# Patient Record
Sex: FEMALE | Race: WHITE | Employment: OTHER | URBAN - METROPOLITAN AREA
[De-identification: names, ages, dates, MRNs, and addresses within clinical notes are randomized per-mention and may not be internally consistent; named-entity substitution may affect disease eponyms.]

---

## 2017-05-09 ENCOUNTER — HOSPITAL ENCOUNTER (INPATIENT)
Facility: HOSPITAL | Age: 67
LOS: 9 days | Discharge: HOME | DRG: 268 | End: 2017-05-18
Attending: EMERGENCY MEDICINE | Admitting: SURGERY
Payer: MEDICARE

## 2017-05-09 ENCOUNTER — OFFICE VISIT (OUTPATIENT)
Dept: FAMILY MEDICINE CLINIC | Facility: CLINIC | Age: 67
End: 2017-05-09

## 2017-05-09 ENCOUNTER — SURGERY (OUTPATIENT)
Age: 67
End: 2017-05-09

## 2017-05-09 ENCOUNTER — ANESTHESIA (OUTPATIENT)
Dept: CARDIAC SURGERY | Facility: HOSPITAL | Age: 67
End: 2017-05-09

## 2017-05-09 ENCOUNTER — APPOINTMENT (OUTPATIENT)
Dept: CT IMAGING | Age: 67
DRG: 268 | End: 2017-05-09
Attending: EMERGENCY MEDICINE
Payer: MEDICARE

## 2017-05-09 ENCOUNTER — ANESTHESIA EVENT (OUTPATIENT)
Dept: CARDIAC SURGERY | Facility: HOSPITAL | Age: 67
End: 2017-05-09

## 2017-05-09 VITALS
OXYGEN SATURATION: 99 % | RESPIRATION RATE: 18 BRPM | HEART RATE: 84 BPM | BODY MASS INDEX: 25.77 KG/M2 | DIASTOLIC BLOOD PRESSURE: 70 MMHG | HEIGHT: 70 IN | SYSTOLIC BLOOD PRESSURE: 120 MMHG | WEIGHT: 180 LBS | TEMPERATURE: 98 F

## 2017-05-09 DIAGNOSIS — R10.31 RIGHT LOWER QUADRANT ABDOMINAL PAIN: Primary | ICD-10-CM

## 2017-05-09 DIAGNOSIS — I71.3 RUPTURED ABDOMINAL AORTIC ANEURYSM (AAA) (HCC): Primary | ICD-10-CM

## 2017-05-09 PROBLEM — I71.30 RUPTURED ABDOMINAL AORTIC ANEURYSM (AAA): Status: ACTIVE | Noted: 2017-05-09

## 2017-05-09 PROBLEM — R77.8 ELEVATED TROPONIN: Status: ACTIVE | Noted: 2017-05-09

## 2017-05-09 PROBLEM — E87.1 HYPONATREMIA: Status: ACTIVE | Noted: 2017-05-09

## 2017-05-09 PROBLEM — I71.30 RUPTURED ABDOMINAL AORTIC ANEURYSM (AAA) (HCC): Status: ACTIVE | Noted: 2017-05-09

## 2017-05-09 PROBLEM — R79.89 ELEVATED TROPONIN: Status: ACTIVE | Noted: 2017-05-09

## 2017-05-09 PROBLEM — D64.9 ANEMIA: Status: ACTIVE | Noted: 2017-05-09

## 2017-05-09 PROCEDURE — 76376 3D RENDER W/INTRP POSTPROCES: CPT | Performed by: EMERGENCY MEDICINE

## 2017-05-09 PROCEDURE — 99291 CRITICAL CARE FIRST HOUR: CPT | Performed by: INTERNAL MEDICINE

## 2017-05-09 PROCEDURE — 05HM33Z INSERTION OF INFUSION DEVICE INTO RIGHT INTERNAL JUGULAR VEIN, PERCUTANEOUS APPROACH: ICD-10-PCS | Performed by: HOSPITALIST

## 2017-05-09 PROCEDURE — 99222 1ST HOSP IP/OBS MODERATE 55: CPT | Performed by: INTERNAL MEDICINE

## 2017-05-09 PROCEDURE — 74174 CTA ABD&PLVS W/CONTRAST: CPT | Performed by: EMERGENCY MEDICINE

## 2017-05-09 PROCEDURE — 04100J8 BYPASS ABDOMINAL AORTA TO BILATERAL COMMON ILIAC ARTERIES WITH SYNTHETIC SUBSTITUTE, OPEN APPROACH: ICD-10-PCS | Performed by: SURGERY

## 2017-05-09 PROCEDURE — 04R00JZ REPLACEMENT OF ABDOMINAL AORTA WITH SYNTHETIC SUBSTITUTE, OPEN APPROACH: ICD-10-PCS | Performed by: SURGERY

## 2017-05-09 DEVICE — IMPLANTABLE DEVICE: Type: IMPLANTABLE DEVICE | Site: AORTA | Status: FUNCTIONAL

## 2017-05-09 RX ORDER — SODIUM CHLORIDE, SODIUM LACTATE, POTASSIUM CHLORIDE, CALCIUM CHLORIDE 600; 310; 30; 20 MG/100ML; MG/100ML; MG/100ML; MG/100ML
INJECTION, SOLUTION INTRAVENOUS CONTINUOUS
Status: DISCONTINUED | OUTPATIENT
Start: 2017-05-09 | End: 2017-05-12

## 2017-05-09 RX ORDER — MORPHINE SULFATE 4 MG/ML
8 INJECTION, SOLUTION INTRAMUSCULAR; INTRAVENOUS
Status: DISCONTINUED | OUTPATIENT
Start: 2017-05-09 | End: 2017-05-17

## 2017-05-09 RX ORDER — NITROGLYCERIN 20 MG/100ML
INJECTION INTRAVENOUS
Status: DISCONTINUED | OUTPATIENT
Start: 2017-05-09 | End: 2017-05-15

## 2017-05-09 RX ORDER — CEFAZOLIN SODIUM 1 G/3ML
INJECTION, POWDER, FOR SOLUTION INTRAMUSCULAR; INTRAVENOUS
Status: DISCONTINUED | OUTPATIENT
Start: 2017-05-09 | End: 2017-05-09 | Stop reason: HOSPADM

## 2017-05-09 RX ORDER — ONDANSETRON 2 MG/ML
4 INJECTION INTRAMUSCULAR; INTRAVENOUS ONCE
Status: COMPLETED | OUTPATIENT
Start: 2017-05-09 | End: 2017-05-09

## 2017-05-09 RX ORDER — MORPHINE SULFATE 4 MG/ML
4 INJECTION, SOLUTION INTRAMUSCULAR; INTRAVENOUS
Status: DISCONTINUED | OUTPATIENT
Start: 2017-05-09 | End: 2017-05-17

## 2017-05-09 RX ORDER — MORPHINE SULFATE 2 MG/ML
2 INJECTION, SOLUTION INTRAMUSCULAR; INTRAVENOUS
Status: DISCONTINUED | OUTPATIENT
Start: 2017-05-09 | End: 2017-05-17

## 2017-05-09 RX ORDER — ONDANSETRON 2 MG/ML
4 INJECTION INTRAMUSCULAR; INTRAVENOUS EVERY 6 HOURS PRN
Status: DISCONTINUED | OUTPATIENT
Start: 2017-05-09 | End: 2017-05-18

## 2017-05-09 RX ORDER — ASPIRIN 325 MG
325 TABLET, DELAYED RELEASE (ENTERIC COATED) ORAL NIGHTLY
Status: DISCONTINUED | OUTPATIENT
Start: 2017-05-09 | End: 2017-05-18

## 2017-05-09 RX ORDER — NICOTINE 21 MG/24HR
1 PATCH, TRANSDERMAL 24 HOURS TRANSDERMAL DAILY
Status: DISCONTINUED | OUTPATIENT
Start: 2017-05-09 | End: 2017-05-18

## 2017-05-09 RX ORDER — SODIUM CHLORIDE 9 MG/ML
INJECTION, SOLUTION INTRAVENOUS CONTINUOUS
Status: ACTIVE | OUTPATIENT
Start: 2017-05-09 | End: 2017-05-09

## 2017-05-09 RX ORDER — SODIUM CHLORIDE 9 MG/ML
INJECTION, SOLUTION INTRAVENOUS CONTINUOUS
Status: DISCONTINUED | OUTPATIENT
Start: 2017-05-09 | End: 2017-05-12

## 2017-05-09 RX ORDER — NALBUPHINE HCL 10 MG/ML
2.5 AMPUL (ML) INJECTION EVERY 4 HOURS PRN
Status: DISCONTINUED | OUTPATIENT
Start: 2017-05-09 | End: 2017-05-17

## 2017-05-09 RX ORDER — NALOXONE HYDROCHLORIDE 0.4 MG/ML
0.08 INJECTION, SOLUTION INTRAMUSCULAR; INTRAVENOUS; SUBCUTANEOUS
Status: DISCONTINUED | OUTPATIENT
Start: 2017-05-09 | End: 2017-05-18

## 2017-05-09 RX ORDER — DIPHENHYDRAMINE HYDROCHLORIDE 50 MG/ML
12.5 INJECTION INTRAMUSCULAR; INTRAVENOUS EVERY 4 HOURS PRN
Status: DISCONTINUED | OUTPATIENT
Start: 2017-05-09 | End: 2017-05-18

## 2017-05-09 RX ORDER — ONDANSETRON 2 MG/ML
4 INJECTION INTRAMUSCULAR; INTRAVENOUS EVERY 6 HOURS PRN
Status: DISCONTINUED | OUTPATIENT
Start: 2017-05-09 | End: 2017-05-09

## 2017-05-09 RX ORDER — HYDROMORPHONE HYDROCHLORIDE 1 MG/ML
0.4 INJECTION, SOLUTION INTRAMUSCULAR; INTRAVENOUS; SUBCUTANEOUS EVERY 30 MIN PRN
Status: DISCONTINUED | OUTPATIENT
Start: 2017-05-09 | End: 2017-05-16

## 2017-05-09 NOTE — PROGRESS NOTES
05/09/17 1157   Clinical Encounter Type   Visited With Family   Jainism Encounters   Jainism Needs (Jewish/prayer)   Family Spiritual Encounters   Family Coping ( provided emotional care of family.)

## 2017-05-09 NOTE — PLAN OF CARE
Received from OR accompanied , on RA. NG placed to LIS. Following commands, complaining of incisional pain, morphine initially given, then dilaudid pca started. 2 bolus doses given in addition with good pain control, currently sleeping.  Small amoun

## 2017-05-09 NOTE — ANESTHESIA POSTPROCEDURE EVALUATION
[place] Patient Status:  Inpatient   Age/Gender 78year old female MRN [id number]   [facility] 6NE-A Attending Brett Plummer MD   Hosp Day # 0 PCP Tamera Mendoza MD       Anesthesia Post-op Note    Procedure(s)

## 2017-05-09 NOTE — CONSULTS
[facility]    PATIENT'S NAME: Ludy Morales   ATTENDING PHYSICIAN: Ya Garg M.D.   CONSULTING PHYSICIAN: Ariadna Rolle M.D.    PATIENT ACCOUNT#:   [de-identified]    LOCATION:  [address] [place] [address]  MEDICAL RECORD #:   [id number]       DATE OF BIRTH: No neck vein distention, carotid bruits, thyroid enlargement. LUNGS:  Clear. HEART:  No murmurs, gallops, or rubs. ABDOMEN:  Mildly distended. There are normal bowel sounds present. EXTREMITIES:  Without clubbing, cyanosis, or edema.   Careful palpatio for adrenal tumor evaluation. 5.   If she has no coronary ischemic symptoms during the admission, we can consider her for an outpatient Lexiscan stress test soon after discharge, assuming echo shows no wall motion abnormalities.     6.   She would benefit

## 2017-05-09 NOTE — CONSULTS
[facility]  Report of Consultation    Ann Mariecamelia Fraserh Patient Status:  Inpatient    1950 MRN [id number]   [facility] 6NE-A Attending Lindsey Liu MD   Hosp Day # 0 PCP Suze Del Castillo MD     Reason for Consultation:  Medical removal     Family History   Problem Relation Age of Onset   • Heart Disorder Neg    • Diabetes Neg    • Hypertension Neg    • Cancer Neg      Social History:   reports that she has been smoking Cigarettes. She has a 23.5 pack-year smoking history.  She ha pulse 78, temperature 96.8 °F (36 °C), temperature source Temporal, resp. rate 17, height 5' 10\" (1.778 m), weight 185 lb (83.915 kg), SpO2 92 %. General: No acute distress. Alert and oriented x 3. HEENT: Moist mucous membranes. EOM-I.  PERRL  Neck: No l surgeon  2. Leukocytosis-possibly stress-induced. Patient given IV Ancef and vancomycin perioperatively by surgeon. Follow CBC. Patient afebrile. UA slightly cloudy no WBC, nitrites and leukoesterase negative. Follow urine culture and chest x-ray.   3.

## 2017-05-09 NOTE — ED NOTES
CT complete. EAS to CT to transport pt to [facility]. No change in status.   Pt continues to c/o back pain

## 2017-05-09 NOTE — CONSULTS
[facility]  Report of Consultation    Max Main Patient Status:  Inpatient    1950 MRN [id number]   [facility] 6NE-A Attending Steve Montgomery MD   Hosp Day # 0 PCP Nicole Blizzard, MD     Reason for Consultation:  Julia López **OR** morphINE sulfate (PF) 4 MG/ML injection 8 mg, 8 mg, Intravenous, Q1H PRN  •  Vancomycin HCl (VANCOCIN) 1,250 mg in sodium chloride 0.9 % 500 mL IVPB, 15 mg/kg, Intravenous, Once  •  nitroGLYCERIN infusion 50mg in D5W 250ml, 5-400 mcg/min, Intravenou 0.6 05/09/2017   TP 8.5 05/09/2017   AST 16 05/09/2017   ALT 17 05/09/2017   LIP 82 05/09/2017   PGLU 142 05/09/2017         BUN (mg/dL)   Date Value   05/09/2017 11   04/23/2014 14   ----------  CREATININE (mg/dL)   Date Value   05/09/2017 0.79   04/23/20

## 2017-05-09 NOTE — ANESTHESIA PREPROCEDURE EVALUATION
PRE-OP EVALUATION    Patient Name: Julieta Hobbs    Pre-op Diagnosis: abdominal aortic aneurysm    Procedure(s):  endovascular repair of abdominal aortic aneurysm    Surgeon(s) and Role:     Purvi Fountain MD - Primary    Pre-op vitals reviewed.   Temp 05/09/2017   CREATSERUM 0.79 05/09/2017   * 05/09/2017   CA 10.1 05/09/2017            Airway      Mallampati: II  Mouth opening: >3 FB  TM distance: > 6 cm  Neck ROM: full Cardiovascular    Cardiovascular exam normal.         Dental    No notable d

## 2017-05-09 NOTE — CONSULTS
alexa amg cardiology consult: full note dictated    77 y/o wf presents with abdominal pain. Sent to [facility] where a huge AAA was found. By cta appeared to have some dye in wall of aneurysm. She went directly to OR. She is postop awake and extubated.  Sh

## 2017-05-09 NOTE — ED PROVIDER NOTES
Patient Seen in: [facility] Cardiovascular Surgery    History   Patient presents with:  Abdomen/Flank Pain (GI/)  Nausea/Vomiting/Diarrhea (gastrointestinal)    Stated Complaint: abd pain, urinary frequency sent by [facility]    HPI    This is a 66-year- Temp(Src) 98 °F (36.7 °C) (Oral)  Resp 16  Ht 177.8 cm (5' 10\")  Wt 83.915 kg  BMI 26.54 kg/m2  SpO2 100%        Physical Exam    The patient is alert and oriented ×3 appears somewhat uncomfortable HEENT exam within normal limits neck there is no lymphade CBC W/ DIFFERENTIAL[[id number]]          Abnormal            Final result                 Please view results for these tests on the individual orders. TYPE AND SCREEN    Narrative: The following orders were created for panel order TYPE AND SCREEN. Other etiologies include pheochromocytoma or metastases. 3. Nonobstructing right renal calculus measuring 11 mm.  4. Bilateral renal cysts. 5. Cholelithiasis. 6. There are numerous other additional incidental findings.  Please see  Results as above shows

## 2017-05-09 NOTE — PROGRESS NOTES
Pt presents to [facility] for lower right sided abd pain x 3 days. Pt states she has had n/v today. Pt states it \"feels like a kidney infection\" which she has had in the past. Pt states the pain is a 7 or 8/10 and is constant.  Pt states she can't stop pacing d/

## 2017-05-09 NOTE — CONSULTS
[facility] Pharmacy Note:  Pain Consult    Elias Torres is a 78year old female started on Dilaudid PCA by Dr. Sj Javier. Pharmacy was consulted to review medication profile and to discontinue previously ordered narcotics and sedatives.     Medication profile was

## 2017-05-10 ENCOUNTER — APPOINTMENT (OUTPATIENT)
Dept: CV DIAGNOSTICS | Facility: HOSPITAL | Age: 67
DRG: 268 | End: 2017-05-10
Attending: INTERNAL MEDICINE
Payer: MEDICARE

## 2017-05-10 ENCOUNTER — APPOINTMENT (OUTPATIENT)
Dept: GENERAL RADIOLOGY | Facility: HOSPITAL | Age: 67
DRG: 268 | End: 2017-05-10
Attending: INTERNAL MEDICINE
Payer: MEDICARE

## 2017-05-10 PROCEDURE — 71010 XR CHEST AP PORTABLE  (CPT=71010): CPT | Performed by: INTERNAL MEDICINE

## 2017-05-10 PROCEDURE — 99233 SBSQ HOSP IP/OBS HIGH 50: CPT | Performed by: INTERNAL MEDICINE

## 2017-05-10 PROCEDURE — 93306 TTE W/DOPPLER COMPLETE: CPT | Performed by: INTERNAL MEDICINE

## 2017-05-10 PROCEDURE — 99232 SBSQ HOSP IP/OBS MODERATE 35: CPT | Performed by: INTERNAL MEDICINE

## 2017-05-10 RX ORDER — DILTIAZEM HYDROCHLORIDE 5 MG/ML
INJECTION INTRAVENOUS
Status: COMPLETED
Start: 2017-05-10 | End: 2017-05-10

## 2017-05-10 RX ORDER — METOPROLOL TARTRATE 5 MG/5ML
2.5 INJECTION INTRAVENOUS EVERY 6 HOURS
Status: DISCONTINUED | OUTPATIENT
Start: 2017-05-10 | End: 2017-05-13

## 2017-05-10 RX ORDER — DEXTROSE, SODIUM CHLORIDE, AND POTASSIUM CHLORIDE 5; .45; .15 G/100ML; G/100ML; G/100ML
INJECTION INTRAVENOUS CONTINUOUS
Status: DISCONTINUED | OUTPATIENT
Start: 2017-05-10 | End: 2017-05-12

## 2017-05-10 RX ORDER — POTASSIUM CHLORIDE 14.9 MG/ML
20 INJECTION INTRAVENOUS ONCE
Status: COMPLETED | OUTPATIENT
Start: 2017-05-10 | End: 2017-05-10

## 2017-05-10 RX ORDER — DILTIAZEM HYDROCHLORIDE 5 MG/ML
10 INJECTION INTRAVENOUS ONCE
Status: COMPLETED | OUTPATIENT
Start: 2017-05-10 | End: 2017-05-10

## 2017-05-10 NOTE — CONSULTS
[facility]    PATIENT'S NAME: Katy Saunders   ATTENDING PHYSICIAN: AYSE Oropeza PHYSICIAN: Keiko Castañeda M.D.    PATIENT ACCOUNT#:   [de-identified]    LOCATION:  [address] [place] [address]  MEDICAL RECORD #:   [id number]       DATE OF BIRTH: [birth year].  Heart rate 72 to 84, respirations 20. HEENT:  Pupils equal, round. LUNGS:  Breath sounds bilaterally. HEART:  Appeared normal without a murmur. ABDOMEN:  Soft. There was a large pulsatile mass, tender.   EXTREMITIES:  The femoral pulses are palpab AYSE [place]wood

## 2017-05-10 NOTE — PROGRESS NOTES
[facility]  Nephrology Progress Note    Ela Escalona Patient Status:  Inpatient    1950 MRN [id number]   [facility] 6NE-A Attending Richar Kerns MD   Hosp Day # 1 PCP Iline Felty, MD       SUBJECTIVE:  Awake and alert PGLU  142*       Meds:     Current Facility-Administered Medications:  dextrose 5 % and 0.45 % NaCl with KCl 20 mEq infusion  Intravenous Continuous   metoprolol Tartrate (LOPRESSOR) 5 MG/5ML injection 2.5 mg 2.5 mg Intravenous Q6H   0.9%  NaCl infusion

## 2017-05-10 NOTE — PROGRESS NOTES
[facility]  Progress Note     Nericorina Rosario Patient Status:  Inpatient    1950 MRN [id number]   [facility] 6NE-A Attending Melania Singleton MD   Hosp Day # 1 PCP Stacy Spencer MD     Chief Complaint: medical managemnt aft Conclusions:    1. Left ventricle: The cavity size was mildly increased. Wall thickness was    normal. Systolic function was moderately reduced. The estimated ejection fraction was 30-35%.  The study is not technically sufficient to allow evaluation of LV d demand ischemia–cardiology  Following,  troponin lower but echo shows reduced LVEF, macy will need cardiac ischemic w/u ? LHC when renal function stable   Meds per cards    7. Nicotine addiction–will give nicotine patch, smoking cessation advised  8.  Inc

## 2017-05-10 NOTE — PROGRESS NOTES
[facility]  Progress Note    Chapis Hollismilagros     Subjective:  No chest pain or shortness of breath. No significant abd pain with current pain meds. i again checked for history of outpt chest pain or hoskins: she denies.        Intake/Output:    Intake/Outp Facility-Administered Medications:  dextrose 5 % and 0.45 % NaCl with KCl 20 mEq infusion  Intravenous Continuous   metoprolol Tartrate (LOPRESSOR) 5 MG/5ML injection 2.5 mg 2.5 mg Intravenous Q6H   0.9%  NaCl infusion  Intravenous Continuous   aspirin EC adrenal mass  7. Ct chest when able to look more closely at lungs.      Gilmer Isaac  5/10/2017  11:45 AM

## 2017-05-10 NOTE — PLAN OF CARE
Sitting up to side of bed when went into rapid afib with RVR, 150-160's, denies any dizziness. EKG done.  called and orders received. Cardizem given with no change, amio bolus and infusion initiated.  Began to complain of upper rt arm discomfort

## 2017-05-10 NOTE — CONSULTS
INFECTIOUS DISEASE CONSULT NOTE    Miah Fernandez Patient Status:  Inpatient    1950 MRN [id number]   [facility] 6NE-A Attending Lauri Torres MD   Hosp Day # 1 PCP Jen Gomez 2.5 mg, 2.5 mg, Intravenous, Q6H  •  0.9%  NaCl infusion, , Intravenous, Continuous  •  aspirin EC EC tab 325 mg, 325 mg, Oral, Nightly  •  lactated ringers infusion, , Intravenous, Continuous  •  morphINE sulfate (PF) 2 MG/ML injection 2 mg, 2 mg, Atrur Ruano Exam:    General: No acute distress. Alert and oriented x 3. Vital signs: Blood pressure 106/64, pulse 96, temperature 100.3 °F (37.9 °C), temperature source Temporal, resp. rate 20, height 5' 10\" (1.778 m), weight 185 lb (83.915 kg), SpO2 92 %.   HEENT: RBCUR  6-10*   BACUR  1+*   EPIUR  Moderate*       Microbiology    Reviewed in EMR,   Ucx neg to date    Radiology: Cta Abd/pel (uvx=[id number])    5/9/2017  PROCEDURE:  CTA ABD/PEL (IGU=[id number]) COMPARISON:  None.   INDICATIONS:  abd pain, urinary frequency sen patent. LIVER:  There are a couple of small subcentimeter hypoattenuating lesions within the liver measuring 7 mm and 4 mm within the medial segment and lateral segment of the left lobes. These lesions are too small to definitively characterize.  There is m Mild dependent atelectasis. A few small blebs are seen in both lungs suggesting possibility of emphysematous disease. OTHER:  Negative. 5/9/2017  CONCLUSION:  1. Massive fusiform infrarenal abdominal aortic aneurysm with evidence of leakage.  The crit recommended. [place], CTA ABD/PEL (OVW=[id number]), 5/09/2017, 9:57. INDICATIONS:  postop AAA resection. adrenal tumor- r/o lung mass  PATIENT STATED HISTORY: (As transcribed by Technologist)     FINDINGS: Lung volumes are low.  The right [place]-Christine catheter s

## 2017-05-10 NOTE — PHYSICAL THERAPY NOTE
PHYSICAL THERAPY EVALUATION - INPATIENT     Room Number: 0324/8940-R  Evaluation Date: 5/10/2017  Type of Evaluation: Initial  Physician Order: PT Eval and Treat    Presenting Problem: AAA repair  Reason for Therapy: Mobility Dysfunction and Discharge extremity ROM and strength are within functional limits     Lower extremity ROM is within functional limits except for the following:  R hip flexion to 70 due to incisional pain    Lower extremity strength is within functional limits Right Hip flexion  3-/ min a for balance limited by fatigue. Patient requesting to return to supine. Sit to supine with min a for trunk and BLEs. Patient educated to perform ankle pumps. Discussed current recommendation for use of rw and plan for discharge home.   Patient als independent     Goal #4 Ascend/descend flight of stairs with rail modified Indepedent   Goal #5    Goal #6    Goal Comments: Goals established on 5/10/2017

## 2017-05-10 NOTE — PROGRESS NOTES
Awake/alert. Denies chest pain/shortness of breath. Has gone inti S-zci-qteslgepd being treated-awaiting cardizem drip. Neurologically intact. Urine output good. Continue supportive care- cardiology following cardiac status. CSA to follow for tomorrow.

## 2017-05-10 NOTE — PROGRESS NOTES
05/10/17 0831   Clinical Encounter Type   Visited With Patient and family together  ( )   Routine Visit Introduction   Continue Visiting No  (Will call as needed)   Patient's Supportive Strategies/Resources  assisted patient/ to id

## 2017-05-10 NOTE — PLAN OF CARE
Received this am, awake, sitting up in chair, dilaudid pca for pain control, rates pain 5/10, comfortable per pt. PT/OT at bedside, ambulated to door. ECHO done. NG to LIS, taking small amounts of ice chips.  consulted.  Plan of care updated, Conrado Sorto

## 2017-05-11 ENCOUNTER — APPOINTMENT (OUTPATIENT)
Dept: ULTRASOUND IMAGING | Facility: HOSPITAL | Age: 67
DRG: 268 | End: 2017-05-11
Attending: SURGERY
Payer: MEDICARE

## 2017-05-11 ENCOUNTER — PRIOR ORIGINAL RECORDS (OUTPATIENT)
Dept: OTHER | Age: 67
End: 2017-05-11

## 2017-05-11 PROCEDURE — 93970 EXTREMITY STUDY: CPT | Performed by: SURGERY

## 2017-05-11 PROCEDURE — 99233 SBSQ HOSP IP/OBS HIGH 50: CPT | Performed by: INTERNAL MEDICINE

## 2017-05-11 PROCEDURE — 05H533Z INSERTION OF INFUSION DEVICE INTO RIGHT SUBCLAVIAN VEIN, PERCUTANEOUS APPROACH: ICD-10-PCS | Performed by: HOSPITALIST

## 2017-05-11 RX ORDER — SODIUM CHLORIDE 0.9 % (FLUSH) 0.9 %
10 SYRINGE (ML) INJECTION EVERY 12 HOURS
Status: DISCONTINUED | OUTPATIENT
Start: 2017-05-11 | End: 2017-05-18

## 2017-05-11 RX ORDER — ECHINACEA PURPUREA EXTRACT 125 MG
1 TABLET ORAL
Status: DISCONTINUED | OUTPATIENT
Start: 2017-05-11 | End: 2017-05-18

## 2017-05-11 NOTE — CM/SW NOTE
Opened per case find. Pt admitted 5/9 with ruptured AAA. Met with pt and . They are both retired. Pt is independent. Owns a rolling walker and cane. No hx of rehab or HH. Pt may consider HH. Pt/ot working with pt and recommend home.    Pt still

## 2017-05-11 NOTE — PROGRESS NOTES
[address] [place] [address]  TEL: [phone]  FAX: [phone]    Cecil Romulo Patient Status:  Inpatient    1950 MRN [id number]   [facility] 6NE-A Attending Marla Reyes MD   Hosp Day # 2 PCP Peter Robles 8. 5   ALB  3.5   --    --   2.0*   NA  135*  141  144  139   K  4.2  4.2  3.7  4.2  4.2   CL  102  109  110  108   CO2  24.0  22.0  27.0  27.0   ALKPHO  96   --    --   72   AST  16   --    --   137*   ALT  17   --    --   28   BILT  0.6   --    --   0.5 Follow up recommended.    Dictated by: Jose Correia MD on 5/10/2017 at 7:59     Approved by: Jose Correia MD      Dictated by: Jose Correia MD on 5/10/2017 at 8:01     Approved by: Jose Correia MD             5/10/2017  PROCEDURE:  XR [facility] AND [facility]

## 2017-05-11 NOTE — PROGRESS NOTES
[facility]  Progress Note     Simba Hart Patient Status:  Inpatient    1950 MRN [id number]   [facility] 6NE-A Attending Michaelle Smalls MD   Hosp Day # 2 PCP Deb Davidson MD     Chief Complaint: medical management af PTP  14.6*   INR  1.13*       Recent Labs   Lab  05/09/17   1549  05/09/17   2208  05/10/17   0525   TROP  0.099*  1.500*  0.873*   CK  50   --    --           Imaging: Imaging data reviewed in Epic.     MICRO:   Blood P   Medications:   • vancomycin  15 addiction– nicotine patch, smoking cessation advised  9. Incidental gallstones-no pericholecystic inflammation seen on CT.  Intrahepatic bile ducts appear mildly dilated- follow LFT's,  10.  Incidental left kidney cyst and 11 mm non obstructing calculus in

## 2017-05-11 NOTE — PROGRESS NOTES
[facility]  Progress Note    Pascual Martell     Subjective:  Denies chest pain or shortness of breath      Intake/Output:    Intake/Output Summary (Last 24 hours) at 05/11/17 1251  Last data filed at 05/11/17 0700   Gross per 24 hour   Intake 3002. 92 oral liquid spray 1-5 spray 1-5 spray Oral Q2H PRN   dextrose 5 % and 0.45 % NaCl with KCl 20 mEq infusion  Intravenous Continuous   metoprolol Tartrate (LOPRESSOR) 5 MG/5ML injection 2.5 mg 2.5 mg Intravenous Q6H   Amiodarone HCl (CORDARONE) 450 mg in dex in past  5. Pvcs: will need iv lopressor when BP allows  6. Adrenal mass: needs evaluation when recovering.      Plan:   - agree with IV antibiotics and IV fluids   - consider decreasing rate to 50 cc/hr and add an ionotrope such as milrinone/dobutamine

## 2017-05-11 NOTE — PROGRESS NOTES
[facility] dosing service    Initial Pharmacokinetic Consult for Vancomycin Dosing     Letha Vallejo is a 78year old female admitted on 5/9/17 who is being treated for leukocytosis post AAA. Pharmacy has been asked to dose Vancomycin by Dr. Ang Malhotra. Vancomycin trough level prior to the 4th dose. Goal trough level 15-20 ug/mL. 3.  Pharmacy will need BUN/Scr daily while on Vancomycin to assess renal function. 4.  Pharmacy will follow and monitor renal function changes, toxicity and efficacy.     Ph

## 2017-05-12 ENCOUNTER — APPOINTMENT (OUTPATIENT)
Dept: GENERAL RADIOLOGY | Facility: HOSPITAL | Age: 67
DRG: 268 | End: 2017-05-12
Attending: NURSE PRACTITIONER
Payer: MEDICARE

## 2017-05-12 ENCOUNTER — APPOINTMENT (OUTPATIENT)
Dept: ULTRASOUND IMAGING | Facility: HOSPITAL | Age: 67
DRG: 268 | End: 2017-05-12
Attending: NURSE PRACTITIONER
Payer: MEDICARE

## 2017-05-12 PROCEDURE — 99233 SBSQ HOSP IP/OBS HIGH 50: CPT | Performed by: INTERNAL MEDICINE

## 2017-05-12 PROCEDURE — 93970 EXTREMITY STUDY: CPT | Performed by: NURSE PRACTITIONER

## 2017-05-12 PROCEDURE — 71010 XR CHEST AP PORTABLE  (CPT=71010): CPT | Performed by: NURSE PRACTITIONER

## 2017-05-12 RX ORDER — POTASSIUM CHLORIDE 29.8 MG/ML
40 INJECTION INTRAVENOUS ONCE
Status: COMPLETED | OUTPATIENT
Start: 2017-05-12 | End: 2017-05-13

## 2017-05-12 RX ORDER — DIGOXIN 0.25 MG/ML
250 INJECTION INTRAMUSCULAR; INTRAVENOUS ONCE
Status: COMPLETED | OUTPATIENT
Start: 2017-05-12 | End: 2017-05-12

## 2017-05-12 RX ORDER — DILTIAZEM HYDROCHLORIDE 5 MG/ML
10 INJECTION INTRAVENOUS ONCE
Status: COMPLETED | OUTPATIENT
Start: 2017-05-12 | End: 2017-05-12

## 2017-05-12 RX ORDER — IPRATROPIUM BROMIDE AND ALBUTEROL SULFATE 2.5; .5 MG/3ML; MG/3ML
3 SOLUTION RESPIRATORY (INHALATION) EVERY 6 HOURS PRN
Status: DISCONTINUED | OUTPATIENT
Start: 2017-05-12 | End: 2017-05-18

## 2017-05-12 RX ORDER — POTASSIUM CHLORIDE 14.9 MG/ML
20 INJECTION INTRAVENOUS ONCE
Status: COMPLETED | OUTPATIENT
Start: 2017-05-12 | End: 2017-05-12

## 2017-05-12 RX ORDER — FUROSEMIDE 10 MG/ML
40 INJECTION INTRAMUSCULAR; INTRAVENOUS
Status: DISCONTINUED | OUTPATIENT
Start: 2017-05-12 | End: 2017-05-15

## 2017-05-12 RX ORDER — DILTIAZEM HYDROCHLORIDE 5 MG/ML
INJECTION INTRAVENOUS
Status: DISPENSED
Start: 2017-05-12 | End: 2017-05-13

## 2017-05-12 RX ORDER — FUROSEMIDE 10 MG/ML
20 INJECTION INTRAMUSCULAR; INTRAVENOUS ONCE
Status: COMPLETED | OUTPATIENT
Start: 2017-05-12 | End: 2017-05-12

## 2017-05-12 RX ORDER — FUROSEMIDE 10 MG/ML
40 INJECTION INTRAMUSCULAR; INTRAVENOUS ONCE
Status: COMPLETED | OUTPATIENT
Start: 2017-05-12 | End: 2017-05-12

## 2017-05-12 NOTE — PROGRESS NOTES
[facility]  Progress Note     Max Jose David Patient Status:  Inpatient    1950 MRN [id number]   [facility] 6NE-A Attending Steve Montgomery MD   Hosp Day # 3 PCP Nicole Blizzard, MD     Chief Complaint: medical management af ALKPHO  96   --    --   72  96   AST  16   --    --   137*  72*   ALT  17   --    --   28  30   BILT  0.6   --    --   0.5  0.8   TP  8.5*   --    --   6.0*  6.1    < > = values in this interval not displayed.        Recent Labs   Lab  05/09/17   1525   P dimer  3. Add nebs  4. Needs gently dieursis- lasix 20 mg IV now   5. Cont IS- only getting 750 today    6. Expected anemia of acute blood loss– Hgb stable 10.1     Follow   7. Hyponatremia secondary dehydration–resolved  8.  Elevated troponin possibly due

## 2017-05-12 NOTE — PHYSICAL THERAPY NOTE
PHYSICAL THERAPY TREATMENT NOTE - INPATIENT    Room Number: 5259/1071-U     Session: 1   Number of Visits to Meet Established Goals: 5    Presenting Problem: AAA repair    Problem List  Principal Problem:    Ruptured abdominal aortic aneurysm (AAA) ([facility] [address] Score:  Raw Score: 18   PT Approx Degree of Impairment Score: 46.58%   Standardized Score (AM-PAC Scale): 43.63   CMS Modifier (G-Code): CK    FUNCTIONAL ABILITY STATUS  Gait Assessment   Gait Assistance: Maximum assistance  Distance (ft): 50  Assistive De of function. The patient scored 13/20 on the Elderly Mobility Scale this session, indicating patient is borderline in terms of safe mobility, however, confident that this patient will progress during hospital admission.  Recommend Pt D/C home with HHPT in o

## 2017-05-12 NOTE — PLAN OF CARE
Received this am awake and alert, dilaudid pca with good pain control. Denies nausea,  at bedside, ng clamped, will assess for possible removal later today. Crackles heard, lasix given, ivf's dc'd as ordered.  Weaning high flow oxygen as able to [employer]

## 2017-05-12 NOTE — PLAN OF CARE
At 1600 ng hooked to suction, only 50cc out. Denied any nausea or abd discomfort while clamped.  called and updated, ok to remove NG. Once NG removed, sao2 increased to >94% on room air, pt stating \"I feel so much better\".

## 2017-05-12 NOTE — OCCUPATIONAL THERAPY NOTE
OCCUPATIONAL THERAPY EVALUATION - INPATIENT     Room Number: 9813/3832-K  Evaluation Date: 5/12/2017  Type of Evaluation: Initial  Presenting Problem: ruptured abdominal aortic aneurysm    Physician Order: IP Consult to Occupational Therapy  Reason for The Pt able to appropriately participate in conversation during session. Behavioral/Emotional/Social: Pt was cooperative and motivated during session. RANGE OF MOTION AND STRENGTH ASSESSMENT  Upper extremity ROM is impaired R shdr actively.   Pt able t light within reach;RN aware of session/findings; All patient questions and concerns addressed;SCDs in place    ASSESSMENT     Patient is a 78year old female admitted 5/9/2017 for ruptured abdominal aortic aneurysm.   In this OT evaluation patient presents w

## 2017-05-12 NOTE — PROGRESS NOTES
[facility]  Progress Note    Harvey Ly Patient Status:  Inpatient    1950 MRN [id number]   [facility] 6NE-A Attending Colleen Solomon MD   Hosp Day # 3 PCP Deon Hoffman MD       Subjective:   Worsening resp status overn ALKPHO 96 05/12/2017   BILT 0.8 05/12/2017   TP 6.1 05/12/2017   AST 72 05/12/2017   ALT 30 05/12/2017   MG 2.2 05/12/2017       Medications:    • vancomycin  15 mg/kg Intravenous Q12H   • Normal Saline Flush  10 mL Intravenous Q12H   • metoprolol Cristóbal Moscoso understands about her heart being moderately weakened. The atrial fib and pvcs/couplets and a couple days ago 2 triplets, all manifestations of her silent cardiac disease.      I think the best option would be to diurese her slowly, the plan on a radial art

## 2017-05-12 NOTE — PROGRESS NOTES
[facility]  TEL: [phone]  FAX: [phone]    [facility] Patient Status:  Inpatient    1950 MRN [id number]   [facility] 6NE-A Attending Lindsey Liu MD   Hosp Day # 3 PCP Robbie Avelar 27.0  27.0  25.0   ALKPHO   --   72  96   AST   --   137*  72*   ALT   --   28  30   BILT   --   0.5  0.8   TP   --   6.0*  6.1       Microbiology    Reviewed in EMR,   Bcx neg    Radiology:   PROCEDURE: XR CHEST AP PORTABLE (CPT=71010)    TECHNIQUE: AP ch

## 2017-05-13 PROCEDURE — 99233 SBSQ HOSP IP/OBS HIGH 50: CPT | Performed by: INTERNAL MEDICINE

## 2017-05-13 RX ORDER — POTASSIUM CHLORIDE 14.9 MG/ML
20 INJECTION INTRAVENOUS ONCE
Status: COMPLETED | OUTPATIENT
Start: 2017-05-13 | End: 2017-05-13

## 2017-05-13 RX ORDER — METOPROLOL TARTRATE 5 MG/5ML
5 INJECTION INTRAVENOUS EVERY 6 HOURS
Status: COMPLETED | OUTPATIENT
Start: 2017-05-13 | End: 2017-05-14

## 2017-05-13 RX ORDER — POTASSIUM CHLORIDE 29.8 MG/ML
40 INJECTION INTRAVENOUS ONCE
Status: COMPLETED | OUTPATIENT
Start: 2017-05-13 | End: 2017-05-13

## 2017-05-13 NOTE — PROGRESS NOTES
[facility]  TEL: [phone]  FAX: [phone]    Daniela Figueroa Patient Status:  Inpatient    1950 MRN [id number]   [facility] 6NE-A Attending Cassi Del Cid MD   Hosp Day # 4 PCP Rafy Hernandez ALKPHO  72  96   --    --    --    AST  137*  72*   --    --    --    ALT  28  30   --    --    --    BILT  0.5  0.8   --    --    --    TP  6.0*  6.1   --    --    --        Microbiology    Reviewed in EMR,   Bcx neg    Radiology:   reviewed    ASSESSME

## 2017-05-13 NOTE — PLAN OF CARE
Ambulated in hallway this evening, tolerated well, at end of walk and sitting on side of bed, noted on monitor, v-tach, pt stating \"I feel like my legs are weak\".  Laid down, converted to rapid afib with heart rate 150-160's, pt remaining awake throughout

## 2017-05-13 NOTE — PROGRESS NOTES
[facility]   [facility] Progress Note    Jamir Segovia Patient Status:  Inpatient    1950 MRN [id number]   [facility] 6NE-A Attending Thelma Last MD   Hosp Day # 4 PCP Milvia Schwarz MD     Subjective:  Feeling better now paramjit 5-400 mcg/min Intravenous Titrated   ondansetron HCl (ZOFRAN) injection 4 mg 4 mg Intravenous Q6H PRN   Naloxone HCl (NARCAN) 0.4 MG/ML injection 0.08 mg 0.08 mg Intravenous Q5 Min PRN   DiphenhydrAMINE HCl (BENADRYL) injection 12.5 mg 12.5 mg Intravenous

## 2017-05-13 NOTE — PROGRESS NOTES
[facility] dosing service    Follow-up Pharmacokinetic Consult for Vancomycin Dosing     Lakshmi Gu is a 78year old female admitted on  who is being treated for fevers and elevated WBCs. Patient is on day 2 of Vancomycin 1.25 gm IV Q 12 hours.   G function. 4.  Pharmacy will follow and monitor renal function changes, toxicity and efficacy.     Jose Alfredo Evangelista, PharmD  5/12/2017  7:44 PM  [address] [place] [address] Extension: [phone]

## 2017-05-13 NOTE — PROGRESS NOTES
[facility]  Progress Note    Julieta Hobbs Patient Status:  Inpatient    1950 MRN [id number]   [facility] 6NE-A Attending Freedom Roblero MD   Hosp Day # 4 PCP Jaci Mccrary MD       Subjective:  Denies chest pain or dyspne 29.9*  30.8*  28.4*   MCV  91.4  88.8  87.7   MCH  30.0  29.1  29.3   MCHC  32.8  32.8  33.5   RDW  14.7  14.5  14.4   NEPRELIM  18.41*  21.69*  12.42*   WBC  24.3*  26.5*  16.9*   PLT  206.0  255.0  294.0       Medications:    • potassium chloride  20 mEq

## 2017-05-13 NOTE — PROGRESS NOTES
[facility]  Progress Note     Bryn Page Patient Status:  Inpatient    1950 MRN [id number]   [facility] 6NE-A Attending David Remy MD   Hosp Day # 4 PCP Allen De Leon MD     Chief Complaint: medical management af Imaging: Imaging data reviewed in Epic.     MICRO:   Blood / urine NGTD    Medications:   • furosemide  40 mg Intravenous BID (Diuretic)   • vancomycin  15 mg/kg Intravenous Q12H   • Normal Saline Flush  10 mL Intravenous Q12H   • metoprolol Tartrate  2 Intrahepatic bile ducts appear mildly dilated- follow LFT's,  11. Incidental left kidney cyst and 11 mm non obstructing calculus in lower pole of the right kidney-advised plenty of oral fluids,     12. Incidental left adrenal gland mass-  13.  Abnormal TFT-

## 2017-05-14 PROCEDURE — 99233 SBSQ HOSP IP/OBS HIGH 50: CPT | Performed by: INTERNAL MEDICINE

## 2017-05-14 RX ORDER — POTASSIUM CHLORIDE 14.9 MG/ML
20 INJECTION INTRAVENOUS ONCE
Status: COMPLETED | OUTPATIENT
Start: 2017-05-14 | End: 2017-05-14

## 2017-05-14 RX ORDER — AMIODARONE HYDROCHLORIDE 200 MG/1
400 TABLET ORAL 3 TIMES DAILY
Status: DISCONTINUED | OUTPATIENT
Start: 2017-05-14 | End: 2017-05-17

## 2017-05-14 RX ORDER — HYDROCODONE BITARTRATE AND ACETAMINOPHEN 5; 325 MG/1; MG/1
2 TABLET ORAL EVERY 4 HOURS PRN
Status: DISCONTINUED | OUTPATIENT
Start: 2017-05-14 | End: 2017-05-18

## 2017-05-14 RX ORDER — HYDROCODONE BITARTRATE AND ACETAMINOPHEN 5; 325 MG/1; MG/1
1 TABLET ORAL EVERY 4 HOURS PRN
Status: DISCONTINUED | OUTPATIENT
Start: 2017-05-14 | End: 2017-05-18

## 2017-05-14 RX ORDER — METOPROLOL TARTRATE 50 MG/1
50 TABLET, FILM COATED ORAL
Status: DISCONTINUED | OUTPATIENT
Start: 2017-05-14 | End: 2017-05-18

## 2017-05-14 NOTE — PROGRESS NOTES
JIMMY HOSPITALIST  Progress Note     Chela Cancer Patient Status:  Inpatient    1950 MRN [id number]   [facility] 6NE-A Attending Baudilio Clarke MD   Hosp Day # 5 PCP Toshia Florian MD     Chief Complaint: medical management af Medications:   • potassium chloride 40mEq IVPB (peripheral line)  40 mEq Intravenous Once    Followed by   • potassium chloride  20 mEq Intravenous Once   • amiodarone HCl  400 mg Oral TID   • metoprolol tartrate  50 mg Oral 2x Daily(Beta Blocker)   • me ischemic w/u ? LHC when renal function stable   Meds per cards    9. Nicotine addiction– nicotine patch, smoking cessation advised  10.  Incidental gallstones-no pericholecystic inflammation seen on CT.  Intrahepatic bile ducts appear mildly dilated- follow

## 2017-05-14 NOTE — PLAN OF CARE
Assumed care of patient at [address] [place] [address]. Patient alert, oriented, and neurologically intact. VSS and patient complaining of pain which is relieved with medication. IV in place and stable. Amio transitioned to PO per cardiology. Will attempt to wean dilaudid PCA.  P

## 2017-05-14 NOTE — PROGRESS NOTES
No complaints-passing gas. Afebrile/ blood pressure stable. Wound clean/dry. Possible cardiac cath tomorrow. Doppler flow right/left extremities.  Neurologically intact, Start clear liquids

## 2017-05-14 NOTE — PROGRESS NOTES
[facility]   [facility] Progress Note    Letha Vallejo Patient Status:  Inpatient    1950 MRN [id number]   [facility] 6NE-A Attending Brien Roth MD   Hosp Day # 5 PCP Dinesh Mccray MD     Subjective:  Sitting up in chair, f injection 4 mg 4 mg Intravenous Q6H PRN   Naloxone HCl (NARCAN) 0.4 MG/ML injection 0.08 mg 0.08 mg Intravenous Q5 Min PRN   DiphenhydrAMINE HCl (BENADRYL) injection 12.5 mg 12.5 mg Intravenous Q4H PRN   Nalbuphine HCl (NUBAIN) injection 2.5 mg 2.5 mg Intr

## 2017-05-14 NOTE — H&P
[address]    PATIENT'S NAME: Kimberly David   ATTENDING PHYSICIAN: Brandan Her M.D.    PATIENT ACCOUNT#:   [de-identified]    LOCATION:  [address] [place] [address]  MEDICAL RECORD #:   [id number]       YOB: 1950  ADMISSION DATE:       05/09/2017 normal.  ABDOMEN:  Distended, tender, particularly in the right lower quadrant. No bowel sounds. EXTREMITIES:  The femoral pulses appear to be palpable. The popliteal and pedal pulses are diminished bilaterally. She was moving all 4 extremities.     CT

## 2017-05-15 ENCOUNTER — APPOINTMENT (OUTPATIENT)
Dept: INTERVENTIONAL RADIOLOGY/VASCULAR | Facility: HOSPITAL | Age: 67
DRG: 268 | End: 2017-05-15
Attending: INTERNAL MEDICINE
Payer: MEDICARE

## 2017-05-15 PROCEDURE — B2151ZZ FLUOROSCOPY OF LEFT HEART USING LOW OSMOLAR CONTRAST: ICD-10-PCS | Performed by: INTERNAL MEDICINE

## 2017-05-15 PROCEDURE — 99233 SBSQ HOSP IP/OBS HIGH 50: CPT | Performed by: INTERNAL MEDICINE

## 2017-05-15 PROCEDURE — 4A023N7 MEASUREMENT OF CARDIAC SAMPLING AND PRESSURE, LEFT HEART, PERCUTANEOUS APPROACH: ICD-10-PCS | Performed by: INTERNAL MEDICINE

## 2017-05-15 PROCEDURE — B2111ZZ FLUOROSCOPY OF MULTIPLE CORONARY ARTERIES USING LOW OSMOLAR CONTRAST: ICD-10-PCS | Performed by: INTERNAL MEDICINE

## 2017-05-15 RX ORDER — MIDAZOLAM HYDROCHLORIDE 1 MG/ML
INJECTION INTRAMUSCULAR; INTRAVENOUS
Status: COMPLETED
Start: 2017-05-15 | End: 2017-05-15

## 2017-05-15 RX ORDER — HEPARIN SODIUM 5000 [USP'U]/ML
INJECTION, SOLUTION INTRAVENOUS; SUBCUTANEOUS
Status: COMPLETED
Start: 2017-05-15 | End: 2017-05-15

## 2017-05-15 RX ORDER — POTASSIUM CHLORIDE 29.8 MG/ML
40 INJECTION INTRAVENOUS ONCE
Status: DISCONTINUED | OUTPATIENT
Start: 2017-05-15 | End: 2017-05-15

## 2017-05-15 RX ORDER — HEPARIN SODIUM 5000 [USP'U]/ML
5000 INJECTION, SOLUTION INTRAVENOUS; SUBCUTANEOUS EVERY 8 HOURS SCHEDULED
Status: DISCONTINUED | OUTPATIENT
Start: 2017-05-15 | End: 2017-05-18

## 2017-05-15 RX ORDER — POTASSIUM CHLORIDE 14.9 MG/ML
20 INJECTION INTRAVENOUS ONCE
Status: DISCONTINUED | OUTPATIENT
Start: 2017-05-15 | End: 2017-05-15

## 2017-05-15 RX ORDER — LIDOCAINE HYDROCHLORIDE 10 MG/ML
INJECTION, SOLUTION INFILTRATION; PERINEURAL
Status: COMPLETED
Start: 2017-05-15 | End: 2017-05-15

## 2017-05-15 RX ORDER — ATORVASTATIN CALCIUM 40 MG/1
40 TABLET, FILM COATED ORAL NIGHTLY
Status: DISCONTINUED | OUTPATIENT
Start: 2017-05-15 | End: 2017-05-18

## 2017-05-15 RX ORDER — VERAPAMIL HYDROCHLORIDE 2.5 MG/ML
INJECTION, SOLUTION INTRAVENOUS
Status: COMPLETED
Start: 2017-05-15 | End: 2017-05-15

## 2017-05-15 RX ORDER — POTASSIUM CHLORIDE 14.9 MG/ML
20 INJECTION INTRAVENOUS ONCE
Status: COMPLETED | OUTPATIENT
Start: 2017-05-15 | End: 2017-05-15

## 2017-05-15 RX ORDER — FUROSEMIDE 10 MG/ML
40 INJECTION INTRAMUSCULAR; INTRAVENOUS DAILY
Status: DISCONTINUED | OUTPATIENT
Start: 2017-05-16 | End: 2017-05-17

## 2017-05-15 NOTE — PROGRESS NOTES
No complaints/denies chest pain/shortness of breath/passing gas with bowel movement. Afebrile/wounds clean/dry. Neurologically intact. Pulses present/ For cardiac cath today.

## 2017-05-15 NOTE — PROCEDURES
[facility]  Angio via Radial Artery Procedure Note    Sarajane Plane Location: Cath Lab    CSN [id number] MRN [id number]   Admission Date 5/9/2017 Procedure Date 5/15/2017   Attending Physician Nancy Osler, MD Procedure Physician Joe Cruz MD distal left main stenosis  3. The circumflex artery is 100% occluded after a ramus intermedius or high diagonal  4. There is a 95% proximal LAD stenosis    Impression:  1. Moderately reduced left ventricular function as outlined above  2.   Severe three-

## 2017-05-15 NOTE — PROGRESS NOTES
JIMMY HOSPITALIST  Progress Note     Myrna Adamsch Patient Status:  Inpatient    1950 MRN [id number]   [facility] 6NE-A Attending Benja Dunbar MD   Hosp Day # 6 PCP James Collier MD     Chief Complaint: medical management af Epic.    MICRO:   Blood / urine NGTD    Medications:   • potassium chloride 40mEq IVPB (peripheral line)  40 mEq Intravenous Once    Followed by   • potassium chloride  20 mEq Intravenous Once   • amiodarone HCl  400 mg Oral TID   • metoprolol tartrate  50 cessation advised  10. Incidental gallstones-no pericholecystic inflammation seen on CT.  Intrahepatic bile ducts appear mildly dilated- follow LFT's,  11.  Incidental left kidney cyst and 11 mm non obstructing calculus in lower pole of the right kidney-adv

## 2017-05-15 NOTE — OCCUPATIONAL THERAPY NOTE
OCCUPATIONAL THERAPY TREATMENT NOTE - INPATIENT     Room Number: 1256/7237-P  Session: 1   Number of Visits to Meet Established Goals: 5    Presenting Problem: ruptured abdominal aortic aneurysm    History related to current admission:admitted to er with a TRANSFER ASSESSMENT  Supine to Sit : Supervision  Sit to Stand: Minimum assistance    Skilled Therapy Provided: Pt received supine for session. Pt able to sit EOB to complete LE dressing donning pants and adjusting socks.   Educated pt on methods to perfor eval/education  Rehab Potential : Good  Frequency (Obs): Daily      OT Goals:   ADL Goals  Patient will perform all ADLs: with modified independent    Functional Transfer Goals  Patient will perform all functional transfers:  with modified independent    U

## 2017-05-15 NOTE — PHYSICAL THERAPY NOTE
PHYSICAL THERAPY TREATMENT NOTE - INPATIENT    Room Number: 2148/6195-T     Session: 2   Number of Visits to Meet Established Goals: 5  History related to current admission: admitted to er with abd/back pain x 2-3 days, ct revealed ruptured AAA, s/p emerg 3-5 steps with a railing?: A Little       AM-PAC Score:  Raw Score: 18   PT Approx Degree of Impairment Score: 46.58%   Standardized Score (AM-PAC Scale): 43.63   CMS Modifier (G-Code): CK    FUNCTIONAL ABILITY STATUS  Gait Assessment   Gait Assistance: Mi Grover Calvin  Frequency (Obs): Daily    CURRENT GOALS   Goal #1   Patient is able to demonstrate supine - sit EOB @ level: modified independent        Goal #2   Patient is able to demonstrate transfers Sit to/from Stand at assistance level: modified independent

## 2017-05-15 NOTE — PROGRESS NOTES
[facility]  Progress Note    Kimberlee Tatum Patient Status:  Inpatient    1950 MRN [id number]   [facility] 6NE-A Attending Anaid Perkins MD   [facility] Day # 6 PCP Katelyn Arenas MD       Subjective:  No complaints.  Overall doin (Diuretic)   • vancomycin  15 mg/kg Intravenous Q12H   • Normal Saline Flush  10 mL Intravenous Q12H   • aspirin  325 mg Oral Nightly   • nicotine  1 patch Transdermal Daily     • Nitroglycerin in D5W     • HYDROmorphone         Assessment:    · Ruptured A

## 2017-05-15 NOTE — PROGRESS NOTES
[address] [place] [address]  TEL: [phone]  FAX: [phone]    Lino Espinosa Patient Status:  Inpatient    1950 MRN [id number]   [facility] 6NE-A Attending Ricky Sanchez MD   Hosp Day # 6 CYN Naranjo Nearing --   29.0   --    --   29.0   ALKPHO   --    --    --    --    --   199*   AST   --    --    --    --    --   30   ALT   --    --    --    --    --   28   BILT   --    --    --    --    --   2.0   TP   --    --    --    --    --   6.1    < > = values in th

## 2017-05-16 ENCOUNTER — APPOINTMENT (OUTPATIENT)
Dept: CT IMAGING | Facility: HOSPITAL | Age: 67
DRG: 268 | End: 2017-05-16
Attending: INTERNAL MEDICINE
Payer: MEDICARE

## 2017-05-16 PROCEDURE — 99233 SBSQ HOSP IP/OBS HIGH 50: CPT | Performed by: INTERNAL MEDICINE

## 2017-05-16 PROCEDURE — 71250 CT THORAX DX C-: CPT | Performed by: INTERNAL MEDICINE

## 2017-05-16 RX ORDER — LISINOPRIL 2.5 MG/1
2.5 TABLET ORAL DAILY
Status: DISCONTINUED | OUTPATIENT
Start: 2017-05-16 | End: 2017-05-16

## 2017-05-16 RX ORDER — POTASSIUM CHLORIDE 20 MEQ/1
40 TABLET, EXTENDED RELEASE ORAL ONCE
Status: COMPLETED | OUTPATIENT
Start: 2017-05-16 | End: 2017-05-16

## 2017-05-16 RX ORDER — DOCUSATE SODIUM 100 MG/1
100 CAPSULE, LIQUID FILLED ORAL 2 TIMES DAILY
Status: DISCONTINUED | OUTPATIENT
Start: 2017-05-16 | End: 2017-05-18

## 2017-05-16 RX ORDER — POLYETHYLENE GLYCOL 3350 17 G/17G
17 POWDER, FOR SOLUTION ORAL DAILY PRN
Status: DISCONTINUED | OUTPATIENT
Start: 2017-05-16 | End: 2017-05-18

## 2017-05-16 NOTE — CONSULTS
[facility] dosing service    Follow-up Pharmacokinetic Consult for Vancomycin Dosing     Paul Rosario is a 78year old female who is being treated for cellulitis. Patient had been on vanco  1.25g iv q12h from 5/11 to 5/15 with trough of 10.5 ug/ml. re-check Vancomycin trough levels prior to 3rd dose. Goal trough level 10-15 ug/mL. 3.  Pharmacy will need BUN/Scr daily while on Vancomycin to assess renal function.     4.  Pharmacy will follow and monitor renal function changes, toxicity and efficacy

## 2017-05-16 NOTE — PROGRESS NOTES
Discussed with Dr. Marilyn Potter. No complaints. Wounds clean/dry. Neurologically intact/tolerating diet.  Transfer to telemetry/start diet/ambulate

## 2017-05-16 NOTE — PROGRESS NOTES
[facility] Pharmacy Note:  Renal Adjustment for Ancef (cefazolin)    Ela Escalona is a 78year old female who has been prescribed Ancef (cefazolin) 2 grams IV every 8 hours.  Based on the patient's weight and renal function, the dose has been adjusted to Ancef

## 2017-05-16 NOTE — PROGRESS NOTES
[facility]  Progress Note    Myrna Tellez Patient Status:  Inpatient    1950 MRN [id number]   [facility] 6NE-A Attending Benja Dunbar MD   [facility] Day # 7 PCP James Collier MD       Subjective:  No complaints.  Denies cp or MG 2.2 05/12/2017       Medications:    • furosemide  40 mg Intravenous Daily   • Heparin Sodium (Porcine)  5,000 Units Subcutaneous [facility]   • atorvastatin  40 mg Oral Nightly   • amiodarone HCl  400 mg Oral TID   • metoprolol tartrate  50 mg Oral 2x D be forced to discharge her without revascularization of her coronaries, to see what their opinion is about timing of icd in this pt. She has a fair amount of scar on echo.  Finally, since no endocrinologist is on the case, i will order the metanephrines and

## 2017-05-16 NOTE — PHYSICAL THERAPY NOTE
Attempted to see pt for PT this afternoon. Per RN, Tl Cruz pt is on limited activity order till surgery due to low EF rate. Checked with pt, she reported that she has been up a lot due to diuretics and exercising her le's in the chair.  Pt deferred mobility ti

## 2017-05-16 NOTE — PROGRESS NOTES
JIMMY HOSPITALIST  Progress Note     [facility] Patient Status:  Inpatient    1950 MRN [id number]   [facility] 6NE-A Attending Anaid Perkins MD   Hosp Day # 7 PCP Katelyn Arenas MD     Chief Complaint: medical management af • aspirin  325 mg Oral Nightly   • nicotine  1 patch Transdermal Daily       ASSESSMENT / PLAN:     1.  Massive infrarenal fusiform abdominal aortic aneurysm- ? leaking  measures 8.2 x 9.0 cm with stranding in the retroperitoneal periaortic fat.   1. S/p removed, midline   5/11 placed      Estimated date of discharge:  TBD  Discharge is dependent on: clinical course  At this point Ms. Martell is expected to be discharge to: home   Plan of care discussed with RN, pt   YULIET Desai   5

## 2017-05-16 NOTE — PLAN OF CARE
[facility]est paperwork filled out and faxed to [facility]. Notified Paula Garcia of new referral.     To follow up on status tomorrow.       Melinda Rubinstein, NP   5/16/2017  5:09 PM

## 2017-05-16 NOTE — PLAN OF CARE
Assumed care of patient at 0730. Patient alert, oriented x4. No complaints of pain at this time. Tolerating PO intake. Able to ambulate in room with walker to the bathroom. Incision with gauze dressing in place, well approximated, no oozing.  Patient now si

## 2017-05-16 NOTE — PROGRESS NOTES
[facility]  TEL: [phone]  FAX: [phone]    Lino Espinosa Patient Status:  Inpatient    1950 MRN [id number]   [facility] 6NE-A Attending Ricky Sanchez MD   Hosp Day # 7 [facility]N [facility]   --   29.0   --   26.0   ALKPHO   --   199*   --   159*   AST   --   30   --   26   ALT   --   28   --   24   BILT   --   2.0   --   1.6   TP   --   6.1   --   5.9*       Microbiology    Reviewed in EMR,   Bcx neg at 4 days    Radiology:   reviewed

## 2017-05-17 PROCEDURE — 99232 SBSQ HOSP IP/OBS MODERATE 35: CPT | Performed by: HOSPITALIST

## 2017-05-17 RX ORDER — TEMAZEPAM 7.5 MG/1
7.5 CAPSULE ORAL NIGHTLY PRN
Status: DISCONTINUED | OUTPATIENT
Start: 2017-05-17 | End: 2017-05-18

## 2017-05-17 RX ORDER — AMIODARONE HYDROCHLORIDE 200 MG/1
400 TABLET ORAL 2 TIMES DAILY WITH MEALS
Status: DISCONTINUED | OUTPATIENT
Start: 2017-05-17 | End: 2017-05-18

## 2017-05-17 NOTE — PROGRESS NOTES
JIMMY HOSPITALIST  Progress Note     Lonza Rubinstein Patient Status:  Inpatient    1950 MRN [id number]   [facility] 6NE-A Attending Chong Meredith MD   Hosp Day # 8 PCP Mi Clifton MD     Chief Complaint: medical management af limitations of the examination. 2. There is a small filling defect within the right upper lobe bronchus which likely represents mucous plugging, however, a three-month follow chest CT is recommended to exclude an endobronchial lesion.   3. Minimal pulmonar packs    3. Repeat blood cultures P  4. A Fib-/ WCT-  1. Cards following  2.  amio  , BB  oral  3. K/ Mg levels replace   As needed   4. TFT  -  TSH 0.303, free t4 - 2.3    F/u as outpt     5.  Acute / Chronic systolic heart failure- LVEF 35%-   25 % on LH stated in subjective.     Objective:  Constitutional: no signs of distress; vitals stable  Cardiovascular: regular rate and rhythm, no murmurs, rubs, or gallops  Respiratory: clear to ausculation bilaterally; no labored breathing pattern  Abdomen: surgical

## 2017-05-17 NOTE — PLAN OF CARE
Assumed care of patient at [address], ambulate in room with walker, denies pain, VSS, NSR on tele, right upper arm midline in place, KENNEY C/D/I, report called to RN pt to transfer to [address] [facility]    • Maintains optimal cardiac output and he

## 2017-05-17 NOTE — PROGRESS NOTES
[facility]  Progress Note    Melvina Martell     Subjective:  No chest pain or shortness of breath. Walking in dailey without symps.        Intake/Output:    Intake/Output Summary (Last 24 hours) at 05/17/17 1130  Last data filed at 05/17/17 5182   Gross Heparin Sodium (Porcine) 5000 UNIT/ML injection 5,000 Units 5,000 Units Subcutaneous Q8H [address]   atorvastatin (LIPITOR) tab 40 mg 40 mg Oral Nightly   amiodarone HCl (PACERONE) tab 400 mg 400 mg Oral TID   Metoprolol Tartrate (LOPRESSOR) tab 50 mg 50 mg Or suspect discharge tomorrow if we can get lifevest. i have ordered a venous doppler legs for tomorrow as surveillance for dvt prior to discharge. i dont feel she needs a diuretic. She will need to see me next week in office- perhaps Monday, or Thursday.

## 2017-05-17 NOTE — PLAN OF CARE
Assumed care @ 1030  A&Ox4, VSS on RA, NSR on tele  Abdominal incision NAINA  Denies pain  Ambulated with PT    Plan for possible d/c tomorrow after venous doppler    CARDIOVASCULAR - ADULT    • Maintains optimal cardiac output and hemodynamic stability Prog

## 2017-05-17 NOTE — PLAN OF CARE
Pt transferred  at [phone]. Safety precautions initiated. Bed in low position. Call light within reach.

## 2017-05-17 NOTE — CONSULTS
I had the opportunity to see Mrs. Luis Alfredo Gan who entered the hospital a week ago with a leaking abdominal aortic aneurysm which required open repair which was successful. This was accomplished via aortobi-iliac grafting by Doctor Iris Ivy.   Her recovery h Physical examination shows an alert female sitting upright in her room in the ICU. Her  is present. Pupils are equal and round. Carotid pulses are equal bilaterally. I don't hear bruits.   Her lungs are fairly clear, perhaps the bases are diminis

## 2017-05-17 NOTE — PLAN OF CARE
Pt A/O X4. Lethargic. Benadryl given for sleep. RA. NSR on tele. VSS. EKG done in the morning. BS active, abdomen soft, mild tenderness. Midline abdominal incision with staples, dressing changed, CDI. Norco given for pain. Will continue to monitor.      CAR

## 2017-05-17 NOTE — PLAN OF CARE
Assumed care of patient at 10 am  AOx4 in NAD  Calm and cooperative  RA lungs clear; diminished to bases  SR; occasional PVC; no edema; SCD's ; heparin sub Q for DVT prophylaxis  Abdomen soft + BS; no BM yet; miralax and colace started as per order  Voids

## 2017-05-17 NOTE — PHYSICAL THERAPY NOTE
Attempted to visit patient for PT treatment. Pt currently refuses due to fatigue and reporting too much activity earlier today (up to bathroom  6 times and ambulating hallway). Pt agreeable to PT tomorrow.

## 2017-05-17 NOTE — CM/SW NOTE
Call received from [facility] representative sky--pt approved for lifevest--updated jacquelyn wesley--requested fitting for today--await call back with name and time of fitting--updated  carter

## 2017-05-18 ENCOUNTER — APPOINTMENT (OUTPATIENT)
Dept: ULTRASOUND IMAGING | Facility: HOSPITAL | Age: 67
DRG: 268 | End: 2017-05-18
Attending: INTERNAL MEDICINE
Payer: MEDICARE

## 2017-05-18 VITALS
SYSTOLIC BLOOD PRESSURE: 114 MMHG | DIASTOLIC BLOOD PRESSURE: 57 MMHG | HEART RATE: 77 BPM | RESPIRATION RATE: 17 BRPM | TEMPERATURE: 98 F | OXYGEN SATURATION: 98 % | BODY MASS INDEX: 27.84 KG/M2 | HEIGHT: 70 IN | WEIGHT: 194.44 LBS

## 2017-05-18 PROCEDURE — 99231 SBSQ HOSP IP/OBS SF/LOW 25: CPT | Performed by: HOSPITALIST

## 2017-05-18 PROCEDURE — 93970 EXTREMITY STUDY: CPT | Performed by: INTERNAL MEDICINE

## 2017-05-18 RX ORDER — HYDROCODONE BITARTRATE AND ACETAMINOPHEN 5; 325 MG/1; MG/1
2 TABLET ORAL EVERY 6 HOURS PRN
Qty: 30 TABLET | Refills: 0 | Status: ON HOLD | OUTPATIENT
Start: 2017-05-18 | End: 2017-05-31

## 2017-05-18 RX ORDER — NICOTINE 21 MG/24HR
1 PATCH, TRANSDERMAL 24 HOURS TRANSDERMAL DAILY
Qty: 21 PATCH | Refills: 0 | Status: SHIPPED | OUTPATIENT
Start: 2017-05-18 | End: 2017-06-09 | Stop reason: ALTCHOICE

## 2017-05-18 RX ORDER — NICOTINE 21 MG/24HR
1 PATCH, TRANSDERMAL 24 HOURS TRANSDERMAL DAILY
Qty: 21 PATCH | Refills: 0 | Status: SHIPPED | OUTPATIENT
Start: 2017-05-18 | End: 2017-05-18

## 2017-05-18 RX ORDER — TEMAZEPAM 7.5 MG/1
7.5 CAPSULE ORAL NIGHTLY PRN
Qty: 15 CAPSULE | Refills: 0 | Status: ON HOLD | OUTPATIENT
Start: 2017-05-18 | End: 2017-10-31

## 2017-05-18 RX ORDER — METOPROLOL TARTRATE 50 MG/1
50 TABLET, FILM COATED ORAL
Qty: 60 TABLET | Refills: 11 | Status: ON HOLD | OUTPATIENT
Start: 2017-05-18 | End: 2017-05-31

## 2017-05-18 RX ORDER — ATORVASTATIN CALCIUM 40 MG/1
40 TABLET, FILM COATED ORAL NIGHTLY
Qty: 30 TABLET | Refills: 11 | Status: SHIPPED | OUTPATIENT
Start: 2017-05-18 | End: 2019-04-25

## 2017-05-18 RX ORDER — DOXYCYCLINE HYCLATE 100 MG/1
100 CAPSULE ORAL 2 TIMES DAILY
Qty: 14 CAPSULE | Refills: 0 | Status: ON HOLD | OUTPATIENT
Start: 2017-05-18 | End: 2017-05-31

## 2017-05-18 RX ORDER — POTASSIUM CHLORIDE 20 MEQ/1
40 TABLET, EXTENDED RELEASE ORAL EVERY 4 HOURS
Status: COMPLETED | OUTPATIENT
Start: 2017-05-18 | End: 2017-05-18

## 2017-05-18 RX ORDER — AMIODARONE HYDROCHLORIDE 200 MG/1
200 TABLET ORAL DAILY
Qty: 30 TABLET | Refills: 11 | Status: SHIPPED | OUTPATIENT
Start: 2017-05-18 | End: 2019-04-25

## 2017-05-18 NOTE — PLAN OF CARE
Assumed care @ 0700  A&Ox4, VSS on RA, NSR on tele  Midline incision with staples  Pt states she had 5 formed BMs yesterday. 3 loose, watery BMs today. Sample sent for c.diff.  Marietta HORVATH and Dr. Mariah Gamboa updated  Venous doppler completed - neg for DVT    Plan

## 2017-05-18 NOTE — PROGRESS NOTES
[address] [facility]  TEL: [phone]  FAX: [phone]    Kimberlee [facility] Patient Status:  Inpatient    1950 MRN [id number]   [facility] 6NE-A Attending Anaid Perkins MD   Hosp Day # 9 PCP Alexa Katz date  Cdiff pend    Radiology:   reviewed    ASSESSMENT:    1. Leukocytosis - trending down   -wbc not back to normal yet but she is afebrile and seems to be improving overall    2. Phlebitis L wrist- sounds like due to infiltrated IV, better today.  Bcx ne

## 2017-05-18 NOTE — PROGRESS NOTES
JIMMY HOSPITALIST  Progress Note     Winston Chen Patient Status:  Inpatient    1950 MRN [id number]   [facility] 6NE-A Attending Duane Zavala MD   [facility] Day # 9 PCP Álvaro Duff MD     Chief Complaint: medical management af Sodium (Porcine)  5,000 Units Subcutaneous [facility]   • atorvastatin  40 mg Oral Nightly   • metoprolol tartrate  50 mg Oral 2x Daily(Beta Blocker)   • Normal Saline Flush  10 mL Intravenous Q12H   • aspirin  325 mg Oral Nightly   • nicotine  1 patch Transd pain w/ eating     12. Incidental left kidney cyst and 11 mm non obstructing calculus in lower pole of the right kidney-advised plenty of oral fluids,     13. Incidental left adrenal gland mass-  14.  Abnormal TFT- will need to be followed ,   PLAN of care

## 2017-05-18 NOTE — PROGRESS NOTES
[facility]  Progress Note    Meli Lee Patient Status:  Inpatient    1950 MRN [id number]   [facility] 6NE-A Attending Nathalie Hi MD   [facility] Day # 9 PCP Melony Wagner MD       Subjective:  No complaints.  Denies cp vancomycin  1,250 mg Intravenous Q12H   • Heparin Sodium (Porcine)  5,000 Units Subcutaneous Q8H [facility]   • atorvastatin  40 mg Oral Nightly   • metoprolol tartrate  50 mg Oral 2x Daily(Beta Blocker)   • Normal Saline Flush  10 mL Intravenous Q12H   • aspirin

## 2017-05-18 NOTE — PHYSICAL THERAPY NOTE
PHYSICAL THERAPY TREATMENT NOTE - INPATIENT    Room Number: 1802/0465-S     Session: 3   Number of Visits to Meet Established Goals: 5  History related to current admission: admitted to er with abd/back pain x 2-3 days, ct revealed ruptured AAA, s/p emerg None   -   Need to walk in hospital room?: A Little   -   Climbing 3-5 steps with a railing?: A Little       AM-PAC Score:  Raw Score: 22   PT Approx Degree of Impairment Score: 20.91%   Standardized Score (AM-PAC Scale): 53.28   CMS Modifier (G-Code): CJ causing increased HR during activity (HR remained WNL during activity). Pt was educated on techniques for relaxation and energy conservation.  Pt continues to demonstrate the following impairments: decreased endurance/activity tolerance, decreased knowledge

## 2017-05-18 NOTE — PROGRESS NOTES
[facility] dosing service    Follow-up Pharmacokinetic Consult for Vancomycin Dosing     Lucila Mallory is a 78year old female admitted on 5/9/17 who is being treated for cellulitis. Patient is on day 2 of restart of Vancomycin 1.25 gm IV Q 12 hours. to assess renal function. 4.  Pharmacy will follow and monitor renal function changes, toxicity and efficacy.     Hira Huang PharmD  5/17/2017  8:40 PM  [address] [place] [address] Extension: [phone]

## 2017-05-18 NOTE — PLAN OF CARE
CARDIOVASCULAR - ADULT    • Maintains optimal cardiac output and hemodynamic stability Adequate for Discharge    • Absence of cardiac arrhythmias or at baseline Adequate for Discharge        Impaired Activities of Daily Living    • Achieve highest/safest l

## 2017-05-18 NOTE — PROGRESS NOTES
[address] [place] [address]  TEL: [phone]  FAX: [phone]    Julieta Hobbs Patient Status:  Inpatient    1950 MRN [id number]   [facility] 6NE-A Attending Freedom Roblero MD   Hosp Day # 8 PCP Jose Jones reviewed    ASSESSMENT:    1. Leukocytosis - stable   -wbc not back to normal yet but she is afebrile and seems to be improving overall    2.  Phlebitis L wrist- sounds like due to infiltrated IV but has obvious erythema on exam, could have a superimposed

## 2017-05-18 NOTE — OCCUPATIONAL THERAPY NOTE
OCCUPATIONAL THERAPY TREATMENT NOTE - INPATIENT     Room Number: 1397/8434-L  Session: 2   Number of Visits to Meet Established Goals: 5    Presenting Problem: ruptured abdominal aortic aneurysm    History related to current admission:admitted to er with a ASSESSMENT  Supine to Sit : Not tested  Sit to Stand: Modified independent    Skilled Therapy Provided: Pt seen seated directly after PT session. Reports fatigue. Demonstrates RUE AROM pain free in all ranges. UE goal D/C'd. Sit to stand Mod (I) via RW.  Jimenez gravity eliminated HEP to improve shdr function with 2 cues for proper technique.  -D/C goal due to no pain in RUE at this time    Additional Goals:  Pt will stand at the sink x 4 minutes during morning routine with no rest breaks modified independent.  -on

## 2017-05-18 NOTE — PLAN OF CARE
Pt A/O X4. Temazepam given for sleep. RA. NSR on tele. BS active, abdomen soft, nontender. Midline abdominal incision with staples, CDI. LT forearm phlebitis- IV Vanco. Denies pain. Pt is sleeping comfortably. Will continue to monitor.      CARDIOVASCULAR -

## 2017-05-19 ENCOUNTER — PATIENT OUTREACH (OUTPATIENT)
Dept: CASE MANAGEMENT | Age: 67
End: 2017-05-19

## 2017-05-19 DIAGNOSIS — I25.10 CAD, MULTIPLE VESSEL: Primary | ICD-10-CM

## 2017-05-19 DIAGNOSIS — Z72.0 TOBACCO ABUSE: ICD-10-CM

## 2017-05-19 DIAGNOSIS — I71.3 RUPTURED ABDOMINAL AORTIC ANEURYSM (AAA) (HCC): ICD-10-CM

## 2017-05-19 DIAGNOSIS — R77.8 ELEVATED TROPONIN: ICD-10-CM

## 2017-05-19 NOTE — DISCHARGE SUMMARY
[facility] HOSPITALIST  DISCHARGE SUMMARY     Daniela Figueroa Patient Status:  Inpatient    1950 MRN [id number]   [facility] 7NE-A Attending No att. providers found   Hosp Day # 9 PCP Aba Marie MD     Date of Admission: 2017 phone for a ruptured abdominal aortic aneurysm.  The aneurysm measured at least 8-9 cm.  CT angiogram showed a 10 cm aortic aneurysm.  It appeared to be ruptured along the right posterior portion of the aneurysm.  The patient had significant back pain donovan echocardiogram done due to the elevated troponin and unknown cardiac history. Was found to have a reduced left ventricular EF of 35%. An echocardiogram, she was also positive for wall motion abnormality. Patient denies complaints of chest pain.     With was continued on IV vancomycin per ID. Will complete antibiotic therapy with doxycycline for 7 days at discharge.     Patient has been followed by cardiology with her reduced EF seen on echo and elevation in troponins felt to been initially caused by maximino have continued to improve. Her edema is resolving. She is awake alert orientated pain is well controlled. She will be going home to the care of her family. They understand postop care. And will be following up with cardiology, her PCP and CV surgery. immediately. ,  ORIF received shock from [facility]est.    Lab/Test results pending at Discharge:   · None will need follow-up of TFTs and adrenal mass at a later date.     Discharge Medication List:     Discharge Medications      START taking these medications total) by mouth nightly as needed for Sleep. Quantity:  15 capsule   Refills:  0         CONTINUE taking these medications       Instructions Prescription details    MULTI-VITAMIN DAILY OR        Take  by mouth.     Refills:  0       PROBIOTIC OR neurological deficits. Musculoskeletal: Moves all extremities. Extremities: no   Edema.   Left wrist less tender and decreased erythema today   -----------------------------------------------------------------------------------------------  PATIENT [facility]

## 2017-05-19 NOTE — CM/SW NOTE
05/19/17 0800   Discharge disposition   Discharged to: Home or Self   Name of [address] [place] [address] services after discharge DME   HME provider ([facility])   Discharge transportation Pr

## 2017-05-19 NOTE — PROGRESS NOTES
Initial Post Discharge Follow Up   Discharge Date: 5/18/17  Contact Date: 5/19/2017    Consent Verification:  Assessment Completed With: Patient  Spouse: Maria Ggio Sandra received per patient?  written  HIPAA Verified?   Yes    Discharge Dx:   Ruptured a • Before leaving the hospital was your diagnoses explained to you? Yes  • Do you have any questions about your diagnoses?  No  • Are you able to perform normal daily activities of living as you have prior to your hospital stay? no  o If No, What are some leaving the hospital were any medication changes discussed with you? yes  • If you were prescribed a new medication:   o Was the new medication’s purpose explained? yes  o Was the new medication’s side effects discussed?  yes  o Do you have any questions ab incentive spirometer and do her breathing exercise. Patient verbalized understanding. [x]  Discharge Summary, medications reviewed/discussed/and reconciled with patient, and orders reviewed and discussed.

## 2017-05-21 ENCOUNTER — APPOINTMENT (OUTPATIENT)
Dept: GENERAL RADIOLOGY | Facility: HOSPITAL | Age: 67
DRG: 229 | End: 2017-05-21
Attending: EMERGENCY MEDICINE
Payer: MEDICARE

## 2017-05-21 ENCOUNTER — HOSPITAL ENCOUNTER (INPATIENT)
Facility: HOSPITAL | Age: 67
LOS: 10 days | Discharge: HOME HEALTH | DRG: 229 | End: 2017-05-31
Attending: EMERGENCY MEDICINE | Admitting: HOSPITALIST
Payer: MEDICARE

## 2017-05-21 DIAGNOSIS — Z86.79 H/O VENTRICULAR TACHYCARDIA: Primary | ICD-10-CM

## 2017-05-21 DIAGNOSIS — I48.91 ATRIAL FIBRILLATION, NEW ONSET (HCC): ICD-10-CM

## 2017-05-21 DIAGNOSIS — R77.8 ELEVATED TROPONIN: ICD-10-CM

## 2017-05-21 PROCEDURE — 71010 XR CHEST AP PORTABLE  (CPT=71010): CPT | Performed by: EMERGENCY MEDICINE

## 2017-05-21 PROCEDURE — 99291 CRITICAL CARE FIRST HOUR: CPT | Performed by: HOSPITALIST

## 2017-05-21 RX ORDER — METOPROLOL TARTRATE 5 MG/5ML
5 INJECTION INTRAVENOUS ONCE
Status: COMPLETED | OUTPATIENT
Start: 2017-05-21 | End: 2017-05-21

## 2017-05-21 RX ORDER — HEPARIN SODIUM 5000 [USP'U]/ML
5000 INJECTION, SOLUTION INTRAVENOUS; SUBCUTANEOUS EVERY 8 HOURS SCHEDULED
Status: DISCONTINUED | OUTPATIENT
Start: 2017-05-21 | End: 2017-05-22

## 2017-05-21 RX ORDER — SODIUM CHLORIDE 9 MG/ML
INJECTION, SOLUTION INTRAVENOUS CONTINUOUS
Status: ACTIVE | OUTPATIENT
Start: 2017-05-21 | End: 2017-05-21

## 2017-05-21 RX ORDER — METOPROLOL TARTRATE 50 MG/1
50 TABLET, FILM COATED ORAL
Status: DISPENSED | OUTPATIENT
Start: 2017-05-21 | End: 2017-05-31

## 2017-05-21 RX ORDER — ASPIRIN 81 MG/1
324 TABLET, CHEWABLE ORAL ONCE
Status: COMPLETED | OUTPATIENT
Start: 2017-05-21 | End: 2017-05-21

## 2017-05-21 RX ORDER — ASPIRIN 325 MG
325 TABLET, DELAYED RELEASE (ENTERIC COATED) ORAL DAILY
Status: DISCONTINUED | OUTPATIENT
Start: 2017-05-21 | End: 2017-05-31

## 2017-05-21 RX ORDER — DOXYCYCLINE HYCLATE 100 MG/1
100 CAPSULE ORAL 2 TIMES DAILY
Status: DISPENSED | OUTPATIENT
Start: 2017-05-21 | End: 2017-05-27

## 2017-05-21 RX ORDER — TEMAZEPAM 7.5 MG/1
7.5 CAPSULE ORAL NIGHTLY PRN
Status: DISCONTINUED | OUTPATIENT
Start: 2017-05-21 | End: 2017-05-25

## 2017-05-21 RX ORDER — ONDANSETRON 2 MG/ML
4 INJECTION INTRAMUSCULAR; INTRAVENOUS EVERY 4 HOURS PRN
Status: DISCONTINUED | OUTPATIENT
Start: 2017-05-21 | End: 2017-05-21 | Stop reason: ALTCHOICE

## 2017-05-21 RX ORDER — METOPROLOL TARTRATE 5 MG/5ML
INJECTION INTRAVENOUS
Status: COMPLETED
Start: 2017-05-21 | End: 2017-05-21

## 2017-05-21 RX ORDER — HYDROCODONE BITARTRATE AND ACETAMINOPHEN 5; 325 MG/1; MG/1
2 TABLET ORAL EVERY 6 HOURS PRN
Status: DISCONTINUED | OUTPATIENT
Start: 2017-05-21 | End: 2017-05-25 | Stop reason: SDUPTHER

## 2017-05-21 RX ORDER — NICOTINE 21 MG/24HR
1 PATCH, TRANSDERMAL 24 HOURS TRANSDERMAL NIGHTLY
Status: DISCONTINUED | OUTPATIENT
Start: 2017-05-21 | End: 2017-05-25

## 2017-05-21 RX ORDER — ONDANSETRON 2 MG/ML
4 INJECTION INTRAMUSCULAR; INTRAVENOUS EVERY 6 HOURS PRN
Status: DISCONTINUED | OUTPATIENT
Start: 2017-05-21 | End: 2017-05-25

## 2017-05-21 RX ORDER — ATORVASTATIN CALCIUM 40 MG/1
40 TABLET, FILM COATED ORAL NIGHTLY
Status: DISCONTINUED | OUTPATIENT
Start: 2017-05-21 | End: 2017-05-31

## 2017-05-21 RX ORDER — AMIODARONE HYDROCHLORIDE 50 MG/ML
150 INJECTION, SOLUTION INTRAVENOUS ONCE
Status: DISCONTINUED | OUTPATIENT
Start: 2017-05-21 | End: 2017-05-22

## 2017-05-21 NOTE — ED INITIAL ASSESSMENT (HPI)
Pt in v-tach. pts life vest going off. Pt is alert and speaking with staff. Respirations equal and nonlabored. A&ox3.

## 2017-05-22 ENCOUNTER — TELEPHONE (OUTPATIENT)
Dept: FAMILY MEDICINE CLINIC | Facility: CLINIC | Age: 67
End: 2017-05-22

## 2017-05-22 ENCOUNTER — APPOINTMENT (OUTPATIENT)
Dept: ULTRASOUND IMAGING | Facility: HOSPITAL | Age: 67
DRG: 229 | End: 2017-05-22
Attending: PHYSICIAN ASSISTANT
Payer: MEDICARE

## 2017-05-22 PROCEDURE — 99233 SBSQ HOSP IP/OBS HIGH 50: CPT | Performed by: HOSPITALIST

## 2017-05-22 PROCEDURE — 02HV33Z INSERTION OF INFUSION DEVICE INTO SUPERIOR VENA CAVA, PERCUTANEOUS APPROACH: ICD-10-PCS | Performed by: INTERNAL MEDICINE

## 2017-05-22 PROCEDURE — B548ZZA ULTRASONOGRAPHY OF SUPERIOR VENA CAVA, GUIDANCE: ICD-10-PCS | Performed by: INTERNAL MEDICINE

## 2017-05-22 PROCEDURE — 93880 EXTRACRANIAL BILAT STUDY: CPT | Performed by: PHYSICIAN ASSISTANT

## 2017-05-22 RX ORDER — HEPARIN SODIUM AND DEXTROSE 10000; 5 [USP'U]/100ML; G/100ML
INJECTION INTRAVENOUS CONTINUOUS
Status: DISCONTINUED | OUTPATIENT
Start: 2017-05-22 | End: 2017-05-25

## 2017-05-22 RX ORDER — AMIODARONE HYDROCHLORIDE 200 MG/1
400 TABLET ORAL 3 TIMES DAILY
Status: DISCONTINUED | OUTPATIENT
Start: 2017-05-22 | End: 2017-05-26

## 2017-05-22 RX ORDER — POTASSIUM CHLORIDE 20 MEQ/1
40 TABLET, EXTENDED RELEASE ORAL EVERY 4 HOURS
Status: COMPLETED | OUTPATIENT
Start: 2017-05-22 | End: 2017-05-22

## 2017-05-22 RX ORDER — HEPARIN SODIUM AND DEXTROSE 10000; 5 [USP'U]/100ML; G/100ML
12 INJECTION INTRAVENOUS ONCE
Status: COMPLETED | OUTPATIENT
Start: 2017-05-22 | End: 2017-05-22

## 2017-05-22 RX ORDER — SODIUM CHLORIDE 0.9 % (FLUSH) 0.9 %
10 SYRINGE (ML) INJECTION AS NEEDED
Status: DISCONTINUED | OUTPATIENT
Start: 2017-05-22 | End: 2017-05-25

## 2017-05-22 RX ORDER — HEPARIN SODIUM 5000 [USP'U]/ML
5000 INJECTION INTRAVENOUS; SUBCUTANEOUS ONCE
Status: COMPLETED | OUTPATIENT
Start: 2017-05-22 | End: 2017-05-22

## 2017-05-22 NOTE — PROGRESS NOTES
[facility]  Progress Note    Ela Escalona Patient Status:  Inpatient    1950 MRN [id number]   [facility] 6NE-A Attending Roswell Sicard, MD   [address] Day # 1 PCP Neftali Pablo MD       Subjective:  No chest pain or shortness 150 mg Intravenous Once   • aspirin EC  325 mg Oral Daily   • atorvastatin  40 mg Oral Nightly   • Metoprolol Tartrate  50 mg Oral 2x Daily(Beta Blocker)   • Doxycycline Hyclate  100 mg Oral BID   • nicotine  1 patch Transdermal Nightly   • Heparin Sodium

## 2017-05-22 NOTE — PROGRESS NOTES
05/22/17 0854   Clinical Encounter Type   Visited With Patient and family together   Routine Visit Introduction   Continue Visiting (The patient/family was empowered to call  anytime for continued support)   Patient's Supportive Strategies/Resou

## 2017-05-22 NOTE — PROGRESS NOTES
[facility]  Progress Note     [facility] Patient Status:  Inpatient    1950 MRN [id number]   [facility] 6NE-A Attending Ronny Lopez MD   [facility] Day # 1 PCP Pierre Ayala MD     Chief Complaint: VT/A. Kirk Enamorado Cord Lidocaine HCl (Cardiac)  150 mg Intravenous Once   • aspirin EC  325 mg Oral Daily   • atorvastatin  40 mg Oral Nightly   • Metoprolol Tartrate  50 mg Oral 2x Daily(Beta Blocker)   • Doxycycline Hyclate  100 mg Oral BID   • nicotine  1 patch Transdermal Ni HENRY.

## 2017-05-22 NOTE — CONSULTS
I had the opportunity to see Mrs. Kayleen Ashraf who entered the hospital recently for emergent repair of ruptured abdominal aortic aneurysm which was successful.  This was accomplished via aortobi-iliac grafting by Doctor Rj Connors Her recovery has been pret Physical examination shows an alert female sitting upright in her room in the ICU.  Her  is present.  Pupils are equal and round.  Carotid pulses are equal bilaterally.  I don't hear bruits.  Her lungs are fairly clear, perhaps the bases are diminis

## 2017-05-22 NOTE — H&P
[facility]  History and Physical     Riccardo Rutledge Patient Status:  Emergency    1950 MRN [id number]   Location [facility] [facility] Attending Haleigh Marquez MD   Hosp Day # 0 PCP Teodoro Caputo MD     Chief Complaint: History:  reports that she has been smoking Cigarettes. She has a 23.5 pack-year smoking history. She has never used smokeless tobacco. She reports that she drinks alcohol. She reports that she does not use illicit drugs.   Family History:   Family History distress. Alert and oriented x 3. HEENT: Normocephalic atraumatic. Moist mucous membranes. EOM-I. PERRLA. Anicteric. Neck: No lymphadenopathy. No JVD. No carotid bruits. Respiratory: Clear to auscultation bilaterally. No wheezes. No rhonchi.   Cardiovasc mass  1. Recommendation was for repeat imaging in 3 months  7. Nicotine dependence  1. Cont. Nicotine patch  8. Leukocytosis  1. Monitor  2. Likely non-infectious  9. Atelectasis    Total Critical care time:  Greater than 40 minutes.       Quality:  · DVT P

## 2017-05-22 NOTE — PROGRESS NOTES
05/22/17 1618   Clinical Encounter Type   Visited With Patient   Orthodoxy Encounters   Orthodoxy Needs Bible;Prayer  (Harrison Prater visited and provided prayer, Scripture, support and SOS)   Spiritual Requests During Visit / Hospitalization Sacrament of the

## 2017-05-22 NOTE — CM/SW NOTE
Pt was dc on 5/18 with lifevest and coming back a few weeks for CABG. Pt was home for 2 days and Sunday lifevest was going off. Pt will be having CABG this admission d/t high risk arrhythmia. Opened per case find. Met with pt.  She felt she was doing wel Patient Status Prior to Admission   Independent with ADLs and Mobility No   Pt. requires assistance with Ambulating   Services in place prior to admission DME/Supplies at home   Type of DME/Supplies Critical access hospital   Discharge Needs   Anticipated D/C need

## 2017-05-22 NOTE — ED PROVIDER NOTES
Patient Seen in: [facility] Emergency Department    History   Patient presents with:  Chest Pain Angina (cardiovascular)    Stated Complaint: life vest going off    HPI  Patient is a 14-year-old female who has recent AAA repair.   Patient was release Relation Age of Onset   • Heart Disorder Neg    • Diabetes Neg    • Hypertension Neg    • Cancer Neg          Smoking Status: Current Every Day Smoker        Packs/Day: 0.50  Years: 52        Types: Cigarettes    Smokeless Status: Never Used limits   ABG PANEL W ELECT AND LACTATE - Abnormal; Notable for the following:     ABG pH 7.50 (*)     ABG pCO2 33 (*)     All other components within normal limits   CBC W/ DIFFERENTIAL - Abnormal; Notable for the following:     WBC 18.8 (*)     HGB 11.9 ( Patient's rhythm was alternating from atrial fibrillation with rapid ventricular response to V. tach wide-complex tachycardia and occasional beats of sinus. Patient was stable throughout.   Patient was given amiodarone and started on amiodarone drip on arr

## 2017-05-22 NOTE — CONSULTS
[facility]  Report of Consultation    Rashid Nayak Patient Status:  Emergency    1950 MRN [id number]   Location [address] [facility] Attending Berkley Marino MD   Hosp Day # 0 PCP Ingris Pandya MD     Reason for Consult BY** [START ON 5/22/2017] Amiodarone HCl (CORDARONE) 450 mg in dextrose 5 % 250 mL infusion, 0.5 mg/min, Intravenous, Continuous    Review of Systems:  All systems were reviewed and are negative except as described above in HPI.     Physical Exam:  Blood pr

## 2017-05-22 NOTE — PROGRESS NOTES
CARDIOLOGY PROGRESS NOTE - [facility]      NAME: [address] [facility] Town: 3378/3175-H - MRN: [id number] - Age: 78year old - :  1950    Date of Admission: 2017  6:37 PM  Admission Diagnosis: Elevated troponin [R74.8]  106   CO2 31.0 28.0   BUN 7* 7*   CA 9.6 8.5   MG  --  1.9         Recent Labs   05/21/17  1850   ALT 29   AST 22   ALB 2.5*       No results for input(s): BNP in the last 72 hours.     Invalid input(s): TROPT      INR   Date/Time Value Ref Range S

## 2017-05-22 NOTE — PLAN OF CARE
From ER to CNICU 2030, family at bedside. Released from the hospital two days ago after AAA repair. Scheduled to have bypass surgery in the next few weeks. Felt light headed and her lifevest kept \"ging off\". Patient was never shocked. Denies CP and SOB.

## 2017-05-22 NOTE — ED NOTES
Pt report from rn, pt resting on the cart, cardio and hospitalist talking wit pt and family. Pt given dose of lopressor, pt denies pain or discomfort, family at bed side.

## 2017-05-22 NOTE — DISCHARGE SUMMARY
[address] [place]    PATIENT'S NAME: José Yu   ATTENDING PHYSICIAN: Rocio Buckley M.D.    PATIENT ACCOUNT#:   [de-identified]    LOCATION:  [address] [place] [address]  MEDICAL RECORD #:   [id number]       YOB: 1950  ADMISSION DATE:       05/09/2017

## 2017-05-23 PROBLEM — I25.5 ISCHEMIC CARDIOMYOPATHY: Status: ACTIVE | Noted: 2017-05-23

## 2017-05-23 PROCEDURE — 99232 SBSQ HOSP IP/OBS MODERATE 35: CPT | Performed by: HOSPITALIST

## 2017-05-23 RX ORDER — POLYETHYLENE GLYCOL 3350 17 G/17G
17 POWDER, FOR SOLUTION ORAL DAILY PRN
Status: DISCONTINUED | OUTPATIENT
Start: 2017-05-23 | End: 2017-05-25

## 2017-05-23 RX ORDER — POLYETHYLENE GLYCOL 3350 17 G/17G
17 POWDER, FOR SOLUTION ORAL ONCE
Status: COMPLETED | OUTPATIENT
Start: 2017-05-23 | End: 2017-05-23

## 2017-05-23 RX ORDER — DOCUSATE SODIUM 100 MG/1
100 CAPSULE, LIQUID FILLED ORAL 2 TIMES DAILY
Status: DISCONTINUED | OUTPATIENT
Start: 2017-05-23 | End: 2017-05-25

## 2017-05-23 NOTE — PROGRESS NOTES
[facility]  Progress Note     Kingsley Mcnamara Patient Status:  Inpatient    1950 MRN [id number]   [facility] 6NE-A Attending Rianna Rivas MD   [facility] Day # 2 PCP David Blanc MD     Chief Complaint: VT/A. Cyndi Borrego • amiodarone HCl  400 mg Oral TID   • Lidocaine HCl (Cardiac)  150 mg Intravenous Once   • aspirin EC  325 mg Oral Daily   • atorvastatin  40 mg Oral Nightly   • Metoprolol Tartrate  50 mg Oral 2x Daily(Beta Blocker)   • Doxycycline Hyclate  100 mg Oral

## 2017-05-23 NOTE — PROGRESS NOTES
Seen and examined by Dr. Donnie Villegas, plan for cabg Thursday.   Katt Ahuja RN  Clinical Coordinator  CV Surgery

## 2017-05-23 NOTE — PROGRESS NOTES
[facility]  Progress Note    Clarise Kayser Bohlin     Subjective:  No chest pain or shortness of breath. No angina or sob prior to admission. No syncope. No discharge from lifevest though she continually reset it.        Intake/Output:    Intake/Output Summ Medications:  PEG 3350 (MIRALAX) powder packet 17 g 17 g Oral Once   heparin (PORCINE) drip 42284xgwzo/250mL infusion CONTINUOUS 200-3,000 Units/hr Intravenous Continuous   Normal Saline Flush 0.9 % injection 10 mL 10 mL Intravenous PRN   amiodarone HCl (P

## 2017-05-23 NOTE — PLAN OF CARE
Problem: CARDIOVASCULAR - ADULT  Goal: Maintains optimal cardiac output and hemodynamic stability  INTERVENTIONS:  - Monitor vital signs, rhythm, and trends  - Monitor for bleeding, hypotension and signs of decreased cardiac output  - Evaluate effectivenes independently with walker, steady gait. Updated regarding plan of care. Made comfortable. Will continue to monitor closely.

## 2017-05-24 ENCOUNTER — ANESTHESIA EVENT (OUTPATIENT)
Dept: CARDIAC SURGERY | Facility: HOSPITAL | Age: 67
DRG: 229 | End: 2017-05-24
Payer: MEDICARE

## 2017-05-24 PROCEDURE — 99232 SBSQ HOSP IP/OBS MODERATE 35: CPT | Performed by: INTERNAL MEDICINE

## 2017-05-24 RX ORDER — SODIUM CHLORIDE 9 MG/ML
INJECTION, SOLUTION INTRAVENOUS CONTINUOUS
Status: DISCONTINUED | OUTPATIENT
Start: 2017-05-25 | End: 2017-05-25

## 2017-05-24 NOTE — PROGRESS NOTES
[facility]  Progress Note     Ana Guy Patient Status:  Inpatient    1950 MRN [id number]   [facility] 6NE-A Attending Reg Perez MD   [address] Day # 3 PCP Jus Lew MD     Chief Complaint: VT/A. Sabrina Colindres Imaging: Imaging data reviewed in Epic.     Medications:   • mupirocin  1 Application Nasal Now then every 0500   • docusate sodium  100 mg Oral BID   • amiodarone HCl  400 mg Oral TID   • Lidocaine HCl (Cardiac)  150 mg Intravenous Once   • aspirin EC

## 2017-05-24 NOTE — PLAN OF CARE
CARDIOVASCULAR - ADULT    • Maintains optimal cardiac output and hemodynamic stability Progressing    • Absence of cardiac arrhythmias or at baseline Progressing          Assumed patient care this am, patient on heparin drip, ptt is therapeutic per protoco

## 2017-05-24 NOTE — PROGRESS NOTES
[facility]  Progress Note    Cecil Sebastina Patient Status:  Inpatient    1950 MRN [id number]   [facility] 6NE-A Attending Tatyana Santo MD   Psychiatric Day # 3 PCP Kushal Harrison MD       Subjective:  Doing well.  No pain, so sob Qt has been stable   · Paroxysmal afib with rvr-maintaining sr.  IV heparin infusing  · Ruptured AAA s/p emergent aortobi-iliac bypass, dacron graft placement 5/9  · Newly dx severe MV cad including 80-90% LM dz- plan to return for cabg tomorrow  · ICM- EF

## 2017-05-24 NOTE — PROGRESS NOTES
Met with patient and  to discuss pre op teaching, binder given, all questions answered.   Robyn Powell RN  Clinical Coordinator  CV Surgery

## 2017-05-24 NOTE — PLAN OF CARE
Assumed care of patient at [address], heparin gtt infusing per protocol, VSS, NSR on tele, ambulating in zamora with walker and standby assist, JENIFFER with S/S C/D/I denies pain, family visiting, will continue to monitor closely.       CARDIOVASCULAR - ADULT    • Jeniffer

## 2017-05-24 NOTE — ANESTHESIA PREPROCEDURE EVALUATION
PRE-OP EVALUATION    Patient Name: Ana Guy    Pre-op Diagnosis: CORONARY ARTERY DISEASE    Procedure(s):  coronary artery bypass graft    Surgeon(s) and Role:     * Myranda Hall MD - Primary    Pre-op vitals reviewed.   Temp: 98.3 °F (36.8 °C)  Pul mg/min Intravenous Continuous   [DISCONTINUED] Amiodarone HCl (CORDARONE) 450 mg in dextrose 5 % 250 mL infusion 0.5 mg/min Intravenous Continuous   [COMPLETED] metoprolol Tartrate (LOPRESSOR) 5 MG/5ML injection 5 mg 5 mg Intravenous Once   [] 0.9% 11   Metoprolol Tartrate 50 MG Oral Tab Take 1 tablet (50 mg total) by mouth 2x Daily(Beta Blocker). Disp: 60 tablet Rfl: 11   Doxycycline Hyclate 100 MG Oral Cap Take 1 capsule (100 mg total) by mouth 2 (two) times daily.  Disp: 14 capsule Rfl: 0   nicotin Neuro/Psych    Negative neuro/psych ROS.                                     Past Surgical History    OTHER SURGICAL HISTORY  2011    Comment facial skin cancer removal    SYMP AAA URGENT REPAIR          Smoking status: Former Smoker  0.50

## 2017-05-25 ENCOUNTER — APPOINTMENT (OUTPATIENT)
Dept: GENERAL RADIOLOGY | Facility: HOSPITAL | Age: 67
DRG: 229 | End: 2017-05-25
Attending: PHYSICIAN ASSISTANT
Payer: MEDICARE

## 2017-05-25 ENCOUNTER — ANESTHESIA (OUTPATIENT)
Dept: CARDIAC SURGERY | Facility: HOSPITAL | Age: 67
DRG: 229 | End: 2017-05-25
Payer: MEDICARE

## 2017-05-25 ENCOUNTER — SURGERY (OUTPATIENT)
Age: 67
End: 2017-05-25

## 2017-05-25 PROCEDURE — 30233R1 TRANSFUSION OF NONAUTOLOGOUS PLATELETS INTO PERIPHERAL VEIN, PERCUTANEOUS APPROACH: ICD-10-PCS | Performed by: THORACIC SURGERY (CARDIOTHORACIC VASCULAR SURGERY)

## 2017-05-25 PROCEDURE — B24BZZ4 ULTRASONOGRAPHY OF HEART WITH AORTA, TRANSESOPHAGEAL: ICD-10-PCS | Performed by: ANESTHESIOLOGY

## 2017-05-25 PROCEDURE — 021109W BYPASS CORONARY ARTERY, TWO ARTERIES FROM AORTA WITH AUTOLOGOUS VENOUS TISSUE, OPEN APPROACH: ICD-10-PCS | Performed by: THORACIC SURGERY (CARDIOTHORACIC VASCULAR SURGERY)

## 2017-05-25 PROCEDURE — 30233N1 TRANSFUSION OF NONAUTOLOGOUS RED BLOOD CELLS INTO PERIPHERAL VEIN, PERCUTANEOUS APPROACH: ICD-10-PCS | Performed by: THORACIC SURGERY (CARDIOTHORACIC VASCULAR SURGERY)

## 2017-05-25 PROCEDURE — 02580ZZ DESTRUCTION OF CONDUCTION MECHANISM, OPEN APPROACH: ICD-10-PCS | Performed by: THORACIC SURGERY (CARDIOTHORACIC VASCULAR SURGERY)

## 2017-05-25 PROCEDURE — 3E033XZ INTRODUCTION OF VASOPRESSOR INTO PERIPHERAL VEIN, PERCUTANEOUS APPROACH: ICD-10-PCS | Performed by: INTERNAL MEDICINE

## 2017-05-25 PROCEDURE — 30233K1 TRANSFUSION OF NONAUTOLOGOUS FROZEN PLASMA INTO PERIPHERAL VEIN, PERCUTANEOUS APPROACH: ICD-10-PCS | Performed by: THORACIC SURGERY (CARDIOTHORACIC VASCULAR SURGERY)

## 2017-05-25 PROCEDURE — 02100Z9 BYPASS CORONARY ARTERY, ONE ARTERY FROM LEFT INTERNAL MAMMARY, OPEN APPROACH: ICD-10-PCS | Performed by: THORACIC SURGERY (CARDIOTHORACIC VASCULAR SURGERY)

## 2017-05-25 PROCEDURE — 02B70ZK EXCISION OF LEFT ATRIAL APPENDAGE, OPEN APPROACH: ICD-10-PCS | Performed by: THORACIC SURGERY (CARDIOTHORACIC VASCULAR SURGERY)

## 2017-05-25 PROCEDURE — 5A1221Z PERFORMANCE OF CARDIAC OUTPUT, CONTINUOUS: ICD-10-PCS | Performed by: THORACIC SURGERY (CARDIOTHORACIC VASCULAR SURGERY)

## 2017-05-25 PROCEDURE — 5A1223Z PERFORMANCE OF CARDIAC PACING, CONTINUOUS: ICD-10-PCS | Performed by: THORACIC SURGERY (CARDIOTHORACIC VASCULAR SURGERY)

## 2017-05-25 PROCEDURE — 71010 XR CHEST AP PORTABLE  (CPT=71010): CPT | Performed by: PHYSICIAN ASSISTANT

## 2017-05-25 PROCEDURE — 06BQ4ZZ EXCISION OF LEFT SAPHENOUS VEIN, PERCUTANEOUS ENDOSCOPIC APPROACH: ICD-10-PCS | Performed by: THORACIC SURGERY (CARDIOTHORACIC VASCULAR SURGERY)

## 2017-05-25 PROCEDURE — 30233N0 TRANSFUSION OF AUTOLOGOUS RED BLOOD CELLS INTO PERIPHERAL VEIN, PERCUTANEOUS APPROACH: ICD-10-PCS | Performed by: THORACIC SURGERY (CARDIOTHORACIC VASCULAR SURGERY)

## 2017-05-25 PROCEDURE — 99232 SBSQ HOSP IP/OBS MODERATE 35: CPT | Performed by: INTERNAL MEDICINE

## 2017-05-25 DEVICE — BIOGLUE OPEN HEART: Type: IMPLANTABLE DEVICE | Status: FUNCTIONAL

## 2017-05-25 RX ORDER — DEXTROSE AND SODIUM CHLORIDE 5; .45 G/100ML; G/100ML
INJECTION, SOLUTION INTRAVENOUS CONTINUOUS
Status: ACTIVE | OUTPATIENT
Start: 2017-05-25 | End: 2017-05-25

## 2017-05-25 RX ORDER — POTASSIUM CHLORIDE 29.8 MG/ML
40 INJECTION INTRAVENOUS AS NEEDED
Status: DISCONTINUED | OUTPATIENT
Start: 2017-05-25 | End: 2017-05-30

## 2017-05-25 RX ORDER — NITROGLYCERIN 20 MG/100ML
INJECTION INTRAVENOUS CONTINUOUS PRN
Status: DISCONTINUED | OUTPATIENT
Start: 2017-05-25 | End: 2017-05-30

## 2017-05-25 RX ORDER — HYDROCODONE BITARTRATE AND ACETAMINOPHEN 10; 325 MG/1; MG/1
2 TABLET ORAL EVERY 4 HOURS PRN
Status: DISCONTINUED | OUTPATIENT
Start: 2017-05-25 | End: 2017-05-31

## 2017-05-25 RX ORDER — MORPHINE SULFATE 4 MG/ML
8 INJECTION, SOLUTION INTRAMUSCULAR; INTRAVENOUS
Status: DISCONTINUED | OUTPATIENT
Start: 2017-05-25 | End: 2017-05-30

## 2017-05-25 RX ORDER — TEMAZEPAM 15 MG/1
15 CAPSULE ORAL NIGHTLY PRN
Status: DISCONTINUED | OUTPATIENT
Start: 2017-05-25 | End: 2017-05-29

## 2017-05-25 RX ORDER — POTASSIUM CHLORIDE 14.9 MG/ML
20 INJECTION INTRAVENOUS AS NEEDED
Status: DISCONTINUED | OUTPATIENT
Start: 2017-05-25 | End: 2017-05-29

## 2017-05-25 RX ORDER — ONDANSETRON 2 MG/ML
4 INJECTION INTRAMUSCULAR; INTRAVENOUS EVERY 6 HOURS PRN
Status: DISCONTINUED | OUTPATIENT
Start: 2017-05-25 | End: 2017-05-31

## 2017-05-25 RX ORDER — MORPHINE SULFATE 2 MG/ML
2 INJECTION, SOLUTION INTRAMUSCULAR; INTRAVENOUS
Status: DISCONTINUED | OUTPATIENT
Start: 2017-05-25 | End: 2017-05-31

## 2017-05-25 RX ORDER — ASPIRIN 325 MG
325 TABLET ORAL ONCE
Status: COMPLETED | OUTPATIENT
Start: 2017-05-25 | End: 2017-05-25

## 2017-05-25 RX ORDER — POLYETHYLENE GLYCOL 3350 17 G/17G
1 POWDER, FOR SOLUTION ORAL DAILY PRN
Status: DISCONTINUED | OUTPATIENT
Start: 2017-05-25 | End: 2017-05-31

## 2017-05-25 RX ORDER — IPRATROPIUM BROMIDE AND ALBUTEROL SULFATE 2.5; .5 MG/3ML; MG/3ML
3 SOLUTION RESPIRATORY (INHALATION) EVERY 4 HOURS PRN
Status: DISCONTINUED | OUTPATIENT
Start: 2017-05-25 | End: 2017-05-31

## 2017-05-25 RX ORDER — FAMOTIDINE 10 MG/ML
20 INJECTION, SOLUTION INTRAVENOUS 2 TIMES DAILY
Status: DISCONTINUED | OUTPATIENT
Start: 2017-05-25 | End: 2017-05-29

## 2017-05-25 RX ORDER — DEXTROSE MONOHYDRATE 25 G/50ML
50 INJECTION, SOLUTION INTRAVENOUS
Status: DISCONTINUED | OUTPATIENT
Start: 2017-05-25 | End: 2017-05-27

## 2017-05-25 RX ORDER — DOCUSATE SODIUM 100 MG/1
100 CAPSULE, LIQUID FILLED ORAL 2 TIMES DAILY
Status: DISCONTINUED | OUTPATIENT
Start: 2017-05-25 | End: 2017-05-31

## 2017-05-25 RX ORDER — DEXTROSE AND SODIUM CHLORIDE 5; .45 G/100ML; G/100ML
INJECTION, SOLUTION INTRAVENOUS CONTINUOUS
Status: ACTIVE | OUTPATIENT
Start: 2017-05-25 | End: 2017-05-26

## 2017-05-25 RX ORDER — ALBUMIN, HUMAN INJ 5% 5 %
250 SOLUTION INTRAVENOUS ONCE AS NEEDED
Status: DISCONTINUED | OUTPATIENT
Start: 2017-05-25 | End: 2017-05-29

## 2017-05-25 RX ORDER — SODIUM CHLORIDE 9 MG/ML
INJECTION, SOLUTION INTRAVENOUS CONTINUOUS
Status: DISCONTINUED | OUTPATIENT
Start: 2017-05-25 | End: 2017-05-29

## 2017-05-25 RX ORDER — FAMOTIDINE 20 MG/1
20 TABLET ORAL 2 TIMES DAILY
Status: DISCONTINUED | OUTPATIENT
Start: 2017-05-25 | End: 2017-05-31

## 2017-05-25 RX ORDER — MIDAZOLAM HYDROCHLORIDE 1 MG/ML
1 INJECTION INTRAMUSCULAR; INTRAVENOUS EVERY 30 MIN PRN
Status: DISCONTINUED | OUTPATIENT
Start: 2017-05-25 | End: 2017-05-29

## 2017-05-25 RX ORDER — ASPIRIN 300 MG
300 SUPPOSITORY, RECTAL RECTAL ONCE
Status: COMPLETED | OUTPATIENT
Start: 2017-05-25 | End: 2017-05-25

## 2017-05-25 RX ORDER — MORPHINE SULFATE 4 MG/ML
4 INJECTION, SOLUTION INTRAMUSCULAR; INTRAVENOUS
Status: DISCONTINUED | OUTPATIENT
Start: 2017-05-25 | End: 2017-05-30

## 2017-05-25 RX ORDER — CHLORHEXIDINE GLUCONATE 0.12 MG/ML
15 RINSE ORAL
Status: DISCONTINUED | OUTPATIENT
Start: 2017-05-25 | End: 2017-05-27

## 2017-05-25 RX ORDER — HYDROCODONE BITARTRATE AND ACETAMINOPHEN 10; 325 MG/1; MG/1
1 TABLET ORAL EVERY 4 HOURS PRN
Status: DISCONTINUED | OUTPATIENT
Start: 2017-05-25 | End: 2017-05-31

## 2017-05-25 RX ORDER — DEXMEDETOMIDINE HYDROCHLORIDE 4 UG/ML
INJECTION, SOLUTION INTRAVENOUS CONTINUOUS
Status: DISCONTINUED | OUTPATIENT
Start: 2017-05-25 | End: 2017-05-29

## 2017-05-25 RX ORDER — DOBUTAMINE HYDROCHLORIDE 100 MG/100ML
INJECTION INTRAVENOUS CONTINUOUS PRN
Status: DISCONTINUED | OUTPATIENT
Start: 2017-05-25 | End: 2017-05-29

## 2017-05-25 RX ORDER — LEVOFLOXACIN 5 MG/ML
INJECTION, SOLUTION INTRAVENOUS
Status: DISCONTINUED | OUTPATIENT
Start: 2017-05-25 | End: 2017-05-26 | Stop reason: ALTCHOICE

## 2017-05-25 RX ORDER — BISACODYL 10 MG
10 SUPPOSITORY, RECTAL RECTAL
Status: DISCONTINUED | OUTPATIENT
Start: 2017-05-25 | End: 2017-05-31

## 2017-05-25 NOTE — PROGRESS NOTES
[facility]  Progress Note    Elias Torres Patient Status:  Inpatient    1950 MRN [id number]   [facility] 6NE-A Attending Uday Flowers MD   [facility] Day # 4 PCP Negro Blancas MD       Subjective:   Intubated, sedated     Obje • mupirocin  1 Application Nasal BID   • amiodarone HCl  400 mg Oral TID   • aspirin EC  325 mg Oral Daily   • atorvastatin  40 mg Oral Nightly   • Metoprolol Tartrate  50 mg Oral 2x Daily(Beta Blocker)   • Doxycycline Hyclate  100 mg Oral BID     • EPIN tolerated. Post-op EKG with sinus and RBBB with no major ST changes. Hypotension after protamine, will monitor and extubation per anesthesiology. D/w CCU RN at bedside.     Mely Gomez MD

## 2017-05-25 NOTE — PROGRESS NOTES
Assumed care of pt this am. She is going to [facility] today for CABG. She has been NPO. Hibiclens done per report. VSS. denies pain. 46- OR staff here to take pt to [facility] 141 escorted on monitoring. 1545-returned from [facility] s/p CABG with MAZE and JOHNATHAN.  Intubate

## 2017-05-25 NOTE — PLAN OF CARE
Assumed care of pt approx 1900. Plan for CABG tomorrow. Hibiclens bath done and leads changed, will repeat in am.  Plan of care reviewed with pt and family and all questions answered and encouraged.

## 2017-05-25 NOTE — DIETARY NOTE
Nutrition Short Note    Dietitian consult received per cardiac rehab/CHF protocol. Pt to be educated by cardiac rehab staff and encouraged to attend outpatient classes taught by RD. RD available PRN.     Samuel Victor MS, RD, LDN

## 2017-05-25 NOTE — ANESTHESIA POSTPROCEDURE EVALUATION
[place] Patient Status:  Inpatient   Age/Gender 78year old female MRN [id number]   [facility] 6NE-A Attending Ana Barrios, [address] [facility] [address] Day # 4 PCP Dodie River MD       Anesthesia Post-op Note    Procedure

## 2017-05-25 NOTE — PROGRESS NOTES
[facility]  Progress Note     Simba Hart Patient Status:  Inpatient    1950 MRN [id number]   [facility] 6NE-A Attending Mercedes Medrano MD   [address] Day # 4 PCP Deb Davidson MD     Chief Complaint: VT/A. Larua Wagoner • [MAR Hold] docusate sodium  100 mg Oral BID   • [MAR Hold] amiodarone HCl  400 mg Oral TID   • [MAR Hold] Lidocaine HCl (Cardiac)  150 mg Intravenous Once   • aspirin EC  325 mg Oral Daily   • [MAR Hold] atorvastatin  40 mg Oral Nightly   • [MAR Hold]

## 2017-05-26 ENCOUNTER — APPOINTMENT (OUTPATIENT)
Dept: GENERAL RADIOLOGY | Facility: HOSPITAL | Age: 67
DRG: 229 | End: 2017-05-26
Attending: THORACIC SURGERY (CARDIOTHORACIC VASCULAR SURGERY)
Payer: MEDICARE

## 2017-05-26 ENCOUNTER — APPOINTMENT (OUTPATIENT)
Dept: GENERAL RADIOLOGY | Facility: HOSPITAL | Age: 67
DRG: 229 | End: 2017-05-26
Attending: PHYSICIAN ASSISTANT
Payer: MEDICARE

## 2017-05-26 ENCOUNTER — APPOINTMENT (OUTPATIENT)
Dept: CV DIAGNOSTICS | Facility: HOSPITAL | Age: 67
DRG: 229 | End: 2017-05-26
Attending: INTERNAL MEDICINE
Payer: MEDICARE

## 2017-05-26 PROBLEM — Z95.1 S/P CABG X 3: Status: ACTIVE | Noted: 2017-05-26

## 2017-05-26 PROBLEM — Z98.890 S/P ABLATION OF ATRIAL FIBRILLATION: Status: ACTIVE | Noted: 2017-05-26

## 2017-05-26 PROBLEM — Z86.79 S/P ABLATION OF ATRIAL FIBRILLATION: Status: ACTIVE | Noted: 2017-05-26

## 2017-05-26 PROCEDURE — 71010 XR CHEST AP PORTABLE  (CPT=71010): CPT | Performed by: PHYSICIAN ASSISTANT

## 2017-05-26 PROCEDURE — 99232 SBSQ HOSP IP/OBS MODERATE 35: CPT | Performed by: INTERNAL MEDICINE

## 2017-05-26 PROCEDURE — 93307 TTE W/O DOPPLER COMPLETE: CPT | Performed by: INTERNAL MEDICINE

## 2017-05-26 PROCEDURE — 71010 XR CHEST AP PORTABLE  (CPT=71010): CPT | Performed by: THORACIC SURGERY (CARDIOTHORACIC VASCULAR SURGERY)

## 2017-05-26 RX ORDER — ALBUMIN, HUMAN INJ 5% 5 %
500 SOLUTION INTRAVENOUS ONCE
Status: COMPLETED | OUTPATIENT
Start: 2017-05-26 | End: 2017-05-26

## 2017-05-26 RX ORDER — AMIODARONE HYDROCHLORIDE 200 MG/1
400 TABLET ORAL 2 TIMES DAILY WITH MEALS
Status: DISCONTINUED | OUTPATIENT
Start: 2017-05-26 | End: 2017-05-31

## 2017-05-26 RX ORDER — SODIUM CHLORIDE 9 MG/ML
INJECTION, SOLUTION INTRAVENOUS ONCE
Status: COMPLETED | OUTPATIENT
Start: 2017-05-26 | End: 2017-05-26

## 2017-05-26 NOTE — PHYSICAL THERAPY NOTE
PHYSICAL THERAPY EVALUATION - INPATIENT     Room Number: 5056/3621-D  Evaluation Date: 5/26/2017  Type of Evaluation: Initial  Physician Order: PT Eval and Treat    Presenting Problem: V-tach, s/p CABG 5/25/17  Reason for Therapy: Mobility Dysfunction functional limits     Lower extremity strength is within functional limits     BALANCE  Static Sitting: Fair  Dynamic Sitting: Fair -  Static Standing: Not tested  Dynamic Standing: Not tested    ADDITIONAL TESTS                                    Veto Getting exercise: Ankle pumps  Transfer training    Patient End of Session: In bed;Needs met;Call light within reach;RN aware of session/findings; All patient questions and concerns addressed;SCDs in place; Discussed recommendations with / Comments: Goals established on 5/26/2017

## 2017-05-26 NOTE — OPERATIVE REPORT
[address] [place]    PATIENT'S NAME: José Yu   ATTENDING PHYSICIAN: Romina Harman MD   OPERATING PHYSICIAN: Geovanny Escalante MD   PATIENT ACCOUNT#:   [id number]    LOCATION:  [address] [place] [address]  MEDICAL RECORD #:   [id number]       DATE OF BIRTH:  [birth year]. She was admitted in the hospital, and surgery date was moved up per request of Cardiology. PROCEDURE:  Appropriate lines and catheters were placed, general anesthesia was induced, Lam catheter was placed in the bladder for drainage. cardioplegia was given, the aorta was unclamped, the heart was defibrillated once. Following complete and thorough rewarming, the patient was weaned from cardiopulmonary bypass without difficulty.   Pump time 93 minutes, crossclamp 77 minutes, cardioplegia

## 2017-05-26 NOTE — HOME CARE LIAISON
Referral received for [facility]. Met with pt to discuss home health services and coverage criteria. Pt agreeable to [facility]. Agency brochure provided. Referral placed via [facility].  [facility] has accepted pt and will provide skilled nurse and physical thera

## 2017-05-26 NOTE — PROGRESS NOTES
[facility]  Progress Note     Riccardo Rutledge Patient Status:  Inpatient    1950 MRN [id number]   [facility] 6NE-A Attending Jakob Anderson MD   [facility] Day # 5 PCP Teodoro Caputo MD     Chief Complaint: VT/A. Jeanine Rios hours.         Imaging: Imaging data reviewed in Epic.     Medications:   • amiodarone HCl  400 mg Oral BID with meals   • docusate sodium  100 mg Oral BID    Or   • docusate sodium  100 mg Oral BID   • famoTIDine  20 mg Oral BID    Or   • famoTIDine  20 mg

## 2017-05-26 NOTE — PLAN OF CARE
Assumed care of pt approx 1915, s/p CABG. Vented overnight and sedated on precedex gtt, anxious, versed IVP given x2 for restlessness and anxiety. Medicated with morphine for pain. Levo weaned off, vitals stable. Extubated at 3445 to venti-mask.  Will c

## 2017-05-26 NOTE — OCCUPATIONAL THERAPY NOTE
OT order received. Attempted to see pt for OT session, but spoke with RN who stated pt is being extubated now and to try later. Will f/u again later as appropriate and as time permits. RN in agreement with this plan.

## 2017-05-26 NOTE — PROGRESS NOTES
POD #!  S/p CABG seen and examined by Dr. Donnie Villegas  -looks great sitting up in bed.  -remains on levo despite albumin and ivf bolous, wean as able, echo per cards, recheck cbc per cards  -up in chair, mobilize once bp stable  -chest tubes with minimal output, ha

## 2017-05-26 NOTE — PROGRESS NOTES
[facility]  Progress Note    Shruti Martell     Subjective:  No chest pain or shortness of breath, except at ct site.        Intake/Output:    Intake/Output Summary (Last 24 hours) at 05/26/17 1203  Last data filed at 05/26/17 0700   Gross per 24 hour infusion 1-10 mcg/min Intravenous Continuous   Insulin Regular Human (NOVOLIN R) 100 Units in sodium chloride 0.9 % 100 mL infusion 1-57 Units/hr Intravenous Continuous   glucose (DEX4) oral liquid 15 g 15 g Oral Q15 Min PRN   Or      Glucose-Vitamin C (DE famoTIDine (PEPCID) tab 20 mg 20 mg Oral BID   Or      famoTIDine (PEPCID) injection 20 mg 20 mg Intravenous BID   potassium chloride IVPB premix 20 mEq 20 mEq Intravenous PRN   Or      potassium chloride IVPB premix 40 mEq 40 mEq Intravenous PRN   calci

## 2017-05-26 NOTE — CONSULTS
[facility]  Report of Consultation    Yakov Lindo Patient Status:  Inpatient    1950 MRN [id number]   [facility] 6NE-A Attending Nayeli Curran MD   [facility] Day # 5 PCP Quinton Paez MD     Reason for Consultation: Continuous  •  glucose (DEX4) oral liquid 15 g, 15 g, Oral, Q15 Min PRN **OR** Glucose-Vitamin C (DEX-4) 4-0.006 g chewable tab 4 tablet, 4 tablet, Oral, Q15 Min PRN **OR** dextrose 50% injection 50 mL, 50 mL, Intravenous, Q15 Min PRN **OR** glucose (DEX4) ondansetron HCl (ZOFRAN) injection 4 mg, 4 mg, Intravenous, Q6H PRN  •  famoTIDine (PEPCID) tab 20 mg, 20 mg, Oral, BID **OR** famoTIDine (PEPCID) injection 20 mg, 20 mg, Intravenous, BID  •  potassium chloride IVPB premix 20 mEq, 20 mEq, Intravenous, PRN acute distress, well groomed   Eyes: anicteric sclera, perrla, eomi  ENT: op clear, no lesions noted, MMM  Neck: supple, no thyromegaly or nodules palpable  Lymph: no neck or supraclavicular lymphadenopathy  Cardiovascular:  RRR; no murmurs 2+ pedal pulses MD  Endocrinology, Diabetes, and Metabolism  [facility]  5/26/2017  7:10 AM

## 2017-05-26 NOTE — OCCUPATIONAL THERAPY NOTE
OCCUPATIONAL THERAPY EVALUATION - INPATIENT     Room Number: 9478/5554-L  Evaluation Date: 5/26/2017  Type of Evaluation: Initial  Presenting Problem: h/o ventricular tachycardia    Physician Order: IP Consult to Occupational Therapy  Reason for Therapy: A techniques;Relaxation;Repositioning    COGNITION  Safety Judgement:  decreased awareness of need for safety  Awareness of Deficits:  decreased awareness of deficits and not aware of deficits    VISION  Current Vision: wears glasses only for reading    [facility] mobility. Pt sat EOB for short time, but was significantly limited d/t incisional pain. Following mobility pt returned to supine. No additional questions or concerns voiced. Discussed recommendation.  Educated pt on hand pumps to assist with reducing ed with modified independent    Functional Transfer Goals  Patient will perform all functional transfers:  with modified independent    UE Exercise Program Goal  Patient will be independent with bilateral AROM HEP (home exercise program).     Additional Goals:

## 2017-05-26 NOTE — CM/SW NOTE
SW met with pt to discuss eventual dc plans. PT/OT advise HHC. Pt in agreement and choice offered. Pt requests referral to [facility]. [facility] paged re: order for hhc.   Ashlee Cespedes, 05/26/2017, 3:33 PM

## 2017-05-27 PROCEDURE — 99232 SBSQ HOSP IP/OBS MODERATE 35: CPT | Performed by: INTERNAL MEDICINE

## 2017-05-27 RX ORDER — DEXTROSE MONOHYDRATE 25 G/50ML
50 INJECTION, SOLUTION INTRAVENOUS
Status: DISCONTINUED | OUTPATIENT
Start: 2017-05-27 | End: 2017-05-31

## 2017-05-27 RX ORDER — CLOTRIMAZOLE 1 %
CREAM (GRAM) TOPICAL 2 TIMES DAILY
Status: DISCONTINUED | OUTPATIENT
Start: 2017-05-27 | End: 2017-05-31

## 2017-05-27 RX ORDER — CODEINE PHOSPHATE AND GUAIFENESIN 10; 100 MG/5ML; MG/5ML
5 SOLUTION ORAL EVERY 4 HOURS PRN
Status: DISCONTINUED | OUTPATIENT
Start: 2017-05-27 | End: 2017-05-31

## 2017-05-27 NOTE — PROGRESS NOTES
[facility]  Progress Note     Paul Rosario Patient Status:  Inpatient    1950 MRN [id number]   [facility] 6NE-A Attending Shade Sawyer MD   [address] Day # 6 PCP Stacy pSencer MD     Chief Complaint: VT/A. Osmin Bella Sol Epic.    Medications:   • clotrimazole   Topical BID   • amiodarone HCl  400 mg Oral BID with meals   • docusate sodium  100 mg Oral BID    Or   • docusate sodium  100 mg Oral BID   • famoTIDine  20 mg Oral BID    Or   • famoTIDine  20 mg Intravenous BID

## 2017-05-27 NOTE — PROGRESS NOTES
No complaints-doing well. Neurologically intact. Status post- CABG/Maze/left atrial appendage ligation.  Pulses present lower legs

## 2017-05-27 NOTE — PROGRESS NOTES
[facility]  Progress Note    Daniela Figueroa Patient Status:  Inpatient    1950 MRN [id number]   [facility] 6NE-A Attending Power Xie MD   [facility] Day # 6 PCP Aba Marie MD     Assessment:  1.  Status post CABG- ische 0.174* 05/21/2017   TROP 0.873* 05/10/2017        Medications:    • amiodarone HCl  400 mg Oral BID with meals   • docusate sodium  100 mg Oral BID    Or   • docusate sodium  100 mg Oral BID   • famoTIDine  20 mg Oral BID    Or   • famoTIDine  20 mg Yasmin Espinosa

## 2017-05-27 NOTE — PROGRESS NOTES
[facility]   [facility] Progress Note    Lino Espinosa Patient Status:  Inpatient    1950 MRN [id number]   [facility] 6NE-A Attending Maria Luisa Linda MD   [address] Day # 6 PCP Sreekanth Baker MD     Subjective:    Doing well.  Incis HYDROcodone-acetaminophen (NORCO)  MG per tab 1 tablet 1 tablet Oral Q4H PRN   Or      HYDROcodone-acetaminophen (NORCO)  MG per tab 2 tablet 2 tablet Oral Q4H PRN   morphINE sulfate (PF) 2 MG/ML injection 2 mg 2 mg Intravenous Q1H PRN   Or PRN   propofol (DIPRIVAN) infusion 5-100 mcg/kg/min Intravenous Continuous   Dexmedetomidine HCl in NaCl (PRECEDEX) 400 MCG/100ML premix infusion 0.2-1.5 mcg/kg/hr Intravenous Continuous   ipratropium-albuterol (DUONEB) nebulizer solution 3 mL 3 mL Nebuliz

## 2017-05-27 NOTE — PLAN OF CARE
Assumed care of pt this am. LIZETTE Lopez atrial art line, R. Trip.picc line. Dobutamine, insulin, IVF infusing. Order for Dr Jamir Hernandez to wean dobs as long as cardiac index >2.0 and pull swjamila. [facility] PA order to deline if CT output <100 when pt gets up.  SBP drop to 70's

## 2017-05-27 NOTE — PROGRESS NOTES
[facility]  Progress Note    Kimberlee Tatum Patient Status:  Inpatient    1950 MRN [id number]   [facility] 6NE-A Attending Jass Arzate MD   [facility] Day # 6 PCP Katelyn Arenas MD       SUBJECTIVE:  No acute events over K 4.0 05/27/2017    05/27/2017   CO2 27.0 05/27/2017    05/27/2017   CA 9.5 05/27/2017   PTT 31.0 05/27/2017   MG 2.3 05/27/2017   PGLU 141 05/26/2017         Lab Results  Component Value Date   A1C 5.3 05/24/2017         Recent Labs   05/25 (RESTORIL) cap 15 mg 15 mg Oral Nightly PRN   Albumin Human (ALBUMINAR) 5 % solution 250 mL 250 mL Intravenous Once PRN   DOBUTamine in D5W (DOBUTREX) 250 mg/250 ml infusion 2.5-10 mcg/kg/min Intravenous Continuous PRN   nitroGLYCERIN infusion 50mg in D5W 2x Daily(Beta Blocker)   Doxycycline Hyclate (VIBRAMYCIN) cap 100 mg 100 mg Oral BID       Assessment and Plan:   78year old female here for 3v CABG. POD2. 1) Hyperglycemia - likely stressed induced. A1c preop was 5.3%. No prior hx of DM.  Currently blo

## 2017-05-28 ENCOUNTER — PRIOR ORIGINAL RECORDS (OUTPATIENT)
Dept: OTHER | Age: 67
End: 2017-05-28

## 2017-05-28 PROCEDURE — 99232 SBSQ HOSP IP/OBS MODERATE 35: CPT | Performed by: INTERNAL MEDICINE

## 2017-05-28 RX ORDER — LACTULOSE 10 G/15ML
10 SOLUTION ORAL ONCE
Status: DISCONTINUED | OUTPATIENT
Start: 2017-05-28 | End: 2017-05-31

## 2017-05-28 RX ORDER — POTASSIUM CHLORIDE 20 MEQ/1
40 TABLET, EXTENDED RELEASE ORAL ONCE
Status: COMPLETED | OUTPATIENT
Start: 2017-05-28 | End: 2017-05-28

## 2017-05-28 NOTE — PROGRESS NOTES
[facility]  Progress Note    Phi López Patient Status:  Inpatient    1950 MRN [id number]   [facility] 6NE-A Attending Dae Avelar MD   [facility] Day # 7 PCP Suze Del Castillo MD     Assessment:  1.  Status post CABG- ische 0.174* 05/21/2017   TROP 0.873* 05/10/2017        Medications:    • lactulose  10 g Oral Once   • clotrimazole   Topical BID   • amiodarone HCl  400 mg Oral BID with meals   • docusate sodium  100 mg Oral BID    Or   • docusate sodium  100 mg Oral BID   •

## 2017-05-28 NOTE — PROGRESS NOTES
[facility]  Progress Note     Meli Lee Patient Status:  Inpatient    1950 MRN [id number]   [facility] 6NE-A Attending Carmen Pillai MD   [address] Day # 7 PCP Melony Wagner MD     Chief Complaint: VT/A. David Gill clotrimazole   Topical BID   • amiodarone HCl  400 mg Oral BID with meals   • docusate sodium  100 mg Oral BID    Or   • docusate sodium  100 mg Oral BID   • famoTIDine  20 mg Oral BID    Or   • famoTIDine  20 mg Intravenous BID   • mupirocin  1 Applicatio

## 2017-05-28 NOTE — PROGRESS NOTES
[facility]   CVS Progress Note    Princess Ta Patient Status:  Inpatient    1950 MRN [id number]   [facility] 6NE-A Attending Beau Briggs MD   [facility] Day # 7 PCP Fer Chavze MD     Subjective:    Doing well walkin Intravenous Continuous   HYDROcodone-acetaminophen (NORCO)  MG per tab 1 tablet 1 tablet Oral Q4H PRN   Or      HYDROcodone-acetaminophen (NORCO)  MG per tab 2 tablet 2 tablet Oral Q4H PRN   morphINE sulfate (PF) 2 MG/ML injection 2 mg 2 mg In mg Intravenous Q30 Min PRN   propofol (DIPRIVAN) infusion 5-100 mcg/kg/min Intravenous Continuous   Dexmedetomidine HCl in NaCl (PRECEDEX) 400 MCG/100ML premix infusion 0.2-1.5 mcg/kg/hr Intravenous Continuous   ipratropium-albuterol (DUONEB) nebulizer sol

## 2017-05-29 PROCEDURE — 99232 SBSQ HOSP IP/OBS MODERATE 35: CPT | Performed by: INTERNAL MEDICINE

## 2017-05-29 RX ORDER — POTASSIUM CHLORIDE 20 MEQ/1
40 TABLET, EXTENDED RELEASE ORAL ONCE
Status: COMPLETED | OUTPATIENT
Start: 2017-05-29 | End: 2017-05-29

## 2017-05-29 RX ORDER — ZOLPIDEM TARTRATE 5 MG/1
5 TABLET ORAL NIGHTLY PRN
Status: DISCONTINUED | OUTPATIENT
Start: 2017-05-29 | End: 2017-05-31

## 2017-05-29 RX ORDER — LACTULOSE 10 G/15ML
10 SOLUTION ORAL ONCE
Status: COMPLETED | OUTPATIENT
Start: 2017-05-29 | End: 2017-05-29

## 2017-05-29 NOTE — PROGRESS NOTES
[facility]   CVS Progress Note    Letha Vallejo Patient Status:  Inpatient    1950 MRN [id number]   [facility] 6NE-A Attending Loren Lamar MD   [facility] Day # 8 PCP Dinesh Mccray MD     Subjective:    Still feels const Topical BID   amiodarone HCl (PACERONE) tab 400 mg 400 mg Oral BID with meals   EPINEPHrine HCl (ADRENALIN) 4 mg in sodium chloride 0.9 % 250 mL infusion 1-10 mcg/min Intravenous Continuous   0.9%  NaCl infusion  Intravenous Continuous   HYDROcodone-acetam 1 g Intravenous PRN   magnesium sulfate IVPB premix 2 g 2 g Intravenous PRN   mupirocin (BACTROBAN) 2% nasal ointment OINT 1 Application 1 Application Nasal BID   Midazolam HCl (VERSED) 2 MG/2ML injection 1 mg 1 mg Intravenous Q30 Min PRN   propofol (DIPRI

## 2017-05-29 NOTE — PROGRESS NOTES
[facility]  Progress Note     Maxbob Main Patient Status:  Inpatient    1950 MRN [id number]   [facility] 6NE-A Attending Martinez Jacobs MD   [facility] Day # 8 PCP Nicole Blizzard, MD     Chief Complaint: VT/A. Thereasa KalaklArti Trejo mg Oral BID    Or   • docusate sodium  100 mg Oral BID   • famoTIDine  20 mg Oral BID    Or   • famoTIDine  20 mg Intravenous BID   • mupirocin  1 Application Nasal BID   • aspirin EC  325 mg Oral Daily   • atorvastatin  40 mg Oral Nightly   • Metoprolol T

## 2017-05-29 NOTE — PROGRESS NOTES
[facility] Progress Note    Patient seen and examined.  Chart reviewed.     Soar at site of prior chest tube insertion.   No shortness of breath    /66 mmHg  Pulse 66  Temp(Src) 98.6 °F (37 °C) (Temporal)  Resp 18  Wt 189 lb 9.5 oz (86 kg)  SpO2 9

## 2017-05-29 NOTE — PHYSICAL THERAPY NOTE
PHYSICAL THERAPY TREATMENT NOTE - INPATIENT    Room Number: 1321/7454-L     Session: 1   Number of Visits to Meet Established Goals: 5    Presenting Problem: V-tach, s/p CABG 5/25/17       History related to current admission: Pt is a 78 y.o.  Female admit etc.): A Little   -   Moving from lying on back to sitting on the side of the bed?: A Little   How much help from another person does the patient currently need. ..   -   Moving to and from a bed to a chair (including a wheelchair)?: A Little   -   Need to mobility; Endurance; Energy conservation;Patient education;Gait training;Strengthening;Stair training;Transfer training;Balance training  Rehab Potential : Good  Frequency (Obs): 5x/week    CURRENT GOALS   Goal #1  Patient is able to demonstrate supine - sit

## 2017-05-29 NOTE — PLAN OF CARE
Patient alert and oriented, up in chair, standby assist to bathroom with walker. On room air, surgical incisions dry and intact, pacer wires secured under gauze with Tegaderm.  Patient passing large amount of flatus, abdomen soft and non-distended, declined

## 2017-05-29 NOTE — PLAN OF CARE
Assumed care of pt @ 0730. Pt up to chair with meals, walking in halls w/ walker. Norco given for pain. Incision sites clean dry and intact. Pacer wires still in place and intact. Pt family at bedside, updated on plan of care and questions answered.  Damian

## 2017-05-30 ENCOUNTER — PRIOR ORIGINAL RECORDS (OUTPATIENT)
Dept: OTHER | Age: 67
End: 2017-05-30

## 2017-05-30 PROCEDURE — 99232 SBSQ HOSP IP/OBS MODERATE 35: CPT | Performed by: INTERNAL MEDICINE

## 2017-05-30 RX ORDER — ACETAMINOPHEN 325 MG/1
650 TABLET ORAL EVERY 6 HOURS PRN
Status: DISCONTINUED | OUTPATIENT
Start: 2017-05-30 | End: 2017-05-31

## 2017-05-30 RX ORDER — SPIRONOLACTONE 25 MG/1
12.5 TABLET ORAL DAILY
Status: DISCONTINUED | OUTPATIENT
Start: 2017-05-30 | End: 2017-05-31

## 2017-05-30 RX ORDER — METOPROLOL SUCCINATE 100 MG/1
100 TABLET, EXTENDED RELEASE ORAL
Status: DISCONTINUED | OUTPATIENT
Start: 2017-05-31 | End: 2017-05-31

## 2017-05-30 NOTE — PROGRESS NOTES
[facility]  Progress Note    Myrna Tellez Patient Status:  Inpatient    1950 MRN [id number]   [facility] 6NE-A Attending Lionel Simpson MD   [facility] Day # 9 PCP James Collier MD     Subjective:  Pt feeling well.  She is de soft  Extremities: no edema    Assessment/Plan: S/P CABG/MAZE/Amputation LA Appendage POD #5   - HD stable   - Vol OK   - PAF/VT - maintaining SR on PO Amio, EP following.  Life Vest on D/C   - Pain control/IS/ambulation   - OK for CTU      [facility]

## 2017-05-30 NOTE — CM/SW NOTE
Spoke w/ pt and she can tx and hoping home tomorrow. Pt has life vest at home and  will bring it in . Pt is set up for [facility].      Shi Hodges RN/CM  NICU  [id number]/[phone]

## 2017-05-30 NOTE — PLAN OF CARE
CARDIOVASCULAR - ADULT    • Maintains optimal cardiac output and hemodynamic stability Progressing    • Absence of cardiac arrhythmias or at baseline Progressing        Received patient awake and alert. Up walking hallway.   Complaints of mild pain medicate

## 2017-05-30 NOTE — PLAN OF CARE
Patient received A+Ox4. Up in chair. Denies sob or cp. Encouraged patient to use incentive spirometer every hour while awake to prevent atelectasis, return demonstration provided. NSR on tele.  Incisions are c/d/i with no drainage noted, incisional care pro

## 2017-05-30 NOTE — PROGRESS NOTES
[facility]  Progress Note     Jamir Segovia Patient Status:  Inpatient    1950 MRN [id number]   [facility] 6NE-A Attending Reno Plascencia MD   [facility] Day # 9 PCP Milvia Schwarz MD     Chief Complaint: VT/A. Radha Gorman Oral BID   • famoTIDine  20 mg Oral BID   • aspirin EC  325 mg Oral Daily   • atorvastatin  40 mg Oral Nightly   • Metoprolol Tartrate  50 mg Oral 2x Daily(Beta Blocker)       ASSESSMENT / PLAN:     1. Coronary artery disease   1. S/p 3V CABG 5/25  2.  Post

## 2017-05-30 NOTE — PROGRESS NOTES
[facility]  Progress Note    Miah Fernandez Patient Status:  Inpatient    1950 MRN [id number]   [facility] 6NE-A Attending Shant Deleon MD   [facility] Day # 9 PCP Renetta Miranda MD       Subjective:  Resting comfortably.  Kristin Nightly   • Metoprolol Tartrate  50 mg Oral 2x Daily(Beta Blocker)          Assessment:    · S/p cabg x 3 (Lima to lad, svg om, svg pda), Maze, reported JOHNATHAN amputation 5/25/17    · VT- has been stable without recurrence.    · Paroxysmal afib with rvr- yareli

## 2017-05-30 NOTE — PLAN OF CARE
Up in chair for lunch. Assisted back to bed. Napped briefly. Per Pan pacer wires in for now. Report called to DENICE [facility]IRA DEPARTMENT [facility]. Transferred on tele to CTU via w/c.

## 2017-05-30 NOTE — OCCUPATIONAL THERAPY NOTE
OCCUPATIONAL THERAPY TREATMENT NOTE - INPATIENT     Room Number: 5762/5959-V  Session: 1   Number of Visits to Meet Established Goals: 7    Presenting Problem: h/o ventricular tachycardia    History related to current admission: Pt is admit for CABG with r ASSESSMENT  Supine to Sit : Supervision  Sit to Stand: Supervision    Skilled Therapy Provided: Pt was received supine in bed for session, therapist educated pt on sternal precautions, pt was able to amb x1 in room, hallway, stairs, bathroom with supervisi

## 2017-05-30 NOTE — PHYSICAL THERAPY NOTE
PHYSICAL THERAPY TREATMENT NOTE - INPATIENT    Room Number: 9927/4688-K     Session: 2    Number of Visits to Meet Established Goals: 5    Presenting Problem: V-tach, s/p CABG 5/25/17     History related to current admission: Pt is a 78 y.o.  Female admitt the bed?: None   How much help from another person does the patient currently need. ..   -   Moving to and from a bed to a chair (including a wheelchair)?: None   -   Need to walk in hospital room?: None   -   Climbing 3-5 steps with a railing?: A Little progressing well with functional mobility and endurance. Pt able to recall 3/3 sternal precautions. Pt performed CV binder HEP, as well as stair negotiation with supervision.  Pt has met all goals at this time, and therefore does not require further skilled

## 2017-05-31 VITALS
HEIGHT: 70 IN | DIASTOLIC BLOOD PRESSURE: 68 MMHG | TEMPERATURE: 98 F | HEART RATE: 67 BPM | SYSTOLIC BLOOD PRESSURE: 109 MMHG | OXYGEN SATURATION: 98 % | WEIGHT: 183.63 LBS | RESPIRATION RATE: 18 BRPM | BODY MASS INDEX: 26.29 KG/M2

## 2017-05-31 PROCEDURE — 99239 HOSP IP/OBS DSCHRG MGMT >30: CPT | Performed by: INTERNAL MEDICINE

## 2017-05-31 RX ORDER — METOPROLOL SUCCINATE 100 MG/1
100 TABLET, EXTENDED RELEASE ORAL
Qty: 30 TABLET | Refills: 6 | Status: ON HOLD | OUTPATIENT
Start: 2017-05-31 | End: 2017-08-13

## 2017-05-31 RX ORDER — SPIRONOLACTONE 25 MG/1
12.5 TABLET ORAL DAILY
Qty: 15 TABLET | Refills: 6 | Status: ON HOLD | OUTPATIENT
Start: 2017-05-31 | End: 2017-08-13

## 2017-05-31 NOTE — PROGRESS NOTES
Met with patient to discuss discharge instructions, activity restrictions and recommendations, follow up visits, all questions answered, encouraged to call with any questions or concerns. Patient ready for d/c.   Robyn Powell RN  Clinical Coordinator  [facility]

## 2017-05-31 NOTE — PROGRESS NOTES
Patient seen and examined by Dr. Leia Waller. OK to D/C home from our perspective.     CHERI AntonyC

## 2017-05-31 NOTE — PROGRESS NOTES
[facility] Progress Note    Subjective:  Feels great, happy to be going home today.  Pacer wires removed this am.     Objective:  /68 mmHg  Pulse 67  Temp(Src) 98.1 °F (36.7 °C) (Oral)  Resp 18  Ht 177.8 cm (5' 10\")  Wt 183 lb 10.3 oz (83.3 k

## 2017-05-31 NOTE — PROGRESS NOTES
Explained discharge instructions including medications and follow ups to the patient, verbalize understanding, PICC line removed, tele monitor discontinued, will be transported via wheelchair.

## 2017-06-01 ENCOUNTER — PATIENT OUTREACH (OUTPATIENT)
Dept: CASE MANAGEMENT | Age: 67
End: 2017-06-01

## 2017-06-01 DIAGNOSIS — Z95.1 S/P CABG X 3: ICD-10-CM

## 2017-06-01 DIAGNOSIS — Z98.890 S/P ABLATION OF ATRIAL FIBRILLATION: ICD-10-CM

## 2017-06-01 DIAGNOSIS — Z86.79 S/P AAA REPAIR: ICD-10-CM

## 2017-06-01 DIAGNOSIS — I25.5 ISCHEMIC CARDIOMYOPATHY: ICD-10-CM

## 2017-06-01 DIAGNOSIS — Z86.79 S/P ABLATION OF ATRIAL FIBRILLATION: ICD-10-CM

## 2017-06-01 DIAGNOSIS — Z98.890 S/P AAA REPAIR: ICD-10-CM

## 2017-06-01 DIAGNOSIS — Z86.79 H/O VENTRICULAR TACHYCARDIA: ICD-10-CM

## 2017-06-01 DIAGNOSIS — I25.10 CAD, MULTIPLE VESSEL: ICD-10-CM

## 2017-06-01 DIAGNOSIS — I48.91 ATRIAL FIBRILLATION, NEW ONSET (HCC): Primary | ICD-10-CM

## 2017-06-01 DIAGNOSIS — R77.8 ELEVATED TROPONIN: ICD-10-CM

## 2017-06-01 NOTE — CM/SW NOTE
Lifevest     06/01/17 0700   Discharge disposition   Discharged to: Home-Health   Name of Facillity/Home Care/Hospice Residential   Discharge transportation Private car

## 2017-06-01 NOTE — DISCHARGE SUMMARY
[facility] HOSPITALIST  DISCHARGE SUMMARY     Bryn Page Patient Status:  Inpatient    1950 MRN [id number]   [facility] 8NE-A Attending No att. providers found   Hosp Day # 10 PCP Allen De Leon MD     Date of Admission: 2017 later date.  During her stay the patient also had leukocytosis and was on IV antibiotics.  She was eventually found to have superficial wrist phlebitis/cellulitis and was eventually discharged on doxycycline for 7 days.  Given her depressed ejection fracti medications       Instructions Prescription details    Metoprolol Succinate  MG Tb24   Last time this was given:  100 mg on 5/31/2017  5:04 AM   Commonly known as: Toprol XL        Take 1 tablet (100 mg total) by mouth Daily Beta Blocker.     Sam Plasencia Tartrate 50 MG Tabs   Commonly known as:  LOPRESSOR                Where to Get Your Medications      Please  your prescriptions at the location directed by your doctor or nurse     Bring a paper prescription for each of these medications    - Russel palpation.      -----------------------------------------------------------------------------------------------  PATIENT DISCHARGE INSTRUCTIONS: See electronic chart    Shanon Crane MD 6/1/2017    Time spent:  > 30 minutes

## 2017-06-02 ENCOUNTER — TELEPHONE (OUTPATIENT)
Dept: FAMILY MEDICINE CLINIC | Facility: CLINIC | Age: 67
End: 2017-06-02

## 2017-06-02 ENCOUNTER — TELEPHONE (OUTPATIENT)
Dept: CARDIOLOGY UNIT | Facility: HOSPITAL | Age: 67
End: 2017-06-02

## 2017-06-02 NOTE — TELEPHONE ENCOUNTER
Patient was discharged from [facility] on 5/31/17 and is at High risk for readmission it is recommended that she have a TCM HFU appointment by 6/7/17. Patient currently has a TCM HFU appointment scheduled for 6/9/17.     Triage: Please ask Dr. Maicol Jean

## 2017-06-02 NOTE — PROGRESS NOTES
Follow Up Phone Call    1. How are you doing now that you are home? \"pretty good, can't complain\"    2. Have there been any changes in your wound/incision since going home? Denies any redness, swelling, discharge, or warmth to the touch. 3. Is your pain manageable at home? \"No pain, except Life Vest battery weighs 2 pounds and causes neck muscles to hurt\"    4. Are you following the walking routine given to you in the hospital? \"i don't necessarily set a timer, but I'm walking all over the house\"    5. Are you continuing to use your incentive spirometer? \"[id number]\"    6. Do you have your appointments for      Chest Xray? 6/8/17 (scheduled per pt, but  knows time and he was in bathroom during phone call)                                                              Primary MD? Dr. Keren Jara (will scheduled per pt)                                                              Cardiologist? 6/8/17 @ [phone] w/Dr. Xiomara Blood? 6/8/17 after visit w/Dr. Xiomara Harden to get staples out; 6/28/17 @ 1030 w/YULIET Walters                                                              Cardiac Rehab?  6/27/17 @1000     7. Do you have any other questions or concerns today?  \"no\"        Kayla Monae  6/2/2017  9:43 AM

## 2017-06-02 NOTE — PROGRESS NOTES
Initial Post Discharge Follow Up   Discharge Date: 5/31/17  Contact Date: 6/1/2017    Consent Verification:  Assessment Completed With: Spouse: Abel Permission received per patient?  written  HIPAA Verified?   Yes    Discharge Dx:   JAYSON of Jill; Murray Take 1 tablet (200 mg total) by mouth daily. Disp: 30 tablet Rfl: 11   atorvastatin 40 MG Oral Tab Take 1 tablet (40 mg total) by mouth nightly. Disp: 30 tablet Rfl: 11   nicotine 14 MG/24HR Transdermal Patch 24 Hr Place 1 patch onto the skin daily.  Disp: while she was still in the hospital.  Your appointment with Cardiopulmonary Rehabilitation is on 6/27/2017 at 10am.    Prior to this appointment: See cardiologist and if stress test is ordered, please complete.  Check with your insurance plan for coverage f Associates, [facility])        [facility], [facility]  [address] [facility] Cardiopulmonary Rehabilitation  [facility]

## 2017-06-08 ENCOUNTER — HOSPITAL ENCOUNTER (OUTPATIENT)
Dept: GENERAL RADIOLOGY | Facility: HOSPITAL | Age: 67
Discharge: HOME | End: 2017-06-08
Attending: THORACIC SURGERY (CARDIOTHORACIC VASCULAR SURGERY)
Payer: MEDICARE

## 2017-06-08 DIAGNOSIS — Z98.890 HISTORY OF OPEN HEART SURGERY: ICD-10-CM

## 2017-06-08 PROCEDURE — 71020 XR CHEST PA + LAT CHEST (CPT=71020): CPT | Performed by: THORACIC SURGERY (CARDIOTHORACIC VASCULAR SURGERY)

## 2017-06-08 PROCEDURE — 71035 XR CHEST DECUBITUS (CPT=71035): CPT | Performed by: THORACIC SURGERY (CARDIOTHORACIC VASCULAR SURGERY)

## 2017-06-09 ENCOUNTER — OFFICE VISIT (OUTPATIENT)
Dept: FAMILY MEDICINE CLINIC | Facility: CLINIC | Age: 67
End: 2017-06-09

## 2017-06-09 VITALS
SYSTOLIC BLOOD PRESSURE: 90 MMHG | WEIGHT: 172 LBS | HEART RATE: 72 BPM | BODY MASS INDEX: 25 KG/M2 | RESPIRATION RATE: 12 BRPM | DIASTOLIC BLOOD PRESSURE: 50 MMHG | TEMPERATURE: 98 F

## 2017-06-09 DIAGNOSIS — Z95.1 S/P CABG X 3: ICD-10-CM

## 2017-06-09 DIAGNOSIS — I48.0 PAF (PAROXYSMAL ATRIAL FIBRILLATION) (HCC): ICD-10-CM

## 2017-06-09 DIAGNOSIS — I47.2 VENTRICULAR TACHYCARDIA (HCC): ICD-10-CM

## 2017-06-09 DIAGNOSIS — I71.3 RUPTURED ABDOMINAL AORTIC ANEURYSM (AAA) (HCC): Primary | ICD-10-CM

## 2017-06-09 DIAGNOSIS — I25.10 CAD, MULTIPLE VESSEL: ICD-10-CM

## 2017-06-09 DIAGNOSIS — I50.20 NYHA CLASS 2 HEART FAILURE WITH REDUCED EJECTION FRACTION (HCC): ICD-10-CM

## 2017-06-09 PROCEDURE — 99495 TRANSJ CARE MGMT MOD F2F 14D: CPT | Performed by: FAMILY MEDICINE

## 2017-06-09 RX ORDER — ACETAMINOPHEN 500 MG
500 TABLET ORAL EVERY 6 HOURS PRN
Status: ON HOLD | COMMUNITY
End: 2019-05-10

## 2017-06-09 NOTE — PROGRESS NOTES
HPI:    Maggy Nunez is a 78year old female here today for hospital follow up.    She was discharged from Inpatient hospital, [facility] to Home   Admission Date: 5/21/17   Discharge Date: 5/31/17  Hospital Discharge Diagnosis: Massive infrarenal the emergency room she had a wide-complex tachycardia consistent with ventricular tachycardia.  She has been in and out of ventricular tachycardia with atrial fibrillation.  She has been started on an amiodarone drip and given IV beta-blocker with improvem repair. She family history is negative for Heart Disorder, Diabetes, Hypertension, and Cancer. She  reports that she quit smoking about 4 weeks ago. Her smoking use included Cigarettes. She has a 23.5 pack-year smoking history.  She has never used smok PLAN:   Diagnoses and all orders for this visit:    Ruptured abdominal aortic aneurysm (AAA) ([facility] [address] [facility]  -     CBC W Differential W Platelet; Future  - Follow-up with vascular surgery as requested    CAD, multiple vessel  -     Comp Metabolic Panel (14);  Future

## 2017-06-15 LAB
BUN: 7 MG/DL
CALCIUM: 8.8 MG/DL
CHLORIDE: 105 MEQ/L
CREATININE, SERUM: 0.47 MG/DL
GLUCOSE: 98 MG/DL
HEMATOCRIT: 30.7 %
HEMOGLOBIN: 9.8 G/DL
PLATELETS: 176 K/UL
POTASSIUM, SERUM: 3.9 MEQ/L
RED BLOOD COUNT: 3.36 X 10-6/U
SODIUM: 139 MEQ/L
WHITE BLOOD COUNT: 13.3 X 10-3/U

## 2017-06-19 ENCOUNTER — PRIOR ORIGINAL RECORDS (OUTPATIENT)
Dept: OTHER | Age: 67
End: 2017-06-19

## 2017-06-20 ENCOUNTER — PRIOR ORIGINAL RECORDS (OUTPATIENT)
Dept: OTHER | Age: 67
End: 2017-06-20

## 2017-06-27 ENCOUNTER — APPOINTMENT (OUTPATIENT)
Dept: CARDIAC REHAB | Facility: HOSPITAL | Age: 67
End: 2017-06-27
Attending: INTERNAL MEDICINE
Payer: MEDICARE

## 2017-07-13 ENCOUNTER — PATIENT OUTREACH (OUTPATIENT)
Dept: CASE MANAGEMENT | Age: 67
End: 2017-07-13

## 2017-07-19 ENCOUNTER — APPOINTMENT (OUTPATIENT)
Dept: CARDIAC REHAB | Facility: HOSPITAL | Age: 67
End: 2017-07-19
Attending: INTERNAL MEDICINE
Payer: MEDICARE

## 2017-07-24 ENCOUNTER — HOSPITAL ENCOUNTER (OUTPATIENT)
Dept: CV DIAGNOSTICS | Facility: HOSPITAL | Age: 67
Discharge: HOME | End: 2017-07-24
Attending: INTERNAL MEDICINE

## 2017-07-24 ENCOUNTER — MYAURORA ACCOUNT LINK (OUTPATIENT)
Dept: OTHER | Age: 67
End: 2017-07-24

## 2017-07-24 ENCOUNTER — PRIOR ORIGINAL RECORDS (OUTPATIENT)
Dept: OTHER | Age: 67
End: 2017-07-24

## 2017-07-24 DIAGNOSIS — I25.5 ISCHEMIC CARDIOMYOPATHY: ICD-10-CM

## 2017-07-28 ENCOUNTER — APPOINTMENT (OUTPATIENT)
Dept: CARDIAC REHAB | Facility: HOSPITAL | Age: 67
End: 2017-07-28
Attending: INTERNAL MEDICINE
Payer: MEDICARE

## 2017-08-01 ENCOUNTER — APPOINTMENT (OUTPATIENT)
Dept: CT IMAGING | Facility: HOSPITAL | Age: 67
DRG: 330 | End: 2017-08-01
Attending: EMERGENCY MEDICINE
Payer: MEDICARE

## 2017-08-01 ENCOUNTER — TELEPHONE (OUTPATIENT)
Dept: FAMILY MEDICINE CLINIC | Facility: CLINIC | Age: 67
End: 2017-08-01

## 2017-08-01 ENCOUNTER — APPOINTMENT (OUTPATIENT)
Dept: GENERAL RADIOLOGY | Facility: HOSPITAL | Age: 67
DRG: 330 | End: 2017-08-01
Attending: SURGERY
Payer: MEDICARE

## 2017-08-01 ENCOUNTER — HOSPITAL ENCOUNTER (INPATIENT)
Facility: HOSPITAL | Age: 67
LOS: 12 days | Discharge: HOME HEALTH | DRG: 330 | End: 2017-08-13
Attending: EMERGENCY MEDICINE | Admitting: HOSPITALIST
Payer: MEDICARE

## 2017-08-01 DIAGNOSIS — I72.3 ILIAC ARTERY ANEURYSM, BILATERAL (HCC): ICD-10-CM

## 2017-08-01 DIAGNOSIS — Z86.79 H/O VENTRICULAR TACHYCARDIA: ICD-10-CM

## 2017-08-01 DIAGNOSIS — I70.209 FEMORAL ARTERY OCCLUSION (HCC): ICD-10-CM

## 2017-08-01 DIAGNOSIS — Z86.79 S/P AAA REPAIR: ICD-10-CM

## 2017-08-01 DIAGNOSIS — K56.609 SMALL BOWEL OBSTRUCTION (HCC): Primary | ICD-10-CM

## 2017-08-01 DIAGNOSIS — Z98.890 S/P AAA REPAIR: ICD-10-CM

## 2017-08-01 PROBLEM — D64.9 NORMOCYTIC ANEMIA: Status: ACTIVE | Noted: 2017-05-09

## 2017-08-01 LAB
ALBUMIN SERPL-MCNC: 3.7 G/DL (ref 3.5–4.8)
ALP LIVER SERPL-CCNC: 101 U/L (ref 55–142)
ALT SERPL-CCNC: 23 U/L (ref 14–54)
AST SERPL-CCNC: 17 U/L (ref 15–41)
ATRIAL RATE: 59 BPM
BASOPHILS # BLD AUTO: 0.04 X10(3) UL (ref 0–0.1)
BASOPHILS NFR BLD AUTO: 0.4 %
BILIRUB SERPL-MCNC: 0.3 MG/DL (ref 0.1–2)
BUN BLD-MCNC: 13 MG/DL (ref 8–20)
CALCIUM BLD-MCNC: 10.1 MG/DL (ref 8.3–10.3)
CHLORIDE: 102 MMOL/L (ref 101–111)
CO2: 28 MMOL/L (ref 22–32)
CREAT BLD-MCNC: 0.89 MG/DL (ref 0.55–1.02)
EOSINOPHIL # BLD AUTO: 0.03 X10(3) UL (ref 0–0.3)
EOSINOPHIL NFR BLD AUTO: 0.3 %
ERYTHROCYTE [DISTWIDTH] IN BLOOD BY AUTOMATED COUNT: 15.4 % (ref 11.5–16)
GLUCOSE BLD-MCNC: 128 MG/DL (ref 70–99)
HCT VFR BLD AUTO: 36.9 % (ref 34–50)
HGB BLD-MCNC: 11.8 G/DL (ref 12–16)
IMMATURE GRANULOCYTE COUNT: 0.06 X10(3) UL (ref 0–1)
IMMATURE GRANULOCYTE RATIO %: 0.5 %
LIPASE: 137 U/L (ref 73–393)
LYMPHOCYTES # BLD AUTO: 1.53 X10(3) UL (ref 0.9–4)
LYMPHOCYTES NFR BLD AUTO: 14 %
M PROTEIN MFR SERPL ELPH: 8.5 G/DL (ref 6.1–8.3)
MCH RBC QN AUTO: 28.4 PG (ref 27–33.2)
MCHC RBC AUTO-ENTMCNC: 32 G/DL (ref 31–37)
MCV RBC AUTO: 88.9 FL (ref 81–100)
MONOCYTES # BLD AUTO: 0.43 X10(3) UL (ref 0.1–0.6)
MONOCYTES NFR BLD AUTO: 3.9 %
NEUTROPHIL ABS PRELIM: 8.86 X10 (3) UL (ref 1.3–6.7)
NEUTROPHILS # BLD AUTO: 8.86 X10(3) UL (ref 1.3–6.7)
NEUTROPHILS NFR BLD AUTO: 80.9 %
P AXIS: 56 DEGREES
P-R INTERVAL: 146 MS
PLATELET # BLD AUTO: 266 10(3)UL (ref 150–450)
POTASSIUM SERPL-SCNC: 4.2 MMOL/L (ref 3.6–5.1)
Q-T INTERVAL: 496 MS
QRS DURATION: 126 MS
QTC CALCULATION (BEZET): 491 MS
R AXIS: 69 DEGREES
RBC # BLD AUTO: 4.15 X10(6)UL (ref 3.8–5.1)
RED CELL DISTRIBUTION WIDTH-SD: 50.1 FL (ref 35.1–46.3)
SODIUM SERPL-SCNC: 138 MMOL/L (ref 136–144)
T AXIS: 80 DEGREES
VENTRICULAR RATE: 59 BPM
WBC # BLD AUTO: 11 X10(3) UL (ref 4–13)

## 2017-08-01 PROCEDURE — 74174 CTA ABD&PLVS W/CONTRAST: CPT | Performed by: EMERGENCY MEDICINE

## 2017-08-01 PROCEDURE — 71010 XR CHEST AP PORTABLE  (CPT=71010): CPT | Performed by: SURGERY

## 2017-08-01 PROCEDURE — 99223 1ST HOSP IP/OBS HIGH 75: CPT | Performed by: HOSPITALIST

## 2017-08-01 PROCEDURE — 74176 CT ABD & PELVIS W/O CONTRAST: CPT | Performed by: EMERGENCY MEDICINE

## 2017-08-01 RX ORDER — MORPHINE SULFATE 4 MG/ML
1 INJECTION, SOLUTION INTRAMUSCULAR; INTRAVENOUS EVERY 2 HOUR PRN
Status: DISCONTINUED | OUTPATIENT
Start: 2017-08-01 | End: 2017-08-11

## 2017-08-01 RX ORDER — LOSARTAN POTASSIUM 50 MG/1
50 TABLET ORAL DAILY
COMMUNITY
End: 2019-10-13 | Stop reason: CLARIF

## 2017-08-01 RX ORDER — ONDANSETRON 2 MG/ML
4 INJECTION INTRAMUSCULAR; INTRAVENOUS ONCE
Status: COMPLETED | OUTPATIENT
Start: 2017-08-01 | End: 2017-08-01

## 2017-08-01 RX ORDER — ENOXAPARIN SODIUM 100 MG/ML
40 INJECTION SUBCUTANEOUS DAILY
Status: DISCONTINUED | OUTPATIENT
Start: 2017-08-01 | End: 2017-08-06

## 2017-08-01 RX ORDER — ONDANSETRON 2 MG/ML
4 INJECTION INTRAMUSCULAR; INTRAVENOUS EVERY 6 HOURS PRN
Status: DISCONTINUED | OUTPATIENT
Start: 2017-08-01 | End: 2017-08-04

## 2017-08-01 RX ORDER — ACETAMINOPHEN/DIPHENHYDRAMINE 500MG-25MG
1 TABLET ORAL DAILY PRN
COMMUNITY

## 2017-08-01 RX ORDER — HYDROMORPHONE HYDROCHLORIDE 1 MG/ML
0.5 INJECTION, SOLUTION INTRAMUSCULAR; INTRAVENOUS; SUBCUTANEOUS EVERY 30 MIN PRN
Status: DISCONTINUED | OUTPATIENT
Start: 2017-08-01 | End: 2017-08-01

## 2017-08-01 RX ORDER — SODIUM CHLORIDE 9 MG/ML
INJECTION, SOLUTION INTRAVENOUS CONTINUOUS
Status: DISCONTINUED | OUTPATIENT
Start: 2017-08-01 | End: 2017-08-04

## 2017-08-01 RX ORDER — METOPROLOL TARTRATE 5 MG/5ML
5 INJECTION INTRAVENOUS EVERY 6 HOURS
Status: DISCONTINUED | OUTPATIENT
Start: 2017-08-01 | End: 2017-08-04

## 2017-08-01 RX ORDER — ONDANSETRON 2 MG/ML
4 INJECTION INTRAMUSCULAR; INTRAVENOUS EVERY 4 HOURS PRN
Status: DISCONTINUED | OUTPATIENT
Start: 2017-08-01 | End: 2017-08-01

## 2017-08-01 RX ORDER — HYDROMORPHONE HYDROCHLORIDE 1 MG/ML
0.5 INJECTION, SOLUTION INTRAMUSCULAR; INTRAVENOUS; SUBCUTANEOUS ONCE
Status: COMPLETED | OUTPATIENT
Start: 2017-08-01 | End: 2017-08-01

## 2017-08-01 RX ORDER — MORPHINE SULFATE 4 MG/ML
4 INJECTION, SOLUTION INTRAMUSCULAR; INTRAVENOUS EVERY 2 HOUR PRN
Status: DISCONTINUED | OUTPATIENT
Start: 2017-08-01 | End: 2017-08-11

## 2017-08-01 RX ORDER — MORPHINE SULFATE 4 MG/ML
2 INJECTION, SOLUTION INTRAMUSCULAR; INTRAVENOUS EVERY 2 HOUR PRN
Status: DISCONTINUED | OUTPATIENT
Start: 2017-08-01 | End: 2017-08-11

## 2017-08-01 NOTE — HISTORICAL OFFICE NOTE
Kyle Oreilly  : 1950  ACCOUNT:  [id number]  [phone]  PCP: Dr. Nicole Blizzard     TODAY'S DATE: 2017  DICTATED BY:  Florence Garnett M.D.]    CHIEF COMPLAINT: [Followup of Atrial fibrillation, currently SR, Followup of CAD, of native vess Postop echo on May 26, 2017, a very limited study that I read showed EF 20%. I really did not feel we could compare the LVs due to the limited views done on the second study.   She also had a carotid Doppler study in the hospital; her right was less than 5 daily. ALCOHOL: drinks rarely. EXERCISE: walks, active. DIET: balanced. MARITAL STATUS: . OCCUPATION: .      ALLERGIES: ProAmatine - Oral, Hypotension    MEDICATIONS: Selected prescriptions see below    VITAL SIGNS: [B/P - 96/50, Pul the chest in a couple of months. ASSESSMENT:  1. Amiodarone Management  2. Carotid artery stenosis, right carotid artery  3. Anemia  4. Aneurysm, abdominal, AAA status post emergent aortobiiliac bypass and Dacron graft placement 5/9/2017  5.  Atrial fi

## 2017-08-01 NOTE — CONSULTS
[facility] cardi[facility] consult: full note dictated    76 y/o wf with cad sp cabg may 21, 2017 after surviving a contained ruptured AAA in early may, 2017, who presents today with sudden onset of abd pain with distension and vomiting (once in er).  Ct suggests s

## 2017-08-01 NOTE — TELEPHONE ENCOUNTER
Incoming call from patient , states his wife has had  abdominal pain since 9 am this morning. I asked Onofre to describe the pain, states is a pain that is under rib cage and wraps around to the middle of the back area.   Feels like the same pain sh

## 2017-08-01 NOTE — ED INITIAL ASSESSMENT (HPI)
Patient with AAA repair in May and bypass graft in June. Patient with LifeVest in place. Patient with stomach cramps radiating from the front of her abdomen around to the back. She had similar symptoms when she had her aortic aneurysm.

## 2017-08-01 NOTE — ED PROVIDER NOTES
Patient Seen in: [facility] Emergency Department    History   Patient presents with:  Abdomen/Flank Pain (GI/)    Stated Complaint: abd pain, s/p triple A repair    HPI    16-year-old female presents to the emergency department with complaints of daily.   temazepam 7.5 MG Oral Cap,  Take 1 capsule (7.5 mg total) by mouth nightly as needed for Sleep. aspirin  MG Oral Tab EC,  Take 1 tablet (325 mg total) by mouth daily.    amiodarone HCl 200 MG Oral Tab,  Take 1 tablet (200 mg total) by mouth bilaterally with difficulty palpating but no signs of acute ischemic changes   Pulmonary/Chest: Effort normal and breath sounds normal. No stridor. She has no wheezes. Abdominal: Soft. Bowel sounds are normal. There is tenderness. There is no rebound. segment changes         CT scan abdomen pelvis with contrast shows no acute extravasation from her AAA repair. She has multiple aneurysms of other vessels as well as what appears to be a small bowel obstruction.   CT scan report is comprehensive please rev Discharge Medication List        Present on Admission  Date Reviewed: 5/9/2017          ICD-10-CM Noted POA    [facility] K56.69 8/1/2017 Unknown

## 2017-08-01 NOTE — H&P
[facility]  History and Physical     Letha Vallejo Patient Status:  Emergency    1950 MRN [id number]   Location [address] [facility] Attending Portillo Tam MD   Hosp Day # 0 PCP Dinesh Mccray MD     Chief Complain Metoprolol Succinate  MG Oral Tablet 24 Hr Take 1 tablet (100 mg total) by mouth Daily Beta Blocker. Disp: 30 tablet Rfl: 6   spironolactone 25 MG Oral Tab Take 0.5 tablets (12.5 mg total) by mouth daily.  Disp: 15 tablet Rfl: 6   temazepam 7.5 MG O 13   CREATSERUM  0.89   CA  10.1   ALB  3.7   NA  138   K  4.2   CL  102   CO2  28.0   ALKPHO  101   AST  17   ALT  23   BILT  0.3   TP  8.5*       Estimated Creatinine Clearance: 65.4 mL/min (based on SCr of 0.89 mg/dL).     No results for input(s): PTP, I

## 2017-08-01 NOTE — CONSULTS
[facility]  Report of Consultation    Kingsley Mcnamara Patient Status:  Emergency    1950 MRN [id number]   Location [address] Attending No att. providers found   Hosp Day # 0 PCP David Blanc MD     Reason for Co normal limits bilaterally. LIVER:  Normal.  No enlargement, atrophy, abnormal density, or significant focal lesion. There is mild intrahepatic biliary tree dilatation. BILIARY:  A gallbladder is present. Small granular stones are seen within the fundus. short axis diameter. Left SFA occlusion. Mild dilatation of the intrahepatic and extrahepatic biliary tree. No pancreatic mass. Gravel-like stones are noted within the gallbladder fundus. No evidence of acute cholecystitis.      Focally dilated smal Data:    Lab Results  Component Value Date   WBC 11.0 08/01/2017   HGB 11.8 08/01/2017   HCT 36.9 08/01/2017   .0 08/01/2017   CREATSERUM 0.89 08/01/2017   BUN 13 08/01/2017    08/01/2017   K 4.2 08/01/2017    08/01/2017   CO2 28.0 08/01 answered.  present. I spent 40 minutes face to face with the patient. More than 50% of that time was spent counseling the patient and/or on coordination of care.  The diagnosis, prognosis, and general treatment was explained to the patient and th

## 2017-08-02 PROCEDURE — 0D9670Z DRAINAGE OF STOMACH WITH DRAINAGE DEVICE, VIA NATURAL OR ARTIFICIAL OPENING: ICD-10-PCS | Performed by: SURGERY

## 2017-08-02 PROCEDURE — 99232 SBSQ HOSP IP/OBS MODERATE 35: CPT | Performed by: HOSPITALIST

## 2017-08-02 NOTE — CONSULTS
[facility]    PATIENT'S NAME: Brianne Narayan   ATTENDING PHYSICIAN: AYSE Arias Dies: Nina Diaz M.D.    PATIENT ACCOUNT#:   [de-identified]    LOCATION:  [facility] 4 EDW 10  MEDICAL RECORD #:   [id number]       DATE OF BIRTH Dyslipidemia. 5.   Carotid disease. 6.   Adrenal mass found on last admission as well, though she has had no hypertensive issues. 7.   Tonsillectomy.     MEDICATIONS:  Prior to admission, metoprolol succinate 100 mg a day, spironolactone 12.5 mg a day, superficial femoral artery:  Asymptomatic. My review of the images from her May 2017 CT abdomen suggests it was occluded then. 4.   Coronary artery disease, status post coronary artery bypass graft:  No angina.   5.   Ischemic cardiomyopathy, with LifeVes

## 2017-08-02 NOTE — PROGRESS NOTES
[facility]  Progress Note    Selena Martell     Subjective:  No chest pain or shortness of breath. No abdominal pain. No nausea.  ngt in      Intake/Output:    Intake/Output Summary (Last 24 hours) at 08/02/17 1625  Last data filed at 08/02/17 1629 ondansetron HCl (ZOFRAN) injection 4 mg 4 mg Intravenous Q6H PRN   metoprolol Tartrate (LOPRESSOR) 5 MG/5ML injection 5 mg 5 mg Intravenous Q6H       Assessment and Plan:  Principal Problem:    Small bowel obstruction (HCC)  Active Problems:    H/O ventr

## 2017-08-02 NOTE — PROGRESS NOTES
08/02/17 1140   Clinical Encounter Type   Visited With Patient   Routine Visit Introduction   Continue Visiting No   Patient's Supportive Strategies/Resources  offered emotional support and explained  role.   acknowledged respecte

## 2017-08-02 NOTE — PROGRESS NOTES
[facility]  Progress Note    Jamir Segovia Patient Status:  Inpatient    1950 MRN [id number]   [facility] 3NE-A Attending Martir Reed MD   Hosp Day # 1 PCP Milvia Schwarz MD     Subjective:  Feels better.   Minimal/no abd stent graft for abdominal aortic aneurysm (AAA)     Atrial fibrillation, new onset ([address]     Ischemic cardiomyopathy     S/P CABG x 3     S/P ablation of atrial fibrillation     SBO (small bowel obstruction) (HCC)     Dyslipidemia     Bilateral iliac arter

## 2017-08-02 NOTE — PLAN OF CARE
Pt. Is Ax4, anxious, upset about having another health issue after all she want through in May. Full code, NPO, electrolyte protocol, RA, Tele:NSR, , BM earlier today, up ad jayla. PIV:.9/100. Monitoring B/P for Metoprolol that has parameters.   Pt. Amy Beckwith

## 2017-08-02 NOTE — CM/SW NOTE
SW found pt thru casefinding for readmission risk and met with pt for assessment and d/c planning. Pt is a 75 y.o female admitted on 08/01 with dx of SBO.  Pt's most recent admission was 05/21-05/31, pt had CABG during admission with d/c home with lifevest

## 2017-08-02 NOTE — PROGRESS NOTES
2137   Paged Tai Gould in regards to NG-he stated to notify [facility]. 2138    Paged Dr. Marcus Figures in regards to no NG tube output-xray ordered for placement. Xray revealed needing advancement. Advanced and second Xray completed showing in fundus.     NG now d

## 2017-08-02 NOTE — PROGRESS NOTES
[facility]  Progress Note     Maggy Bushrasmiley Patient Status:  Inpatient    1950 MRN [id number]   [facility] 3NE-A Attending Emily Navarro MD   Hosp Day # 1 PCP George Cervantes MD     Chief Complaint:  abd pain     S: Vanice Gaucher imporved since CABG- has life vest at home   4. H/o VT  1. Continue life vest  2. Oral amio on hold, IV per cardio  3. EP to eval  4. LVEF improved   5. PAF s/p MAZE and JOHNATHAN resection  1. Currently in NSR  6. Chronic systolic heart failure  1.  Compensated

## 2017-08-03 ENCOUNTER — TELEPHONE (OUTPATIENT)
Dept: FAMILY MEDICINE CLINIC | Facility: CLINIC | Age: 67
End: 2017-08-03

## 2017-08-03 ENCOUNTER — APPOINTMENT (OUTPATIENT)
Dept: ULTRASOUND IMAGING | Facility: HOSPITAL | Age: 67
DRG: 330 | End: 2017-08-03
Attending: SURGERY
Payer: MEDICARE

## 2017-08-03 LAB
BUN BLD-MCNC: 14 MG/DL (ref 8–20)
CALCIUM BLD-MCNC: 9.7 MG/DL (ref 8.3–10.3)
CHLORIDE: 105 MMOL/L (ref 101–111)
CO2: 22 MMOL/L (ref 22–32)
CREAT BLD-MCNC: 0.69 MG/DL (ref 0.55–1.02)
GLUCOSE BLD-MCNC: 136 MG/DL (ref 70–99)
POTASSIUM SERPL-SCNC: 4 MMOL/L (ref 3.6–5.1)
SODIUM SERPL-SCNC: 138 MMOL/L (ref 136–144)

## 2017-08-03 PROCEDURE — 93925 LOWER EXTREMITY STUDY: CPT | Performed by: SURGERY

## 2017-08-03 PROCEDURE — 99232 SBSQ HOSP IP/OBS MODERATE 35: CPT | Performed by: HOSPITALIST

## 2017-08-03 NOTE — PROGRESS NOTES
[facility]  Progress Note     Elias Torres Patient Status:  Inpatient    1950 MRN [id number]   [facility] 3NE-A Attending Maynor Caldera MD   Hosp Day # 2 PCP Negro Blancas MD     Chief Complaint:  abd pain     S: Lorri Sandhoff 5/25/17  1. IV BB  2. Cardiology following/ EP to see for consideration ICD/ need for life vest at d/c   4. H/o VT  1. Continue life vest per EP rec: Will see Fri per cards notes  2. EP to eval  3. LVEF improved  But still reduced   5.  PAF s/p MAZE and [place]

## 2017-08-03 NOTE — CONSULTS
[address]    PATIENT'S NAME: Jose Francisco Byrne   ATTENDING PHYSICIAN: [facility] [place]: Tori Pruitt M.D.    PATIENT ACCOUNT#:   [de-identified]    LOCATION:  [address] [place] [address]  MEDICAL RECORD #:   [id number]       DATE OF BIRTH: EXAMINATION:    EXTREMITIES:  There is no gangrene or tissue loss in the lower legs. No evidence of any gross infection.   The patient had palpable femoral pulses; the popliteal and pedals were diminished as they were preoperatively when I did her ruptured

## 2017-08-03 NOTE — PROGRESS NOTES
[facility]  Cardiology Progress Note    Subjective:  No chest pain or shortness of breath. At a little soup and drank a bit, now feels nauseated. No emesis since NGT out.       Objective:  /96 (BP Location: Left arm)   Pulse 99   Temp 98.3 °F ( of 30-35%. No syncope or discharges from [address] [place] [address]:  1. Need to wait 3 months post CABG before can offer an ICD. Would also prefer she recover from SBO before implanting device. 2.  Recheck Echo in 1 month.   If EF < 35% and recovered from SBO ca

## 2017-08-03 NOTE — PLAN OF CARE
Resumed care of pt. At 1900. Pt. Is Ax4, full code, and a bit anxious. Pt. States she would like her NG removed because she states she has so much mucus in the back of her throat. NG is properly draining. Pt. Was NPO, in AM, NG was removed and pt.  Plac

## 2017-08-03 NOTE — PROGRESS NOTES
[facility]  Progress Note    Kimberlee Tatum Patient Status:  Inpatient    1950 MRN [id number]   [facility] 3NE-A Attending Sohail Paredes MD   [facility] Day # 2 PCP Katelyn Arenas MD     Subjective:  Feels good other than gagging MD  8/3/2017  5:56 AM

## 2017-08-04 ENCOUNTER — APPOINTMENT (OUTPATIENT)
Dept: GENERAL RADIOLOGY | Facility: HOSPITAL | Age: 67
DRG: 330 | End: 2017-08-04
Attending: SURGERY
Payer: MEDICARE

## 2017-08-04 PROCEDURE — 74020 XR ABDOMEN, OBSTRUCTIVE SERIES (CPT=74020): CPT | Performed by: SURGERY

## 2017-08-04 PROCEDURE — 99232 SBSQ HOSP IP/OBS MODERATE 35: CPT | Performed by: HOSPITALIST

## 2017-08-04 RX ORDER — FLUTICASONE PROPIONATE 50 MCG
2 SPRAY, SUSPENSION (ML) NASAL 2 TIMES DAILY
Status: DISCONTINUED | OUTPATIENT
Start: 2017-08-04 | End: 2017-08-06

## 2017-08-04 RX ORDER — DEXTROSE AND SODIUM CHLORIDE 5; .45 G/100ML; G/100ML
INJECTION, SOLUTION INTRAVENOUS CONTINUOUS
Status: DISCONTINUED | OUTPATIENT
Start: 2017-08-04 | End: 2017-08-06

## 2017-08-04 RX ORDER — METOCLOPRAMIDE HYDROCHLORIDE 5 MG/ML
10 INJECTION INTRAMUSCULAR; INTRAVENOUS EVERY 6 HOURS PRN
Status: DISCONTINUED | OUTPATIENT
Start: 2017-08-04 | End: 2017-08-04

## 2017-08-04 RX ORDER — METOCLOPRAMIDE HYDROCHLORIDE 5 MG/ML
10 INJECTION INTRAMUSCULAR; INTRAVENOUS EVERY 6 HOURS
Status: DISCONTINUED | OUTPATIENT
Start: 2017-08-04 | End: 2017-08-06

## 2017-08-04 RX ORDER — METOPROLOL SUCCINATE 50 MG/1
50 TABLET, EXTENDED RELEASE ORAL
Status: DISCONTINUED | OUTPATIENT
Start: 2017-08-04 | End: 2017-08-05

## 2017-08-04 RX ORDER — CALCIUM CARBONATE 200(500)MG
1000 TABLET,CHEWABLE ORAL EVERY 4 HOURS PRN
Status: DISCONTINUED | OUTPATIENT
Start: 2017-08-04 | End: 2017-08-06

## 2017-08-04 RX ORDER — LOSARTAN POTASSIUM 50 MG/1
25 TABLET ORAL DAILY
Status: DISCONTINUED | OUTPATIENT
Start: 2017-08-05 | End: 2017-08-05

## 2017-08-04 RX ORDER — METOCLOPRAMIDE 10 MG/1
10 TABLET ORAL EVERY 6 HOURS PRN
Status: DISCONTINUED | OUTPATIENT
Start: 2017-08-04 | End: 2017-08-04

## 2017-08-04 RX ORDER — AMIODARONE HYDROCHLORIDE 200 MG/1
200 TABLET ORAL DAILY
Status: DISCONTINUED | OUTPATIENT
Start: 2017-08-04 | End: 2017-08-05

## 2017-08-04 NOTE — PROGRESS NOTES
· [facility] Progress Note     Subjective: Tolerating liquids without nausea/vomitting.   No CP or dyspnea    Objective:  141/74  Afebrile  SR  I/O incomplete    No new labs    Tele: short run of atrial tach this am.     Neuro:awake/alert; nml

## 2017-08-04 NOTE — PROGRESS NOTES
[facility]  Progress Note    Jordyn Burgess Patient Status:  Inpatient    1950 MRN [id number]   [facility] 3NE-A Attending Wanda Diego MD   [address] Day # 3 PCP Ursula Mohamud MD     Subjective:  Feels ok.   Nasal congestion i repair        Plan:  1. Pt with a slowly resolving small bowel obstruction. No abdominal pain. No flatus today. 2. NGT out. Continue liquid diet. Will advance slowly. 3. Obstructive series shows reviewed with the patient.   \"May represent mild ileus\

## 2017-08-04 NOTE — PLAN OF CARE
ANXIETY    • Will report anxiety at manageable levels Progressing        DISCHARGE PLANNING    • Discharge to home or other facility with appropriate resources Progressing        GASTROINTESTINAL - ADULT    • Minimal or absence of nausea and vomiting Progr

## 2017-08-04 NOTE — PLAN OF CARE
Pt. Is Ax4, full code. Clear liquid diet. Pt. Takes off . Tele: NS, RA, continent of bowel and bladder. Up ad jayla. PIV: .9/100. One episode of vomiting early morning, Zofran given per mar. WIll continue to monitor.     ANXIETY    • Will report anx

## 2017-08-04 NOTE — PROGRESS NOTES
Passing gas yesterday- no abdominal pain/ complaint of rhinnorhea with nausea. Plan obstructive series. Call to Dr. Dahl Flotyrone- aware of malrotation of mesenteric base. Obstructive series ordered. . Afebrile- pulse 90

## 2017-08-04 NOTE — PROGRESS NOTES
[facility]  Progress Note     Myrna Tellez Patient Status:  Inpatient    1950 MRN [id number]   [facility] 3NE-A Attending Yves Anglin MD   Hosp Day # 3 PCP James Collier MD     Chief Complaint:  abd pain     S: Erin Hides aneurysm, left SFA occlusion- chronic   1. Vascular on consult  2. Cards following   3. Arterial US- no stenosis, biphasic waveforms ,   3. CAD s/p 3-vessel CABG 5/25/17  1. IV BB  2.  Cardiology following  3. EP rec:  Cont life vest x1 month, repeat echo 1

## 2017-08-05 ENCOUNTER — APPOINTMENT (OUTPATIENT)
Dept: GENERAL RADIOLOGY | Facility: HOSPITAL | Age: 67
DRG: 330 | End: 2017-08-05
Attending: SURGERY
Payer: MEDICARE

## 2017-08-05 ENCOUNTER — ANESTHESIA EVENT (OUTPATIENT)
Dept: SURGERY | Facility: HOSPITAL | Age: 67
End: 2017-08-05

## 2017-08-05 LAB
BASOPHILS # BLD AUTO: 0.03 X10(3) UL (ref 0–0.1)
BASOPHILS NFR BLD AUTO: 0.2 %
BUN BLD-MCNC: 9 MG/DL (ref 8–20)
CALCIUM BLD-MCNC: 9.5 MG/DL (ref 8.3–10.3)
CHLORIDE: 98 MMOL/L (ref 101–111)
CO2: 30 MMOL/L (ref 22–32)
CREAT BLD-MCNC: 0.55 MG/DL (ref 0.55–1.02)
EOSINOPHIL # BLD AUTO: 0.01 X10(3) UL (ref 0–0.3)
EOSINOPHIL NFR BLD AUTO: 0.1 %
ERYTHROCYTE [DISTWIDTH] IN BLOOD BY AUTOMATED COUNT: 15 % (ref 11.5–16)
GLUCOSE BLD-MCNC: 120 MG/DL (ref 70–99)
HCT VFR BLD AUTO: 36 % (ref 34–50)
HGB BLD-MCNC: 11.8 G/DL (ref 12–16)
IMMATURE GRANULOCYTE COUNT: 0.12 X10(3) UL (ref 0–1)
IMMATURE GRANULOCYTE RATIO %: 0.9 %
LYMPHOCYTES # BLD AUTO: 1.89 X10(3) UL (ref 0.9–4)
LYMPHOCYTES NFR BLD AUTO: 13.9 %
MCH RBC QN AUTO: 28.2 PG (ref 27–33.2)
MCHC RBC AUTO-ENTMCNC: 32.8 G/DL (ref 31–37)
MCV RBC AUTO: 86.1 FL (ref 81–100)
MONOCYTES # BLD AUTO: 1.96 X10(3) UL (ref 0.1–0.6)
MONOCYTES NFR BLD AUTO: 14.5 %
NEUTROPHIL ABS PRELIM: 9.55 X10 (3) UL (ref 1.3–6.7)
NEUTROPHILS # BLD AUTO: 9.55 X10(3) UL (ref 1.3–6.7)
NEUTROPHILS NFR BLD AUTO: 70.4 %
PLATELET # BLD AUTO: 240 10(3)UL (ref 150–450)
POTASSIUM SERPL-SCNC: 3 MMOL/L (ref 3.6–5.1)
POTASSIUM SERPL-SCNC: 3.9 MMOL/L (ref 3.6–5.1)
RBC # BLD AUTO: 4.18 X10(6)UL (ref 3.8–5.1)
RED CELL DISTRIBUTION WIDTH-SD: 46.7 FL (ref 35.1–46.3)
SODIUM SERPL-SCNC: 136 MMOL/L (ref 136–144)
WBC # BLD AUTO: 13.6 X10(3) UL (ref 4–13)

## 2017-08-05 PROCEDURE — 99232 SBSQ HOSP IP/OBS MODERATE 35: CPT | Performed by: HOSPITALIST

## 2017-08-05 PROCEDURE — 71010 XR CHEST AP PORTABLE  (CPT=71010): CPT | Performed by: SURGERY

## 2017-08-05 RX ORDER — POTASSIUM CHLORIDE 14.9 MG/ML
20 INJECTION INTRAVENOUS ONCE
Status: COMPLETED | OUTPATIENT
Start: 2017-08-05 | End: 2017-08-05

## 2017-08-05 RX ORDER — METOPROLOL TARTRATE 5 MG/5ML
5 INJECTION INTRAVENOUS EVERY 6 HOURS
Status: DISCONTINUED | OUTPATIENT
Start: 2017-08-05 | End: 2017-08-06

## 2017-08-05 NOTE — PROGRESS NOTES
[facility]  Progress Note     Phi Rene Patient Status:  Inpatient    1950 MRN [id number]   [facility] 3NE-A Attending Susy Reynoso MD   [facility] Day # 4 PCP Suze Del Castillo MD     Chief Complaint:  abd pain     S: [address] consult  2. Cards following   3. Arterial US- no stenosis, biphasic waveforms ,   3. CAD s/p 3-vessel CABG 5/25/17  1. IV BB  2.  Cardiology following  3. EP rec:  Cont life vest x1 month, repeat echo 1 month, if LVEF < 35%, then would offer ICD at that asya

## 2017-08-05 NOTE — ANESTHESIA PREPROCEDURE EVALUATION
PRE-OP EVALUATION    Patient Name: Paul Rosario    Pre-op Diagnosis: Small bowel obstruction ([facility] [K56.69]    Procedure(s):  Exploratory laparotomy, lysis of adhesions, possible bowel resection    Surgeon(s) and Role:     Alexa Caldera MD - Primary Outpatient Medications:    Docusate Sodium (STOOL SOFTENER OR) Take 1 capsule by mouth nightly. Disp:  Rfl:    Diphenhydramine-APAP, sleep, (TYLENOL PM EXTRA STRENGTH)  MG Oral Tab Take 1 tablet by mouth daily.  Disp:  Rfl:    Losartan Potassium SURGICAL HISTORY      Comment: facial skin cancer removal  No date: OTHER SURGICAL HISTORY      Comment: left atrial appendage amputation  No date: OTHER SURGICAL HISTORY      Comment: maze procedure  No date: SYMP AAA URGENT REPAIR     Smoking status: For patient    Consented to blood products.           Present on Admission:  **None**

## 2017-08-05 NOTE — PROGRESS NOTES
[facility]  Progress Note    Kimberlee Tatum Patient Status:  Inpatient    1950 MRN [id number]   [facility] 3NE-A Attending Sohail Paredes MD   [facility] Day # 4 PCP Katelyn Arenas MD     Subjective:  Feels ok but still with dry h abdominal aortic aneurysm (AAA)     Atrial fibrillation, new onset (HCC)     Ischemic cardiomyopathy     S/P CABG x 3     S/P ablation of atrial fibrillation     SBO (small bowel obstruction) (HCC)     Dyslipidemia     Bilateral iliac artery aneurysm ([address]

## 2017-08-05 NOTE — PROGRESS NOTES
[facility]  Progress Note    Meli Lee Patient Status:  Inpatient    1950 MRN [id number]   [facility] 3NE-A Attending Mirta Alva MD   Hosp Day # 4 PCP Melony Wagner MD     Subjective:  Patient seen and examined, 240.0 08/05/2017       Lab Results  Component Value Date   INR 1.47 (H) 05/25/2017   INR 1.61 (H) 05/25/2017   INR 1.02 05/21/2017       Medications:    Current Facility-Administered Medications:  potassium chloride 40 mEq in sodium chloride 0.9 % 250 mL I repair      Should see examined, chart reviewed, tentatively scheduled for lysis of adhesions in a.m. Given this we should stop her oral medications including amiodarone, losartan, metoprolol until her small bowel obstruction is completely resolved.   Do Lares

## 2017-08-05 NOTE — PLAN OF CARE
Pt states feeling better after NG tube was reinserted this morning. It has been draining brownish liquid in moderate amount. VS stable. No complaints of nausea. Plan for surgery in AM discussed with pt who is in agreement with it.  NPO status maintained, IV

## 2017-08-06 ENCOUNTER — SURGERY (OUTPATIENT)
Age: 67
End: 2017-08-06

## 2017-08-06 ENCOUNTER — ANESTHESIA (OUTPATIENT)
Dept: SURGERY | Facility: HOSPITAL | Age: 67
End: 2017-08-06

## 2017-08-06 LAB
ANTIBODY SCREEN: NEGATIVE
ERYTHROCYTE [DISTWIDTH] IN BLOOD BY AUTOMATED COUNT: 15.2 % (ref 11.5–16)
GLUCOSE BLD-MCNC: 120 MG/DL (ref 65–99)
GLUCOSE BLD-MCNC: 131 MG/DL (ref 65–99)
HCT VFR BLD AUTO: 40 % (ref 34–50)
HGB BLD-MCNC: 12.8 G/DL (ref 12–16)
MCH RBC QN AUTO: 28.3 PG (ref 27–33.2)
MCHC RBC AUTO-ENTMCNC: 32 G/DL (ref 31–37)
MCV RBC AUTO: 88.5 FL (ref 81–100)
PLATELET # BLD AUTO: 217 10(3)UL (ref 150–450)
RBC # BLD AUTO: 4.52 X10(6)UL (ref 3.8–5.1)
RED CELL DISTRIBUTION WIDTH-SD: 48.5 FL (ref 35.1–46.3)
RH BLOOD TYPE: NEGATIVE
WBC # BLD AUTO: 10.4 X10(3) UL (ref 4–13)

## 2017-08-06 PROCEDURE — 0DB80ZZ EXCISION OF SMALL INTESTINE, OPEN APPROACH: ICD-10-PCS | Performed by: SURGERY

## 2017-08-06 PROCEDURE — 0DNA0ZZ RELEASE JEJUNUM, OPEN APPROACH: ICD-10-PCS | Performed by: SURGERY

## 2017-08-06 PROCEDURE — 06QC0ZZ REPAIR RIGHT COMMON ILIAC VEIN, OPEN APPROACH: ICD-10-PCS | Performed by: SURGERY

## 2017-08-06 PROCEDURE — 99233 SBSQ HOSP IP/OBS HIGH 50: CPT | Performed by: STUDENT IN AN ORGANIZED HEALTH CARE EDUCATION/TRAINING PROGRAM

## 2017-08-06 PROCEDURE — 3E0M05Z INTRODUCTION OF ADHESION BARRIER INTO PERITONEAL CAVITY, OPEN APPROACH: ICD-10-PCS | Performed by: SURGERY

## 2017-08-06 DEVICE — SEPRAFILM ADHESION BARRIER (MEMBRANE) IS A STERILE, BIORESORBABLE, TRANSLUCENT ADHESION BARRIER COMPOSED OF TWO ANIONIC POLYSACCHARIDES, SODIUM HYALURONATE (HA) AND CARBOXYMETHYLCELLULOSE (CMC).
Type: IMPLANTABLE DEVICE | Site: ABDOMEN | Status: FUNCTIONAL
Brand: SEPRAFILM

## 2017-08-06 RX ORDER — FAMOTIDINE 10 MG/ML
20 INJECTION, SOLUTION INTRAVENOUS 2 TIMES DAILY
Status: DISCONTINUED | OUTPATIENT
Start: 2017-08-06 | End: 2017-08-13

## 2017-08-06 RX ORDER — DIPHENHYDRAMINE HCL 25 MG
25 CAPSULE ORAL NIGHTLY PRN
Status: DISCONTINUED | OUTPATIENT
Start: 2017-08-06 | End: 2017-08-13

## 2017-08-06 RX ORDER — NALOXONE HYDROCHLORIDE 0.4 MG/ML
0.08 INJECTION, SOLUTION INTRAMUSCULAR; INTRAVENOUS; SUBCUTANEOUS
Status: DISCONTINUED | OUTPATIENT
Start: 2017-08-06 | End: 2017-08-06

## 2017-08-06 RX ORDER — METOCLOPRAMIDE HYDROCHLORIDE 5 MG/ML
10 INJECTION INTRAMUSCULAR; INTRAVENOUS EVERY 6 HOURS PRN
Status: DISCONTINUED | OUTPATIENT
Start: 2017-08-06 | End: 2017-08-13

## 2017-08-06 RX ORDER — ONDANSETRON 2 MG/ML
4 INJECTION INTRAMUSCULAR; INTRAVENOUS AS NEEDED
Status: DISCONTINUED | OUTPATIENT
Start: 2017-08-06 | End: 2017-08-06 | Stop reason: HOSPADM

## 2017-08-06 RX ORDER — HYDROMORPHONE HYDROCHLORIDE 1 MG/ML
0.4 INJECTION, SOLUTION INTRAMUSCULAR; INTRAVENOUS; SUBCUTANEOUS EVERY 5 MIN PRN
Status: DISCONTINUED | OUTPATIENT
Start: 2017-08-06 | End: 2017-08-06 | Stop reason: HOSPADM

## 2017-08-06 RX ORDER — NALOXONE HYDROCHLORIDE 0.4 MG/ML
0.08 INJECTION, SOLUTION INTRAMUSCULAR; INTRAVENOUS; SUBCUTANEOUS
Status: DISCONTINUED | OUTPATIENT
Start: 2017-08-06 | End: 2017-08-11

## 2017-08-06 RX ORDER — MORPHINE SULFATE 4 MG/ML
2 INJECTION, SOLUTION INTRAMUSCULAR; INTRAVENOUS EVERY 30 MIN PRN
Status: DISCONTINUED | OUTPATIENT
Start: 2017-08-06 | End: 2017-08-11

## 2017-08-06 RX ORDER — DEXTROSE AND SODIUM CHLORIDE 5; .9 G/100ML; G/100ML
INJECTION, SOLUTION INTRAVENOUS CONTINUOUS
Status: DISCONTINUED | OUTPATIENT
Start: 2017-08-06 | End: 2017-08-08

## 2017-08-06 RX ORDER — ONDANSETRON 2 MG/ML
4 INJECTION INTRAMUSCULAR; INTRAVENOUS EVERY 6 HOURS PRN
Status: DISCONTINUED | OUTPATIENT
Start: 2017-08-06 | End: 2017-08-06

## 2017-08-06 RX ORDER — NALOXONE HYDROCHLORIDE 0.4 MG/ML
80 INJECTION, SOLUTION INTRAMUSCULAR; INTRAVENOUS; SUBCUTANEOUS AS NEEDED
Status: DISCONTINUED | OUTPATIENT
Start: 2017-08-06 | End: 2017-08-06 | Stop reason: HOSPADM

## 2017-08-06 RX ORDER — DIPHENHYDRAMINE HYDROCHLORIDE 50 MG/ML
12.5 INJECTION INTRAMUSCULAR; INTRAVENOUS EVERY 4 HOURS PRN
Status: DISCONTINUED | OUTPATIENT
Start: 2017-08-06 | End: 2017-08-06

## 2017-08-06 RX ORDER — HYDROMORPHONE HYDROCHLORIDE 1 MG/ML
INJECTION, SOLUTION INTRAMUSCULAR; INTRAVENOUS; SUBCUTANEOUS
Status: COMPLETED
Start: 2017-08-06 | End: 2017-08-06

## 2017-08-06 RX ORDER — HEPARIN SODIUM 5000 [USP'U]/ML
5000 INJECTION, SOLUTION INTRAVENOUS; SUBCUTANEOUS EVERY 12 HOURS SCHEDULED
Status: DISCONTINUED | OUTPATIENT
Start: 2017-08-07 | End: 2017-08-06

## 2017-08-06 RX ORDER — NALBUPHINE HCL 10 MG/ML
2.5 AMPUL (ML) INJECTION EVERY 4 HOURS PRN
Status: DISCONTINUED | OUTPATIENT
Start: 2017-08-06 | End: 2017-08-06

## 2017-08-06 RX ORDER — SODIUM CHLORIDE, SODIUM LACTATE, POTASSIUM CHLORIDE, CALCIUM CHLORIDE 600; 310; 30; 20 MG/100ML; MG/100ML; MG/100ML; MG/100ML
INJECTION, SOLUTION INTRAVENOUS CONTINUOUS
Status: DISCONTINUED | OUTPATIENT
Start: 2017-08-06 | End: 2017-08-06

## 2017-08-06 RX ORDER — HYDROMORPHONE HYDROCHLORIDE 1 MG/ML
0.4 INJECTION, SOLUTION INTRAMUSCULAR; INTRAVENOUS; SUBCUTANEOUS EVERY 30 MIN PRN
Status: DISCONTINUED | OUTPATIENT
Start: 2017-08-06 | End: 2017-08-06

## 2017-08-06 RX ORDER — ALBUTEROL SULFATE 2.5 MG/3ML
2.5 SOLUTION RESPIRATORY (INHALATION) AS NEEDED
Status: DISCONTINUED | OUTPATIENT
Start: 2017-08-06 | End: 2017-08-06 | Stop reason: HOSPADM

## 2017-08-06 RX ORDER — DIPHENHYDRAMINE HYDROCHLORIDE 50 MG/ML
12.5 INJECTION INTRAMUSCULAR; INTRAVENOUS EVERY 4 HOURS PRN
Status: DISCONTINUED | OUTPATIENT
Start: 2017-08-06 | End: 2017-08-13

## 2017-08-06 RX ORDER — FAMOTIDINE 20 MG/1
20 TABLET ORAL 2 TIMES DAILY
Status: DISCONTINUED | OUTPATIENT
Start: 2017-08-06 | End: 2017-08-13

## 2017-08-06 RX ORDER — FLUTICASONE PROPIONATE 50 MCG
1 SPRAY, SUSPENSION (ML) NASAL DAILY
Status: DISCONTINUED | OUTPATIENT
Start: 2017-08-06 | End: 2017-08-13

## 2017-08-06 RX ORDER — NALBUPHINE HCL 10 MG/ML
2.5 AMPUL (ML) INJECTION EVERY 4 HOURS PRN
Status: DISCONTINUED | OUTPATIENT
Start: 2017-08-06 | End: 2017-08-11

## 2017-08-06 RX ORDER — ONDANSETRON 2 MG/ML
4 INJECTION INTRAMUSCULAR; INTRAVENOUS EVERY 6 HOURS PRN
Status: DISCONTINUED | OUTPATIENT
Start: 2017-08-06 | End: 2017-08-13

## 2017-08-06 RX ORDER — CLINDAMYCIN PHOSPHATE 900 MG/50ML
INJECTION INTRAVENOUS
Status: DISCONTINUED | OUTPATIENT
Start: 2017-08-06 | End: 2017-08-06

## 2017-08-06 RX ORDER — METOPROLOL TARTRATE 5 MG/5ML
5 INJECTION INTRAVENOUS ONCE
Status: COMPLETED | OUTPATIENT
Start: 2017-08-06 | End: 2017-08-06

## 2017-08-06 RX ORDER — METOPROLOL TARTRATE 5 MG/5ML
2.5 INJECTION INTRAVENOUS ONCE
Status: DISCONTINUED | OUTPATIENT
Start: 2017-08-06 | End: 2017-08-13

## 2017-08-06 NOTE — PROGRESS NOTES
[facility]  Progress Note     Phi Rene Patient Status:  Inpatient    1950 MRN [id number]   [facility] 3NE-A Attending Aleja Beltran MD   [facility] Day # 5 PCP Suze Del Castillo MD     Chief Complaint: GI pain     S: Patient • enoxaparin  40 mg Subcutaneous Daily       ASSESSMENT / PLAN:     1. Aflutter  1. Pt has been NPO and off amiodarone, may have also been ppt by hypotension or causing it  2. Lopressor x1  3. Amio boblus, then gtt  4. Cards at bedside   2.  Hypotension d

## 2017-08-06 NOTE — BRIEF OP NOTE
Pre-Operative Diagnosis: Small bowel obstruction (HCC) [K56.69]     Post-Operative Diagnosis: Small bowel obstruction ([address] [K56.69]     Procedure Performed:   Procedure(s):  Exploratory laparotomy, lysis of adhesions and small bowel resection X2 and cl

## 2017-08-06 NOTE — PLAN OF CARE
ANXIETY    • Will report anxiety at manageable levels Progressing        COPING    • Pt/Family able to verbalize concerns and demonstrate effective coping strategies Progressing        GASTROINTESTINAL - ADULT    • Minimal or absence of nausea and vomiting

## 2017-08-06 NOTE — PROGRESS NOTES
[facility]  Progress Note    Julieta Hobbs Patient Status:  Inpatient    1950 MRN [id number]   [facility] 3NE-A Attending Severiano Gan MD   Hosp Day # 5 PCP Jaci Mccrary MD     Subjective:  Called to see just after pa Facility-Administered Medications:  lactated ringers infusion  Intravenous Continuous   dextrose 5 % and 0.9 % NaCl infusion  Intravenous Continuous   HYDROmorphone (DILAUDID) 0.2 mg/ml PCA infusion  Intravenous Continuous   Amiodarone HCl (CORDARONE) 150 endovascular stent graft for abdominal aortic aneurysm (AAA)     Atrial fibrillation, new onset ([address]     Ischemic cardiomyopathy     S/P CABG x 3     S/P ablation of atrial fibrillation     SBO (small bowel obstruction) ([address]     Dyslipidemia     Bilatera

## 2017-08-06 NOTE — OPERATIVE REPORT
[facility]    PATIENT'S NAME: Ashlyn Valle   ATTENDING PHYSICIAN: Cecil Harrington MD   OPERATING PHYSICIAN: Marty Vick M.D.    PATIENT ACCOUNT#:   [de-identified]    LOCATION:  [address] [place] [address]  MEDICAL RECORD #:   [id number]       DATE OF BIRTH:  0 extensive amount of adhesions of the small bowel to the anterior abdominal wall. Using sharp scissors dissection and electrocautery, the adhesions of the small bowel were lysed.   This was carried from the ligament of Treitz all the way to the terminal ile PDS stitches. The skin was then closed with skin staples. The patient tolerated the procedure well and was taken to Recovery in stable condition. There no complications to this case.       Dictated By Cathlyn Goltz, M.D.  d: 08/06/2017 09:29:09  t: 08/06

## 2017-08-06 NOTE — ANESTHESIA POSTPROCEDURE EVALUATION
[facility] Patient Status:  Inpatient   Age/Gender 75year old female MRN [id number]   [facility] SURGERY Attending Aleja Beltran, [address] [facility] [address] Day # 5 PCP Suze Del Castillo MD       Anesthesia Post-op Note    Procedure(s

## 2017-08-06 NOTE — PLAN OF CARE
1315-Received by transfer from CTU 3, S/P bowel resection today and episode of RVR with hypotension. On Amnio @ 1mg, gupta patent,Monitor shows AFlutter in 120-130's. Orders received from Dr Thao Philip for Cardioversion.   1400-Converted to RSR spontaneously, no

## 2017-08-06 NOTE — PLAN OF CARE
ASSUMED CARE OF PT POST OP AT 11:40 . PALE, SLEEPY, HR IN 'S BP 76/ 50   STARTED 500CC BOLUS 0.9 NS & EKG DONE. RAPID RESPONSE CALLED. TEAM AT BEDSIDE.  ANOTHER 500 CC BOLUS INFUSED, AMIODARONE BOLUS GIVEN, AMIODARONE GTT STARTED  CARDIOLOGY AT [facility]

## 2017-08-07 LAB
ALBUMIN SERPL-MCNC: 2.1 G/DL (ref 3.5–4.8)
ALP LIVER SERPL-CCNC: 54 U/L (ref 55–142)
ALT SERPL-CCNC: 14 U/L (ref 14–54)
AST SERPL-CCNC: 13 U/L (ref 15–41)
ATRIAL RATE: 127 BPM
ATRIAL RATE: 141 BPM
BASOPHILS # BLD AUTO: 0.02 X10(3) UL (ref 0–0.1)
BASOPHILS NFR BLD AUTO: 0.1 %
BILIRUB SERPL-MCNC: 0.5 MG/DL (ref 0.1–2)
BUN BLD-MCNC: 18 MG/DL (ref 8–20)
CALCIUM BLD-MCNC: 8.2 MG/DL (ref 8.3–10.3)
CHLORIDE: 108 MMOL/L (ref 101–111)
CO2: 24 MMOL/L (ref 22–32)
CREAT BLD-MCNC: 0.86 MG/DL (ref 0.55–1.02)
EOSINOPHIL # BLD AUTO: 0 X10(3) UL (ref 0–0.3)
EOSINOPHIL NFR BLD AUTO: 0 %
ERYTHROCYTE [DISTWIDTH] IN BLOOD BY AUTOMATED COUNT: 15.4 % (ref 11.5–16)
GLUCOSE BLD-MCNC: 136 MG/DL (ref 70–99)
HAV IGM SER QL: 1.5 MG/DL (ref 1.7–3)
HCT VFR BLD AUTO: 40.6 % (ref 34–50)
HGB BLD-MCNC: 13.6 G/DL (ref 12–16)
IMMATURE GRANULOCYTE COUNT: 0.16 X10(3) UL (ref 0–1)
IMMATURE GRANULOCYTE RATIO %: 1.1 %
LYMPHOCYTES # BLD AUTO: 1.96 X10(3) UL (ref 0.9–4)
LYMPHOCYTES NFR BLD AUTO: 14 %
M PROTEIN MFR SERPL ELPH: 5.4 G/DL (ref 6.1–8.3)
MCH RBC QN AUTO: 29.5 PG (ref 27–33.2)
MCHC RBC AUTO-ENTMCNC: 33.5 G/DL (ref 31–37)
MCV RBC AUTO: 88.1 FL (ref 81–100)
MONOCYTES # BLD AUTO: 1.47 X10(3) UL (ref 0.1–0.6)
MONOCYTES NFR BLD AUTO: 10.5 %
NEUTROPHIL ABS PRELIM: 10.42 X10 (3) UL (ref 1.3–6.7)
NEUTROPHILS # BLD AUTO: 10.42 X10(3) UL (ref 1.3–6.7)
NEUTROPHILS NFR BLD AUTO: 74.3 %
P AXIS: 62 DEGREES
P-R INTERVAL: 232 MS
PLATELET # BLD AUTO: 172 10(3)UL (ref 150–450)
POTASSIUM SERPL-SCNC: 3.5 MMOL/L (ref 3.6–5.1)
Q-T INTERVAL: 330 MS
Q-T INTERVAL: 390 MS
QRS DURATION: 104 MS
QRS DURATION: 96 MS
QTC CALCULATION (BEZET): 479 MS
QTC CALCULATION (BEZET): 500 MS
R AXIS: 61 DEGREES
R AXIS: 66 DEGREES
RBC # BLD AUTO: 4.61 X10(6)UL (ref 3.8–5.1)
RED CELL DISTRIBUTION WIDTH-SD: 49.1 FL (ref 35.1–46.3)
SODIUM SERPL-SCNC: 141 MMOL/L (ref 136–144)
T AXIS: 107 DEGREES
T AXIS: 92 DEGREES
VENTRICULAR RATE: 127 BPM
VENTRICULAR RATE: 99 BPM
WBC # BLD AUTO: 14 X10(3) UL (ref 4–13)

## 2017-08-07 PROCEDURE — 99233 SBSQ HOSP IP/OBS HIGH 50: CPT | Performed by: STUDENT IN AN ORGANIZED HEALTH CARE EDUCATION/TRAINING PROGRAM

## 2017-08-07 RX ORDER — DEXTROSE AND SODIUM CHLORIDE 5; .9 G/100ML; G/100ML
INJECTION, SOLUTION INTRAVENOUS ONCE
Status: COMPLETED | OUTPATIENT
Start: 2017-08-07 | End: 2017-08-07

## 2017-08-07 NOTE — PROGRESS NOTES
[facility]  Progress Note     Harvey Ly Patient Status:  Inpatient    1950 MRN [id number]   [facility] 3NE-A Attending Jude Ball MD   [facility] Day # 6 PCP Deon Hoffman MD     Chief Complaint: GI pain     S: Patient Intravenous BID   • metoprolol Tartrate  2.5 mg Intravenous Once   • Fluticasone Propionate  1 spray Each Nare Daily       ASSESSMENT / PLAN:     1. Aflutter- spont conversion back to NSR   1. Amio  gtt  2. Cards following    2.  Hypotension due to arrhythm

## 2017-08-07 NOTE — PLAN OF CARE
Pt received at 1930 alert/oriented x4, FANG, following all commands. Pt denies any SOB. C/o mid abdominal pain r/t surgery/incision. Pt states PCA morphine is \"helping tremendously\" and that pain is already better after starting to use PCA.  Pt in [place] ALISSON Mccall amio

## 2017-08-07 NOTE — PROGRESS NOTES
[facility]  Progress Note    Matt Rota Patient Status:  Inpatient    1950 MRN [id number]   [facility] 6NE-A Attending Apolinar Sandoval MD   [facility] Day # 6 PCP Pam Sharma MD       Subjective:  No chest pain or shortnes 20 mg Oral BID    Or   • famoTIDine  20 mg Intravenous BID   • metoprolol Tartrate  2.5 mg Intravenous Once   • Fluticasone Propionate  1 spray Each Nare Daily     • Dextrose-NaCl 100 mL/hr at 08/07/17 0146   • amiodarone 0.5 mg/min (08/07/17 0600)   • mor

## 2017-08-07 NOTE — PROGRESS NOTES
[facility]  Progress Note    Satinder Garcia Patient Status:  Inpatient    1950 MRN [id number]   [facility] 6NE-A Attending Bryan Lam MD   [facility] Day # 6 PCP Tamera Mendoza MD     Subjective:  Feels good.   Transferred to [address] [place] [address] multiple vessel     H/O ventricular tachycardia     NSVT (nonsustained ventricular tachycardia) (HCC)     Paroxysmal atrial fibrillation (HCC)     Coronary artery disease involving native heart without angina pectoris     History of endovascular stent jaimie

## 2017-08-07 NOTE — PLAN OF CARE
Pain controlled with PCA , Up to chair not wanting to walk. Encourage IS/ Ambulation. Lam discontinued. Pain management. On amino gtt. Monitor shows NSR. See flow sheet for ongoing assessments.

## 2017-08-07 NOTE — PROGRESS NOTES
08/07/17 1532   Clinical Encounter Type   Visited With Patient   Sacramental Encounters   Sacrament of Sick-Anointing (Father Brooklynn Webber provided prayer, Scripture, support and Sacrament of the Sick with patient.)

## 2017-08-08 LAB
ATRIAL RATE: 89 BPM
BASOPHILS # BLD AUTO: 0.02 X10(3) UL (ref 0–0.1)
BASOPHILS NFR BLD AUTO: 0.1 %
BUN BLD-MCNC: 15 MG/DL (ref 8–20)
CALCIUM BLD-MCNC: 8.6 MG/DL (ref 8.3–10.3)
CHLORIDE: 111 MMOL/L (ref 101–111)
CO2: 24 MMOL/L (ref 22–32)
CREAT BLD-MCNC: 0.93 MG/DL (ref 0.55–1.02)
EOSINOPHIL # BLD AUTO: 0.08 X10(3) UL (ref 0–0.3)
EOSINOPHIL NFR BLD AUTO: 0.5 %
ERYTHROCYTE [DISTWIDTH] IN BLOOD BY AUTOMATED COUNT: 15.9 % (ref 11.5–16)
GLUCOSE BLD-MCNC: 125 MG/DL (ref 70–99)
HAV IGM SER QL: 2.2 MG/DL (ref 1.7–3)
HCT VFR BLD AUTO: 37.9 % (ref 34–50)
HGB BLD-MCNC: 11.7 G/DL (ref 12–16)
IMMATURE GRANULOCYTE COUNT: 0.2 X10(3) UL (ref 0–1)
IMMATURE GRANULOCYTE RATIO %: 1.2 %
LYMPHOCYTES # BLD AUTO: 1.92 X10(3) UL (ref 0.9–4)
LYMPHOCYTES NFR BLD AUTO: 11.3 %
MCH RBC QN AUTO: 28.1 PG (ref 27–33.2)
MCHC RBC AUTO-ENTMCNC: 30.9 G/DL (ref 31–37)
MCV RBC AUTO: 91.1 FL (ref 81–100)
MONOCYTES # BLD AUTO: 1.94 X10(3) UL (ref 0.1–0.6)
MONOCYTES NFR BLD AUTO: 11.4 %
NEUTROPHIL ABS PRELIM: 12.88 X10 (3) UL (ref 1.3–6.7)
NEUTROPHILS # BLD AUTO: 12.88 X10(3) UL (ref 1.3–6.7)
NEUTROPHILS NFR BLD AUTO: 75.5 %
P AXIS: 50 DEGREES
P-R INTERVAL: 138 MS
PLATELET # BLD AUTO: 155 10(3)UL (ref 150–450)
POTASSIUM SERPL-SCNC: 3.6 MMOL/L (ref 3.6–5.1)
POTASSIUM SERPL-SCNC: 3.6 MMOL/L (ref 3.6–5.1)
Q-T INTERVAL: 350 MS
QRS DURATION: 86 MS
QTC CALCULATION (BEZET): 425 MS
R AXIS: 35 DEGREES
RBC # BLD AUTO: 4.16 X10(6)UL (ref 3.8–5.1)
RED CELL DISTRIBUTION WIDTH-SD: 53.1 FL (ref 35.1–46.3)
SODIUM SERPL-SCNC: 144 MMOL/L (ref 136–144)
T AXIS: 32 DEGREES
VENTRICULAR RATE: 89 BPM
WBC # BLD AUTO: 17 X10(3) UL (ref 4–13)

## 2017-08-08 PROCEDURE — 99233 SBSQ HOSP IP/OBS HIGH 50: CPT | Performed by: STUDENT IN AN ORGANIZED HEALTH CARE EDUCATION/TRAINING PROGRAM

## 2017-08-08 RX ORDER — ENOXAPARIN SODIUM 100 MG/ML
40 INJECTION SUBCUTANEOUS DAILY
Status: DISCONTINUED | OUTPATIENT
Start: 2017-08-08 | End: 2017-08-12

## 2017-08-08 RX ORDER — AMIODARONE HYDROCHLORIDE 200 MG/1
400 TABLET ORAL 3 TIMES DAILY
Status: DISCONTINUED | OUTPATIENT
Start: 2017-08-08 | End: 2017-08-11

## 2017-08-08 RX ORDER — DOCUSATE SODIUM 100 MG/1
100 CAPSULE, LIQUID FILLED ORAL 2 TIMES DAILY
Status: DISCONTINUED | OUTPATIENT
Start: 2017-08-08 | End: 2017-08-13

## 2017-08-08 RX ORDER — POTASSIUM CHLORIDE 20 MEQ/1
40 TABLET, EXTENDED RELEASE ORAL EVERY 4 HOURS
Status: COMPLETED | OUTPATIENT
Start: 2017-08-08 | End: 2017-08-08

## 2017-08-08 RX ORDER — SODIUM CHLORIDE 9 MG/ML
INJECTION, SOLUTION INTRAVENOUS ONCE
Status: DISCONTINUED | OUTPATIENT
Start: 2017-08-08 | End: 2017-08-08

## 2017-08-08 RX ORDER — METOPROLOL SUCCINATE 25 MG/1
25 TABLET, EXTENDED RELEASE ORAL
Status: DISCONTINUED | OUTPATIENT
Start: 2017-08-08 | End: 2017-08-13

## 2017-08-08 RX ORDER — LOSARTAN POTASSIUM 25 MG/1
25 TABLET ORAL DAILY
Status: DISCONTINUED | OUTPATIENT
Start: 2017-08-08 | End: 2017-08-08

## 2017-08-08 NOTE — PROGRESS NOTES
Resting comfortably. Afebrile with stabl blood pressure/heart rate.  Notes read- per General Surgery

## 2017-08-08 NOTE — PROGRESS NOTES
[facility]  Progress Note    Miah Jim Patient Status:  Inpatient    1950 MRN [id number]   [facility] 6NE-A Attending Tiffany Peralta MD   [facility] Day # 7 PCP Renetta Miranda MD       Subjective:  No chest pain or shortnes 08/08/2017   MG 2.2 08/08/2017       Medications:    • Potassium Chloride ER  40 mEq Oral Q4H   • meropenem  500 mg Intravenous Q8H   • famoTIDine  20 mg Oral BID    Or   • famoTIDine  20 mg Intravenous BID   • metoprolol Tartrate  2.5 mg Intravenous Once

## 2017-08-08 NOTE — CONSULTS
IV Team consult for midline placement. After discussion with primary RN and assessment of patient, decision made to place ultrasound guided peripheral IV.

## 2017-08-08 NOTE — PLAN OF CARE
Assumed care at 1, patient resting in bed, drowsy but opens eyes spontaneously, oriented x4, FANG, VSS, NSR per tele. Amio gtt and D5NS infusing. NG to LIS, green drainage. Ambulated to bedside commode without incident.  Pain well controlled with morphine

## 2017-08-08 NOTE — PLAN OF CARE
ANXIETY    • Will report anxiety at manageable levels Completed        COPING    • Pt/Family able to verbalize concerns and demonstrate effective coping strategies Completed          CARDIOVASCULAR - ADULT    • Maintains optimal cardiac output and hemodyna

## 2017-08-08 NOTE — PROGRESS NOTES
[facility]  Progress Note     Isadora Chaudhary Patient Status:  Inpatient    1950 MRN [id number]   [facility] 3NE-A Attending Cynthia Barron MD   [facility] Day # 7 PCP Fanny Balderas MD     Chief Complaint: GI pain     S: Patient Intravenous BID   • metoprolol Tartrate  2.5 mg Intravenous Once   • Fluticasone Propionate  1 spray Each Nare Daily       ASSESSMENT / PLAN:     1. Aflutter- spont conversion back to NSR   1. Amio  Po  Loading dose, follow QTC  2. Cards following    2.  Hy

## 2017-08-08 NOTE — CONSULTS
INFECTIOUS DISEASE CONSULTATION    [facility] Patient Status:  Inpatient    1950 MRN [id number]   [facility] 6NE-A Attending Milagros Gomez MD   [facility] Day # 7 PCP Kamari Boss diphenhydrAMINE (BENADRYL) cap/tab 25 mg, 25 mg, Oral, Nightly PRN  •  famoTIDine (PEPCID) tab 20 mg, 20 mg, Oral, BID **OR** famoTIDine (PEPCID) injection 20 mg, 20 mg, Intravenous, BID  •  metoprolol Tartrate (LOPRESSOR) 5 MG/5ML injection 2.5 mg, 2.5 m murmurs. Abdomen: Soft, distended -lap incision dressing dry  Per staff wound clean  Musculoskeletal: Full range of motion of all extremities. No swelling noted.   Joints: no effusions  Skin: Norash      Laboratory Data:      Recent Labs   Lab  08/08/17 Agustina Wilde MD  [facility]  [phone]

## 2017-08-08 NOTE — PROGRESS NOTES
[facility]  Progress Note    Satinder Garcia Patient Status:  Inpatient    1950 MRN [id number]   [facility] 6NE-A Attending Bryan Lam MD   [facility] Day # 7 PCP Tamera Mendoza MD     Subjective:  Feels good. Mild pain.   No na fibrillation ([facility] [address]     Coronary artery disease involving native heart without angina pectoris     History of endovascular stent graft for abdominal aortic aneurysm (AAA)     Atrial fibrillation, new onset (HCC)     Ischemic cardiomyopathy     S/P CABG x 3

## 2017-08-09 ENCOUNTER — APPOINTMENT (OUTPATIENT)
Dept: ULTRASOUND IMAGING | Facility: HOSPITAL | Age: 67
DRG: 330 | End: 2017-08-09
Attending: STUDENT IN AN ORGANIZED HEALTH CARE EDUCATION/TRAINING PROGRAM
Payer: MEDICARE

## 2017-08-09 LAB
BASOPHILS # BLD AUTO: 0.04 X10(3) UL (ref 0–0.1)
BASOPHILS NFR BLD AUTO: 0.2 %
BLOOD TYPE BARCODE: 9500
BUN BLD-MCNC: 10 MG/DL (ref 8–20)
CALCIUM BLD-MCNC: 8.4 MG/DL (ref 8.3–10.3)
CHLORIDE: 106 MMOL/L (ref 101–111)
CO2: 28 MMOL/L (ref 22–32)
CREAT BLD-MCNC: 0.68 MG/DL (ref 0.55–1.02)
EOSINOPHIL # BLD AUTO: 0.6 X10(3) UL (ref 0–0.3)
EOSINOPHIL NFR BLD AUTO: 3.2 %
ERYTHROCYTE [DISTWIDTH] IN BLOOD BY AUTOMATED COUNT: 15.8 % (ref 11.5–16)
GLUCOSE BLD-MCNC: 88 MG/DL (ref 70–99)
HCT VFR BLD AUTO: 31.3 % (ref 34–50)
HGB BLD-MCNC: 10.2 G/DL (ref 12–16)
IMMATURE GRANULOCYTE COUNT: 0.33 X10(3) UL (ref 0–1)
IMMATURE GRANULOCYTE RATIO %: 1.8 %
LYMPHOCYTES # BLD AUTO: 2.28 X10(3) UL (ref 0.9–4)
LYMPHOCYTES NFR BLD AUTO: 12.1 %
MCH RBC QN AUTO: 28.7 PG (ref 27–33.2)
MCHC RBC AUTO-ENTMCNC: 32.6 G/DL (ref 31–37)
MCV RBC AUTO: 87.9 FL (ref 81–100)
MONOCYTES # BLD AUTO: 1.99 X10(3) UL (ref 0.1–0.6)
MONOCYTES NFR BLD AUTO: 10.6 %
NEUTROPHIL ABS PRELIM: 13.54 X10 (3) UL (ref 1.3–6.7)
NEUTROPHILS # BLD AUTO: 13.54 X10(3) UL (ref 1.3–6.7)
NEUTROPHILS NFR BLD AUTO: 72.1 %
PLATELET # BLD AUTO: 157 10(3)UL (ref 150–450)
POTASSIUM SERPL-SCNC: 4 MMOL/L (ref 3.6–5.1)
POTASSIUM SERPL-SCNC: 4 MMOL/L (ref 3.6–5.1)
RBC # BLD AUTO: 3.56 X10(6)UL (ref 3.8–5.1)
RED CELL DISTRIBUTION WIDTH-SD: 51.2 FL (ref 35.1–46.3)
SODIUM SERPL-SCNC: 138 MMOL/L (ref 136–144)
TSI SER-ACNC: 0.75 MIU/ML (ref 0.35–5.5)
WBC # BLD AUTO: 18.8 X10(3) UL (ref 4–13)

## 2017-08-09 PROCEDURE — 99233 SBSQ HOSP IP/OBS HIGH 50: CPT | Performed by: STUDENT IN AN ORGANIZED HEALTH CARE EDUCATION/TRAINING PROGRAM

## 2017-08-09 PROCEDURE — 93971 EXTREMITY STUDY: CPT | Performed by: STUDENT IN AN ORGANIZED HEALTH CARE EDUCATION/TRAINING PROGRAM

## 2017-08-09 RX ORDER — ATORVASTATIN CALCIUM 40 MG/1
40 TABLET, FILM COATED ORAL NIGHTLY
Status: DISCONTINUED | OUTPATIENT
Start: 2017-08-09 | End: 2017-08-13

## 2017-08-09 RX ORDER — HYDROCODONE BITARTRATE AND ACETAMINOPHEN 5; 325 MG/1; MG/1
1 TABLET ORAL EVERY 4 HOURS PRN
Status: DISCONTINUED | OUTPATIENT
Start: 2017-08-09 | End: 2017-08-13

## 2017-08-09 RX ORDER — HYDROMORPHONE HYDROCHLORIDE 1 MG/ML
0.5 INJECTION, SOLUTION INTRAMUSCULAR; INTRAVENOUS; SUBCUTANEOUS EVERY 2 HOUR PRN
Status: DISCONTINUED | OUTPATIENT
Start: 2017-08-09 | End: 2017-08-11

## 2017-08-09 RX ORDER — IBUPROFEN 600 MG/1
600 TABLET ORAL EVERY 6 HOURS PRN
Status: DISCONTINUED | OUTPATIENT
Start: 2017-08-09 | End: 2017-08-12

## 2017-08-09 RX ORDER — LOSARTAN POTASSIUM 25 MG/1
25 TABLET ORAL EVERY EVENING
Status: DISCONTINUED | OUTPATIENT
Start: 2017-08-09 | End: 2017-08-13

## 2017-08-09 RX ORDER — IBUPROFEN 400 MG/1
400 TABLET ORAL EVERY 6 HOURS PRN
Status: DISCONTINUED | OUTPATIENT
Start: 2017-08-09 | End: 2017-08-12

## 2017-08-09 RX ORDER — HYDROCODONE BITARTRATE AND ACETAMINOPHEN 5; 325 MG/1; MG/1
2 TABLET ORAL EVERY 4 HOURS PRN
Status: DISCONTINUED | OUTPATIENT
Start: 2017-08-09 | End: 2017-08-13

## 2017-08-09 RX ORDER — HYDROMORPHONE HYDROCHLORIDE 1 MG/ML
1 INJECTION, SOLUTION INTRAMUSCULAR; INTRAVENOUS; SUBCUTANEOUS EVERY 2 HOUR PRN
Status: DISCONTINUED | OUTPATIENT
Start: 2017-08-09 | End: 2017-08-11

## 2017-08-09 NOTE — PROGRESS NOTES
[facility]  Cardiology Progress Note    Ana Guy Patient Status:  Inpatient    1950 MRN [id number]   [facility] 7NE-A Attending Rodríguez Meier MD   [facility] Day # 8 PCP Jus Lew MD     Subjective:  Feels like she is g normal   5/17 TSH . 303     Medications:  • meropenem  500 mg Intravenous Q8H   • Amiodarone HCl  400 mg Oral TID   • metoprolol succinate  25 mg Oral Daily Beta Blocker   • docusate sodium  100 mg Oral BID   • enoxaparin  40 mg Subcutaneous Daily   • famoT

## 2017-08-09 NOTE — HOME CARE LIAISON
DIAGNOSES AND PERTINENT MEDICAL HISTORY:   SBO  WITH RESCECTION   WT  77.7KG   HT   177.8CM    FACILITY NAME AND DC DATE:  [facility]   PENDING    BEDSIDE VISIT WITH:  TNL MET WITH PTNT AT BEDSIDE    SERVICES ORDERED:  SN ONLY    VERIFIED PATIENT ADDRESS, PHON

## 2017-08-09 NOTE — PLAN OF CARE
Assumed care at 1, patient resting in bed, A/Ox4, FANG, VSS, NSR per tele. Morphine PCA in place, pain well controlled. Abdominal binder placed. Itching treated with benadryl prn, see eMAR.  Plan of care discussed with patient, questions answered, will co

## 2017-08-09 NOTE — PROGRESS NOTES
[facility]  Progress Note     Elalizbet Escalona Patient Status:  Inpatient    1950 MRN [id number]   [facility] 3NE-A Attending Redd Hayes MD   [facility] Day # 8 PCP Iline Felty, MD     Chief Complaint: GI pain     S: Patient • atorvastatin  40 mg Oral Nightly   • Losartan Potassium  25 mg Oral QPM   • meropenem  500 mg Intravenous Q8H   • Amiodarone HCl  400 mg Oral TID   • metoprolol succinate  25 mg Oral Daily Beta Blocker   • docusate sodium  100 mg Oral BID   • enoxapari afebrile.  Her LUE looks mor warm/ red- pt allergic to PCN, will start vancomycin IV and get Doppler of LUE to look for any VTE.  - Diet per sx- pt tolerating thus far   - PT/OT   - IS use     Yolanda Alford MD

## 2017-08-09 NOTE — PROGRESS NOTES
[facility]                INFECTIOUS DISEASE PROGRESS NOTE    Jordyn Burgess Patient Status:  Inpatient    1950 MRN [id number]   [facility] 7NE-A Attending Agata Castañeda MD   [facility] Day # 8 PCP Ursula Mohamud MD     Antib involving native heart without angina pectoris     History of endovascular stent graft for abdominal aortic aneurysm (AAA)     Atrial fibrillation, new onset (HCC)     Ischemic cardiomyopathy     S/P CABG x 3     S/P ablation of atrial fibrillation     SBO

## 2017-08-09 NOTE — PHYSICAL THERAPY NOTE
Order received for PT eval. Attempted to see Pt this am, however, Pt reports just falling asleep and requesting to rest at this time. Will follow up later, as schedule permits. Followed up this afternoon and Pt off floor for doppler US.  Will follow up

## 2017-08-09 NOTE — HOME CARE LIAISON
MET WITH PTNT TO DISCUSS HOME HEALTH SERVICES AND COVERAGE CRITERIA. PTNT AGREEABLE TO Anthony French. PTNT GIVEN RESIDENTIAL BROCHURE. RESIDENTIAL WITH PROVIDE SN/PT ON DISCHARGE.     Thank you for this referral,   Leona Sloan

## 2017-08-09 NOTE — PROGRESS NOTES
[facility]  Progress Note    Emily Khan Patient Status:  Inpatient    1950 MRN [id number]   [facility] 6NE-A Attending Ricci Stephens MD   [facility] Day # 8 PCP Narda Steven MD     Subjective:  Feels good. Minimal pain.   No disease involving native heart without angina pectoris     History of endovascular stent graft for abdominal aortic aneurysm (AAA)     Atrial fibrillation, new onset (HCC)     Ischemic cardiomyopathy     S/P CABG x 3     S/P ablation of atrial fibrillation

## 2017-08-09 NOTE — CM/SW NOTE
SW received order, that pt requesting VN. SW met w/ pt to discuss [address]. Pt stated that she would like VN and not PT. Pt agreeable to [facility] due to having a hx w/ them. CHIKA paged [facility]  w/ [facility] referral for VN. Orders in.     [place],  -Ext.  [phone]

## 2017-08-10 LAB
BASOPHILS # BLD AUTO: 0.05 X10(3) UL (ref 0–0.1)
BASOPHILS NFR BLD AUTO: 0.3 %
BLOOD TYPE BARCODE: 9500
BUN BLD-MCNC: 8 MG/DL (ref 8–20)
CALCIUM BLD-MCNC: 8.8 MG/DL (ref 8.3–10.3)
CHLORIDE: 102 MMOL/L (ref 101–111)
CO2: 27 MMOL/L (ref 22–32)
CREAT BLD-MCNC: 0.46 MG/DL (ref 0.55–1.02)
EOSINOPHIL # BLD AUTO: 0.3 X10(3) UL (ref 0–0.3)
EOSINOPHIL NFR BLD AUTO: 1.7 %
ERYTHROCYTE [DISTWIDTH] IN BLOOD BY AUTOMATED COUNT: 15.8 % (ref 11.5–16)
GLUCOSE BLD-MCNC: 99 MG/DL (ref 70–99)
HCT VFR BLD AUTO: 33.5 % (ref 34–50)
HGB BLD-MCNC: 11.1 G/DL (ref 12–16)
IMMATURE GRANULOCYTE COUNT: 0.31 X10(3) UL (ref 0–1)
IMMATURE GRANULOCYTE RATIO %: 1.7 %
LYMPHOCYTES # BLD AUTO: 2.11 X10(3) UL (ref 0.9–4)
LYMPHOCYTES NFR BLD AUTO: 11.6 %
MCH RBC QN AUTO: 28.4 PG (ref 27–33.2)
MCHC RBC AUTO-ENTMCNC: 33.1 G/DL (ref 31–37)
MCV RBC AUTO: 85.7 FL (ref 81–100)
MONOCYTES # BLD AUTO: 2.38 X10(3) UL (ref 0.1–0.6)
MONOCYTES NFR BLD AUTO: 13.1 %
NEUTROPHIL ABS PRELIM: 13.02 X10 (3) UL (ref 1.3–6.7)
NEUTROPHILS # BLD AUTO: 13.02 X10(3) UL (ref 1.3–6.7)
NEUTROPHILS NFR BLD AUTO: 71.6 %
PLATELET # BLD AUTO: 235 10(3)UL (ref 150–450)
POTASSIUM SERPL-SCNC: 3.4 MMOL/L (ref 3.6–5.1)
RBC # BLD AUTO: 3.91 X10(6)UL (ref 3.8–5.1)
RED CELL DISTRIBUTION WIDTH-SD: 49 FL (ref 35.1–46.3)
SODIUM SERPL-SCNC: 138 MMOL/L (ref 136–144)
WBC # BLD AUTO: 18.2 X10(3) UL (ref 4–13)

## 2017-08-10 PROCEDURE — 99233 SBSQ HOSP IP/OBS HIGH 50: CPT | Performed by: STUDENT IN AN ORGANIZED HEALTH CARE EDUCATION/TRAINING PROGRAM

## 2017-08-10 RX ORDER — METOCLOPRAMIDE HYDROCHLORIDE 5 MG/ML
10 INJECTION INTRAMUSCULAR; INTRAVENOUS EVERY 6 HOURS
Status: DISCONTINUED | OUTPATIENT
Start: 2017-08-10 | End: 2017-08-13

## 2017-08-10 NOTE — PLAN OF CARE
Assumed care @0700. Pt AOx4. VSS on RA. No complaints of pain. Nausea resolved per pt. Tolerating CLD well. Midline incision: dressing c/d/i. Abdominal binder when ambulating. Medications per MAR. Ambulated unit. Tolerated well.    Oriented to r

## 2017-08-10 NOTE — PLAN OF CARE
Assumed care of pt 1900. Aox4. POD #3 SHELL and small bowel resection. Abdominal binder in place. Abdominal incision with guaze dressing in place, C/D/I. Abdomen soft. +BS. About 2015 pt stated having bouts of nausea. Pt given IV Reglan with some relief.  Pt

## 2017-08-10 NOTE — PROGRESS NOTES
[facility]  Progress Note    Max Main Patient Status:  Inpatient    1950 MRN [id number]   [facility] 7NE-A Attending Anne Marie Izquierdo MD   [facility] Day # 9 PCP Nicole Blizzard, MD     Subjective:  One episode of emesis [facility])     Iliac artery aneurysm, bilateral (HCC)     S/P AAA repair     Typical atrial flutter (HCC)     Hypotension     Cellulitis of left upper extremity        Plan:  1. Pt s/p lysis of adhesions, small bowel resection x2, repair of venotomy.   2. Pt wit

## 2017-08-10 NOTE — OCCUPATIONAL THERAPY NOTE
OCCUPATIONAL THERAPY EVALUATION - INPATIENT     Room Number: 1158/5233-W  Evaluation Date: 8/10/2017  Type of Evaluation: Initial  Presenting Problem: SBO, 8/6 exploratory laparoscopy, lysis of adhesion, small bowel resection, recent AAA repair and CABG in amputation  No date: OTHER SURGICAL HISTORY      Comment: maze procedure  No date: SYMP AAA URGENT REPAIR    HOME SITUATION  Type of Home: House  Home Layout: Two level  Lives With: Spouse; Son    Toilet and Equipment: 3-in-1 commode  Shower/Tub and Equipme Toileting, which includes using toilet, bedpan or urinal? : A Little  -   Putting on and taking off regular upper body clothing?: A Little  -   Taking care of personal grooming such as brushing teeth?: None  -   Eating meals?: None    AM-PAC Score:  Score: Research supports that pt with this level of impairment often benefit from discharging home.       OT Discharge Recommendations: Home  OT Device Recommendations: None    PLAN  OT Treatment Plan: Energy conservation/work simplification techniques;ADL trainin

## 2017-08-10 NOTE — PROGRESS NOTES
[facility]                INFECTIOUS DISEASE PROGRESS NOTE    Paul Rosario Patient Status:  Inpatient    1950 MRN [id number]   [facility] 7NE-A Attending Ricardo Vazquez MD   [facility] Day # 9 PCP Stacy Spencer MD     Antib Ischemic cardiomyopathy     S/P CABG x 3     S/P ablation of atrial fibrillation     SBO (small bowel obstruction) (HCC)     Dyslipidemia     Bilateral iliac artery aneurysm (HCC)     Small bowel obstruction (HCC)     Iliac artery aneurysm, bilateral ([address]

## 2017-08-10 NOTE — PHYSICAL THERAPY NOTE
PHYSICAL THERAPY EVALUATION - INPATIENT     Room Number: 9357/8633-U  Evaluation Date: 8/10/2017  Type of Evaluation: Initial  Physician Order: PT Eval and Treat    Presenting Problem: SBO s/p ex lap with SHELL, SBR x2, and repair of venotomy 8/6/17; pos was independent with ADL, IADL. Pt recently started to go shopping at stores, has progressed to walking 1000 steps per day recently. Ambulaing without device. Pt has been wearing a life vest at home since May surgery. Supportive .       SUBJECTIVE (G-Code): CK    FUNCTIONAL ABILITY STATUS  Gait Assessment   Gait Assistance: Maximum assistance (per FIM definitions; actually CGA)  Distance (ft): 100  Assistive Device: Rolling walker  Pattern:  (slow zina, flexed posture)  Stoop/Curb Assistance: Not mobility; Endurance; Patient education;Gait training;Strengthening;Stair training;Transfer training;Balance training  Rehab Potential : Good  Frequency (Obs): 5x/week  Number of Visits to Meet Established Goals: 5      CURRENT GOALS    Goal #1 Patient is abl

## 2017-08-10 NOTE — PROGRESS NOTES
[facility]  Progress Note    Norm Lojaic Patient Status:  Inpatient    1950 MRN [id number]   [facility] Attending Sue Marino MD   [facility] Day # 9 PCP Matthew Kwan MD       Subjective:  Nauseated overnight.  Melodie Apple 27.0 08/10/2017   GLU 99 08/10/2017   CA 8.8 08/10/2017       Medications:    • Metoclopramide HCl  10 mg Intravenous Q6H   • atorvastatin  40 mg Oral Nightly   • Losartan Potassium  25 mg Oral QPM   • meropenem  500 mg Intravenous Q8H   • Amiodarone HCl - consider anticoagulation (only few hrs of atrial flutter post op). Meropenem post bowel surgery to decrease risk of AAA endograft infection from 05/2017.     Pain control and treat nausea.   BP on lower side but hope we can up-titrate her HF meds befo

## 2017-08-11 ENCOUNTER — APPOINTMENT (OUTPATIENT)
Dept: ULTRASOUND IMAGING | Facility: HOSPITAL | Age: 67
DRG: 330 | End: 2017-08-11
Attending: HOSPITALIST
Payer: MEDICARE

## 2017-08-11 ENCOUNTER — APPOINTMENT (OUTPATIENT)
Dept: ULTRASOUND IMAGING | Facility: HOSPITAL | Age: 67
DRG: 330 | End: 2017-08-11
Attending: SURGERY
Payer: MEDICARE

## 2017-08-11 LAB
BASOPHILS # BLD AUTO: 0.05 X10(3) UL (ref 0–0.1)
BASOPHILS NFR BLD AUTO: 0.3 %
BUN BLD-MCNC: 6 MG/DL (ref 8–20)
CALCIUM BLD-MCNC: 8.5 MG/DL (ref 8.3–10.3)
CHLORIDE: 103 MMOL/L (ref 101–111)
CO2: 27 MMOL/L (ref 22–32)
CREAT BLD-MCNC: 0.52 MG/DL (ref 0.55–1.02)
EOSINOPHIL # BLD AUTO: 0.75 X10(3) UL (ref 0–0.3)
EOSINOPHIL NFR BLD AUTO: 5 %
ERYTHROCYTE [DISTWIDTH] IN BLOOD BY AUTOMATED COUNT: 15.8 % (ref 11.5–16)
GLUCOSE BLD-MCNC: 100 MG/DL (ref 70–99)
HCT VFR BLD AUTO: 31.7 % (ref 34–50)
HGB BLD-MCNC: 10.5 G/DL (ref 12–16)
IMMATURE GRANULOCYTE COUNT: 0.23 X10(3) UL (ref 0–1)
IMMATURE GRANULOCYTE RATIO %: 1.5 %
LYMPHOCYTES # BLD AUTO: 1.78 X10(3) UL (ref 0.9–4)
LYMPHOCYTES NFR BLD AUTO: 11.9 %
MCH RBC QN AUTO: 28.5 PG (ref 27–33.2)
MCHC RBC AUTO-ENTMCNC: 33.1 G/DL (ref 31–37)
MCV RBC AUTO: 85.9 FL (ref 81–100)
MONOCYTES # BLD AUTO: 2.38 X10(3) UL (ref 0.1–0.6)
MONOCYTES NFR BLD AUTO: 15.9 %
NEUTROPHIL ABS PRELIM: 9.77 X10 (3) UL (ref 1.3–6.7)
NEUTROPHILS # BLD AUTO: 9.77 X10(3) UL (ref 1.3–6.7)
NEUTROPHILS NFR BLD AUTO: 65.4 %
PLATELET # BLD AUTO: 256 10(3)UL (ref 150–450)
POTASSIUM SERPL-SCNC: 3.6 MMOL/L (ref 3.6–5.1)
POTASSIUM SERPL-SCNC: 3.6 MMOL/L (ref 3.6–5.1)
RBC # BLD AUTO: 3.69 X10(6)UL (ref 3.8–5.1)
RED CELL DISTRIBUTION WIDTH-SD: 49.6 FL (ref 35.1–46.3)
SODIUM SERPL-SCNC: 138 MMOL/L (ref 136–144)
WBC # BLD AUTO: 15 X10(3) UL (ref 4–13)

## 2017-08-11 PROCEDURE — 93970 EXTREMITY STUDY: CPT | Performed by: SURGERY

## 2017-08-11 PROCEDURE — 93971 EXTREMITY STUDY: CPT | Performed by: HOSPITALIST

## 2017-08-11 RX ORDER — FLUCONAZOLE 100 MG/1
150 TABLET ORAL DAILY
Status: COMPLETED | OUTPATIENT
Start: 2017-08-11 | End: 2017-08-11

## 2017-08-11 RX ORDER — POTASSIUM CHLORIDE 20 MEQ/1
40 TABLET, EXTENDED RELEASE ORAL EVERY 4 HOURS
Status: DISCONTINUED | OUTPATIENT
Start: 2017-08-11 | End: 2017-08-11

## 2017-08-11 RX ORDER — AMIODARONE HYDROCHLORIDE 200 MG/1
200 TABLET ORAL DAILY
Status: DISCONTINUED | OUTPATIENT
Start: 2017-08-12 | End: 2017-08-13

## 2017-08-11 NOTE — PROGRESS NOTES
[facility]  Progress Note    Yakovalejandro Lindo Patient Status:  Inpatient    1950 MRN [id number]   [facility] 7NE-A Attending Libra Quiles MD   [facility] Day # 10 PCP Quinton Paez MD     Subjective:  Feels better this morning.   P bilateral ([address]     S/P AAA repair     Typical atrial flutter (HCC)     Hypotension     Cellulitis of left upper extremity        Plan:  1. Doing well s/p lysis of adhesions, small bowel resection x2, repair of venotomy. 2. Tolerating clear liquids.   No n

## 2017-08-11 NOTE — PROGRESS NOTES
Notes read. Passing gas. Neurologically intact/pulses unchanged. Discussed with Dr. Lenore Chambers- agree with antibiotics/ Diet per general surgery

## 2017-08-11 NOTE — PROGRESS NOTES
[facility]                INFECTIOUS DISEASE PROGRESS NOTE    Bryn Page Patient Status:  Inpatient    1950 MRN [id number]   [facility] 7NE-A Attending Brittney Olsen MD   Hosp Day # 10 PCP Allen De Leon MD     Anti ablation of atrial fibrillation     SBO (small bowel obstruction) (HCC)     Dyslipidemia     Bilateral iliac artery aneurysm (HCC)     Small bowel obstruction (HCC)     Iliac artery aneurysm, bilateral (HCC)     S/P AAA repair     Typical atrial flutter (H

## 2017-08-11 NOTE — OCCUPATIONAL THERAPY NOTE
Pt has an order for R UE Doppler to rule out DVT. OT will wait for the result before proceeding with OT session.

## 2017-08-11 NOTE — PROGRESS NOTES
[facility]  Progress Note     Maxconstantino Main Patient Status:  Inpatient    1950 MRN [id number]   [facility] 3NE-A Attending Anne Marie Izquierdo MD   Hosp Day # 10 PCP Nicole Blizzard, MD     Chief Complaint: GI pain     S: Redia Lank Oral TID   • metoprolol succinate  25 mg Oral Daily Beta Blocker   • docusate sodium  100 mg Oral BID   • enoxaparin  40 mg Subcutaneous Daily   • famoTIDine  20 mg Oral BID    Or   • famoTIDine  20 mg Intravenous BID   • metoprolol Tartrate  2.5 mg Mati Junior

## 2017-08-11 NOTE — PROGRESS NOTES
[facility]  Progress Note    Rossana Martell     Subjective:  No chest pain or shortness of breath. Every day feeling better.        Intake/Output:    Intake/Output Summary (Last 24 hours) at 08/11/17 1145  Last data filed at 08/11/17 0700   Gross per Intravenous Q6H   HYDROcodone-acetaminophen (NORCO) 5-325 MG per tab 1 tablet 1 tablet Oral Q4H PRN   Or      HYDROcodone-acetaminophen (NORCO) 5-325 MG per tab 2 tablet 2 tablet Oral Q4H PRN   ibuprofen (MOTRIN) tab 400 mg 400 mg Oral Q6H PRN   Or      ib injection 2 mg 2 mg Intravenous Q2H PRN   Or      morphINE sulfate (PF) 4 MG/ML injection 4 mg 4 mg Intravenous Q2H PRN       Assessment and Plan:  Principal Problem:    Small bowel obstruction (HCC) sp laparotomy for lysis adhesions and resection bowel  A

## 2017-08-11 NOTE — PLAN OF CARE
Assumed care @0700. Pt AOx4. VSS on RA. No complaints of pain. Nausea resolved per pt. Tolerating FLD. Midline incision: dressing c/d/i. Abdominal binder when ambulating.   + flatus, large BM today. Medications per MAR. Ambulated unit.  Tolerate

## 2017-08-12 LAB
BASOPHILS # BLD AUTO: 0.02 X10(3) UL (ref 0–0.1)
BASOPHILS # BLD AUTO: 0.04 X10(3) UL (ref 0–0.1)
BASOPHILS NFR BLD AUTO: 0.1 %
BASOPHILS NFR BLD AUTO: 0.3 %
BUN BLD-MCNC: 5 MG/DL (ref 8–20)
BUN BLD-MCNC: 6 MG/DL (ref 8–20)
CALCIUM BLD-MCNC: 8.3 MG/DL (ref 8.3–10.3)
CALCIUM BLD-MCNC: 8.6 MG/DL (ref 8.3–10.3)
CHLORIDE: 102 MMOL/L (ref 101–111)
CHLORIDE: 103 MMOL/L (ref 101–111)
CO2: 29 MMOL/L (ref 22–32)
CO2: 29 MMOL/L (ref 22–32)
CREAT BLD-MCNC: 0.42 MG/DL (ref 0.55–1.02)
CREAT BLD-MCNC: 0.48 MG/DL (ref 0.55–1.02)
EOSINOPHIL # BLD AUTO: 0.44 X10(3) UL (ref 0–0.3)
EOSINOPHIL # BLD AUTO: 0.57 X10(3) UL (ref 0–0.3)
EOSINOPHIL NFR BLD AUTO: 3.1 %
EOSINOPHIL NFR BLD AUTO: 4.1 %
ERYTHROCYTE [DISTWIDTH] IN BLOOD BY AUTOMATED COUNT: 15.8 % (ref 11.5–16)
ERYTHROCYTE [DISTWIDTH] IN BLOOD BY AUTOMATED COUNT: 15.8 % (ref 11.5–16)
GLUCOSE BLD-MCNC: 100 MG/DL (ref 70–99)
GLUCOSE BLD-MCNC: 95 MG/DL (ref 70–99)
HCT VFR BLD AUTO: 29.6 % (ref 34–50)
HCT VFR BLD AUTO: 30.7 % (ref 34–50)
HGB BLD-MCNC: 10.1 G/DL (ref 12–16)
HGB BLD-MCNC: 9.6 G/DL (ref 12–16)
IMMATURE GRANULOCYTE COUNT: 0.26 X10(3) UL (ref 0–1)
IMMATURE GRANULOCYTE COUNT: 0.27 X10(3) UL (ref 0–1)
IMMATURE GRANULOCYTE RATIO %: 1.9 %
IMMATURE GRANULOCYTE RATIO %: 1.9 %
INR BLD: 1.32 (ref 0.89–1.11)
LYMPHOCYTES # BLD AUTO: 2.01 X10(3) UL (ref 0.9–4)
LYMPHOCYTES # BLD AUTO: 2.07 X10(3) UL (ref 0.9–4)
LYMPHOCYTES NFR BLD AUTO: 14.5 %
LYMPHOCYTES NFR BLD AUTO: 14.7 %
MCH RBC QN AUTO: 27.9 PG (ref 27–33.2)
MCH RBC QN AUTO: 28.3 PG (ref 27–33.2)
MCHC RBC AUTO-ENTMCNC: 32.4 G/DL (ref 31–37)
MCHC RBC AUTO-ENTMCNC: 32.9 G/DL (ref 31–37)
MCV RBC AUTO: 86 FL (ref 81–100)
MCV RBC AUTO: 86 FL (ref 81–100)
MONOCYTES # BLD AUTO: 1.86 X10(3) UL (ref 0.1–0.6)
MONOCYTES # BLD AUTO: 2.18 X10(3) UL (ref 0.1–0.6)
MONOCYTES NFR BLD AUTO: 13.2 %
MONOCYTES NFR BLD AUTO: 15.8 %
NEUTROPHIL ABS PRELIM: 8.77 X10 (3) UL (ref 1.3–6.7)
NEUTROPHIL ABS PRELIM: 9.44 X10 (3) UL (ref 1.3–6.7)
NEUTROPHILS # BLD AUTO: 8.77 X10(3) UL (ref 1.3–6.7)
NEUTROPHILS # BLD AUTO: 9.44 X10(3) UL (ref 1.3–6.7)
NEUTROPHILS NFR BLD AUTO: 63.4 %
NEUTROPHILS NFR BLD AUTO: 67 %
PLATELET # BLD AUTO: 294 10(3)UL (ref 150–450)
PLATELET # BLD AUTO: 312 10(3)UL (ref 150–450)
POTASSIUM SERPL-SCNC: 3.7 MMOL/L (ref 3.6–5.1)
POTASSIUM SERPL-SCNC: 3.7 MMOL/L (ref 3.6–5.1)
PSA SERPL DL<=0.01 NG/ML-MCNC: 16.5 SECONDS (ref 12–14.3)
RBC # BLD AUTO: 3.44 X10(6)UL (ref 3.8–5.1)
RBC # BLD AUTO: 3.57 X10(6)UL (ref 3.8–5.1)
RED CELL DISTRIBUTION WIDTH-SD: 49.9 FL (ref 35.1–46.3)
RED CELL DISTRIBUTION WIDTH-SD: 50.2 FL (ref 35.1–46.3)
SODIUM SERPL-SCNC: 136 MMOL/L (ref 136–144)
SODIUM SERPL-SCNC: 137 MMOL/L (ref 136–144)
WBC # BLD AUTO: 13.8 X10(3) UL (ref 4–13)
WBC # BLD AUTO: 14.1 X10(3) UL (ref 4–13)

## 2017-08-12 PROCEDURE — B54NZZA ULTRASONOGRAPHY OF LEFT UPPER EXTREMITY VEINS, GUIDANCE: ICD-10-PCS | Performed by: STUDENT IN AN ORGANIZED HEALTH CARE EDUCATION/TRAINING PROGRAM

## 2017-08-12 PROCEDURE — 99232 SBSQ HOSP IP/OBS MODERATE 35: CPT | Performed by: STUDENT IN AN ORGANIZED HEALTH CARE EDUCATION/TRAINING PROGRAM

## 2017-08-12 PROCEDURE — 05HF33Z INSERTION OF INFUSION DEVICE INTO LEFT CEPHALIC VEIN, PERCUTANEOUS APPROACH: ICD-10-PCS | Performed by: STUDENT IN AN ORGANIZED HEALTH CARE EDUCATION/TRAINING PROGRAM

## 2017-08-12 PROCEDURE — 99223 1ST HOSP IP/OBS HIGH 75: CPT | Performed by: INTERNAL MEDICINE

## 2017-08-12 RX ORDER — POTASSIUM CHLORIDE 14.9 MG/ML
20 INJECTION INTRAVENOUS ONCE
Status: COMPLETED | OUTPATIENT
Start: 2017-08-12 | End: 2017-08-12

## 2017-08-12 RX ORDER — ENOXAPARIN SODIUM 100 MG/ML
1 INJECTION SUBCUTANEOUS EVERY 12 HOURS SCHEDULED
Status: DISCONTINUED | OUTPATIENT
Start: 2017-08-12 | End: 2017-08-12

## 2017-08-12 RX ORDER — ENOXAPARIN SODIUM 100 MG/ML
1 INJECTION SUBCUTANEOUS EVERY 12 HOURS
Status: DISCONTINUED | OUTPATIENT
Start: 2017-08-12 | End: 2017-08-13

## 2017-08-12 RX ORDER — SODIUM CHLORIDE 0.9 % (FLUSH) 0.9 %
10 SYRINGE (ML) INJECTION EVERY 12 HOURS
Status: DISCONTINUED | OUTPATIENT
Start: 2017-08-12 | End: 2017-08-13

## 2017-08-12 NOTE — PROGRESS NOTES
[facility]                INFECTIOUS DISEASE PROGRESS NOTE    Miah Fernandez Patient Status:  Inpatient    1950 MRN [id number]   [facility] 7NE-A Attending Tiffany Peralta MD   [facility] Day # 11 PCP Renetta Miranda MD     Anti

## 2017-08-12 NOTE — PROGRESS NOTES
[facility]  Progress Note     Harvey Ly Patient Status:  Inpatient    1950 MRN [id number]   [facility] 7NE-A Attending Jude Ball MD   [facility] Day # 6 PCP Deon Hoffman MD     Chief Complaint: Abdominal pain     S: Losartan Potassium  25 mg Oral QPM   • meropenem  500 mg Intravenous Q8H   • metoprolol succinate  25 mg Oral Daily Beta Blocker   • docusate sodium  100 mg Oral BID   • famoTIDine  20 mg Oral BID    Or   • famoTIDine  20 mg Intravenous BID   • metoprolol

## 2017-08-12 NOTE — PLAN OF CARE
Assumed care of pt 1900. POD #6 SHELL abdominal incision with guaze dressing C/D/I. Abdominal binder in place. Pt refusing prn pain meds, stated comfortable, pain only when changing positions, using pillow against abdomen to brace with movement.  FLD, per pt

## 2017-08-12 NOTE — PROGRESS NOTES
[facility] Progress Note    Subjective:  R arm swelling and redness in worse, now another IV has infiltrated.      Objective:  /55 (BP Location: Left arm)   Pulse 78   Temp 98 °F (36.7 °C) (Oral)   Resp 18   Wt 185 lb 13.6 oz (84.3 kg)   SpO2

## 2017-08-12 NOTE — CONSULTS
Hematology Initial Consultation Note    Patient Name: Myrna Tellez  Medical Record Number: [id number]    YOB: 1950   Date of Consultation: 8/12/2017   Physician requesting consultation: Dr. Girma Cadena    Reason for Consultation:  Eduardo Torrez HISTORY      Comment: maze procedure  No date: SYMP AAA URGENT REPAIR    Home Medications:    No current facility-administered medications on file prior to encounter.    Current Outpatient Prescriptions on File Prior to Encounter:  acetaminophen 500 MG Oral Medications:  HYDROcodone-acetaminophen **OR** HYDROcodone-acetaminophen, Metoclopramide HCl, diphenhydrAMINE, ondansetron HCl, DiphenhydrAMINE HCl, phenol    Allergies:     Pcn [Penicillins]         Protamine                 Seafood                 Nausea murmurs  Respiratory: Lungs clear to auscultation bilaterally  GI/Abd: Soft, non-tender with normoactive bowel sounds, no hepatosplenomegaly  Neurological: Grossly intact   Lymphatics: No palpable lymphadenopathy  Skin: no rashes or petechiae  Ext: no BLE arteries distally. No high-grade stenotic lesion is identified by real-time evaluation. Asymmetric decreased right great toe pressure may reflect small vessel disease in the foot.     CTA a/p 8/1/17: CONCLUSION:  Status post abdominal aortic aneurysm repa thereafter Rx to her outpatient pharmacy now and ask SW/CM determine if copay is affordable for when she can be switched to this agent.      *RUE superficial venous thrombosis- cephalic vein  -due to PIV  -expect to improve, especially since starting antico

## 2017-08-12 NOTE — PROGRESS NOTES
[facility]  Progress Note    Princess Ta Patient Status:  Inpatient    1950 MRN [id number]   [facility] 7NE-A Attending Jeni Roldan MD   [facility] Day # 11 PCP Fer Chavez MD     Subjective:  Patient sitting up today.   He 08/12/2017    08/12/2017   CA 8.3 08/12/2017          Assessment/Plan:  Patient Active Problem List:     Tobacco abuse     Ruptured abdominal aortic aneurysm (AAA) (HCC)     Hyponatremia     Normocytic anemia     Elevated troponin     CAD, multiple

## 2017-08-12 NOTE — PROGRESS NOTES
Denies rest pain/ischemic leg symptoms/ passing gas with bowel movement. No chest pain/shortness of breath. Afebrile/sinus rhythm. Abdomen soft-no tenderness, Right/left femoral palpable. Right/left DP/PT by doppler. Neurologically intact.  No calf swelling

## 2017-08-12 NOTE — PLAN OF CARE
Assumed care at 0730. Denies nausea. C/o mild incisional pain, relieved with norco.  Midline placed. Heme consult. Abd incision c/d/i. Dressing changed, guaze and tape. BUE still edematous, R peripheral IV infiltrated early in day. Needs attended to.

## 2017-08-13 VITALS
OXYGEN SATURATION: 100 % | HEART RATE: 89 BPM | SYSTOLIC BLOOD PRESSURE: 118 MMHG | BODY MASS INDEX: 26 KG/M2 | DIASTOLIC BLOOD PRESSURE: 64 MMHG | TEMPERATURE: 98 F | RESPIRATION RATE: 20 BRPM | WEIGHT: 184.31 LBS

## 2017-08-13 LAB
BASOPHILS # BLD AUTO: 0.03 X10(3) UL (ref 0–0.1)
BASOPHILS NFR BLD AUTO: 0.2 %
BUN BLD-MCNC: 6 MG/DL (ref 8–20)
CALCIUM BLD-MCNC: 8.3 MG/DL (ref 8.3–10.3)
CHLORIDE: 103 MMOL/L (ref 101–111)
CO2: 29 MMOL/L (ref 22–32)
CREAT BLD-MCNC: 0.4 MG/DL (ref 0.55–1.02)
DEPRECATED HBV CORE AB SER IA-ACNC: 324.3 NG/ML (ref 10–291)
EOSINOPHIL # BLD AUTO: 0.72 X10(3) UL (ref 0–0.3)
EOSINOPHIL NFR BLD AUTO: 5.3 %
ERYTHROCYTE [DISTWIDTH] IN BLOOD BY AUTOMATED COUNT: 15.9 % (ref 11.5–16)
GLUCOSE BLD-MCNC: 100 MG/DL (ref 70–99)
HCT VFR BLD AUTO: 28.4 % (ref 34–50)
HGB BLD-MCNC: 9.3 G/DL (ref 12–16)
IMMATURE GRANULOCYTE COUNT: 0.31 X10(3) UL (ref 0–1)
IMMATURE GRANULOCYTE RATIO %: 2.3 %
INR BLD: 1.29 (ref 0.89–1.11)
LYMPHOCYTES # BLD AUTO: 2.38 X10(3) UL (ref 0.9–4)
LYMPHOCYTES NFR BLD AUTO: 17.5 %
MCH RBC QN AUTO: 28.4 PG (ref 27–33.2)
MCHC RBC AUTO-ENTMCNC: 32.7 G/DL (ref 31–37)
MCV RBC AUTO: 86.6 FL (ref 81–100)
MONOCYTES # BLD AUTO: 1.66 X10(3) UL (ref 0.1–0.6)
MONOCYTES NFR BLD AUTO: 12.2 %
NEUTROPHIL ABS PRELIM: 8.52 X10 (3) UL (ref 1.3–6.7)
NEUTROPHILS # BLD AUTO: 8.52 X10(3) UL (ref 1.3–6.7)
NEUTROPHILS NFR BLD AUTO: 62.5 %
PLATELET # BLD AUTO: 334 10(3)UL (ref 150–450)
POTASSIUM SERPL-SCNC: 3.9 MMOL/L (ref 3.6–5.1)
PSA SERPL DL<=0.01 NG/ML-MCNC: 16.2 SECONDS (ref 12–14.3)
RBC # BLD AUTO: 3.28 X10(6)UL (ref 3.8–5.1)
RED CELL DISTRIBUTION WIDTH-SD: 50.4 FL (ref 35.1–46.3)
RETIC ABS CALC AUTO: 44.3 X10(3) UL (ref 22.5–147.5)
RETIC IRF CALC: 0.27 RATIO (ref 0.09–0.3)
RETIC%: 1.4 % (ref 0.5–2.5)
RETICULOCYTE HEMOGLOBIN EQUIVALENT: 29.6 PG (ref 28.2–36.3)
SODIUM SERPL-SCNC: 139 MMOL/L (ref 136–144)
WBC # BLD AUTO: 13.6 X10(3) UL (ref 4–13)

## 2017-08-13 PROCEDURE — 99239 HOSP IP/OBS DSCHRG MGMT >30: CPT | Performed by: STUDENT IN AN ORGANIZED HEALTH CARE EDUCATION/TRAINING PROGRAM

## 2017-08-13 PROCEDURE — 99232 SBSQ HOSP IP/OBS MODERATE 35: CPT | Performed by: INTERNAL MEDICINE

## 2017-08-13 RX ORDER — HYDROCODONE BITARTRATE AND ACETAMINOPHEN 5; 325 MG/1; MG/1
1-2 TABLET ORAL EVERY 4 HOURS PRN
Qty: 12 TABLET | Refills: 0 | Status: SHIPPED | OUTPATIENT
Start: 2017-08-13 | End: 2017-08-28

## 2017-08-13 RX ORDER — ASPIRIN 81 MG/1
81 TABLET ORAL DAILY
Qty: 30 TABLET | Refills: 0 | Status: ON HOLD | COMMUNITY
Start: 2017-08-13 | End: 2019-05-12

## 2017-08-13 RX ORDER — ONDANSETRON 4 MG/1
4 TABLET, FILM COATED ORAL EVERY 8 HOURS PRN
Qty: 8 TABLET | Refills: 0 | Status: SHIPPED | OUTPATIENT
Start: 2017-08-13 | End: 2017-08-13

## 2017-08-13 RX ORDER — ONDANSETRON 4 MG/1
4 TABLET, FILM COATED ORAL EVERY 8 HOURS PRN
Qty: 8 TABLET | Refills: 0 | Status: ON HOLD | OUTPATIENT
Start: 2017-08-13 | End: 2017-10-31

## 2017-08-13 RX ORDER — METOPROLOL SUCCINATE 100 MG/1
50 TABLET, EXTENDED RELEASE ORAL
Qty: 30 TABLET | Refills: 6 | Status: SHIPPED | OUTPATIENT
Start: 2017-08-13 | End: 2017-08-13

## 2017-08-13 RX ORDER — METOPROLOL SUCCINATE 100 MG/1
50 TABLET, EXTENDED RELEASE ORAL
Qty: 30 TABLET | Refills: 6 | Status: ON HOLD | OUTPATIENT
Start: 2017-08-13 | End: 2017-11-01

## 2017-08-13 NOTE — DISCHARGE SUMMARY
[facility] [place] [facility]  DISCHARGE SUMMARY     Ela Escalona Patient Status:  Inpatient    1950 MRN [id number]   [facility] 7NE-A Attending Redd Hayes MD   [facility] Day # 15 PCP Iline Felty, MD     Date of Admission: 2017  Date diffuse and feels like bloating/gas. Reports nausea and episode of emesis in ED. Last BM this morning and normal. Denies fever or chills. Patient reports pain was similar to when she presented with AAA which prompted ED evaluation.  Currently rates pain 7/1 acetaminophen 500 MG Tabs  Commonly known as:  TYLENOL EXTRA STRENGTH      Take 500 mg by mouth every 6 (six) hours as needed for Pain. Refills:  0     MULTI-VITAMIN DAILY OR      Take 1 tablet by mouth daily.    Refills:  0     PROBIOTIC OR      Take 1 Please  your prescriptions at the location directed by your doctor or nurse    Bring a paper prescription for each of these medications  · HYDROcodone-acetaminophen 5-325 MG Tabs         Follow-up appointment:   Radha Singh MD  [address] [facility]

## 2017-08-13 NOTE — CM/SW NOTE
Order received to check Eliquis coverage. Discussed with RN who stated patient does not need SW assistance, and will pay whatever the cost is. SW to remain available should additional needs arise.     Yuli Gomes, MSW, LSW  P: [phone]  Bello: [phone]

## 2017-08-13 NOTE — PROGRESS NOTES
[facility]  Progress Note    Princess Ta Patient Status:  Inpatient    1950 MRN [id number]   [facility] 7NE-A Attending Jeni Roldan MD   [facility] Day # 12 PCP Fer Chavez MD     Subjective: Patient sitting up in the [facility] 08/13/2017   CA 8.3 08/13/2017          Assessment/Plan:  Patient Active Problem List:     Tobacco abuse     Ruptured abdominal aortic aneurysm (AAA) (HCC)     Hyponatremia     Normocytic anemia     Elevated troponin     CAD, multiple vessel     H/O ventri

## 2017-08-13 NOTE — PROGRESS NOTES
Hematology Progress Note    Patient Name: Lashell Georges  Medical Record Number: [id number]    YOB: 1950     Reason for Consultation:  Lashell Georges was seen today for the diagnosis of RUE superficial thrombosis, bilateral SFA occlusion murmurs  Respiratory: Lungs clear to auscultation bilaterally  GI/Abd: Soft, non-tender with normoactive bowel sounds, +midline incision is c/d/i with staples. Skin: no rashes or petechiae  Ext: no BLE edema or tenderness.   Toes are slightly cooler than evaluation. Asymmetric decreased right great toe pressure may reflect small vessel disease in the foot. CTA a/p 8/1/17: CONCLUSION:  Status post abdominal aortic aneurysm repair. No evidence of leak or fistula.    Bilateral common iliac artery aneurysm removed  -improving  -monitor clinically for resolution    Okay to discharge home today from hematology standpoint once outpatient anticoagulant is chosen.   If surg not okay with eliquis now, would have to place order for outpatient lovenox and await appro

## 2017-08-13 NOTE — PLAN OF CARE
Assumed care at 0730. Denies pain, abd incision c/d/i. [id number] Liz Mccall for d/c by all consults. No home abx per Dr. Kelly Escobar. To be d/c'ed with [facility]. Needs attended to. Discharge education completed.      CARDIOVASCULAR - ADULT    • Maintains optimal cardiac output and

## 2017-08-13 NOTE — PROGRESS NOTES
[facility] Progress Note    Subjective:  Hoping to go home. R arm much better.      Objective:  /58 (BP Location: Right arm)   Pulse 78   Temp 98.5 °F (36.9 °C) (Oral)   Resp 18   Wt 184 lb 4.9 oz (83.6 kg)   SpO2 94%   BMI 26.44 kg/m²     Tel

## 2017-08-14 ENCOUNTER — PATIENT OUTREACH (OUTPATIENT)
Dept: CASE MANAGEMENT | Age: 67
End: 2017-08-14

## 2017-08-14 DIAGNOSIS — Z02.9 ENCOUNTERS FOR ADMINISTRATIVE PURPOSE: ICD-10-CM

## 2017-08-14 LAB
ATRIAL RATE: 85 BPM
P AXIS: 27 DEGREES
P-R INTERVAL: 136 MS
Q-T INTERVAL: 356 MS
QRS DURATION: 88 MS
QTC CALCULATION (BEZET): 423 MS
R AXIS: 42 DEGREES
T AXIS: 0 DEGREES
VENTRICULAR RATE: 85 BPM

## 2017-08-14 NOTE — PROGRESS NOTES
Initial Post Discharge Follow Up   Discharge Date: 8/13/17  Contact Date: 8/14/2017    Consent Verification:  Assessment Completed With: Spouse: Abel  Permission received per patient?  written  HIPAA Verified?   Yes    Discharge Dx:   Small bowel obstru tablet Rfl: 0   aspirin 81 MG Oral Tab EC Take 1 tablet (81 mg total) by mouth daily. Disp: 30 tablet Rfl: 0   apixaban 5 MG Oral Tab Take 2 tabs (10mg) by mouth twice daily for 7 days, then take 1 tab (5mg) by mouth twice daily thereafter.  Disp: 74 tablet your medications with you to make sure we are not missing anything? yes  • Are there any reasons that keep you from taking your medication as prescribed? No  Are you having any concerns with constipation?  No    Referrals/orders at D/C:  Home Health ordered comfortably                                                                                                                                            Aug 18, 2017  1:30 PM CDT Hospital/SNF F/U with Ramez Parr MD [place] [id number], [facility],

## 2017-08-15 ENCOUNTER — TELEPHONE (OUTPATIENT)
Dept: FAMILY MEDICINE CLINIC | Facility: CLINIC | Age: 67
End: 2017-08-15

## 2017-08-15 DIAGNOSIS — D64.9 ANEMIA, UNSPECIFIED: ICD-10-CM

## 2017-08-15 DIAGNOSIS — I82.611 ACUTE THROMBOSIS OF RIGHT CEPHALIC VEIN: ICD-10-CM

## 2017-08-15 DIAGNOSIS — I70.209 SUPERFICIAL FEMORAL ARTERY OCCLUSION (HCC): Primary | ICD-10-CM

## 2017-08-15 NOTE — TELEPHONE ENCOUNTER
Incoming call from [facility]), requesting verbal okay to resume [address] [facility],   Verbal okay given per Dr. Regena Duverney.

## 2017-08-17 ENCOUNTER — APPOINTMENT (OUTPATIENT)
Dept: CARDIAC REHAB | Facility: HOSPITAL | Age: 67
End: 2017-08-17
Attending: INTERNAL MEDICINE
Payer: MEDICARE

## 2017-08-18 ENCOUNTER — TELEPHONE (OUTPATIENT)
Dept: FAMILY MEDICINE CLINIC | Facility: CLINIC | Age: 67
End: 2017-08-18

## 2017-08-18 NOTE — TELEPHONE ENCOUNTER
Pt.s spouse Piter Leahy) called the office to cancel Sonia's 1:30 TCM with Dr. Egan Led. He stated \" Sumeet Sayrudi does not feel well, she is not sleeping, she is weak and shaky. \" Message was brought to Dr. Lashonda Copeland attention by [facility].  Dr. Egan Led asked If I wo

## 2017-08-18 NOTE — TELEPHONE ENCOUNTER
GEORGETTE: Pt called wanting to cancel her TCM/HFU appt for today. After speaking with Ene she asked that I transfer her to her line.

## 2017-08-22 ENCOUNTER — OFFICE VISIT (OUTPATIENT)
Dept: SURGERY | Facility: CLINIC | Age: 67
End: 2017-08-22

## 2017-08-22 VITALS
HEART RATE: 99 BPM | BODY MASS INDEX: 24.34 KG/M2 | DIASTOLIC BLOOD PRESSURE: 61 MMHG | SYSTOLIC BLOOD PRESSURE: 96 MMHG | HEIGHT: 70 IN | WEIGHT: 170 LBS | RESPIRATION RATE: 18 BRPM | TEMPERATURE: 99 F

## 2017-08-22 DIAGNOSIS — K56.609 SMALL BOWEL OBSTRUCTION (HCC): Primary | ICD-10-CM

## 2017-08-22 PROCEDURE — 99024 POSTOP FOLLOW-UP VISIT: CPT | Performed by: PHYSICIAN ASSISTANT

## 2017-08-22 NOTE — PROGRESS NOTES
Post Operative Visit Note       Active Problems  1.  Small bowel obstruction [facility])         Chief Complaint   Patient presents with:  Post-Op: 1st PO-  Exploratory laparotomy, lysis of adhesions, small bowel resection x2, and closure of venotomy of the commo appendage amputation  No date: OTHER SURGICAL HISTORY      Comment: maze procedure  No date: SYMP AAA URGENT REPAIR    The family history and social history have been reviewed by me today.     Family History   Problem Relation Age of Onset   • Heart Disorde  MG Oral Tab Take 1 tablet by mouth daily. Disp:  Rfl:    Losartan Potassium 25 MG Oral Tab Take 25 mg by mouth daily. Disp:  Rfl:    acetaminophen 500 MG Oral Tab Take 500 mg by mouth every 6 (six) hours as needed for Pain.  Disp:  Rfl:    temazepam adult)   Pulse 99   Temp 98.7 °F (37.1 °C) (Oral)   Resp 18   Ht 70\"   Wt 170 lb   BMI 24.39 kg/m²   Physical Exam   Constitutional: She is oriented to person, place, and time. She appears well-developed and well-nourished. No distress.    HENT:   Head: No intestine with intramural hemorrhage, acute and chronic serositis with foreign body giant cell reaction, and changes consistent with serosal adhesions.   -Negative for malignancy.     Electronically signed by Sandy Fairchild MD on 8/11/2017 at  5:21 PM

## 2017-08-28 ENCOUNTER — OFFICE VISIT (OUTPATIENT)
Dept: FAMILY MEDICINE CLINIC | Facility: CLINIC | Age: 67
End: 2017-08-28

## 2017-08-28 ENCOUNTER — APPOINTMENT (OUTPATIENT)
Dept: HEMATOLOGY/ONCOLOGY | Facility: HOSPITAL | Age: 67
End: 2017-08-28
Attending: INTERNAL MEDICINE
Payer: MEDICARE

## 2017-08-28 VITALS
DIASTOLIC BLOOD PRESSURE: 66 MMHG | WEIGHT: 162 LBS | OXYGEN SATURATION: 98 % | HEART RATE: 104 BPM | RESPIRATION RATE: 20 BRPM | HEIGHT: 70 IN | TEMPERATURE: 98 F | BODY MASS INDEX: 23.19 KG/M2 | SYSTOLIC BLOOD PRESSURE: 108 MMHG

## 2017-08-28 DIAGNOSIS — R31.9 URINARY TRACT INFECTION WITH HEMATURIA, SITE UNSPECIFIED: Primary | ICD-10-CM

## 2017-08-28 DIAGNOSIS — R30.0 DYSURIA: ICD-10-CM

## 2017-08-28 DIAGNOSIS — N39.0 URINARY TRACT INFECTION WITH HEMATURIA, SITE UNSPECIFIED: Primary | ICD-10-CM

## 2017-08-28 PROBLEM — I71.30 RUPTURED ABDOMINAL AORTIC ANEURYSM (AAA) (HCC): Status: RESOLVED | Noted: 2017-05-09 | Resolved: 2017-08-28

## 2017-08-28 PROBLEM — E87.1 HYPONATREMIA: Status: RESOLVED | Noted: 2017-05-09 | Resolved: 2017-08-28

## 2017-08-28 PROBLEM — I71.30 RUPTURED ABDOMINAL AORTIC ANEURYSM (AAA): Status: RESOLVED | Noted: 2017-05-09 | Resolved: 2017-08-28

## 2017-08-28 PROBLEM — I71.3 RUPTURED ABDOMINAL AORTIC ANEURYSM (AAA) (HCC): Status: RESOLVED | Noted: 2017-05-09 | Resolved: 2017-08-28

## 2017-08-28 LAB
MULTISTIX LOT#: ABNORMAL NUMERIC
NITRITE, URINE: POSITIVE
PH, URINE: 6 (ref 4.5–8)
PROTEIN (URINE DIPSTICK): 300 MG/DL
SPECIFIC GRAVITY: 1.02 (ref 1–1.03)
UROBILINOGEN,SEMI-QN: 0.2 MG/DL (ref 0–1.9)

## 2017-08-28 PROCEDURE — 87186 SC STD MICRODIL/AGAR DIL: CPT | Performed by: FAMILY MEDICINE

## 2017-08-28 PROCEDURE — 87086 URINE CULTURE/COLONY COUNT: CPT | Performed by: FAMILY MEDICINE

## 2017-08-28 PROCEDURE — 99214 OFFICE O/P EST MOD 30 MIN: CPT | Performed by: FAMILY MEDICINE

## 2017-08-28 PROCEDURE — 87077 CULTURE AEROBIC IDENTIFY: CPT | Performed by: FAMILY MEDICINE

## 2017-08-28 PROCEDURE — 81003 URINALYSIS AUTO W/O SCOPE: CPT | Performed by: FAMILY MEDICINE

## 2017-08-28 RX ORDER — NITROFURANTOIN 25; 75 MG/1; MG/1
100 CAPSULE ORAL 2 TIMES DAILY
Qty: 14 CAPSULE | Refills: 0 | Status: SHIPPED | OUTPATIENT
Start: 2017-08-28 | End: 2017-08-30 | Stop reason: ALTCHOICE

## 2017-08-28 NOTE — PROGRESS NOTES
[facility] Family Medicine Office Note  Chief Complaint:   Patient presents with:  Burning On Urination: x 1 day, urgency to urinate, fatigue      HPI:   This is a 75year old female coming in for burning sensation with urination x 1 day.  Alli Laurent Packs/day: 0.50      Years: 47.00        Types: Cigarettes     Quit date: 5/9/2017  Smokeless tobacco: Never Used                      Alcohol use:  Yes              Comment: rarely, no hx of excessive use    Family History:  F (PROBIOTIC OR) Take 1 tablet by mouth daily. Disp:  Rfl:    nystatin 753914 UNIT/ML Mouth/Throat Suspension Take 5 mL (500,000 Units total) by mouth 4 (four) times daily.  Disp: 60 mL Rfl: 0   temazepam 7.5 MG Oral Cap Take 1 capsule (7.5 mg total) by laura apparent distress. HEENT:  Head:  Normocephalic, atraumatic Eyes: EOMI, PERRLA, no scleral icterus, conjunctivae clear bilaterally, no eye discharge Ears: External normal. Nose: patent, no nasal discharge Throat:  No erythema or exudate.   Mouth:  No oral mouth 2 (two) times daily. Patient/Caregiver Education: Patient/Caregiver Education: There are no barriers to learning. Medical education done. Outcome: Patient verbalizes understanding.  Patient is notified to call with any questions, complicat

## 2017-08-28 NOTE — PATIENT INSTRUCTIONS
Urinary Tract Infections in Women    Urinary tract infections (UTIs) are most often caused by bacteria (germs). These bacteria enter the urinary tract. The bacteria may come from outside the body.  Or they may travel from the skin outside the rectum or va The lifestyle changes below will help get rid of your UTI. They may also help prevent future UTIs. · Drink plenty of fluids. This includes water, juice, or other caffeine-free drinks. Fluids help flush bacteria out of your body. · Empty your bladder.  Alw · Kidney or bladder stones are collections of crystals. They form in the urine. Stones may be found anywhere in the urinary tract. But they form most often in the kidneys or bladder. In addition to blood in the urine, they can cause severe pain.   · BPH sta · Infection with a bacteria or virus. This can be a urinary tract infection (UTI). Or it may be a sexually transmitted infection (STI). · Sensitivity or allergy to chemicals. These chemicals are found in lotions and other products.   · Prostate or bladder © 1382-2737 [address], [address] [place]. All rights reserved. This information is not intended as a substitute for professional medical care. Always follow your healthcare professional's instructions.

## 2017-08-30 ENCOUNTER — TELEPHONE (OUTPATIENT)
Dept: FAMILY MEDICINE CLINIC | Facility: CLINIC | Age: 67
End: 2017-08-30

## 2017-08-30 RX ORDER — CIPROFLOXACIN 500 MG/1
500 TABLET, FILM COATED ORAL 2 TIMES DAILY
Qty: 14 TABLET | Refills: 0 | COMMUNITY
Start: 2017-08-30 | End: 2017-09-13 | Stop reason: ALTCHOICE

## 2017-09-11 ENCOUNTER — PRIOR ORIGINAL RECORDS (OUTPATIENT)
Dept: OTHER | Age: 67
End: 2017-09-11

## 2017-09-12 ENCOUNTER — OFFICE VISIT (OUTPATIENT)
Dept: SURGERY | Facility: CLINIC | Age: 67
End: 2017-09-12

## 2017-09-12 ENCOUNTER — TELEPHONE (OUTPATIENT)
Dept: SURGERY | Facility: CLINIC | Age: 67
End: 2017-09-12

## 2017-09-12 VITALS
SYSTOLIC BLOOD PRESSURE: 108 MMHG | TEMPERATURE: 98 F | DIASTOLIC BLOOD PRESSURE: 71 MMHG | BODY MASS INDEX: 23.19 KG/M2 | RESPIRATION RATE: 18 BRPM | HEIGHT: 70 IN | WEIGHT: 162 LBS | HEART RATE: 92 BPM

## 2017-09-12 DIAGNOSIS — T14.8XXA HEMATOMA: ICD-10-CM

## 2017-09-12 DIAGNOSIS — K56.609 SBO (SMALL BOWEL OBSTRUCTION) (HCC): Primary | ICD-10-CM

## 2017-09-12 PROCEDURE — 99024 POSTOP FOLLOW-UP VISIT: CPT | Performed by: PHYSICIAN ASSISTANT

## 2017-09-12 NOTE — TELEPHONE ENCOUNTER
called that patient's post-op wound, where staples had been remove on 8/22 in now open 1-1 1/2 inches and open and draining large amount of reddish fluid. patient appointment made.

## 2017-09-12 NOTE — PROGRESS NOTES
Follow Up Visit Note       Active Problems      1. SBO (small bowel obstruction) ([address]    2.  Hematoma          Chief Complaint   Patient presents with:  Wound: s/p SBO on 8/6- C/O incision opened, still has drainage, incision PN (5/10),     History of Pres Onset   • Heart Disorder Father    • Cancer Mother      colon cancer   • Diabetes Neg    • Hypertension Neg      Social History    Marital status:              Spouse name:                       Years of education:                 Number of children Rfl:    Losartan Potassium 25 MG Oral Tab Take 25 mg by mouth daily. Disp:  Rfl:    acetaminophen 500 MG Oral Tab Take 500 mg by mouth every 6 (six) hours as needed for Pain.  Disp:  Rfl:    temazepam 7.5 MG Oral Cap Take 1 capsule (7.5 mg total) by mouth n well-nourished. No distress. HENT:   Head: Normocephalic and atraumatic. Mouth/Throat: Oropharynx is clear and moist.   Eyes: Conjunctivae and EOM are normal. No scleral icterus. Neck: Normal range of motion.    Cardiovascular: Normal rate and regular event that these occur. Wound care should consist of a dry dressing applied twice daily to absorb drainage. She may change this dressing more than twice daily if needed.     The patient voices relief after decompression of this fluid collection at today

## 2017-09-13 ENCOUNTER — OFFICE VISIT (OUTPATIENT)
Dept: FAMILY MEDICINE CLINIC | Facility: CLINIC | Age: 67
End: 2017-09-13

## 2017-09-13 ENCOUNTER — TELEPHONE (OUTPATIENT)
Dept: FAMILY MEDICINE CLINIC | Facility: CLINIC | Age: 67
End: 2017-09-13

## 2017-09-13 VITALS
WEIGHT: 167 LBS | RESPIRATION RATE: 16 BRPM | SYSTOLIC BLOOD PRESSURE: 112 MMHG | DIASTOLIC BLOOD PRESSURE: 64 MMHG | BODY MASS INDEX: 23.64 KG/M2 | HEIGHT: 70.5 IN | HEART RATE: 76 BPM

## 2017-09-13 DIAGNOSIS — N39.0 UTI (URINARY TRACT INFECTION), UNCOMPLICATED: Primary | ICD-10-CM

## 2017-09-13 LAB
APPEARANCE: CLEAR
BILIRUBIN: NEGATIVE
GLUCOSE (URINE DIPSTICK): NEGATIVE MG/DL
KETONES (URINE DIPSTICK): NEGATIVE MG/DL
MULTISTIX LOT#: NORMAL NUMERIC
NITRITE, URINE: NEGATIVE
PH, URINE: 5.5 (ref 4.5–8)
PROTEIN (URINE DIPSTICK): 100 MG/DL
SPECIFIC GRAVITY: 1.02 (ref 1–1.03)
URINE-COLOR: YELLOW
UROBILINOGEN,SEMI-QN: 0.2 MG/DL (ref 0–1.9)

## 2017-09-13 PROCEDURE — 99214 OFFICE O/P EST MOD 30 MIN: CPT | Performed by: FAMILY MEDICINE

## 2017-09-13 PROCEDURE — 87086 URINE CULTURE/COLONY COUNT: CPT | Performed by: FAMILY MEDICINE

## 2017-09-13 PROCEDURE — 81003 URINALYSIS AUTO W/O SCOPE: CPT | Performed by: FAMILY MEDICINE

## 2017-09-13 RX ORDER — CIPROFLOXACIN 500 MG/1
500 TABLET, FILM COATED ORAL 2 TIMES DAILY
Qty: 20 TABLET | Refills: 0 | Status: SHIPPED | OUTPATIENT
Start: 2017-09-13 | End: 2017-09-13

## 2017-09-13 RX ORDER — CIPROFLOXACIN 500 MG/1
500 TABLET, FILM COATED ORAL 2 TIMES DAILY
Qty: 20 TABLET | Refills: 0 | Status: SHIPPED | OUTPATIENT
Start: 2017-09-13 | End: 2017-09-23

## 2017-09-13 NOTE — PROGRESS NOTES
Kin Pat is a 75year old female. HPI:   Patient presents with symptoms of UTI. Complaining of urinary frequency, urgency, dysuria, suprapubic pain, hematuria for the past few days. Denies back pain, fever or n/v.   Was treated recently for [facility] Product (PROBIOTIC OR) Take 1 tablet by mouth daily.    Disp:  Rfl:       Past Medical History:   Diagnosis Date   • AAA [facility]abdominal aortic aneurysm) [facility]    • Anemia, unspecified 8/15/2017   • Arrhythmia     a fib, [place]   • Cancer ([facility]     skin   • Coronary Metoprolol Succinate  MG Oral Tablet 24 Hr Take 0.5 tablets (50 mg total) by mouth Daily Beta Blocker. Disp: 30 tablet Rfl: 6   nystatin [id number] UNIT/ML Mouth/Throat Suspension Take 5 mL (500,000 Units total) by mouth 4 (four) times daily.  Disp: 60 symptoms.

## 2017-09-13 NOTE — PROGRESS NOTES
JIMMY HOSPITALIST  Progress Note     Myrna Adamsch Patient Status:  Inpatient    1950 MRN [id number]   [facility] 3NE-A Attending Hardeep Castro MD   [facility] Day # 9 PCP James Collier MD     Chief Complaint: GI pain     S: Patient docusate sodium  100 mg Oral BID   • enoxaparin  40 mg Subcutaneous Daily   • famoTIDine  20 mg Oral BID    Or   • famoTIDine  20 mg Intravenous BID   • metoprolol Tartrate  2.5 mg Intravenous Once   • Fluticasone Propionate  1 spray Each Nare Daily PT/OT    Governor MD Olya side rails up/repositioned

## 2017-09-13 NOTE — TELEPHONE ENCOUNTER
Dr. Tanner Gimenez saw pt for a UTI on 08/28/2017. SHe  prescribed Macrobid 100 mg 1 po bid for 7 days. She was feeling better now she is having a return in UTI sx. She would like to know if you can give her another prescription?  Please advise

## 2017-09-13 NOTE — TELEPHONE ENCOUNTER
Patient's  Bebeto Buck (on HIPAA called and states that she completed her medication for a UTI and felt better but now symptoms are coming back. He is wondering if more antibiotics can be prescribed please advise.

## 2017-09-14 ENCOUNTER — TELEPHONE (OUTPATIENT)
Dept: HEMATOLOGY/ONCOLOGY | Facility: HOSPITAL | Age: 67
End: 2017-09-14

## 2017-09-14 ENCOUNTER — OFFICE VISIT (OUTPATIENT)
Dept: HEMATOLOGY/ONCOLOGY | Facility: HOSPITAL | Age: 67
End: 2017-09-14
Attending: INTERNAL MEDICINE
Payer: MEDICARE

## 2017-09-14 VITALS
OXYGEN SATURATION: 98 % | SYSTOLIC BLOOD PRESSURE: 104 MMHG | BODY MASS INDEX: 23.39 KG/M2 | HEIGHT: 70.5 IN | DIASTOLIC BLOOD PRESSURE: 66 MMHG | WEIGHT: 165.19 LBS | HEART RATE: 84 BPM | TEMPERATURE: 99 F | RESPIRATION RATE: 18 BRPM

## 2017-09-14 DIAGNOSIS — I82.611 ACUTE THROMBOSIS OF RIGHT CEPHALIC VEIN: ICD-10-CM

## 2017-09-14 DIAGNOSIS — I70.209 SUPERFICIAL FEMORAL ARTERY OCCLUSION (HCC): Primary | ICD-10-CM

## 2017-09-14 DIAGNOSIS — D64.9 NORMOCYTIC ANEMIA: ICD-10-CM

## 2017-09-14 DIAGNOSIS — I25.5 ISCHEMIC CARDIOMYOPATHY: ICD-10-CM

## 2017-09-14 DIAGNOSIS — D64.9 ANEMIA: ICD-10-CM

## 2017-09-14 LAB
ALBUMIN SERPL-MCNC: 2.6 G/DL (ref 3.5–4.8)
ALP LIVER SERPL-CCNC: 91 U/L (ref 55–142)
ALT SERPL-CCNC: 12 U/L (ref 14–54)
AST SERPL-CCNC: 11 U/L (ref 15–41)
BASOPHILS # BLD AUTO: 0.07 X10(3) UL (ref 0–0.1)
BASOPHILS NFR BLD AUTO: 0.7 %
BILIRUB SERPL-MCNC: 0.2 MG/DL (ref 0.1–2)
BUN BLD-MCNC: 12 MG/DL (ref 8–20)
CALCIUM BLD-MCNC: 10.1 MG/DL (ref 8.3–10.3)
CHLORIDE: 105 MMOL/L (ref 101–111)
CO2: 27 MMOL/L (ref 22–32)
CREAT BLD-MCNC: 0.9 MG/DL (ref 0.55–1.02)
DEPRECATED HBV CORE AB SER IA-ACNC: 150.1 NG/ML (ref 10–291)
EOSINOPHIL # BLD AUTO: 0.39 X10(3) UL (ref 0–0.3)
EOSINOPHIL NFR BLD AUTO: 3.8 %
ERYTHROCYTE [DISTWIDTH] IN BLOOD BY AUTOMATED COUNT: 16.3 % (ref 11.5–16)
GLUCOSE BLD-MCNC: 76 MG/DL (ref 70–99)
HCT VFR BLD AUTO: 35.4 % (ref 34–50)
HGB BLD-MCNC: 11.1 G/DL (ref 12–16)
IMMATURE GRANULOCYTE COUNT: 0.07 X10(3) UL (ref 0–1)
IMMATURE GRANULOCYTE RATIO %: 0.7 %
LYMPHOCYTES # BLD AUTO: 2.21 X10(3) UL (ref 0.9–4)
LYMPHOCYTES NFR BLD AUTO: 21.6 %
M PROTEIN MFR SERPL ELPH: 8 G/DL (ref 6.1–8.3)
MCH RBC QN AUTO: 27.8 PG (ref 27–33.2)
MCHC RBC AUTO-ENTMCNC: 31.4 G/DL (ref 31–37)
MCV RBC AUTO: 88.5 FL (ref 81–100)
MONOCYTES # BLD AUTO: 1.19 X10(3) UL (ref 0.1–0.6)
MONOCYTES NFR BLD AUTO: 11.6 %
NEUTROPHIL ABS PRELIM: 6.3 X10 (3) UL (ref 1.3–6.7)
NEUTROPHILS # BLD AUTO: 6.3 X10(3) UL (ref 1.3–6.7)
NEUTROPHILS NFR BLD AUTO: 61.6 %
PLATELET # BLD AUTO: 435 10(3)UL (ref 150–450)
POTASSIUM SERPL-SCNC: 4.1 MMOL/L (ref 3.6–5.1)
RBC # BLD AUTO: 4 X10(6)UL (ref 3.8–5.1)
RED CELL DISTRIBUTION WIDTH-SD: 53.1 FL (ref 35.1–46.3)
RETIC ABS CALC AUTO: 58.8 X10(3) UL (ref 22.5–147.5)
RETIC IRF CALC: 0.19 RATIO (ref 0.09–0.3)
RETIC%: 1.5 % (ref 0.5–2.5)
RETICULOCYTE HEMOGLOBIN EQUIVALENT: 28.4 PG (ref 28.2–36.3)
SODIUM SERPL-SCNC: 141 MMOL/L (ref 136–144)
WBC # BLD AUTO: 10.2 X10(3) UL (ref 4–13)

## 2017-09-14 PROCEDURE — 99215 OFFICE O/P EST HI 40 MIN: CPT | Performed by: INTERNAL MEDICINE

## 2017-09-14 NOTE — PROGRESS NOTES
Hematology Clinic Follow Up Visit    Patient Name: Max Main  Medical Record Number: [id number]    YOB: 1950   PCP: Dr. Nicole Blizzard  Other providers:  Dr. Zackery Mckee    Reason for Consultation:  Max Main was seen today f surgical wound, this is less as well.      Past Medical History:  Past Medical History:   Diagnosis Date   • AAA (abdominal aortic aneurysm) ([facility] [address]    • Anemia, unspecified 8/15/2017   • Arrhythmia     a fib, VT   • Cancer ([facility] [address]     skin   • Coronary atheros mouth every 6 (six) hours as needed for Pain. Disp:  Rfl:    temazepam 7.5 MG Oral Cap Take 1 capsule (7.5 mg total) by mouth nightly as needed for Sleep. Disp: 15 capsule Rfl: 0   amiodarone HCl 200 MG Oral Tab Take 1 tablet (200 mg total) by mouth daily. 1115)  Do Not Use - Resp Rate: --  SpO2: 98 % (09/14 1115)    Wt Readings from Last 6 Encounters:  09/14/17 : 74.9 kg (165 lb 3.2 oz)  09/13/17 : 75.8 kg (167 lb)  09/12/17 : 73.5 kg (162 lb)  08/28/17 : 73.5 kg (162 lb)  08/22/17 : 77.1 kg (170 lb)  08/13 2.5 % 1.5   RETIC ABSOLUTE      22.5 - 147.5 x10(3) uL 58.8   Retic IRF      0.090 - 0.300 Ratio 0.188   Reticulocyte Hemoglobin Equivalent      28.2 - 36.3 pg 28.4     Imaging:    As reviewed above    Impression & Plan:     *bilateral SFA occlusion, acute

## 2017-09-14 NOTE — TELEPHONE ENCOUNTER
Inna Faustin MD  P Edw Rebecca Shearer Rns             Please call pt and notify her that I talked to Dr. Zohreh Medellin and he is in agreement that she should remain on eliquis     Pt's  informed and verbalized understanding.

## 2017-09-15 ENCOUNTER — PRIOR ORIGINAL RECORDS (OUTPATIENT)
Dept: OTHER | Age: 67
End: 2017-09-15

## 2017-09-27 ENCOUNTER — TELEPHONE (OUTPATIENT)
Dept: SURGERY | Facility: CLINIC | Age: 67
End: 2017-09-27

## 2017-09-29 ENCOUNTER — CARDPULM VISIT (OUTPATIENT)
Dept: CARDIAC REHAB | Facility: HOSPITAL | Age: 67
End: 2017-09-29
Attending: INTERNAL MEDICINE
Payer: MEDICARE

## 2017-10-02 ENCOUNTER — CARDPULM VISIT (OUTPATIENT)
Dept: CARDIAC REHAB | Facility: HOSPITAL | Age: 67
End: 2017-10-02
Attending: INTERNAL MEDICINE
Payer: MEDICARE

## 2017-10-02 PROCEDURE — 93798 PHYS/QHP OP CAR RHAB W/ECG: CPT

## 2017-10-04 ENCOUNTER — CARDPULM VISIT (OUTPATIENT)
Dept: CARDIAC REHAB | Facility: HOSPITAL | Age: 67
End: 2017-10-04
Attending: INTERNAL MEDICINE
Payer: MEDICARE

## 2017-10-04 ENCOUNTER — HOSPITAL ENCOUNTER (OUTPATIENT)
Dept: CV DIAGNOSTICS | Facility: HOSPITAL | Age: 67
Discharge: HOME | End: 2017-10-04
Attending: INTERNAL MEDICINE

## 2017-10-04 DIAGNOSIS — I25.10 CORONARY ARTERIOSCLEROSIS: ICD-10-CM

## 2017-10-04 DIAGNOSIS — I25.5 CARDIOMYOPATHY, ISCHEMIC: ICD-10-CM

## 2017-10-04 DIAGNOSIS — Z95.1 S/P CABG X 3: ICD-10-CM

## 2017-10-04 PROCEDURE — 93798 PHYS/QHP OP CAR RHAB W/ECG: CPT

## 2017-10-06 ENCOUNTER — CARDPULM VISIT (OUTPATIENT)
Dept: CARDIAC REHAB | Facility: HOSPITAL | Age: 67
End: 2017-10-06
Attending: INTERNAL MEDICINE
Payer: MEDICARE

## 2017-10-06 PROCEDURE — 93798 PHYS/QHP OP CAR RHAB W/ECG: CPT

## 2017-10-09 ENCOUNTER — APPOINTMENT (OUTPATIENT)
Dept: CARDIAC REHAB | Facility: HOSPITAL | Age: 67
End: 2017-10-09
Attending: INTERNAL MEDICINE
Payer: MEDICARE

## 2017-10-09 PROCEDURE — 93798 PHYS/QHP OP CAR RHAB W/ECG: CPT

## 2017-10-10 ENCOUNTER — PATIENT MESSAGE (OUTPATIENT)
Dept: FAMILY MEDICINE CLINIC | Facility: CLINIC | Age: 67
End: 2017-10-10

## 2017-10-10 NOTE — TELEPHONE ENCOUNTER
From: Maggy Nunez  To: Bryan Stephens MD  Sent: 10/10/2017 8:57 AM CDT  Subject: Test Results Question    I had an echocardiogram at [facility] on 10/4/17. Would you please post the results on My Chart.     Thanks,    Shantel Arenas

## 2017-10-11 ENCOUNTER — APPOINTMENT (OUTPATIENT)
Dept: CARDIAC REHAB | Facility: HOSPITAL | Age: 67
End: 2017-10-11
Attending: INTERNAL MEDICINE
Payer: MEDICARE

## 2017-10-11 PROCEDURE — 93798 PHYS/QHP OP CAR RHAB W/ECG: CPT

## 2017-10-13 ENCOUNTER — CARDPULM VISIT (OUTPATIENT)
Dept: CARDIAC REHAB | Facility: HOSPITAL | Age: 67
End: 2017-10-13
Attending: INTERNAL MEDICINE
Payer: MEDICARE

## 2017-10-13 PROCEDURE — 93798 PHYS/QHP OP CAR RHAB W/ECG: CPT

## 2017-10-16 ENCOUNTER — APPOINTMENT (OUTPATIENT)
Dept: CARDIAC REHAB | Facility: HOSPITAL | Age: 67
End: 2017-10-16
Attending: INTERNAL MEDICINE
Payer: MEDICARE

## 2017-10-16 ENCOUNTER — PRIOR ORIGINAL RECORDS (OUTPATIENT)
Dept: OTHER | Age: 67
End: 2017-10-16

## 2017-10-18 ENCOUNTER — APPOINTMENT (OUTPATIENT)
Dept: CARDIAC REHAB | Facility: HOSPITAL | Age: 67
End: 2017-10-18
Attending: INTERNAL MEDICINE
Payer: MEDICARE

## 2017-10-18 PROCEDURE — 93798 PHYS/QHP OP CAR RHAB W/ECG: CPT

## 2017-10-20 ENCOUNTER — CARDPULM VISIT (OUTPATIENT)
Dept: CARDIAC REHAB | Facility: HOSPITAL | Age: 67
End: 2017-10-20
Attending: INTERNAL MEDICINE
Payer: MEDICARE

## 2017-10-20 PROCEDURE — 93798 PHYS/QHP OP CAR RHAB W/ECG: CPT

## 2017-10-23 ENCOUNTER — CARDPULM VISIT (OUTPATIENT)
Dept: CARDIAC REHAB | Facility: HOSPITAL | Age: 67
End: 2017-10-23
Attending: INTERNAL MEDICINE
Payer: MEDICARE

## 2017-10-23 PROCEDURE — 93798 PHYS/QHP OP CAR RHAB W/ECG: CPT

## 2017-10-25 ENCOUNTER — CARDPULM VISIT (OUTPATIENT)
Dept: CARDIAC REHAB | Facility: HOSPITAL | Age: 67
End: 2017-10-25
Attending: INTERNAL MEDICINE
Payer: MEDICARE

## 2017-10-25 PROCEDURE — 93798 PHYS/QHP OP CAR RHAB W/ECG: CPT

## 2017-10-27 ENCOUNTER — PRIOR ORIGINAL RECORDS (OUTPATIENT)
Dept: OTHER | Age: 67
End: 2017-10-27

## 2017-10-30 ENCOUNTER — CARDPULM VISIT (OUTPATIENT)
Dept: CARDIAC REHAB | Facility: HOSPITAL | Age: 67
End: 2017-10-30
Attending: INTERNAL MEDICINE
Payer: MEDICARE

## 2017-10-30 PROCEDURE — 93798 PHYS/QHP OP CAR RHAB W/ECG: CPT

## 2017-10-30 NOTE — H&P
Elke Garcíahardt  : 1950  ACCOUNT:  [id number]  [phone]  PCP: Dr. Jesica Reed     TODAY'S DATE: 10/16/2017  DICTATED BY:  [00]    CHIEF COMPLAINT: [Followup of Cardiomyopathy, ischemic.]    HPI:    [On 10/16/2017, Robbie Cotton, a 75year old f RESP: respirations with normal rate and rhythm, clear to auscultation. CHEST/BREASTS: healed sternotomy. GI: no masses, tenderness or hepatosplenomegaly, rectal deferred. :  deferred. MS: inadequate gait for exercise/testing.  EXT: no clubbing or cyanosis

## 2017-10-31 ENCOUNTER — HOSPITAL ENCOUNTER (OUTPATIENT)
Dept: INTERVENTIONAL RADIOLOGY/VASCULAR | Facility: HOSPITAL | Age: 67
Discharge: HOME | End: 2017-11-01
Attending: INTERNAL MEDICINE | Admitting: INTERNAL MEDICINE
Payer: MEDICARE

## 2017-10-31 ENCOUNTER — APPOINTMENT (OUTPATIENT)
Dept: GENERAL RADIOLOGY | Facility: HOSPITAL | Age: 67
End: 2017-10-31
Attending: INTERNAL MEDICINE
Payer: MEDICARE

## 2017-10-31 DIAGNOSIS — I47.2 V-TACH (HCC): ICD-10-CM

## 2017-10-31 DIAGNOSIS — I42.9 CARDIOMYOPATHY (HCC): ICD-10-CM

## 2017-10-31 DIAGNOSIS — I25.10 CAD (CORONARY ARTERY DISEASE): ICD-10-CM

## 2017-10-31 DIAGNOSIS — I48.91 ATRIAL FIBRILLATION (HCC): ICD-10-CM

## 2017-10-31 PROCEDURE — 33249 INSJ/RPLCMT DEFIB W/LEAD(S): CPT

## 2017-10-31 PROCEDURE — 02H63KZ INSERTION OF DEFIBRILLATOR LEAD INTO RIGHT ATRIUM, PERCUTANEOUS APPROACH: ICD-10-PCS | Performed by: INTERNAL MEDICINE

## 2017-10-31 PROCEDURE — 99152 MOD SED SAME PHYS/QHP 5/>YRS: CPT

## 2017-10-31 PROCEDURE — 0JH608Z INSERTION OF DEFIBRILLATOR GENERATOR INTO CHEST SUBCUTANEOUS TISSUE AND FASCIA, OPEN APPROACH: ICD-10-PCS | Performed by: INTERNAL MEDICINE

## 2017-10-31 PROCEDURE — 99153 MOD SED SAME PHYS/QHP EA: CPT

## 2017-10-31 PROCEDURE — 71010 XR CHEST AP PORTABLE  (CPT=71010): CPT | Performed by: INTERNAL MEDICINE

## 2017-10-31 PROCEDURE — 02HK3KZ INSERTION OF DEFIBRILLATOR LEAD INTO RIGHT VENTRICLE, PERCUTANEOUS APPROACH: ICD-10-PCS | Performed by: INTERNAL MEDICINE

## 2017-10-31 PROCEDURE — 3E0102A INTRODUCTION OF ANTI-INFECTIVE ENVELOPE INTO SUBCUTANEOUS TISSUE, OPEN APPROACH: ICD-10-PCS | Performed by: INTERNAL MEDICINE

## 2017-10-31 RX ORDER — AMIODARONE HYDROCHLORIDE 200 MG/1
200 TABLET ORAL DAILY
Status: DISCONTINUED | OUTPATIENT
Start: 2017-11-01 | End: 2017-11-01

## 2017-10-31 RX ORDER — LIDOCAINE HYDROCHLORIDE 10 MG/ML
INJECTION, SOLUTION INFILTRATION; PERINEURAL
Status: COMPLETED
Start: 2017-10-31 | End: 2017-10-31

## 2017-10-31 RX ORDER — ONDANSETRON 2 MG/ML
4 INJECTION INTRAMUSCULAR; INTRAVENOUS EVERY 6 HOURS PRN
Status: DISCONTINUED | OUTPATIENT
Start: 2017-10-31 | End: 2017-11-01

## 2017-10-31 RX ORDER — LOSARTAN POTASSIUM 25 MG/1
25 TABLET ORAL DAILY
Status: DISCONTINUED | OUTPATIENT
Start: 2017-10-31 | End: 2017-11-01

## 2017-10-31 RX ORDER — DIPHENHYDRAMINE HYDROCHLORIDE 50 MG/ML
INJECTION INTRAMUSCULAR; INTRAVENOUS
Status: COMPLETED
Start: 2017-10-31 | End: 2017-10-31

## 2017-10-31 RX ORDER — ACETAMINOPHEN 325 MG/1
650 TABLET ORAL EVERY 4 HOURS PRN
Status: DISCONTINUED | OUTPATIENT
Start: 2017-10-31 | End: 2017-11-01

## 2017-10-31 RX ORDER — ACETAMINOPHEN AND CODEINE PHOSPHATE 300; 30 MG/1; MG/1
1 TABLET ORAL EVERY 4 HOURS PRN
Status: DISCONTINUED | OUTPATIENT
Start: 2017-10-31 | End: 2017-11-01

## 2017-10-31 RX ORDER — SODIUM CHLORIDE 9 MG/ML
INJECTION, SOLUTION INTRAVENOUS CONTINUOUS
Status: DISCONTINUED | OUTPATIENT
Start: 2017-10-31 | End: 2017-11-01

## 2017-10-31 RX ORDER — ATORVASTATIN CALCIUM 40 MG/1
40 TABLET, FILM COATED ORAL NIGHTLY
Status: DISCONTINUED | OUTPATIENT
Start: 2017-10-31 | End: 2017-11-01

## 2017-10-31 RX ORDER — ACETAMINOPHEN AND CODEINE PHOSPHATE 300; 30 MG/1; MG/1
2 TABLET ORAL EVERY 4 HOURS PRN
Status: DISCONTINUED | OUTPATIENT
Start: 2017-10-31 | End: 2017-11-01

## 2017-10-31 RX ORDER — MIDAZOLAM HYDROCHLORIDE 1 MG/ML
INJECTION INTRAMUSCULAR; INTRAVENOUS
Status: COMPLETED
Start: 2017-10-31 | End: 2017-10-31

## 2017-10-31 RX ORDER — ONDANSETRON 4 MG/1
4 TABLET, FILM COATED ORAL EVERY 8 HOURS PRN
Status: DISCONTINUED | OUTPATIENT
Start: 2017-10-31 | End: 2017-11-01

## 2017-10-31 RX ORDER — ASPIRIN 81 MG/1
81 TABLET ORAL DAILY
Status: DISCONTINUED | OUTPATIENT
Start: 2017-11-01 | End: 2017-11-01

## 2017-10-31 RX ORDER — METOPROLOL SUCCINATE 100 MG/1
100 TABLET, EXTENDED RELEASE ORAL
Status: DISCONTINUED | OUTPATIENT
Start: 2017-11-01 | End: 2017-11-01

## 2017-10-31 RX ORDER — CHLORHEXIDINE GLUCONATE 4 G/100ML
30 SOLUTION TOPICAL
Status: COMPLETED | OUTPATIENT
Start: 2017-10-31 | End: 2017-10-31

## 2017-10-31 RX ORDER — BACITRACIN 50000 [USP'U]/1
INJECTION, POWDER, LYOPHILIZED, FOR SOLUTION INTRAMUSCULAR
Status: COMPLETED
Start: 2017-10-31 | End: 2017-10-31

## 2017-10-31 RX ADMIN — ACETAMINOPHEN AND CODEINE PHOSPHATE 2 TABLET: 300; 30 TABLET ORAL at 19:04:00

## 2017-10-31 RX ADMIN — ATORVASTATIN CALCIUM 40 MG: 40 TABLET, FILM COATED ORAL at 20:36:00

## 2017-10-31 RX ADMIN — SODIUM CHLORIDE: 9 INJECTION, SOLUTION INTRAVENOUS at 13:15:00

## 2017-10-31 RX ADMIN — CHLORHEXIDINE GLUCONATE 30 ML: 4 SOLUTION TOPICAL at 13:45:00

## 2017-10-31 NOTE — PROGRESS NOTES
Rc'd pt from cath lab in stable condition. VSS. Mepelex dressing to left chest is soft, clean and dry, No bleeding or hematoma. Pt denies c/o pain or discomfort. Dr Juan Hester at bedside. Pt ambulated to , tolerated well. Eating dinner.  Report called to [employer]

## 2017-10-31 NOTE — PROCEDURES
OPERATION(S) PERFORMED:   1. Dual chamber ICD implant. 2. Chest fluoroscopy. 3. ICD testing. : Nico Samano MD  INDICATION:  CM, EF <34%    COMPLICATIONS: None     ESTIMATED BLOOD LOSS: Minimal.      SEDATION: IV was maintained by RN.  Patient Status post successful implant of a dual chamber ICD with a active fixation right ventricular (RV) lead, active fixation RA lead with adequate pacing and sensing parameters.    Emilia Hobbs MD  [facility]

## 2017-10-31 NOTE — PLAN OF CARE
NURSING ADMISSION NOTE      Patient admitted via Wheelchair from cath lab holding, L chest dressing cdi, no sign of bleeding or hematoma. Oriented to room. Safety precautions initiated. Bed in low position. Call light in reach.

## 2017-11-01 ENCOUNTER — APPOINTMENT (OUTPATIENT)
Dept: CARDIAC REHAB | Facility: HOSPITAL | Age: 67
End: 2017-11-01
Attending: INTERNAL MEDICINE
Payer: MEDICARE

## 2017-11-01 ENCOUNTER — APPOINTMENT (OUTPATIENT)
Dept: GENERAL RADIOLOGY | Facility: HOSPITAL | Age: 67
End: 2017-11-01
Attending: INTERNAL MEDICINE
Payer: MEDICARE

## 2017-11-01 VITALS
HEIGHT: 70 IN | BODY MASS INDEX: 23.34 KG/M2 | OXYGEN SATURATION: 97 % | TEMPERATURE: 98 F | SYSTOLIC BLOOD PRESSURE: 100 MMHG | DIASTOLIC BLOOD PRESSURE: 63 MMHG | HEART RATE: 63 BPM | WEIGHT: 163 LBS | RESPIRATION RATE: 11 BRPM

## 2017-11-01 PROCEDURE — 90471 IMMUNIZATION ADMIN: CPT | Performed by: NURSE PRACTITIONER

## 2017-11-01 PROCEDURE — 71020 XR CHEST PA + LAT CHEST (CPT=71020): CPT | Performed by: INTERNAL MEDICINE

## 2017-11-01 PROCEDURE — 82962 GLUCOSE BLOOD TEST: CPT

## 2017-11-01 RX ORDER — METOPROLOL SUCCINATE 50 MG/1
75 TABLET, EXTENDED RELEASE ORAL
Qty: 60 TABLET | Refills: 2 | Status: ON HOLD | OUTPATIENT
Start: 2017-11-01 | End: 2019-05-10

## 2017-11-01 RX ADMIN — LOSARTAN POTASSIUM 25 MG: 25 TABLET ORAL at 08:07:00

## 2017-11-01 RX ADMIN — ACETAMINOPHEN AND CODEINE PHOSPHATE 2 TABLET: 300; 30 TABLET ORAL at 00:17:00

## 2017-11-01 RX ADMIN — ACETAMINOPHEN AND CODEINE PHOSPHATE 2 TABLET: 300; 30 TABLET ORAL at 04:48:00

## 2017-11-01 RX ADMIN — AMIODARONE HYDROCHLORIDE 200 MG: 200 TABLET ORAL at 08:07:00

## 2017-11-01 RX ADMIN — ASPIRIN 81 MG: 81 TABLET ORAL at 08:08:00

## 2017-11-01 RX ADMIN — METOPROLOL SUCCINATE 100 MG: 100 TABLET, EXTENDED RELEASE ORAL at 07:01:00

## 2017-11-01 NOTE — PROGRESS NOTES
[facility]  Cardiology Progress Note    Inocencio Gonzalez Patient Status:  Outpatient in a Bed    1950 MRN [id number]   [facility] [place] 8NE-A Attending Charlie Alfaro MD   Hosp Day # 0 PCP Flavio Monroe MD     Subjective:  [facility] CABG 5/17, severe LV dysfunction  · Dyslipidemia  · S/p AAA repair    Plan:   1. Patient instructed to keep incision dry for 3-5 days, keep dressing in place for 3-5 days, leave steri-strips in place.  Keep arm on side of device below shoulder level for 6 w

## 2017-11-01 NOTE — PLAN OF CARE
Patient tele dc'd. IV discontinued with catheter intact. Pt denies cp, sob, dizziness or palpitations. Pt denies calf tenderness. Pt discharge and post pacemaker instructions reviewed with patient and spouse verbalized understanding.  Pt medication reviewed

## 2017-11-03 ENCOUNTER — APPOINTMENT (OUTPATIENT)
Dept: CARDIAC REHAB | Facility: HOSPITAL | Age: 67
End: 2017-11-03
Attending: INTERNAL MEDICINE
Payer: MEDICARE

## 2017-11-06 ENCOUNTER — APPOINTMENT (OUTPATIENT)
Dept: CARDIAC REHAB | Facility: HOSPITAL | Age: 67
End: 2017-11-06
Attending: INTERNAL MEDICINE
Payer: MEDICARE

## 2017-11-08 ENCOUNTER — APPOINTMENT (OUTPATIENT)
Dept: CARDIAC REHAB | Facility: HOSPITAL | Age: 67
End: 2017-11-08
Attending: INTERNAL MEDICINE
Payer: MEDICARE

## 2017-11-09 ENCOUNTER — PRIOR ORIGINAL RECORDS (OUTPATIENT)
Dept: OTHER | Age: 67
End: 2017-11-09

## 2017-11-10 ENCOUNTER — APPOINTMENT (OUTPATIENT)
Dept: CARDIAC REHAB | Facility: HOSPITAL | Age: 67
End: 2017-11-10
Attending: INTERNAL MEDICINE
Payer: MEDICARE

## 2017-11-13 ENCOUNTER — APPOINTMENT (OUTPATIENT)
Dept: CARDIAC REHAB | Facility: HOSPITAL | Age: 67
End: 2017-11-13
Attending: INTERNAL MEDICINE
Payer: MEDICARE

## 2017-11-15 ENCOUNTER — APPOINTMENT (OUTPATIENT)
Dept: CARDIAC REHAB | Facility: HOSPITAL | Age: 67
End: 2017-11-15
Attending: INTERNAL MEDICINE
Payer: MEDICARE

## 2017-11-17 ENCOUNTER — APPOINTMENT (OUTPATIENT)
Dept: CARDIAC REHAB | Facility: HOSPITAL | Age: 67
End: 2017-11-17
Attending: INTERNAL MEDICINE
Payer: MEDICARE

## 2017-11-20 ENCOUNTER — APPOINTMENT (OUTPATIENT)
Dept: CARDIAC REHAB | Facility: HOSPITAL | Age: 67
End: 2017-11-20
Attending: INTERNAL MEDICINE
Payer: MEDICARE

## 2017-11-20 ENCOUNTER — PRIOR ORIGINAL RECORDS (OUTPATIENT)
Dept: OTHER | Age: 67
End: 2017-11-20

## 2017-11-22 ENCOUNTER — APPOINTMENT (OUTPATIENT)
Dept: CARDIAC REHAB | Facility: HOSPITAL | Age: 67
End: 2017-11-22
Attending: INTERNAL MEDICINE
Payer: MEDICARE

## 2017-11-24 ENCOUNTER — APPOINTMENT (OUTPATIENT)
Dept: CARDIAC REHAB | Facility: HOSPITAL | Age: 67
End: 2017-11-24
Attending: INTERNAL MEDICINE
Payer: MEDICARE

## 2017-11-27 ENCOUNTER — APPOINTMENT (OUTPATIENT)
Dept: CARDIAC REHAB | Facility: HOSPITAL | Age: 67
End: 2017-11-27
Attending: INTERNAL MEDICINE
Payer: MEDICARE

## 2017-11-29 ENCOUNTER — CARDPULM VISIT (OUTPATIENT)
Dept: CARDIAC REHAB | Facility: HOSPITAL | Age: 67
End: 2017-11-29
Attending: INTERNAL MEDICINE
Payer: MEDICARE

## 2017-11-29 PROCEDURE — 93798 PHYS/QHP OP CAR RHAB W/ECG: CPT

## 2017-12-01 ENCOUNTER — APPOINTMENT (OUTPATIENT)
Dept: CARDIAC REHAB | Facility: HOSPITAL | Age: 67
End: 2017-12-01
Attending: INTERNAL MEDICINE
Payer: MEDICARE

## 2017-12-04 ENCOUNTER — CARDPULM VISIT (OUTPATIENT)
Dept: CARDIAC REHAB | Facility: HOSPITAL | Age: 67
End: 2017-12-04
Attending: INTERNAL MEDICINE
Payer: MEDICARE

## 2017-12-04 PROCEDURE — 93798 PHYS/QHP OP CAR RHAB W/ECG: CPT

## 2017-12-06 ENCOUNTER — CARDPULM VISIT (OUTPATIENT)
Dept: CARDIAC REHAB | Facility: HOSPITAL | Age: 67
End: 2017-12-06
Attending: INTERNAL MEDICINE
Payer: MEDICARE

## 2017-12-06 PROCEDURE — 93798 PHYS/QHP OP CAR RHAB W/ECG: CPT

## 2017-12-08 ENCOUNTER — CARDPULM VISIT (OUTPATIENT)
Dept: CARDIAC REHAB | Facility: HOSPITAL | Age: 67
End: 2017-12-08
Attending: INTERNAL MEDICINE
Payer: MEDICARE

## 2017-12-08 PROCEDURE — 93798 PHYS/QHP OP CAR RHAB W/ECG: CPT

## 2017-12-11 ENCOUNTER — CARDPULM VISIT (OUTPATIENT)
Dept: CARDIAC REHAB | Facility: HOSPITAL | Age: 67
End: 2017-12-11
Attending: INTERNAL MEDICINE
Payer: MEDICARE

## 2017-12-11 PROCEDURE — 93798 PHYS/QHP OP CAR RHAB W/ECG: CPT

## 2017-12-13 ENCOUNTER — CARDPULM VISIT (OUTPATIENT)
Dept: CARDIAC REHAB | Facility: HOSPITAL | Age: 67
End: 2017-12-13
Attending: INTERNAL MEDICINE
Payer: MEDICARE

## 2017-12-13 PROCEDURE — 93798 PHYS/QHP OP CAR RHAB W/ECG: CPT

## 2017-12-14 ENCOUNTER — OFFICE VISIT (OUTPATIENT)
Dept: HEMATOLOGY/ONCOLOGY | Facility: HOSPITAL | Age: 67
End: 2017-12-14
Attending: INTERNAL MEDICINE
Payer: MEDICARE

## 2017-12-14 VITALS
RESPIRATION RATE: 18 BRPM | DIASTOLIC BLOOD PRESSURE: 70 MMHG | HEIGHT: 70.51 IN | OXYGEN SATURATION: 100 % | WEIGHT: 177.19 LBS | BODY MASS INDEX: 25.08 KG/M2 | SYSTOLIC BLOOD PRESSURE: 115 MMHG | HEART RATE: 99 BPM

## 2017-12-14 DIAGNOSIS — I70.209 SUPERFICIAL FEMORAL ARTERY OCCLUSION (HCC): Primary | ICD-10-CM

## 2017-12-14 DIAGNOSIS — Z79.01 CHRONIC ANTICOAGULATION: ICD-10-CM

## 2017-12-14 DIAGNOSIS — D64.9 NORMOCYTIC ANEMIA: ICD-10-CM

## 2017-12-14 DIAGNOSIS — I48.0 PAROXYSMAL ATRIAL FIBRILLATION (HCC): ICD-10-CM

## 2017-12-14 PROCEDURE — 99214 OFFICE O/P EST MOD 30 MIN: CPT | Performed by: INTERNAL MEDICINE

## 2017-12-14 RX ORDER — SPIRONOLACTONE 25 MG/1
12.5 TABLET ORAL DAILY
COMMUNITY

## 2017-12-14 NOTE — PROGRESS NOTES
Patient here for 3 month MD f/u. States feeling well. Had pacemaker/defibrilator placed since last visit. Recovered well, increased energy. About 1/2 way finished through cardiac rehab.

## 2017-12-14 NOTE — PATIENT INSTRUCTIONS
For Dr. Mona Hoyos nurse line, call [phone] with any questions or concerns,  Monday through Friday 8:00 to 4:30. After hours or weekends for urgent needs: [phone].   Central [phone]  Medical Records:   [phone]  [facility] Cent

## 2017-12-14 NOTE — PROGRESS NOTES
Hematology Clinic Follow Up Visit    Patient Name: Jamir Segovia  Medical Record Number: [id number]    YOB: 1950   PCP: Dr. Milvia Schwarz  Other providers:  Dr. Drea Griggs, Dr. Louie Cantu    Reason for Consultation:  Jamir Segovia (abdominal aortic aneurysm) (HCC)    • Anemia, unspecified 8/15/2017   • Arrhythmia     a fib, VT   • Atherosclerosis of coronary artery    • Calculus of kidney    • Cancer [facility])     skin   • Chronic atrial fibrillation (HCC)    • Coronary atherosclerosis Vitamin (MULTI-VITAMIN DAILY OR) Take 1 tablet by mouth daily. Disp:  Rfl:    Probiotic Product (PROBIOTIC OR) Take 1 tablet by mouth daily.    Disp:  Rfl:      Allergies:     Pcn [Penicillins]       Nausea only  Protamine                 Seafood appropriate  Eyes: EOMI  ENT: Oropharynx is clear  CV: Regular rate and rhythm, no murmurs. +PPM/AICD at left chest wall  Respiratory: Lungs clear to auscultation bilaterally  GI/Abd: Soft, non-tender with normoactive bowel sounds, no hepatosplenomegaly. her hx of afib and additional vascular history, I favor she remain on anticoagulation indefinitely  -continue eliquis 5mg BID   -from a hematology perspective I do not feel strongly that she needs to remain on aspirin so long as she is on an anticoagulant,

## 2017-12-18 ENCOUNTER — CARDPULM VISIT (OUTPATIENT)
Dept: CARDIAC REHAB | Facility: HOSPITAL | Age: 67
End: 2017-12-18
Attending: INTERNAL MEDICINE
Payer: MEDICARE

## 2017-12-18 PROCEDURE — 93798 PHYS/QHP OP CAR RHAB W/ECG: CPT

## 2017-12-20 ENCOUNTER — CARDPULM VISIT (OUTPATIENT)
Dept: CARDIAC REHAB | Facility: HOSPITAL | Age: 67
End: 2017-12-20
Attending: INTERNAL MEDICINE
Payer: MEDICARE

## 2017-12-20 PROCEDURE — 93798 PHYS/QHP OP CAR RHAB W/ECG: CPT

## 2017-12-22 ENCOUNTER — CARDPULM VISIT (OUTPATIENT)
Dept: CARDIAC REHAB | Facility: HOSPITAL | Age: 67
End: 2017-12-22
Attending: INTERNAL MEDICINE
Payer: MEDICARE

## 2017-12-22 PROCEDURE — 93798 PHYS/QHP OP CAR RHAB W/ECG: CPT

## 2017-12-29 ENCOUNTER — CARDPULM VISIT (OUTPATIENT)
Dept: CARDIAC REHAB | Facility: HOSPITAL | Age: 67
End: 2017-12-29
Attending: INTERNAL MEDICINE
Payer: MEDICARE

## 2017-12-29 PROCEDURE — 93798 PHYS/QHP OP CAR RHAB W/ECG: CPT

## 2018-01-03 ENCOUNTER — CARDPULM VISIT (OUTPATIENT)
Dept: CARDIAC REHAB | Facility: HOSPITAL | Age: 68
End: 2018-01-03
Attending: INTERNAL MEDICINE
Payer: MEDICARE

## 2018-01-03 PROCEDURE — 93798 PHYS/QHP OP CAR RHAB W/ECG: CPT

## 2018-01-05 ENCOUNTER — CARDPULM VISIT (OUTPATIENT)
Dept: CARDIAC REHAB | Facility: HOSPITAL | Age: 68
End: 2018-01-05
Attending: INTERNAL MEDICINE
Payer: MEDICARE

## 2018-01-05 PROCEDURE — 93798 PHYS/QHP OP CAR RHAB W/ECG: CPT

## 2018-01-10 ENCOUNTER — CARDPULM VISIT (OUTPATIENT)
Dept: CARDIAC REHAB | Facility: HOSPITAL | Age: 68
End: 2018-01-10
Attending: INTERNAL MEDICINE
Payer: MEDICARE

## 2018-01-10 PROCEDURE — 93798 PHYS/QHP OP CAR RHAB W/ECG: CPT

## 2018-01-12 ENCOUNTER — CARDPULM VISIT (OUTPATIENT)
Dept: CARDIAC REHAB | Facility: HOSPITAL | Age: 68
End: 2018-01-12
Attending: INTERNAL MEDICINE
Payer: MEDICARE

## 2018-01-12 PROCEDURE — 93798 PHYS/QHP OP CAR RHAB W/ECG: CPT

## 2018-01-17 ENCOUNTER — CARDPULM VISIT (OUTPATIENT)
Dept: CARDIAC REHAB | Facility: HOSPITAL | Age: 68
End: 2018-01-17
Attending: INTERNAL MEDICINE
Payer: MEDICARE

## 2018-01-17 PROCEDURE — 93798 PHYS/QHP OP CAR RHAB W/ECG: CPT

## 2018-01-19 ENCOUNTER — CARDPULM VISIT (OUTPATIENT)
Dept: CARDIAC REHAB | Facility: HOSPITAL | Age: 68
End: 2018-01-19
Attending: INTERNAL MEDICINE
Payer: MEDICARE

## 2018-01-19 PROCEDURE — 93798 PHYS/QHP OP CAR RHAB W/ECG: CPT

## 2018-01-22 ENCOUNTER — CARDPULM VISIT (OUTPATIENT)
Dept: CARDIAC REHAB | Facility: HOSPITAL | Age: 68
End: 2018-01-22
Attending: INTERNAL MEDICINE
Payer: MEDICARE

## 2018-01-22 PROCEDURE — 93798 PHYS/QHP OP CAR RHAB W/ECG: CPT

## 2018-01-24 ENCOUNTER — CARDPULM VISIT (OUTPATIENT)
Dept: CARDIAC REHAB | Facility: HOSPITAL | Age: 68
End: 2018-01-24
Attending: INTERNAL MEDICINE
Payer: MEDICARE

## 2018-01-24 PROCEDURE — 93798 PHYS/QHP OP CAR RHAB W/ECG: CPT

## 2018-01-26 ENCOUNTER — CARDPULM VISIT (OUTPATIENT)
Dept: CARDIAC REHAB | Facility: HOSPITAL | Age: 68
End: 2018-01-26
Attending: INTERNAL MEDICINE
Payer: MEDICARE

## 2018-01-26 PROCEDURE — 93798 PHYS/QHP OP CAR RHAB W/ECG: CPT

## 2018-01-29 ENCOUNTER — CARDPULM VISIT (OUTPATIENT)
Dept: CARDIAC REHAB | Facility: HOSPITAL | Age: 68
End: 2018-01-29
Attending: INTERNAL MEDICINE
Payer: MEDICARE

## 2018-01-29 PROCEDURE — 93798 PHYS/QHP OP CAR RHAB W/ECG: CPT

## 2018-01-30 NOTE — OCCUPATIONAL THERAPY NOTE
Attempted to see pt for OT this afternoon. Per RN, Dilma Rosenberg pt is on limited activity order till surgery due to low EF rate. Checked with pt, she reported that she has been up a lot due to diuretics and exercising in the chair.  Pt deferred mobility till the shi Patient returning phone call to nurse.    Preferred times to be called: anytime     Preferred contact number#      mobile [phone]

## 2018-01-31 ENCOUNTER — CARDPULM VISIT (OUTPATIENT)
Dept: CARDIAC REHAB | Facility: HOSPITAL | Age: 68
End: 2018-01-31
Attending: INTERNAL MEDICINE
Payer: MEDICARE

## 2018-01-31 PROCEDURE — 93798 PHYS/QHP OP CAR RHAB W/ECG: CPT

## 2018-02-02 ENCOUNTER — CARDPULM VISIT (OUTPATIENT)
Dept: CARDIAC REHAB | Facility: HOSPITAL | Age: 68
End: 2018-02-02
Attending: INTERNAL MEDICINE
Payer: MEDICARE

## 2018-02-02 PROCEDURE — 93798 PHYS/QHP OP CAR RHAB W/ECG: CPT

## 2018-02-05 ENCOUNTER — CARDPULM VISIT (OUTPATIENT)
Dept: CARDIAC REHAB | Facility: HOSPITAL | Age: 68
End: 2018-02-05
Attending: INTERNAL MEDICINE
Payer: MEDICARE

## 2018-02-05 PROCEDURE — 93798 PHYS/QHP OP CAR RHAB W/ECG: CPT

## 2018-02-14 ENCOUNTER — MYAURORA ACCOUNT LINK (OUTPATIENT)
Dept: OTHER | Age: 68
End: 2018-02-14

## 2018-02-14 ENCOUNTER — PRIOR ORIGINAL RECORDS (OUTPATIENT)
Dept: OTHER | Age: 68
End: 2018-02-14

## 2018-03-02 ENCOUNTER — OFFICE VISIT (OUTPATIENT)
Dept: FAMILY MEDICINE CLINIC | Facility: CLINIC | Age: 68
End: 2018-03-02

## 2018-03-02 VITALS
TEMPERATURE: 97 F | SYSTOLIC BLOOD PRESSURE: 110 MMHG | WEIGHT: 189 LBS | HEIGHT: 70.5 IN | BODY MASS INDEX: 26.76 KG/M2 | DIASTOLIC BLOOD PRESSURE: 70 MMHG | HEART RATE: 94 BPM | RESPIRATION RATE: 16 BRPM

## 2018-03-02 DIAGNOSIS — N30.00 ACUTE CYSTITIS WITHOUT HEMATURIA: Primary | ICD-10-CM

## 2018-03-02 DIAGNOSIS — R35.0 URINARY FREQUENCY: ICD-10-CM

## 2018-03-02 LAB
MULTISTIX LOT#: NORMAL NUMERIC
PH, URINE: 5.5 (ref 4.5–8)
SPECIFIC GRAVITY: 1.01 (ref 1–1.03)
URINE-COLOR: YELLOW
UROBILINOGEN,SEMI-QN: 0.2 MG/DL (ref 0–1.9)

## 2018-03-02 PROCEDURE — 99214 OFFICE O/P EST MOD 30 MIN: CPT | Performed by: FAMILY MEDICINE

## 2018-03-02 PROCEDURE — 87086 URINE CULTURE/COLONY COUNT: CPT | Performed by: FAMILY MEDICINE

## 2018-03-02 PROCEDURE — 81003 URINALYSIS AUTO W/O SCOPE: CPT | Performed by: FAMILY MEDICINE

## 2018-03-02 RX ORDER — CIPROFLOXACIN 500 MG/1
500 TABLET, FILM COATED ORAL 2 TIMES DAILY
Qty: 20 TABLET | Refills: 0 | Status: SHIPPED | OUTPATIENT
Start: 2018-03-02 | End: 2018-04-02 | Stop reason: ALTCHOICE

## 2018-03-02 NOTE — PROGRESS NOTES
Winston Chen is a 75year old female. HPI:   Patient presents with symptoms of UTI. Complaining of urinary frequency, urgency, dysuria, suprapubic pain, hematuria for the past 7 days.  Denies back pain, fever or n/v.        Current Outpatient Prescripti blood transfusion    • IBS (irritable bowel syndrome)    • Kidney stone    • Skin cancer 2001   • Small bowel obstruction ([facility] [address] 8/1/2017   • Tobacco abuse 9/17/2014      Social History:  Smoking status: Former Smoker SOFTENER OR) Take 1 capsule by mouth nightly. Disp:  Rfl:    Diphenhydramine-APAP, sleep, (TYLENOL PM EXTRA STRENGTH)  MG Oral Tab Take 1 tablet by mouth daily. Disp:  Rfl:    Losartan Potassium 25 MG Oral Tab Take 25 mg by mouth daily.  Disp:  Rfl:

## 2018-04-02 ENCOUNTER — OFFICE VISIT (OUTPATIENT)
Dept: FAMILY MEDICINE CLINIC | Facility: CLINIC | Age: 68
End: 2018-04-02

## 2018-04-02 ENCOUNTER — PRIOR ORIGINAL RECORDS (OUTPATIENT)
Dept: OTHER | Age: 68
End: 2018-04-02

## 2018-04-02 ENCOUNTER — APPOINTMENT (OUTPATIENT)
Dept: LAB | Age: 68
End: 2018-04-02
Attending: FAMILY MEDICINE
Payer: MEDICARE

## 2018-04-02 VITALS
BODY MASS INDEX: 27.18 KG/M2 | HEART RATE: 84 BPM | WEIGHT: 192 LBS | SYSTOLIC BLOOD PRESSURE: 120 MMHG | DIASTOLIC BLOOD PRESSURE: 70 MMHG | HEIGHT: 70.5 IN | RESPIRATION RATE: 16 BRPM | TEMPERATURE: 98 F

## 2018-04-02 DIAGNOSIS — R35.0 URINARY FREQUENCY: ICD-10-CM

## 2018-04-02 DIAGNOSIS — N30.00 ACUTE CYSTITIS WITHOUT HEMATURIA: Primary | ICD-10-CM

## 2018-04-02 DIAGNOSIS — R63.5 WEIGHT GAIN: ICD-10-CM

## 2018-04-02 PROCEDURE — 86376 MICROSOMAL ANTIBODY EACH: CPT

## 2018-04-02 PROCEDURE — 84443 ASSAY THYROID STIM HORMONE: CPT

## 2018-04-02 PROCEDURE — 87086 URINE CULTURE/COLONY COUNT: CPT | Performed by: FAMILY MEDICINE

## 2018-04-02 PROCEDURE — 36415 COLL VENOUS BLD VENIPUNCTURE: CPT

## 2018-04-02 PROCEDURE — 99214 OFFICE O/P EST MOD 30 MIN: CPT | Performed by: FAMILY MEDICINE

## 2018-04-02 PROCEDURE — 84439 ASSAY OF FREE THYROXINE: CPT

## 2018-04-02 PROCEDURE — 86800 THYROGLOBULIN ANTIBODY: CPT

## 2018-04-02 PROCEDURE — 81003 URINALYSIS AUTO W/O SCOPE: CPT | Performed by: FAMILY MEDICINE

## 2018-04-02 RX ORDER — CEPHALEXIN 500 MG/1
500 CAPSULE ORAL 2 TIMES DAILY
Qty: 28 CAPSULE | Refills: 0 | Status: SHIPPED | OUTPATIENT
Start: 2018-04-02 | End: 2018-04-16

## 2018-04-02 NOTE — PROGRESS NOTES
Myrna Tellez is a 75year old female. HPI:   Patient presents with symptoms of UTI. Complaining of urinary frequency, urgency, dysuria, suprapubic pain, hematuria for the past 7 days.  Denies back pain, fever or n/v.  States symptoms improved with last tablet by mouth daily.    Disp:  Rfl:       Past Medical History:   Diagnosis Date   • AAA (abdominal aortic aneurysm) (HCC)    • Anemia, unspecified 8/15/2017   • Arrhythmia     a fib, VT   • Atherosclerosis of coronary artery    • Calculus of kidney    • Metoprolol Succinate ER 50 MG Oral Tablet 24 Hr Take 1.5 tablets (75 mg total) by mouth Daily Beta Blocker. Disp: 60 tablet Rfl: 2   apixaban 5 MG Oral Tab Take 1 tablet (5 mg total) by mouth 2 (two) times daily.  Disp: 60 tablet Rfl: 11   aspirin 81 MG O

## 2018-04-10 ENCOUNTER — TELEPHONE (OUTPATIENT)
Dept: FAMILY MEDICINE CLINIC | Facility: CLINIC | Age: 68
End: 2018-04-10

## 2018-04-10 DIAGNOSIS — R35.0 URINARY FREQUENCY: Primary | ICD-10-CM

## 2018-04-10 DIAGNOSIS — R30.0 DYSURIA: ICD-10-CM

## 2018-04-10 RX ORDER — CIPROFLOXACIN 500 MG/1
500 TABLET, FILM COATED ORAL 2 TIMES DAILY
Qty: 20 TABLET | Refills: 0 | Status: SHIPPED | OUTPATIENT
Start: 2018-04-10 | End: 2018-04-20

## 2018-04-10 NOTE — TELEPHONE ENCOUNTER
Per pt, she recently saw Dr. Luh Reinoso on 4/2 for a UTI. Per pt today, the symptoms are still there, pain, burning, going pee every 2hrs; no blood in her urine tho.   Per pt, she is not getting better and is inquiring if Dr. Luh Reinoso could phone something stronger

## 2018-04-10 NOTE — TELEPHONE ENCOUNTER
Urine cx keeps showing no growth. She may be having interstitial cystitis and not a UTI. I will switch her to cipro 500mg BID x 10 days but would like her to see urology as I'm not convinced it is a UTI.

## 2018-04-10 NOTE — TELEPHONE ENCOUNTER
Call to pt-advised of info noted below from dr odom. Patient voices understanding/agrees with plan/no further questions/confirms [facility] in record.    Pt sts has not seen urology in the past but sts \" was just referred to dr obregon-I prefer to schedul

## 2018-04-16 ENCOUNTER — PRIOR ORIGINAL RECORDS (OUTPATIENT)
Dept: OTHER | Age: 68
End: 2018-04-16

## 2018-04-18 PROCEDURE — 87086 URINE CULTURE/COLONY COUNT: CPT | Performed by: PHYSICIAN ASSISTANT

## 2018-04-18 PROCEDURE — 81015 MICROSCOPIC EXAM OF URINE: CPT | Performed by: PHYSICIAN ASSISTANT

## 2018-04-24 LAB
BUN: 11 MG/DL
CALCIUM: 9 MG/DL
CHLORIDE: 104 MEQ/L
CREATININE, SERUM: 0.47 MG/DL
FREE T4: 1.3 MG/DL
FREE T4: 2.3 MG/DL
GLUCOSE: 108 MG/DL
POTASSIUM, SERUM: 3.8 MEQ/L
SODIUM: 137 MEQ/L
THYROID STIMULATING HORMONE: 0.3 MLU/L
THYROID STIMULATING HORMONE: 7.76 MLU/L

## 2018-04-24 PROCEDURE — 81015 MICROSCOPIC EXAM OF URINE: CPT | Performed by: PHYSICIAN ASSISTANT

## 2018-04-24 PROCEDURE — 87086 URINE CULTURE/COLONY COUNT: CPT | Performed by: PHYSICIAN ASSISTANT

## 2018-05-01 ENCOUNTER — MED REC SCAN ONLY (OUTPATIENT)
Dept: FAMILY MEDICINE CLINIC | Facility: CLINIC | Age: 68
End: 2018-05-01

## 2018-05-08 ENCOUNTER — TELEPHONE (OUTPATIENT)
Dept: FAMILY MEDICINE CLINIC | Facility: CLINIC | Age: 68
End: 2018-05-08

## 2018-05-08 DIAGNOSIS — I48.91 ATRIAL FIBRILLATION, NEW ONSET (HCC): ICD-10-CM

## 2018-05-08 DIAGNOSIS — D64.9 NORMOCYTIC ANEMIA: ICD-10-CM

## 2018-05-08 DIAGNOSIS — Z86.79 H/O VENTRICULAR TACHYCARDIA: Primary | ICD-10-CM

## 2018-05-08 DIAGNOSIS — I70.209 SUPERFICIAL FEMORAL ARTERY OCCLUSION (HCC): ICD-10-CM

## 2018-05-08 NOTE — TELEPHONE ENCOUNTER
Received notice that pt will need an H&P for cystoscopy on 5/15/18 with Dr Alecia Lehman. Pt will also need EKG, CBC and BMP. Orders pended to provider. Call to pt reaches  Queenie August, listed on HIPPA.   Queenie August stated that he would call back to schedule, bu

## 2018-05-08 NOTE — TELEPHONE ENCOUNTER
Pt's  edwin called back. Offered him the Monday 3:45 appointment. Claude Parsley stated that pt has seen Dr Frandy Pang the last 2 times for this issue and that they would rather be seen sooner than the day before surgery. Pt scheduled with Dr Frandy Pang.   Will get te

## 2018-05-10 ENCOUNTER — OFFICE VISIT (OUTPATIENT)
Dept: FAMILY MEDICINE CLINIC | Facility: CLINIC | Age: 68
End: 2018-05-10

## 2018-05-10 ENCOUNTER — MYAURORA ACCOUNT LINK (OUTPATIENT)
Dept: OTHER | Age: 68
End: 2018-05-10

## 2018-05-10 ENCOUNTER — LAB ENCOUNTER (OUTPATIENT)
Dept: LAB | Age: 68
End: 2018-05-10
Attending: FAMILY MEDICINE
Payer: MEDICARE

## 2018-05-10 VITALS
HEART RATE: 78 BPM | TEMPERATURE: 97 F | BODY MASS INDEX: 27.04 KG/M2 | DIASTOLIC BLOOD PRESSURE: 64 MMHG | RESPIRATION RATE: 18 BRPM | HEIGHT: 70.5 IN | SYSTOLIC BLOOD PRESSURE: 104 MMHG | WEIGHT: 191 LBS

## 2018-05-10 DIAGNOSIS — D64.9 NORMOCYTIC ANEMIA: ICD-10-CM

## 2018-05-10 DIAGNOSIS — Z01.818 PRE-OP EXAM: Primary | ICD-10-CM

## 2018-05-10 DIAGNOSIS — I50.22 CHRONIC SYSTOLIC HEART FAILURE (HCC): ICD-10-CM

## 2018-05-10 DIAGNOSIS — I48.0 PAROXYSMAL ATRIAL FIBRILLATION (HCC): ICD-10-CM

## 2018-05-10 PROCEDURE — 85025 COMPLETE CBC W/AUTO DIFF WBC: CPT

## 2018-05-10 PROCEDURE — 36415 COLL VENOUS BLD VENIPUNCTURE: CPT

## 2018-05-10 PROCEDURE — 99214 OFFICE O/P EST MOD 30 MIN: CPT | Performed by: FAMILY MEDICINE

## 2018-05-10 PROCEDURE — 93000 ELECTROCARDIOGRAM COMPLETE: CPT | Performed by: FAMILY MEDICINE

## 2018-05-10 PROCEDURE — 80048 BASIC METABOLIC PNL TOTAL CA: CPT

## 2018-05-10 RX ORDER — LEVOTHYROXINE SODIUM 0.03 MG/1
25 TABLET ORAL
Refills: 2 | COMMUNITY
End: 2019-10-13 | Stop reason: CLARIF

## 2018-05-10 NOTE — PROGRESS NOTES
[facility] Family Medicine Office Note  Chief Complaint:   Patient presents with:  Pre-Op Exam: procedure 5/15/18 kidney stone removal       HPI:   This is a 75year old female with a past medical history of chronic systolic heart failure, paroxy Packs/day: 0.50      Years: 47.00        Types: Cigarettes     Quit date: 5/9/2017  Smokeless tobacco: Never Used                      Alcohol use:  Yes              Comment: rarely, no hx of excessive (MULTI-VITAMIN DAILY OR) Take 1 tablet by mouth daily. Disp:  Rfl:    Probiotic Product (PROBIOTIC OR) Take 1 tablet by mouth daily.    Disp:  Rfl:       Counseling given: Not Answered       REVIEW OF SYSTEMS:   ROS:  CONSTITUTIONAL:  Denies any unusual w intact with 5/5 bilaterally upper and lower extremities, no edema noted  NEURO:  CN 2 - 12 grossly intact     ASSESSMENT AND PLAN:   1.  Pre-op exam  -  Pending lab results, will medically clear patient for cystoscopy right retrograde pyelogram, ureteroscop tachycardia     NSVT (nonsustained ventricular tachycardia) (HCC)     Paroxysmal atrial fibrillation (HCC)     Coronary artery disease involving native heart without angina pectoris     History of endovascular stent graft for abdominal aortic aneurysm (AAA

## 2018-05-11 ENCOUNTER — TELEPHONE (OUTPATIENT)
Dept: FAMILY MEDICINE CLINIC | Facility: CLINIC | Age: 68
End: 2018-05-11

## 2018-05-11 NOTE — PROGRESS NOTES
Based on stable labs and EKG, patient is medically clear for cystoscopy right retrograde pyelogram, ureteroscopy along with laser lithotripsy and right stent placement to be done on May 15, 2018 at [facility] with Dr. Popeye Meneses

## 2018-05-11 NOTE — TELEPHONE ENCOUNTER
Call from roberta CONROY/[facility]-[phone]-requesting copies of pt's cmp and ekg for upcoming surgery May 15, 2018 at [address] [place] [address] with Dr. Jeanne Montgomery. Record reviewed-advised pt had cbc, bmp and ekg done yesterday.  Yadira Talley

## 2018-05-15 ENCOUNTER — HOSPITAL (OUTPATIENT)
Dept: OTHER | Age: 68
End: 2018-05-15
Attending: UROLOGY

## 2018-06-08 ENCOUNTER — TELEPHONE (OUTPATIENT)
Dept: FAMILY MEDICINE CLINIC | Facility: CLINIC | Age: 68
End: 2018-06-08

## 2018-06-13 ENCOUNTER — PRIOR ORIGINAL RECORDS (OUTPATIENT)
Dept: OTHER | Age: 68
End: 2018-06-13

## 2018-06-13 PROCEDURE — 88108 CYTOPATH CONCENTRATE TECH: CPT | Performed by: UROLOGY

## 2018-06-19 ENCOUNTER — PRIOR ORIGINAL RECORDS (OUTPATIENT)
Dept: OTHER | Age: 68
End: 2018-06-19

## 2018-07-03 PROCEDURE — 87086 URINE CULTURE/COLONY COUNT: CPT | Performed by: UROLOGY

## 2018-07-16 ENCOUNTER — PRIOR ORIGINAL RECORDS (OUTPATIENT)
Dept: OTHER | Age: 68
End: 2018-07-16

## 2018-07-16 ENCOUNTER — TELEPHONE (OUTPATIENT)
Dept: HEMATOLOGY/ONCOLOGY | Facility: HOSPITAL | Age: 68
End: 2018-07-16

## 2018-07-16 NOTE — TELEPHONE ENCOUNTER
called stating wife is having a cystoscopy tomorrow and they are wondering what they should do about the Eliquis?

## 2018-07-16 NOTE — TELEPHONE ENCOUNTER
Per MD, they should contact cardiology to see what they recommend. Patient has not been seen in over 6 months and due to not knowing what all they would be doing during the cystoscopy it is hard to determine.  Patient has more of a reason to be on the [facility]

## 2018-07-23 ENCOUNTER — HOSPITAL (OUTPATIENT)
Dept: OTHER | Age: 68
End: 2018-07-23
Attending: UROLOGY

## 2018-08-01 ENCOUNTER — PRIOR ORIGINAL RECORDS (OUTPATIENT)
Dept: OTHER | Age: 68
End: 2018-08-01

## 2018-08-09 ENCOUNTER — OFFICE VISIT (OUTPATIENT)
Dept: FAMILY MEDICINE CLINIC | Facility: CLINIC | Age: 68
End: 2018-08-09
Payer: MEDICARE

## 2018-08-09 VITALS
TEMPERATURE: 97 F | DIASTOLIC BLOOD PRESSURE: 62 MMHG | WEIGHT: 191 LBS | HEART RATE: 56 BPM | BODY MASS INDEX: 27.35 KG/M2 | RESPIRATION RATE: 16 BRPM | SYSTOLIC BLOOD PRESSURE: 118 MMHG | HEIGHT: 70 IN

## 2018-08-09 DIAGNOSIS — Z95.1 S/P CABG X 3: ICD-10-CM

## 2018-08-09 DIAGNOSIS — I25.5 ISCHEMIC CARDIOMYOPATHY: ICD-10-CM

## 2018-08-09 DIAGNOSIS — E78.5 DYSLIPIDEMIA: ICD-10-CM

## 2018-08-09 DIAGNOSIS — Z11.59 NEED FOR HEPATITIS C SCREENING TEST: ICD-10-CM

## 2018-08-09 DIAGNOSIS — Z98.890 S/P ABLATION OF ATRIAL FIBRILLATION: ICD-10-CM

## 2018-08-09 DIAGNOSIS — Z86.79 S/P ABLATION OF ATRIAL FIBRILLATION: ICD-10-CM

## 2018-08-09 DIAGNOSIS — I25.10 CORONARY ARTERY DISEASE INVOLVING NATIVE CORONARY ARTERY OF NATIVE HEART WITHOUT ANGINA PECTORIS: ICD-10-CM

## 2018-08-09 DIAGNOSIS — Z98.890 S/P AAA REPAIR: ICD-10-CM

## 2018-08-09 DIAGNOSIS — Z00.00 ENCOUNTER FOR ANNUAL HEALTH EXAMINATION: Primary | ICD-10-CM

## 2018-08-09 DIAGNOSIS — I73.9 PERIPHERAL VASCULAR DISEASE (HCC): ICD-10-CM

## 2018-08-09 DIAGNOSIS — E03.8 OTHER SPECIFIED HYPOTHYROIDISM: ICD-10-CM

## 2018-08-09 DIAGNOSIS — Z79.01 CHRONIC ANTICOAGULATION: ICD-10-CM

## 2018-08-09 DIAGNOSIS — Z95.828 HISTORY OF ENDOVASCULAR STENT GRAFT FOR ABDOMINAL AORTIC ANEURYSM (AAA): ICD-10-CM

## 2018-08-09 DIAGNOSIS — Z86.79 S/P AAA REPAIR: ICD-10-CM

## 2018-08-09 DIAGNOSIS — I47.2 NSVT (NONSUSTAINED VENTRICULAR TACHYCARDIA) (HCC): ICD-10-CM

## 2018-08-09 DIAGNOSIS — N20.0 NEPHROLITHIASIS: ICD-10-CM

## 2018-08-09 DIAGNOSIS — I48.0 PAROXYSMAL ATRIAL FIBRILLATION (HCC): ICD-10-CM

## 2018-08-09 PROCEDURE — 99214 OFFICE O/P EST MOD 30 MIN: CPT | Performed by: FAMILY MEDICINE

## 2018-08-09 PROCEDURE — G0438 PPPS, INITIAL VISIT: HCPCS | Performed by: FAMILY MEDICINE

## 2018-08-09 NOTE — PATIENT INSTRUCTIONS
Onesimo Martell's SCREENING SCHEDULE   Tests on this list are recommended by your physician but may not be covered, or covered at this frequency, by your insurer. Please check with your insurance carrier before scheduling to verify coverage.    PREVENTATI one of the following criteria:   • Men who are 73-68 years old and have smoked more than 100 cigarettes in their lifetime   • Anyone with a family history    Colorectal Cancer Screening  Covered up to Age 76     Colonoscopy Screen   Covered every 10 years- for this or any previous visit. Please get every year    Pneumococcal 13 (Prevnar)  Covered Once after 65 No orders found for this or any previous visit.  Please get once after your 65th birthday    Pneumococcal 23 (Pneumovax)  Covered Once after 65 No orde covered at this frequency, by your insurer. Please check with your insurance carrier before scheduling to verify coverage.    PREVENTATIVE SERVICES  INDICATIONS AND SCHEDULE Internal Lab or Procedure External Lab or Procedure   Diabetes Screening      HbgA1 lifetime   • Anyone with a family history    Colorectal Cancer Screening  Covered up to Age 76     Colonoscopy Screen   Covered every 10 years- more often if abnormal Colonoscopy due on 06/23/1950 Update [facility] if applicable    Flex Sigmoidosco found for this or any previous visit. Please get once after your 65th birthday    Pneumococcal 23 (Pneumovax)  Covered Once after 65 No orders found for this or any previous visit.  Please get once after your 65th birthday    Hepatitis B for Moderate/High R

## 2018-08-09 NOTE — PROGRESS NOTES
HPI:   Ela Escalona is a 70year old female who presents for a Medicare Initial Annual Wellness visit (Once after 12 month Medicare anniversary) . Patient is here for her Medicare initial annual wellness visit.   She states that she has been feeli status: Former Smoker                                                              Packs/day: 0.50      Years: 47.00        Types: Cigarettes     Quit date: 5/9/2017  Smokeless tobacco: Never Used                           Ms. Miranda Band already takes aspirin Taking for the 8/9/18 encounter (Office Visit) with Malissa Dominguez MD:  Levothyroxine Sodium 25 MCG Oral Tab    spironolactone 25 MG Oral Tab Take 12.5 mg by mouth daily. cilostazol 50 MG Oral Tab Take 50 mg by mouth 2 (two) times daily.    Metoprolo that she quit smoking about 15 months ago. Her smoking use included Cigarettes. She has a 23.50 pack-year smoking history. She has never used smokeless tobacco. She reports that she drinks alcohol. She reports that she does not use drugs.      REVIEW OF SYS coronary artery of native heart without angina pectoris  -     Continue follow-up with cardiology  - Continue metoprolol succinate 50 mg 1 and half tablets daily  -     LIPID PANEL;  Future  -     OFFICE/OUTPT VISIT,EST,LEVL IV    Paroxysmal atrial fibrilla the plan. Reinforced healthy diet, lifestyle, and exercise. No Follow-up on file. Flavio Monroe MD, 8/9/2018     General Health     In the past six months, have you lost more than 10 pounds without trying?: 2 - No  Has your appetite been poor? data found. Pap and Pelvic      Pap: Every 3 yrs age 21-68 or Pap+HPV every 5 yrs age 33-67, age 72 and older at high risk There are no preventive care reminders to display for this patient.  Update Health Maintenance if applicable    Chlamydia  Annually Creatinine (mg/dL)   Date Value   07/16/2018 0.90    No flowsheet data found. Drug Serum Conc  Annually No results found for: DIGOXIN, DIG, VALP No flowsheet data found.                Template: [employer] ENCINAS MEDICARE ANNUAL ASSESSMENT FEMALE [15432]

## 2018-08-13 ENCOUNTER — MYAURORA ACCOUNT LINK (OUTPATIENT)
Dept: OTHER | Age: 68
End: 2018-08-13

## 2018-08-13 ENCOUNTER — PRIOR ORIGINAL RECORDS (OUTPATIENT)
Dept: OTHER | Age: 68
End: 2018-08-13

## 2018-08-13 PROBLEM — R77.8 ELEVATED TROPONIN: Status: RESOLVED | Noted: 2017-05-09 | Resolved: 2018-08-13

## 2018-08-13 PROBLEM — D64.9 NORMOCYTIC ANEMIA: Status: RESOLVED | Noted: 2017-05-09 | Resolved: 2018-08-13

## 2018-08-13 PROBLEM — Z86.79 H/O VENTRICULAR TACHYCARDIA: Status: RESOLVED | Noted: 2017-05-21 | Resolved: 2018-08-13

## 2018-08-13 PROBLEM — R79.89 ELEVATED TROPONIN: Status: RESOLVED | Noted: 2017-05-09 | Resolved: 2018-08-13

## 2018-08-13 PROBLEM — I73.9 PERIPHERAL VASCULAR DISEASE: Status: ACTIVE | Noted: 2018-08-13

## 2018-08-13 PROBLEM — I73.9 PERIPHERAL VASCULAR DISEASE (HCC): Status: ACTIVE | Noted: 2018-08-13

## 2018-08-13 PROBLEM — I48.91 ATRIAL FIBRILLATION, NEW ONSET (HCC): Status: RESOLVED | Noted: 2017-05-21 | Resolved: 2018-08-13

## 2018-08-13 PROBLEM — E03.8 OTHER SPECIFIED HYPOTHYROIDISM: Status: ACTIVE | Noted: 2018-08-13

## 2018-08-13 PROBLEM — N20.0 NEPHROLITHIASIS: Status: ACTIVE | Noted: 2018-08-13

## 2018-08-13 LAB
ALBUMIN: 3.2 G/DL
ALKALINE PHOSPHATATE(ALK PHOS): 80 IU/L
BILIRUBIN TOTAL: 0.21 MG/DL
BUN: 12 MG/DL
CALCIUM: 9.8 MG/DL
CHLORIDE: 103 MEQ/L
CREATININE, SERUM: 0.9 MG/DL
GLUCOSE: 121 MG/DL
HEMATOCRIT: 35.1 %
HEMOGLOBIN: 11 G/DL
PLATELETS: 286 K/UL
POTASSIUM, SERUM: 4.2 MEQ/L
PROTEIN, TOTAL: 7.4 G/DL
RED BLOOD COUNT: 3.77 X 10-6/U
SGOT (AST): 17 IU/L
SGPT (ALT): 17 IU/L
SODIUM: 140 MEQ/L
WHITE BLOOD COUNT: 10.79 X 10-3/U

## 2018-08-15 ENCOUNTER — MYAURORA ACCOUNT LINK (OUTPATIENT)
Dept: OTHER | Age: 68
End: 2018-08-15

## 2018-10-04 PROCEDURE — 81015 MICROSCOPIC EXAM OF URINE: CPT | Performed by: UROLOGY

## 2018-10-04 PROCEDURE — 87086 URINE CULTURE/COLONY COUNT: CPT | Performed by: UROLOGY

## 2018-10-08 ENCOUNTER — PRIOR ORIGINAL RECORDS (OUTPATIENT)
Dept: OTHER | Age: 68
End: 2018-10-08

## 2018-10-10 ENCOUNTER — PRIOR ORIGINAL RECORDS (OUTPATIENT)
Dept: OTHER | Age: 68
End: 2018-10-10

## 2018-10-17 ENCOUNTER — HOSPITAL ENCOUNTER (OUTPATIENT)
Dept: LAB | Facility: HOSPITAL | Age: 68
Discharge: HOME | End: 2018-10-17
Attending: INTERNAL MEDICINE
Payer: MEDICARE

## 2018-10-17 DIAGNOSIS — Z11.59 NEED FOR HEPATITIS C SCREENING TEST: ICD-10-CM

## 2018-10-17 DIAGNOSIS — E78.5 DYSLIPIDEMIA: ICD-10-CM

## 2018-10-17 DIAGNOSIS — I73.9 PERIPHERAL VASCULAR DISEASE (HCC): ICD-10-CM

## 2018-10-17 DIAGNOSIS — I25.10 CORONARY ARTERY DISEASE INVOLVING NATIVE CORONARY ARTERY OF NATIVE HEART WITHOUT ANGINA PECTORIS: ICD-10-CM

## 2018-10-23 ENCOUNTER — TELEPHONE (OUTPATIENT)
Dept: FAMILY MEDICINE CLINIC | Facility: CLINIC | Age: 68
End: 2018-10-23

## 2018-10-23 DIAGNOSIS — I73.9 PERIPHERAL VASCULAR DISEASE (HCC): ICD-10-CM

## 2018-10-23 DIAGNOSIS — E78.5 DYSLIPIDEMIA: Primary | ICD-10-CM

## 2018-10-23 DIAGNOSIS — I25.10 CORONARY ARTERY DISEASE INVOLVING NATIVE CORONARY ARTERY OF NATIVE HEART WITHOUT ANGINA PECTORIS: ICD-10-CM

## 2018-10-23 DIAGNOSIS — Z11.59 NEED FOR HEPATITIS C SCREENING TEST: ICD-10-CM

## 2018-10-23 NOTE — TELEPHONE ENCOUNTER
Abel (on hippa) calling to see if wife's test results are back. He reports he had his done same day at hospital and he rec'd his but no call yet on hers. Per chart it shows labs were collected 10/17 but no result yet.  I offered to call lab and

## 2018-10-24 NOTE — TELEPHONE ENCOUNTER
France Castañeda RN addressing. She informed me Zeenat KESSLER is no longer the lab supervisor (she manages inpt now). She will address with new supervisor.

## 2018-10-25 ENCOUNTER — MYAURORA ACCOUNT LINK (OUTPATIENT)
Dept: OTHER | Age: 68
End: 2018-10-25

## 2018-10-25 ENCOUNTER — HOSPITAL ENCOUNTER (OUTPATIENT)
Dept: LAB | Facility: HOSPITAL | Age: 68
Discharge: HOME | End: 2018-10-25
Attending: INTERNAL MEDICINE
Payer: MEDICARE

## 2018-10-25 ENCOUNTER — HOSPITAL ENCOUNTER (OUTPATIENT)
Dept: CARDIOLOGY CLINIC | Facility: HOSPITAL | Age: 68
Discharge: HOME | End: 2018-10-25
Attending: SURGERY

## 2018-10-25 ENCOUNTER — PRIOR ORIGINAL RECORDS (OUTPATIENT)
Dept: OTHER | Age: 68
End: 2018-10-25

## 2018-10-25 DIAGNOSIS — Z86.79 S/P AAA (ABDOMINAL AORTIC ANEURYSM) REPAIR: ICD-10-CM

## 2018-10-25 DIAGNOSIS — Z98.890 S/P AAA (ABDOMINAL AORTIC ANEURYSM) REPAIR: ICD-10-CM

## 2018-10-25 DIAGNOSIS — I77.9 PERIPHERAL ARTERIAL OCCLUSIVE DISEASE (HCC): ICD-10-CM

## 2018-10-25 DIAGNOSIS — I71.4 AAA (ABDOMINAL AORTIC ANEURYSM) WITHOUT RUPTURE (HCC): ICD-10-CM

## 2018-10-25 DIAGNOSIS — I70.213 ATHEROSCLEROSIS OF NATIVE ARTERIES OF EXTREMITIES WITH INTERMITTENT CLAUDICATION, BILATERAL LEGS (HCC): ICD-10-CM

## 2018-10-25 PROCEDURE — 84439 ASSAY OF FREE THYROXINE: CPT | Performed by: INTERNAL MEDICINE

## 2018-10-25 PROCEDURE — 80061 LIPID PANEL: CPT | Performed by: INTERNAL MEDICINE

## 2018-10-25 PROCEDURE — 84443 ASSAY THYROID STIM HORMONE: CPT | Performed by: INTERNAL MEDICINE

## 2018-10-25 NOTE — TELEPHONE ENCOUNTER
LM to CB, please advise pt or  that her labs were done at the [address] [place] [address] lab which is separate from the [facility]. He would need to call [phone] with any questions about the labs they jhony.

## 2018-10-26 LAB
CHOLESTEROL, TOTAL: 179 MG/DL
FREE T4: 1.2 MG/DL
HDL CHOLESTEROL: 51 MG/DL
LDL CHOLESTEROL: 106 MG/DL
THYROID STIMULATING HORMONE: 5.66 MLU/L
TRIGLYCERIDES: 110 MG/DL

## 2018-10-31 ENCOUNTER — PRIOR ORIGINAL RECORDS (OUTPATIENT)
Dept: OTHER | Age: 68
End: 2018-10-31

## 2018-11-01 ENCOUNTER — PRIOR ORIGINAL RECORDS (OUTPATIENT)
Dept: OTHER | Age: 68
End: 2018-11-01

## 2018-11-07 ENCOUNTER — PRIOR ORIGINAL RECORDS (OUTPATIENT)
Dept: OTHER | Age: 68
End: 2018-11-07

## 2018-11-08 ENCOUNTER — PRIOR ORIGINAL RECORDS (OUTPATIENT)
Dept: OTHER | Age: 68
End: 2018-11-08

## 2018-11-23 ENCOUNTER — MYAURORA ACCOUNT LINK (OUTPATIENT)
Dept: OTHER | Age: 68
End: 2018-11-23

## 2018-12-21 ENCOUNTER — HOSPITAL ENCOUNTER (OUTPATIENT)
Dept: CT IMAGING | Facility: HOSPITAL | Age: 68
Discharge: HOME | End: 2018-12-21
Attending: SURGERY
Payer: MEDICARE

## 2018-12-21 DIAGNOSIS — I70.213 ATHEROSCLEROSIS OF BOTH LOWER EXTREMITIES WITH INTERMITTENT CLAUDICATION (HCC): ICD-10-CM

## 2018-12-21 DIAGNOSIS — I71.4 AAA (ABDOMINAL AORTIC ANEURYSM) (HCC): ICD-10-CM

## 2018-12-21 PROCEDURE — 82565 ASSAY OF CREATININE: CPT

## 2018-12-21 PROCEDURE — 75635 CT ANGIO ABDOMINAL ARTERIES: CPT | Performed by: SURGERY

## 2019-01-01 ENCOUNTER — EXTERNAL RECORD (OUTPATIENT)
Dept: HEALTH INFORMATION MANAGEMENT | Facility: OTHER | Age: 69
End: 2019-01-01

## 2019-01-16 ENCOUNTER — PRIOR ORIGINAL RECORDS (OUTPATIENT)
Dept: OTHER | Age: 69
End: 2019-01-16

## 2019-01-17 ENCOUNTER — PRIOR ORIGINAL RECORDS (OUTPATIENT)
Dept: OTHER | Age: 69
End: 2019-01-17

## 2019-01-22 ENCOUNTER — PRIOR ORIGINAL RECORDS (OUTPATIENT)
Dept: OTHER | Age: 69
End: 2019-01-22

## 2019-01-22 NOTE — PLAN OF CARE
Patient/Family Goals    • Patient/Family Long Term Goal Progressing    • Patient/Family Short Term Goal Progressing          Pt AOx4. On RA, denies SOB. NSR on tele. Left upper chest dressing c/d/i. Sling in place. Pain managed with T3s.  Plan for CXR in am Universal Safety Interventions

## 2019-01-28 ENCOUNTER — PRIOR ORIGINAL RECORDS (OUTPATIENT)
Dept: OTHER | Age: 69
End: 2019-01-28

## 2019-02-12 PROCEDURE — 87186 SC STD MICRODIL/AGAR DIL: CPT | Performed by: UROLOGY

## 2019-02-12 PROCEDURE — 87077 CULTURE AEROBIC IDENTIFY: CPT | Performed by: UROLOGY

## 2019-02-12 PROCEDURE — 81001 URINALYSIS AUTO W/SCOPE: CPT | Performed by: UROLOGY

## 2019-02-12 PROCEDURE — 87086 URINE CULTURE/COLONY COUNT: CPT | Performed by: UROLOGY

## 2019-02-13 ENCOUNTER — PRIOR ORIGINAL RECORDS (OUTPATIENT)
Dept: OTHER | Age: 69
End: 2019-02-13

## 2019-02-18 ENCOUNTER — PRIOR ORIGINAL RECORDS (OUTPATIENT)
Dept: OTHER | Age: 69
End: 2019-02-18

## 2019-02-28 VITALS
WEIGHT: 180 LBS | BODY MASS INDEX: 25.77 KG/M2 | HEIGHT: 70 IN | HEART RATE: 70 BPM | DIASTOLIC BLOOD PRESSURE: 78 MMHG | SYSTOLIC BLOOD PRESSURE: 120 MMHG

## 2019-02-28 VITALS
DIASTOLIC BLOOD PRESSURE: 52 MMHG | BODY MASS INDEX: 23.77 KG/M2 | WEIGHT: 166 LBS | HEART RATE: 74 BPM | SYSTOLIC BLOOD PRESSURE: 92 MMHG | HEIGHT: 70 IN

## 2019-02-28 VITALS — WEIGHT: 202 LBS | HEART RATE: 68 BPM | SYSTOLIC BLOOD PRESSURE: 100 MMHG | DIASTOLIC BLOOD PRESSURE: 62 MMHG

## 2019-02-28 VITALS
SYSTOLIC BLOOD PRESSURE: 110 MMHG | BODY MASS INDEX: 28.06 KG/M2 | HEIGHT: 70 IN | DIASTOLIC BLOOD PRESSURE: 70 MMHG | WEIGHT: 196 LBS | HEART RATE: 74 BPM

## 2019-02-28 VITALS — WEIGHT: 169 LBS | HEART RATE: 64 BPM | SYSTOLIC BLOOD PRESSURE: 106 MMHG | DIASTOLIC BLOOD PRESSURE: 76 MMHG

## 2019-02-28 VITALS
SYSTOLIC BLOOD PRESSURE: 122 MMHG | WEIGHT: 190 LBS | HEIGHT: 70 IN | BODY MASS INDEX: 27.2 KG/M2 | DIASTOLIC BLOOD PRESSURE: 72 MMHG | HEART RATE: 80 BPM

## 2019-02-28 VITALS
WEIGHT: 193 LBS | DIASTOLIC BLOOD PRESSURE: 68 MMHG | BODY MASS INDEX: 27.63 KG/M2 | HEART RATE: 69 BPM | SYSTOLIC BLOOD PRESSURE: 120 MMHG | HEIGHT: 70 IN

## 2019-02-28 VITALS
HEIGHT: 70 IN | WEIGHT: 199 LBS | BODY MASS INDEX: 28.49 KG/M2 | SYSTOLIC BLOOD PRESSURE: 110 MMHG | HEART RATE: 91 BPM | DIASTOLIC BLOOD PRESSURE: 68 MMHG

## 2019-02-28 VITALS
HEIGHT: 70 IN | DIASTOLIC BLOOD PRESSURE: 50 MMHG | BODY MASS INDEX: 24.34 KG/M2 | SYSTOLIC BLOOD PRESSURE: 96 MMHG | HEART RATE: 70 BPM | WEIGHT: 170 LBS

## 2019-02-28 VITALS
HEIGHT: 70 IN | WEIGHT: 164 LBS | BODY MASS INDEX: 23.48 KG/M2 | DIASTOLIC BLOOD PRESSURE: 64 MMHG | SYSTOLIC BLOOD PRESSURE: 92 MMHG | HEART RATE: 92 BPM

## 2019-02-28 VITALS — SYSTOLIC BLOOD PRESSURE: 118 MMHG | WEIGHT: 164 LBS | HEART RATE: 64 BPM | DIASTOLIC BLOOD PRESSURE: 82 MMHG

## 2019-03-06 RX ORDER — METOPROLOL SUCCINATE 50 MG/1
TABLET, EXTENDED RELEASE ORAL
Qty: 60 TABLET | Refills: 4 | Status: SHIPPED | OUTPATIENT
Start: 2019-03-06 | End: 2019-08-08 | Stop reason: SDUPTHER

## 2019-03-27 RX ORDER — LEVOTHYROXINE SODIUM 0.03 MG/1
TABLET ORAL
COMMUNITY
Start: 2018-04-16 | End: 2019-04-22 | Stop reason: SDUPTHER

## 2019-03-27 RX ORDER — LOSARTAN POTASSIUM 50 MG/1
TABLET ORAL
COMMUNITY
Start: 2019-01-04 | End: 2020-10-02

## 2019-03-27 RX ORDER — SPIRONOLACTONE 25 MG/1
TABLET ORAL
COMMUNITY
Start: 2018-04-19 | End: 2019-04-16 | Stop reason: SDUPTHER

## 2019-04-17 RX ORDER — SPIRONOLACTONE 25 MG/1
TABLET ORAL
Qty: 45 TABLET | Refills: 3 | Status: SHIPPED | OUTPATIENT
Start: 2019-04-17 | End: 2020-04-24

## 2019-04-18 RX ORDER — LEVOTHYROXINE SODIUM 0.03 MG/1
TABLET ORAL
Qty: 90 TABLET | Refills: 2 | OUTPATIENT
Start: 2019-04-18

## 2019-04-19 ENCOUNTER — TELEPHONE (OUTPATIENT)
Dept: FAMILY MEDICINE CLINIC | Facility: CLINIC | Age: 69
End: 2019-04-19

## 2019-04-19 ENCOUNTER — TELEPHONE (OUTPATIENT)
Dept: CARDIOLOGY | Age: 69
End: 2019-04-19

## 2019-04-19 DIAGNOSIS — Z01.818 PRE-OP TESTING: Primary | ICD-10-CM

## 2019-04-19 NOTE — TELEPHONE ENCOUNTER
medical clearance, EKG, CMP, CBC, PT/PTT request.  Patient is scheduled t have right total hip replacement on 5/10/2019 with Dr. Álvaro Acosta. I spoke with patient, appointment scheduled. Orders entered.

## 2019-04-22 RX ORDER — LEVOTHYROXINE SODIUM 0.03 MG/1
25 TABLET ORAL DAILY
Qty: 90 TABLET | Refills: 1 | Status: SHIPPED | OUTPATIENT
Start: 2019-04-22 | End: 2019-08-26 | Stop reason: CLARIF

## 2019-04-26 ENCOUNTER — LAB ENCOUNTER (OUTPATIENT)
Dept: LAB | Facility: HOSPITAL | Age: 69
End: 2019-04-26
Attending: FAMILY MEDICINE
Payer: MEDICARE

## 2019-04-26 DIAGNOSIS — I65.21 OCCLUSION OF RIGHT CAROTID ARTERY: ICD-10-CM

## 2019-04-26 DIAGNOSIS — I25.5 ISCHEMIC CARDIOMYOPATHY: Primary | ICD-10-CM

## 2019-04-26 DIAGNOSIS — Z01.818 PRE-OP TESTING: ICD-10-CM

## 2019-04-26 DIAGNOSIS — M16.11 PRIMARY OSTEOARTHRITIS OF RIGHT HIP: ICD-10-CM

## 2019-04-26 DIAGNOSIS — I25.10 CORONARY ATHEROSCLEROSIS OF NATIVE CORONARY ARTERY: ICD-10-CM

## 2019-04-26 DIAGNOSIS — Z11.59 NEED FOR HEPATITIS C SCREENING TEST: ICD-10-CM

## 2019-04-26 DIAGNOSIS — Z95.1 POSTSURGICAL AORTOCORONARY BYPASS STATUS: ICD-10-CM

## 2019-04-26 DIAGNOSIS — E78.00 PURE HYPERCHOLESTEROLEMIA: ICD-10-CM

## 2019-04-26 PROCEDURE — 86850 RBC ANTIBODY SCREEN: CPT

## 2019-04-26 PROCEDURE — 85730 THROMBOPLASTIN TIME PARTIAL: CPT

## 2019-04-26 PROCEDURE — 80061 LIPID PANEL: CPT

## 2019-04-26 PROCEDURE — 80053 COMPREHEN METABOLIC PANEL: CPT

## 2019-04-26 PROCEDURE — 36415 COLL VENOUS BLD VENIPUNCTURE: CPT

## 2019-04-26 PROCEDURE — 86901 BLOOD TYPING SEROLOGIC RH(D): CPT

## 2019-04-26 PROCEDURE — 87081 CULTURE SCREEN ONLY: CPT

## 2019-04-26 PROCEDURE — 86900 BLOOD TYPING SEROLOGIC ABO: CPT

## 2019-04-26 PROCEDURE — 85025 COMPLETE CBC W/AUTO DIFF WBC: CPT

## 2019-04-26 PROCEDURE — 93010 ELECTROCARDIOGRAM REPORT: CPT | Performed by: INTERNAL MEDICINE

## 2019-04-26 PROCEDURE — 84439 ASSAY OF FREE THYROXINE: CPT

## 2019-04-26 PROCEDURE — 93005 ELECTROCARDIOGRAM TRACING: CPT

## 2019-04-26 PROCEDURE — 84443 ASSAY THYROID STIM HORMONE: CPT

## 2019-04-26 PROCEDURE — 86803 HEPATITIS C AB TEST: CPT

## 2019-04-26 PROCEDURE — 85610 PROTHROMBIN TIME: CPT

## 2019-04-29 ENCOUNTER — TELEPHONE (OUTPATIENT)
Dept: CARDIOLOGY | Age: 69
End: 2019-04-29

## 2019-04-29 ENCOUNTER — ANESTHESIA EVENT (OUTPATIENT)
Dept: SURGERY | Facility: HOSPITAL | Age: 69
End: 2019-04-29

## 2019-04-29 NOTE — PAT NURSING NOTE
Chart sent to anesthesia review for cardiomyopathy history. /anesthesia requested note of cardiac clearance. Faxed request to and spoke w [facility] [place] call center/Lana. She will notify Dr.office. Also faxed request to surgeon as GEORGETTE.

## 2019-04-29 NOTE — ICD/PM
No change to [address] for hip replacement. Grounding pad on thigh, opposite side of ICD. Routine outpatient followup.     Yan Conklin NP

## 2019-04-30 ENCOUNTER — TELEPHONE (OUTPATIENT)
Dept: CARDIOLOGY | Age: 69
End: 2019-04-30

## 2019-04-30 ENCOUNTER — OFFICE VISIT (OUTPATIENT)
Dept: FAMILY MEDICINE CLINIC | Facility: CLINIC | Age: 69
End: 2019-04-30
Payer: MEDICARE

## 2019-04-30 VITALS
RESPIRATION RATE: 16 BRPM | SYSTOLIC BLOOD PRESSURE: 116 MMHG | HEART RATE: 88 BPM | BODY MASS INDEX: 28.77 KG/M2 | DIASTOLIC BLOOD PRESSURE: 60 MMHG | WEIGHT: 201 LBS | HEIGHT: 70 IN

## 2019-04-30 DIAGNOSIS — Z95.828 HISTORY OF ENDOVASCULAR STENT GRAFT FOR ABDOMINAL AORTIC ANEURYSM (AAA): ICD-10-CM

## 2019-04-30 DIAGNOSIS — Z01.818 PREOPERATIVE CLEARANCE: Primary | ICD-10-CM

## 2019-04-30 DIAGNOSIS — I73.9 PERIPHERAL VASCULAR DISEASE (HCC): ICD-10-CM

## 2019-04-30 DIAGNOSIS — R79.89 ELEVATED SERUM CREATININE: Primary | ICD-10-CM

## 2019-04-30 DIAGNOSIS — I47.2 NSVT (NONSUSTAINED VENTRICULAR TACHYCARDIA) (HCC): ICD-10-CM

## 2019-04-30 DIAGNOSIS — M16.11 PRIMARY OSTEOARTHRITIS OF RIGHT HIP: ICD-10-CM

## 2019-04-30 DIAGNOSIS — E78.5 DYSLIPIDEMIA: ICD-10-CM

## 2019-04-30 DIAGNOSIS — I25.10 CORONARY ARTERY DISEASE INVOLVING NATIVE CORONARY ARTERY OF NATIVE HEART WITHOUT ANGINA PECTORIS: ICD-10-CM

## 2019-04-30 DIAGNOSIS — Z95.810 AICD (AUTOMATIC CARDIOVERTER/DEFIBRILLATOR) PRESENT: ICD-10-CM

## 2019-04-30 DIAGNOSIS — I48.0 PAROXYSMAL ATRIAL FIBRILLATION (HCC): ICD-10-CM

## 2019-04-30 PROCEDURE — 99214 OFFICE O/P EST MOD 30 MIN: CPT | Performed by: FAMILY MEDICINE

## 2019-04-30 NOTE — PROGRESS NOTES
Meli Lee is a 70year old female who presents for a pre-operative physical exam. Patient is to have right total hip replacement, to be done by Dr. Edilberto Cunningham at [facility] on 5/10/2019.       HPI:   Pt complains of progressive right hip pain for over • Calculus of kidney    • Cancer ([facility] [address]     skin   • Chronic atrial fibrillation (HCC)    • Coronary atherosclerosis    • Disorder of thyroid    • History of blood transfusion    • IBS (irritable bowel syndrome)    • Ischemic cardiomyopathy    • Kidney s skin lesions  EYES:denies blurred vision or double vision  HEENT: denies nasal congestion, sinus pain or ST  LUNGS: denies shortness of breath with exertion  CARDIOVASCULAR: denies chest pain on exertion  GI: denies abdominal pain,denies heartburn  : den ventricular tachycardia) ([facility] [address]  Has AICD    5. AICD (automatic cardioverter/defibrillator) present  Follow-up per cardiology    6. Paroxysmal atrial fibrillation (HCC)  Continue apixaban 5 mg 1 tablet twice daily.    Continue metoprolol succinate 50 mg 1.5

## 2019-05-01 ENCOUNTER — TELEPHONE (OUTPATIENT)
Dept: CARDIOLOGY | Age: 69
End: 2019-05-01

## 2019-05-01 DIAGNOSIS — E78.00 PURE HYPERCHOLESTEROLEMIA: Primary | ICD-10-CM

## 2019-05-01 RX ORDER — TORSEMIDE 20 MG/1
40 TABLET ORAL DAILY
Qty: 60 TABLET | Refills: 2 | Status: SHIPPED | OUTPATIENT
Start: 2019-05-01 | End: 2019-05-02 | Stop reason: SDUPTHER

## 2019-05-01 RX ORDER — ROSUVASTATIN CALCIUM 5 MG/1
5 TABLET, COATED ORAL DAILY
Qty: 30 TABLET | Refills: 2 | Status: SHIPPED | OUTPATIENT
Start: 2019-05-01 | End: 2019-08-04 | Stop reason: SDUPTHER

## 2019-05-02 RX ORDER — TORSEMIDE 10 MG/1
40 TABLET ORAL DAILY
Qty: 120 TABLET | Refills: 3 | Status: SHIPPED | OUTPATIENT
Start: 2019-05-02 | End: 2019-08-26 | Stop reason: CLARIF

## 2019-05-07 ENCOUNTER — TELEPHONE (OUTPATIENT)
Dept: CARDIOLOGY | Age: 69
End: 2019-05-07

## 2019-05-10 ENCOUNTER — APPOINTMENT (OUTPATIENT)
Dept: GENERAL RADIOLOGY | Facility: HOSPITAL | Age: 69
DRG: 470 | End: 2019-05-10
Attending: ORTHOPAEDIC SURGERY
Payer: MEDICARE

## 2019-05-10 ENCOUNTER — ANESTHESIA (OUTPATIENT)
Dept: SURGERY | Facility: HOSPITAL | Age: 69
End: 2019-05-10

## 2019-05-10 ENCOUNTER — HOSPITAL ENCOUNTER (INPATIENT)
Facility: HOSPITAL | Age: 69
LOS: 2 days | Discharge: HOME HEALTH | DRG: 470 | End: 2019-05-12
Attending: ORTHOPAEDIC SURGERY | Admitting: ORTHOPAEDIC SURGERY
Payer: MEDICARE

## 2019-05-10 DIAGNOSIS — M16.11 PRIMARY OSTEOARTHRITIS OF RIGHT HIP: Primary | ICD-10-CM

## 2019-05-10 PROCEDURE — 99223 1ST HOSP IP/OBS HIGH 75: CPT | Performed by: INTERNAL MEDICINE

## 2019-05-10 PROCEDURE — 3E0T3BZ INTRODUCTION OF ANESTHETIC AGENT INTO PERIPHERAL NERVES AND PLEXI, PERCUTANEOUS APPROACH: ICD-10-PCS | Performed by: ANESTHESIOLOGY

## 2019-05-10 PROCEDURE — 0SR904A REPLACEMENT OF RIGHT HIP JOINT WITH CERAMIC ON POLYETHYLENE SYNTHETIC SUBSTITUTE, UNCEMENTED, OPEN APPROACH: ICD-10-PCS | Performed by: ORTHOPAEDIC SURGERY

## 2019-05-10 PROCEDURE — 73501 X-RAY EXAM HIP UNI 1 VIEW: CPT | Performed by: ORTHOPAEDIC SURGERY

## 2019-05-10 DEVICE — BONE SCREW 6.5X40 SELF-TAP: Type: IMPLANTABLE DEVICE | Site: HIP | Status: FUNCTIONAL

## 2019-05-10 DEVICE — BONE SCREW 6.5X35 SELF-TAP: Type: IMPLANTABLE DEVICE | Site: HIP | Status: FUNCTIONAL

## 2019-05-10 DEVICE — MOD CUP NEU LNR LG 50/52/54X36: Type: IMPLANTABLE DEVICE | Site: HIP | Status: FUNCTIONAL

## 2019-05-10 DEVICE — TM MOD CUP 52MM CLUSTER: Type: IMPLANTABLE DEVICE | Site: HIP | Status: FUNCTIONAL

## 2019-05-10 DEVICE — BIOLOX® DELTA, CERAMIC FEMORAL HEAD, S, Ø 36/-3.5, TAPER 12/14
Type: IMPLANTABLE DEVICE | Site: HIP | Status: FUNCTIONAL
Brand: BIOLOX® DELTA

## 2019-05-10 DEVICE — M/L TAP 10 EXT REDUCE NECK LNG: Type: IMPLANTABLE DEVICE | Site: HIP | Status: FUNCTIONAL

## 2019-05-10 RX ORDER — HYDROCODONE BITARTRATE AND ACETAMINOPHEN 10; 325 MG/1; MG/1
TABLET ORAL
Qty: 40 TABLET | Refills: 0 | Status: SHIPPED | OUTPATIENT
Start: 2019-05-10 | End: 2020-01-24 | Stop reason: ALTCHOICE

## 2019-05-10 RX ORDER — ONDANSETRON 2 MG/ML
4 INJECTION INTRAMUSCULAR; INTRAVENOUS AS NEEDED
Status: DISCONTINUED | OUTPATIENT
Start: 2019-05-10 | End: 2019-05-10 | Stop reason: HOSPADM

## 2019-05-10 RX ORDER — CLINDAMYCIN PHOSPHATE 900 MG/50ML
900 INJECTION INTRAVENOUS ONCE
Status: COMPLETED | OUTPATIENT
Start: 2019-05-10 | End: 2019-05-10

## 2019-05-10 RX ORDER — SODIUM PHOSPHATE, DIBASIC AND SODIUM PHOSPHATE, MONOBASIC 7; 19 G/133ML; G/133ML
1 ENEMA RECTAL ONCE AS NEEDED
Status: DISCONTINUED | OUTPATIENT
Start: 2019-05-10 | End: 2019-05-12

## 2019-05-10 RX ORDER — SPIRONOLACTONE 25 MG/1
12.5 TABLET ORAL DAILY
Status: DISCONTINUED | OUTPATIENT
Start: 2019-05-10 | End: 2019-05-11

## 2019-05-10 RX ORDER — HYDROMORPHONE HYDROCHLORIDE 1 MG/ML
0.2 INJECTION, SOLUTION INTRAMUSCULAR; INTRAVENOUS; SUBCUTANEOUS EVERY 2 HOUR PRN
Status: DISCONTINUED | OUTPATIENT
Start: 2019-05-10 | End: 2019-05-10

## 2019-05-10 RX ORDER — DIPHENHYDRAMINE HYDROCHLORIDE 50 MG/ML
12.5 INJECTION INTRAMUSCULAR; INTRAVENOUS EVERY 4 HOURS PRN
Status: DISCONTINUED | OUTPATIENT
Start: 2019-05-10 | End: 2019-05-12

## 2019-05-10 RX ORDER — MORPHINE SULFATE 4 MG/ML
1 INJECTION, SOLUTION INTRAMUSCULAR; INTRAVENOUS EVERY 2 HOUR PRN
Status: DISCONTINUED | OUTPATIENT
Start: 2019-05-10 | End: 2019-05-12

## 2019-05-10 RX ORDER — ACETAMINOPHEN 325 MG/1
TABLET ORAL
Status: COMPLETED
Start: 2019-05-10 | End: 2019-05-10

## 2019-05-10 RX ORDER — MORPHINE SULFATE 4 MG/ML
2 INJECTION, SOLUTION INTRAMUSCULAR; INTRAVENOUS EVERY 2 HOUR PRN
Status: DISCONTINUED | OUTPATIENT
Start: 2019-05-10 | End: 2019-05-12

## 2019-05-10 RX ORDER — METOPROLOL SUCCINATE 50 MG/1
50 TABLET, EXTENDED RELEASE ORAL 2 TIMES DAILY
Status: ON HOLD | COMMUNITY
End: 2019-10-16

## 2019-05-10 RX ORDER — ROSUVASTATIN CALCIUM 5 MG/1
5 TABLET, COATED ORAL NIGHTLY
Status: DISCONTINUED | OUTPATIENT
Start: 2019-05-10 | End: 2019-05-12

## 2019-05-10 RX ORDER — DIPHENHYDRAMINE HCL 25 MG
25 CAPSULE ORAL EVERY 4 HOURS PRN
Status: DISCONTINUED | OUTPATIENT
Start: 2019-05-10 | End: 2019-05-12

## 2019-05-10 RX ORDER — ROSUVASTATIN CALCIUM 5 MG/1
5 TABLET, COATED ORAL NIGHTLY
Status: ON HOLD | COMMUNITY
End: 2019-05-10

## 2019-05-10 RX ORDER — TORSEMIDE 20 MG/1
20 TABLET ORAL 2 TIMES DAILY
COMMUNITY
Start: 2019-05-02

## 2019-05-10 RX ORDER — ACETAMINOPHEN AND CODEINE PHOSPHATE 300; 30 MG/1; MG/1
2 TABLET ORAL EVERY 4 HOURS PRN
Status: DISCONTINUED | OUTPATIENT
Start: 2019-05-10 | End: 2019-05-11

## 2019-05-10 RX ORDER — CLINDAMYCIN PHOSPHATE 900 MG/50ML
900 INJECTION INTRAVENOUS EVERY 8 HOURS
Status: COMPLETED | OUTPATIENT
Start: 2019-05-10 | End: 2019-05-11

## 2019-05-10 RX ORDER — SODIUM CHLORIDE, SODIUM LACTATE, POTASSIUM CHLORIDE, CALCIUM CHLORIDE 600; 310; 30; 20 MG/100ML; MG/100ML; MG/100ML; MG/100ML
INJECTION, SOLUTION INTRAVENOUS CONTINUOUS
Status: DISCONTINUED | OUTPATIENT
Start: 2019-05-10 | End: 2019-05-10 | Stop reason: HOSPADM

## 2019-05-10 RX ORDER — SENNOSIDES 8.6 MG
17.2 TABLET ORAL NIGHTLY
Status: DISCONTINUED | OUTPATIENT
Start: 2019-05-10 | End: 2019-05-12

## 2019-05-10 RX ORDER — ACETAMINOPHEN 325 MG/1
650 TABLET ORAL EVERY 4 HOURS PRN
Status: DISCONTINUED | OUTPATIENT
Start: 2019-05-10 | End: 2019-05-12

## 2019-05-10 RX ORDER — METOCLOPRAMIDE HYDROCHLORIDE 5 MG/ML
10 INJECTION INTRAMUSCULAR; INTRAVENOUS EVERY 6 HOURS PRN
Status: ACTIVE | OUTPATIENT
Start: 2019-05-10 | End: 2019-05-12

## 2019-05-10 RX ORDER — HYDROMORPHONE HYDROCHLORIDE 1 MG/ML
0.8 INJECTION, SOLUTION INTRAMUSCULAR; INTRAVENOUS; SUBCUTANEOUS EVERY 2 HOUR PRN
Status: DISCONTINUED | OUTPATIENT
Start: 2019-05-10 | End: 2019-05-10

## 2019-05-10 RX ORDER — OXYCODONE HYDROCHLORIDE 10 MG/1
10 TABLET ORAL EVERY 4 HOURS PRN
Status: DISCONTINUED | OUTPATIENT
Start: 2019-05-10 | End: 2019-05-10

## 2019-05-10 RX ORDER — SODIUM CHLORIDE 9 MG/ML
INJECTION, SOLUTION INTRAVENOUS CONTINUOUS
Status: DISCONTINUED | OUTPATIENT
Start: 2019-05-10 | End: 2019-05-12

## 2019-05-10 RX ORDER — DIPHENHYDRAMINE HYDROCHLORIDE 50 MG/ML
25 INJECTION INTRAMUSCULAR; INTRAVENOUS ONCE AS NEEDED
Status: ACTIVE | OUTPATIENT
Start: 2019-05-10 | End: 2019-05-10

## 2019-05-10 RX ORDER — ONDANSETRON 2 MG/ML
4 INJECTION INTRAMUSCULAR; INTRAVENOUS EVERY 4 HOURS PRN
Status: DISCONTINUED | OUTPATIENT
Start: 2019-05-10 | End: 2019-05-12

## 2019-05-10 RX ORDER — MAGNESIUM HYDROXIDE 1200 MG/15ML
LIQUID ORAL CONTINUOUS PRN
Status: COMPLETED | OUTPATIENT
Start: 2019-05-10 | End: 2019-05-10

## 2019-05-10 RX ORDER — TRAMADOL HYDROCHLORIDE 50 MG/1
50 TABLET ORAL EVERY 6 HOURS
Status: DISPENSED | OUTPATIENT
Start: 2019-05-10 | End: 2019-05-12

## 2019-05-10 RX ORDER — SODIUM CHLORIDE, SODIUM LACTATE, POTASSIUM CHLORIDE, CALCIUM CHLORIDE 600; 310; 30; 20 MG/100ML; MG/100ML; MG/100ML; MG/100ML
INJECTION, SOLUTION INTRAVENOUS CONTINUOUS
Status: DISCONTINUED | OUTPATIENT
Start: 2019-05-10 | End: 2019-05-12

## 2019-05-10 RX ORDER — ACETAMINOPHEN 500 MG
1000 TABLET ORAL ONCE
Status: DISCONTINUED | OUTPATIENT
Start: 2019-05-10 | End: 2019-05-10 | Stop reason: HOSPADM

## 2019-05-10 RX ORDER — ACETAMINOPHEN 325 MG/1
650 TABLET ORAL 4 TIMES DAILY
Status: DISCONTINUED | OUTPATIENT
Start: 2019-05-10 | End: 2019-05-10

## 2019-05-10 RX ORDER — LOSARTAN POTASSIUM 50 MG/1
50 TABLET ORAL DAILY
Status: DISCONTINUED | OUTPATIENT
Start: 2019-05-10 | End: 2019-05-11

## 2019-05-10 RX ORDER — HYDROMORPHONE HYDROCHLORIDE 1 MG/ML
0.4 INJECTION, SOLUTION INTRAMUSCULAR; INTRAVENOUS; SUBCUTANEOUS EVERY 5 MIN PRN
Status: DISCONTINUED | OUTPATIENT
Start: 2019-05-10 | End: 2019-05-10 | Stop reason: HOSPADM

## 2019-05-10 RX ORDER — TRAMADOL HYDROCHLORIDE 50 MG/1
50 TABLET ORAL EVERY 12 HOURS
Status: DISCONTINUED | OUTPATIENT
Start: 2019-05-10 | End: 2019-05-10

## 2019-05-10 RX ORDER — OXYCODONE HYDROCHLORIDE 5 MG/1
5 TABLET ORAL EVERY 4 HOURS PRN
Status: DISCONTINUED | OUTPATIENT
Start: 2019-05-10 | End: 2019-05-10

## 2019-05-10 RX ORDER — ACETAMINOPHEN AND CODEINE PHOSPHATE 300; 30 MG/1; MG/1
1 TABLET ORAL EVERY 4 HOURS PRN
Status: DISCONTINUED | OUTPATIENT
Start: 2019-05-10 | End: 2019-05-11

## 2019-05-10 RX ORDER — BISACODYL 10 MG
10 SUPPOSITORY, RECTAL RECTAL
Status: DISCONTINUED | OUTPATIENT
Start: 2019-05-10 | End: 2019-05-12

## 2019-05-10 RX ORDER — OXYCODONE HYDROCHLORIDE 15 MG/1
15 TABLET ORAL EVERY 4 HOURS PRN
Status: DISCONTINUED | OUTPATIENT
Start: 2019-05-10 | End: 2019-05-10

## 2019-05-10 RX ORDER — LEVOTHYROXINE SODIUM 0.03 MG/1
25 TABLET ORAL
Status: DISCONTINUED | OUTPATIENT
Start: 2019-05-10 | End: 2019-05-12

## 2019-05-10 RX ORDER — TIZANIDINE 2 MG/1
2 TABLET ORAL 3 TIMES DAILY PRN
Status: DISCONTINUED | OUTPATIENT
Start: 2019-05-10 | End: 2019-05-12

## 2019-05-10 RX ORDER — ROSUVASTATIN CALCIUM 5 MG/1
5 TABLET, COATED ORAL NIGHTLY
COMMUNITY
Start: 2019-05-01

## 2019-05-10 RX ORDER — METOPROLOL SUCCINATE 50 MG/1
50 TABLET, EXTENDED RELEASE ORAL 2 TIMES DAILY
Status: DISCONTINUED | OUTPATIENT
Start: 2019-05-10 | End: 2019-05-12

## 2019-05-10 RX ORDER — DOCUSATE SODIUM 100 MG/1
100 CAPSULE, LIQUID FILLED ORAL 2 TIMES DAILY
Status: DISCONTINUED | OUTPATIENT
Start: 2019-05-10 | End: 2019-05-12

## 2019-05-10 RX ORDER — DOCUSATE SODIUM 100 MG/1
100 CAPSULE, LIQUID FILLED ORAL DAILY
Status: DISCONTINUED | OUTPATIENT
Start: 2019-05-10 | End: 2019-05-10

## 2019-05-10 RX ORDER — POLYETHYLENE GLYCOL 3350 17 G/17G
17 POWDER, FOR SOLUTION ORAL DAILY PRN
Status: DISCONTINUED | OUTPATIENT
Start: 2019-05-10 | End: 2019-05-12

## 2019-05-10 RX ORDER — ACETAMINOPHEN 325 MG/1
650 TABLET ORAL ONCE
Status: COMPLETED | OUTPATIENT
Start: 2019-05-10 | End: 2019-05-10

## 2019-05-10 RX ORDER — MORPHINE SULFATE 4 MG/ML
3 INJECTION, SOLUTION INTRAMUSCULAR; INTRAVENOUS EVERY 2 HOUR PRN
Status: DISCONTINUED | OUTPATIENT
Start: 2019-05-10 | End: 2019-05-12

## 2019-05-10 RX ORDER — HYDROMORPHONE HYDROCHLORIDE 1 MG/ML
0.4 INJECTION, SOLUTION INTRAMUSCULAR; INTRAVENOUS; SUBCUTANEOUS EVERY 2 HOUR PRN
Status: DISCONTINUED | OUTPATIENT
Start: 2019-05-10 | End: 2019-05-10

## 2019-05-10 RX ORDER — NALOXONE HYDROCHLORIDE 0.4 MG/ML
80 INJECTION, SOLUTION INTRAMUSCULAR; INTRAVENOUS; SUBCUTANEOUS AS NEEDED
Status: DISCONTINUED | OUTPATIENT
Start: 2019-05-10 | End: 2019-05-10 | Stop reason: HOSPADM

## 2019-05-10 NOTE — PLAN OF CARE
PEDAL PULSES BARELY HEARD FROM DOPPLER. NEED DEEP PALPATION FOR PULSES. FOOT AND TOES ABLE TO WIGGLE WARM TO TOUCH. DR Chaparro Maldonado AWARE OF ABOVE. WILL CONTINUE MONITOR. PER PT \"I HAVE PROBLEM WITH MY CIRC FOR SO LONG\"

## 2019-05-10 NOTE — BRIEF OP NOTE
Pre-Operative Diagnosis: Primary osteoarthritis of right hip [M16.11]     Post-Operative Diagnosis: Primary osteoarthritis of right hip [M16.11]      Procedure Performed:   Procedure(s):  RIGHT TOTAL HIP ARTHROPLASTY    Surgeon(s) and Role:     * Venkat Baledras

## 2019-05-10 NOTE — CONSULTS
[facility]  Report of Consultation    Maggy Nunez Patient Status:  Inpatient    1950 MRN [id number]   [facility] 3SW-A Attending Krystina Jones MD   Hosp Day # 0 PCP George Cervantes MD     Reason for Consultation:  Medical CARDIAC PACEMAKER PLACEMENT  10/31/2017    [employer]   • COLECTOMY      due to bowel obstruction   • CYSTOSCOPY,INSERT URETERAL STENT     • EXPLORATORY LAPAROTOMY N/A 8/6/2017    Performed by Cara Miller MD at [place] [address] [facility] injection 4 mg, 4 mg, Intravenous, Q4H PRN  •  Metoclopramide HCl (REGLAN) injection 10 mg, 10 mg, Intravenous, Q6H PRN  •  diphenhydrAMINE HCl (BENADRYL) injection 25 mg, 25 mg, Intravenous, Once PRN  •  diphenhydrAMINE (BENADRYL) cap/tab 25 mg, Equal pulses   Abdomen: Soft, nontender, nondistended. Positive bowel sounds. No rebound tenderness  Neurologic: Awake alert oriented x3, no focal neurological deficits.   Musculoskeletal: Right hip incision in dressing  Integument: Right hip incision in d [facility].    Ashish Hopson MD  5/10/2019  1:24 PM

## 2019-05-10 NOTE — ANESTHESIA POSTPROCEDURE EVALUATION
[place] Patient Status:  Surgery Admit - Inpt   Age/Gender 70year old female MRN [id number]   Location [facility] Attending Halima Koenig MD   Hosp Day # 0 PCP MD Adrián Lee

## 2019-05-10 NOTE — H&P
[address] [place]    PATIENT'S NAME: Kaden Andino   ATTENDING PHYSICIAN: Elsie Wilson M.D.    PATIENT ACCOUNT#:   [de-identified]    LOCATION:  [address] [place] [address] [address]  MEDICAL RECORD #:   [id number]       YOB: 1950  ADMISSION DATE: bypass surgery x3, status post ablation for atrial fibrillation, dyslipidemia, bilateral iliac aortic aneurysm status post AAA repair, superficial femoral artery occlusion, chronic anticoagulation, nephrolithiasis, peripheral vascular disease, hypothyroidi seen superiorly on x-ray. PLAN:  Right total hip replacement at [facility] on 05/10/2019. The patient understands the procedure. The patient does wish to proceed. The patient has no medical contraindication to the proposed surgery.     Dictated By Khushbu Josue

## 2019-05-10 NOTE — ANESTHESIA PREPROCEDURE EVALUATION
PRE-OP EVALUATION    Patient Name: Isadora Chaudhary    Pre-op Diagnosis: Primary osteoarthritis of right hip [M16.11]    Procedure(s):  RIGHT TOTAL HIP ARTHROPLASTY    Surgeon(s) and Role:     Mike Ma MD - Primary    Pre-op vitals reviewed.   Temp: every 6 (six) hours as needed for Pain. Disp:  Rfl:    Multiple Vitamin (MULTI-VITAMIN DAILY OR) Take 1 tablet by mouth daily. Disp:  Rfl:    Probiotic Product (PROBIOTIC OR) Take 1 tablet by mouth daily.    Disp:  Rfl:        Allergies: Pcn [Penicillins] [facility] MAIN OR   • HEART CORONARY ARTERY BYPASS GRAFT N/A 5/25/2017    Performed by Uriah Claros MD at [address]   • [address] W/O BYPASS     • OTHER SURGICAL HISTORY  2011    facial skin cancer removal   • OTHER SURGICAL HISTORY      le spouse        Heydimary jois held >72 hours  Risks and benefits of neuraxial anesthesia discussed with patient, including but not limited to bleeding, infection, and PDPH. GA as backup anesthetic planned and discussed with patient.   Possible tooth/airway injury, or rashes  RESPIRATORY: denies shortness of breath with exertion  CARDIOVASCULAR: denies chest pain on exertion  GI: denies abdominal pain and denies heartburn  NEURO: denies headaches    EXAM:   /75 (BP Location: Right arm)   Pulse 67   Temp 97.7 °F

## 2019-05-10 NOTE — PLAN OF CARE
PT REFUSED OXYCODONE D/T PT STATES\" I  TROW UP WITH IT\" PT STATES' I CAN TAKE TYL WITH CODEINE AND MS\" PAIN SERVICE NOTIFIED SEE NEW ORDER.

## 2019-05-10 NOTE — PLAN OF CARE
RECD ALERT ,AWAKE, ORIENTED X4. SLIGHT [place] NO HA. SKIN COLOR PALE. PROMISE FOOT WARM TO TOUCH ,ABLE TO MOVE SIDE TO SIDE. PEDAL PULSES  WEAK , . WILL CONTINUE MONITOR. HX PVD CALL LIGHT W/IN REACH. CALL LIGHT W/IN REACH.  FAMILY AT BS CALL FOR ALL NEEDS AND ASSI

## 2019-05-10 NOTE — PHYSICAL THERAPY NOTE
PHYSICAL THERAPY HIP EVALUATION - INPATIENT     Room Number: 361/361-A  Evaluation Date: 5/10/2019  Type of Evaluation: Initial  Physician Order: PT Eval and Treat    Presenting Problem: s/p right SAIMA (5/10/19)  Reason for Therapy: Mobility Dysfunction and LAPAROTOMY N/A 8/6/2017    Performed by Katie Garnett MD at [address] [place] [address] GRAFT N/A 5/25/2017    Performed by Hernandez Allen MD at [facility]   • [address] W/O BYPASS     • OTHER SURGICAL HISTORY  2011 arm  BP Method: Automatic  Patient Position: Sitting    O2 WALK  SPO2 on Room Air at Rest: 99  SPO2 Ambulation on Room Air: 96            AM-PAC '6-Clicks' [address]  How much difficulty does the patient currently have. ..  -   T decreased stance time on right; limited by pain  Stairs: N/A  Curb Step: N/A      Comments related to Mobility: Pt denies dizziness throughout session. Sitting edge of bed blood pressure: 116/65; sitting in chair following mobility: 121/67.        Pt educat PT(with family asst)    PLAN  PT Treatment Plan: Bed mobility; Body mechanics; Endurance; Energy conservation;Patient education; Family education;Gait training;Range of motion;Strengthening;Stoop training;Stair training;Transfer training;Balance training  Reha

## 2019-05-11 PROCEDURE — 99232 SBSQ HOSP IP/OBS MODERATE 35: CPT | Performed by: INTERNAL MEDICINE

## 2019-05-11 RX ORDER — HYDROCODONE BITARTRATE AND ACETAMINOPHEN 10; 325 MG/1; MG/1
2 TABLET ORAL EVERY 4 HOURS PRN
Status: DISCONTINUED | OUTPATIENT
Start: 2019-05-11 | End: 2019-05-12

## 2019-05-11 RX ORDER — HYDROCODONE BITARTRATE AND ACETAMINOPHEN 10; 325 MG/1; MG/1
1 TABLET ORAL EVERY 4 HOURS PRN
Status: DISCONTINUED | OUTPATIENT
Start: 2019-05-11 | End: 2019-05-12

## 2019-05-11 NOTE — PROGRESS NOTES
[place] Patient Status:  Inpatient    1950 MRN [id number]   [facility] 3SW-A Attending Ayah El MD   Hosp Day # 1 PCP Lindsey Jensen MD         S:  Patient doing well. No nausea.   No SOB, CP or robby

## 2019-05-11 NOTE — PLAN OF CARE
AOX4. Denies any numbness and or tingling on RLE. MF dressing is C/D/I. Up with min assist using the walker, voided per BSC. PT/OT. RA maintained. SCDs, IS encouraged. Pain is moderate to severe - pain management plan explained, verbalized understanding.  C

## 2019-05-11 NOTE — PROGRESS NOTES
[facility]  Progress Note    Lino Espinosa Patient Status:  Inpatient    1950 MRN [id number]   [facility] 3SW-A Attending Clemencia Vallejo MD   Hosp Day # 1 PCP Sreekanth Baker MD     CC: Medical management    SUBJECTIVE:  Com Medications:  HYDROcodone-acetaminophen (NORCO)  MG per tab 1 tablet 1 tablet Oral Q4H PRN   Or      HYDROcodone-acetaminophen (NORCO)  MG per tab 2 tablet 2 tablet Oral Q4H PRN   artificial saliva sustitute (BIOTENE) solution SOLN  Oral PRN the right hip status post right total hip arthroplasty by orthopedics Dr. Jad Walker by orthopedics Dr. Maier Sep     1.  CAD with previous history of CABG, atrial fibrillation with previous history of Maze procedure and left atrial appendage amputatio

## 2019-05-11 NOTE — HOME CARE LIAISON
Patient is a pre-ortho. Met with patient to confirm that [facility]idential will be set up at discharge for RN/PT s/p R SAIMA. Patient confirmed and agrreeable. Brochure and contact information provided for any further questions or concerns.   Thanks

## 2019-05-11 NOTE — CM/SW NOTE
05/11/19 0900   CM/SW Referral Data   Referral Source Social Work (self-referral)   Reason for Referral Discharge planning   Informant Other  (chart review)   Patient Info   Patient's Mental Status Alert;Oriented   Patient's [facility]

## 2019-05-11 NOTE — PROGRESS NOTES
99.3 temp, IS encouraged and did a recheck, result was 101. Surgical dressing doesn't have any sign and symptoms of infection, dressing is C/D/I.  Dr. Modesto Griggs was paged

## 2019-05-11 NOTE — OCCUPATIONAL THERAPY NOTE
OCCUPATIONAL THERAPY EVALUATION - INPATIENT     Room Number: 361/361-A  Evaluation Date: 5/11/2019  Type of Evaluation: Initial  Presenting Problem: R SAIMA    Physician Order: IP Consult to Occupational Therapy  Reason for Therapy: ADL/IADL Dysfunction and 2011    facial skin cancer removal   • OTHER SURGICAL HISTORY      left atrial appendage amputation   • OTHER SURGICAL HISTORY      maze procedure   • SYMP AAA URGENT REPAIR  2017   • TONSILLECTOMY         OCCUPATIONAL PROFILE    HOME SITUATION  Type of Ho Score (AM-PAC Scale): 44.27  CMS Modifier (G-Code): CJ    FUNCTIONAL TRANSFER ASSESSMENT  Supine to Sit : Moderate assistance  Sit to Stand: Minimum assistance    Skilled Therapy Provided: Pt educated on role and goals of OT.  Educated pt on posterior hip p benefit from skilled inpatient OT to address the above deficits, maximizing patient’s ability to return safely to her prior level of function.     Patient Complexity  Occupational Profile/Medical History LOW - Brief history including review of medical or th

## 2019-05-11 NOTE — OPERATIVE REPORT
[address]    PATIENT'S NAME: Kathy Moser   ATTENDING PHYSICIAN: Mackenzie Barnard M.D. OPERATING PHYSICIAN: Mackenzie Barnard M.D.    PATIENT ACCOUNT#:   [de-identified]    LOCATION:  [address] [place] [address]  MEDICAL RECORD #:   [id number]       DATE OF BIRTH:  [birth year] were gently teased apart with a gloved digit. Self-retaining Charnley retractor was positioned. The hip was placed in marked internal rotation.   The short external rotators were released from the posterior aspect of the greater trochanter, including the to remove bone from the proximal metaphysis. I began broaching with a size 4 broach, and then I broached all way up to a size 10 broach, when excellent rotational stability was achieved. I used a calcar planer to plane the calcar. I removed the broach. hip was copiously irrigated. A size 10 Scooby ML taper press-fit plasma sprayed, extended offset, reduced neck length femoral component was impacted into the proximal femur. I had excellent rotational stability.   I then chose a Biolox ceramic head, 36 mm

## 2019-05-11 NOTE — PROGRESS NOTES
Post Op Day 1 Ortho Note: Right SAIMA    Status Post Nerve Block:  Type of Nerve Block: Right Fascia Iliaca  Single Injection Nerve Block    Post op review: No evidence of immediate block related complications, No paresthesia noted, Able to lift leg(s), Able

## 2019-05-11 NOTE — PHYSICAL THERAPY NOTE
PHYSICAL THERAPY HIP TREATMENT NOTE - INPATIENT      Room Number: 939/447-X     Session: 1 and 2  Number of Visits to Meet Established Goals: 5    Presenting Problem: s/p right SAIMA (5/10/19)    Problem List  Active Problems:    Coronary artery disease invo REPAIR  2017   • TONSILLECTOMY         SUBJECTIVE  \"I am in  maxine much pain. I need pain medication. \"    Patient’s self-stated goal is not to have pain.     OBJECTIVE  Precautions: SAIMA - posterior    WEIGHT BEARING RESTRICTION  Weight Bearing Restriction: required AA but able to complete rest with AROM. Pt requiring VC and TC for proper form of exercise. Pt c/o of R hip pain 9/10. Pt reported not taking pain medication prior to session. Pt performed sit<>stand Mod A w RW.  VC to facilitate head elevation, th 0 reps 15 reps   Forward, back steps 0 reps 15 reps   Short Squats 0 reps 15 reps     Comments:  Pt participated in group session, tolerance was good.  was present family in the AM and SPTA for PM.   is a SPTA.     Patient End of Session: Up in c

## 2019-05-12 VITALS
BODY MASS INDEX: 29.45 KG/M2 | SYSTOLIC BLOOD PRESSURE: 108 MMHG | TEMPERATURE: 98 F | HEIGHT: 70 IN | DIASTOLIC BLOOD PRESSURE: 62 MMHG | WEIGHT: 205.69 LBS | OXYGEN SATURATION: 94 % | HEART RATE: 93 BPM | RESPIRATION RATE: 15 BRPM

## 2019-05-12 PROCEDURE — 99232 SBSQ HOSP IP/OBS MODERATE 35: CPT | Performed by: INTERNAL MEDICINE

## 2019-05-12 NOTE — PLAN OF CARE
Received in bed with family at bedside. Discussed plan of care. Complains of pain to right hip. Medicated with oral pain medication, see MAR. Instructed not to get up without assistance. Verbalizes understanding.  Up to bathroom with standby assistance with

## 2019-05-12 NOTE — OCCUPATIONAL THERAPY NOTE
OCCUPATIONAL THERAPY TREATMENT NOTE - INPATIENT     Room Number: 361/361-A  Session: 1   Number of Visits to Meet Established Goals: 2    Presenting Problem: R SAIMA    History related to current admission: Pt s/p elective R SAIMA 5/10.  Hx: pacemaker    [place] SURGICAL HISTORY      maze procedure   • SYMP AAA URGENT REPAIR  2017   • TONSILLECTOMY         SUBJECTIVE  \"this hurts a lot worse then my other surgeries\"    Patient self-stated goal is to go home and be pain free    OBJECTIVE  Precautions: SAIMA - postinderjit how to complete transfer. Pt was educated on steps of transfer and provided cgA to assist with pt comfort.   Pt participated in sit<>stand transfer and toilet transfer, initially requiring Heather and moderate prompting for body mechanics/posterior hip precau

## 2019-05-12 NOTE — PROGRESS NOTES
[facility]  Progress Note    Princess Ta Patient Status:  Inpatient    1950 MRN [id number]   [facility] 3SW-A Attending Eugene Talley MD   Hosp Day # 2 PCP Fer Chavez MD     CC: Medical management    SUBJECTIVE:  Com PRN   artificial saliva sustitute (BIOTENE) solution SOLN  Oral PRN   lactated ringers infusion  Intravenous Continuous   apixaban (ELIQUIS) tab 5 mg 5 mg Oral BID   Senna (SENOKOT) tab 17.2 mg 17.2 mg Oral Nightly   docusate sodium (COLACE) cap 100 mg 100 cardiomyopathy with previous pacemaker defibrillator, history of AAA with previous repair in 2017, peripheral vascular disease  1. continue home medications including metoprolol, statin   2. Apixaban restarted by orthopedics .     3. Follow blood pressure

## 2019-05-12 NOTE — PLAN OF CARE
Pt A & O x3, on RA. /IS. Tele-NSR. SCDs. Eliquis. Up x 1/walker. WBAT. PT/OT. Aquacel dressing CDI. DC home with [facility] later today. Hip precautions. Will continue to monitor.

## 2019-05-12 NOTE — PHYSICAL THERAPY NOTE
PHYSICAL THERAPY HIP TREATMENT NOTE - INPATIENT      Room Number: 012/786-E     Session: 3  Number of Visits to Meet Established Goals: 5    Presenting Problem: s/p right SAIMA (5/10/19)    Problem List  Active Problems:    Coronary artery disease involving 2017   • TONSILLECTOMY         SUBJECTIVE  \"This is so emotional. This is more pain than my heart surgeries\"     Patient’s self-stated goal is to return home     OBJECTIVE  Precautions: SAIMA - posterior    WEIGHT BEARING RESTRICTION  Weight Bearing Restr and keeping RW within WALTER. Pt demos antalgic gait c decreased zina and requires several standing rest breaks. Pt tolerated therex c increased reps and min A for set up. Pt participated in car/tub t/f training c min A for RLE and cues for sequencing.  Esteban Christensen of motion;Strengthening;Stoop training;Stair training;Transfer training;Balance training  Rehab Potential : Good  Frequency (Obs): Daily    CURRENT GOALS  Goal #1  Patient is able to demonstrate supine - sit EOB @ level: supervision      Goal #2  Patient i

## 2019-05-12 NOTE — PLAN OF CARE
Denies calf pain, fall and DVT prevention in place. Tolerates diet, voids. Pain controlled with current routine- patient with greater relief from norco, IV pain medication given X1. Patient and spouse updated, progressing and in agreement with POC.  Safety

## 2019-05-12 NOTE — PLAN OF CARE
Received pt at 0230, alert and oriented, assisted to [facility] with walker and gait belt, moving slowly, reinforced hip precautions. Voided without difficulty. Removed microfoam dressing. Aquacel intact with scant drainage, no active bleeding observed.   Small

## 2019-05-12 NOTE — PROGRESS NOTES
[facility] [address] [place] [address]  Orthopedic Surgery  Progress Note    Paul Rosario Patient Status:  Inpatient    1950 MRN [id number]   [facility] 3SW-A Attending Halima Koenig MD   Hosp Day # 2 PCP Stacy Spencer MD     SUBJECTIVE:  Chong Sanders

## 2019-05-12 NOTE — PROGRESS NOTES
Acute Pain Service    Post Op Day 2 Ortho Note    Assessed patient in chair. Patient states Desirae Guillen is working well to manage pain; reports dry mouth and no appetite - no nausea at present.    's - management per Hospitalist.  Patient able to bear weig

## 2019-05-12 NOTE — PROGRESS NOTES
PIV removed, pt watching DC hip video. Pt has all belongings. Script give to pt for norco. Pt is on eliquis at home and has pills at home. Called w/c to take pt to lobby. Pt has walker at home.

## 2019-05-12 NOTE — DISCHARGE SUMMARY
Discharge Summary  Patient ID:  Kin Pat  [id number]  70year old  6/23/1950    Admit date: 5/10/2019    Discharge date and time: 5/12/19    Attending Physician: No att. providers found     Reason for admission: Primary osteoarthritis of right hip [M

## 2019-05-13 ENCOUNTER — TELEPHONE (OUTPATIENT)
Dept: FAMILY MEDICINE CLINIC | Facility: CLINIC | Age: 69
End: 2019-05-13

## 2019-05-13 ENCOUNTER — PATIENT OUTREACH (OUTPATIENT)
Dept: CASE MANAGEMENT | Age: 69
End: 2019-05-13

## 2019-05-13 DIAGNOSIS — M16.11 PRIMARY OSTEOARTHRITIS OF RIGHT HIP: ICD-10-CM

## 2019-05-13 DIAGNOSIS — Z02.9 ENCOUNTERS FOR UNSPECIFIED ADMINISTRATIVE PURPOSE: ICD-10-CM

## 2019-05-13 PROBLEM — Z47.89 ORTHOPEDIC AFTERCARE: Status: ACTIVE | Noted: 2019-05-13

## 2019-05-13 PROCEDURE — 1111F DSCHRG MED/CURRENT MED MERGE: CPT

## 2019-05-13 NOTE — CM/SW NOTE
05/13/19 0800   Discharge disposition   Expected discharge disposition Home-Health   Name of Facillity/Home Care/Hospice [facility]idential   Discharge transportation Private car   DC 5/12/19

## 2019-05-13 NOTE — TELEPHONE ENCOUNTER
Please call pt and schedule a TCM for pt to see Dr. Teodoro Webb before 05/26/19 per Care Manager Nathen Frazier.

## 2019-05-13 NOTE — PROGRESS NOTES
Initial Post Discharge Follow Up   Discharge Date: 5/12/19  Contact Date: 5/13/2019    Consent Verification:  Assessment Completed With: Spouse: Abel Permission received per patient?  written  HIPAA Verified?   Yes    Discharge Dx:    Primary osteoarthr mouth nightly. Disp:  Rfl:    Metoprolol Succinate ER 50 MG Oral Tablet 24 Hr Take 50 mg by mouth 2 (two) times daily.  Disp:  Rfl:    HYDROcodone-acetaminophen  MG Oral Tab Take 1-2 tablets by mouth every 6 (six) hours as needed for painShould you ne up?  Yes   If Yes: With Whom: [facility]   When: 5/13/19    DME ordered at D/C? No, Abel states Oliverio Parish has a walker, a cane a high toilet seat and a shower chair. Services ordered at D/C?   No, not at this time     DX: specifics:  Ortho Spine:  Has t instructed Abel to call patient's PCP with any questions or needs, he states he will. [x]  Discharge Summary, Discharge medications reviewed/discussed/and reconciled against outpatient medications with patient,  and orders reviewed and discussed.  An

## 2019-05-13 NOTE — TELEPHONE ENCOUNTER
Patient discharged home from [facility] on 5/12/19 and is recommended to have a TCM HFU by 5/26/19 or sooner. NCM attempted to schedule a TCM HFU appointment, patient's  states they will call the office themselves to schedule.     Triage: Please

## 2019-05-20 ENCOUNTER — OFFICE VISIT (OUTPATIENT)
Dept: CARDIOLOGY | Age: 69
End: 2019-05-20

## 2019-05-20 VITALS
DIASTOLIC BLOOD PRESSURE: 60 MMHG | BODY MASS INDEX: 27.2 KG/M2 | SYSTOLIC BLOOD PRESSURE: 120 MMHG | HEIGHT: 70 IN | HEART RATE: 85 BPM | WEIGHT: 190 LBS

## 2019-05-20 DIAGNOSIS — E78.00 PURE HYPERCHOLESTEROLEMIA: ICD-10-CM

## 2019-05-20 DIAGNOSIS — I25.5 CARDIOMYOPATHY, ISCHEMIC: ICD-10-CM

## 2019-05-20 DIAGNOSIS — I25.10 CORONARY ARTERY DISEASE INVOLVING NATIVE CORONARY ARTERY OF NATIVE HEART WITHOUT ANGINA PECTORIS: Primary | ICD-10-CM

## 2019-05-20 DIAGNOSIS — I65.21 STENOSIS OF RIGHT CAROTID ARTERY: ICD-10-CM

## 2019-05-20 DIAGNOSIS — Z95.1 HX OF CABG: ICD-10-CM

## 2019-05-20 PROBLEM — I70.209 SUPERFICIAL FEMORAL ARTERY OCCLUSION (CMD): Status: ACTIVE | Noted: 2019-05-20

## 2019-05-20 PROBLEM — Z95.828 HISTORY OF ENDOVASCULAR STENT GRAFT FOR ABDOMINAL AORTIC ANEURYSM (AAA): Status: ACTIVE | Noted: 2019-05-20

## 2019-05-20 PROBLEM — I47.20 VENTRICULAR TACHYCARDIA (CMD): Status: ACTIVE | Noted: 2018-08-13

## 2019-05-20 PROBLEM — I71.40 ABDOMINAL AORTIC ANEURYSM (AAA) WITHOUT RUPTURE (CMD): Status: ACTIVE | Noted: 2017-06-08

## 2019-05-20 PROBLEM — Z86.79 ATRIAL FIBRILLATION, CURRENTLY IN SINUS RHYTHM: Status: ACTIVE | Noted: 2018-08-13

## 2019-05-20 PROBLEM — Z79.899 LONG TERM CURRENT USE OF AMIODARONE: Status: ACTIVE | Noted: 2017-06-19

## 2019-05-20 PROBLEM — I72.3 ANEURYSM OF ILIAC ARTERY (CMD): Status: ACTIVE | Noted: 2017-08-01

## 2019-05-20 PROBLEM — Z95.810 ICD (IMPLANTABLE CARDIOVERTER-DEFIBRILLATOR) IN PLACE: Status: ACTIVE | Noted: 2018-08-13

## 2019-05-20 PROCEDURE — 99214 OFFICE O/P EST MOD 30 MIN: CPT | Performed by: INTERNAL MEDICINE

## 2019-06-05 ENCOUNTER — TELEPHONE (OUTPATIENT)
Dept: FAMILY MEDICINE CLINIC | Facility: CLINIC | Age: 69
End: 2019-06-05

## 2019-06-06 ENCOUNTER — APPOINTMENT (OUTPATIENT)
Dept: CARDIOLOGY | Age: 69
End: 2019-06-06
Attending: INTERNAL MEDICINE

## 2019-06-07 ENCOUNTER — HOSPITAL ENCOUNTER (OUTPATIENT)
Dept: PHYSICAL THERAPY | Facility: HOSPITAL | Age: 69
Setting detail: THERAPIES SERIES
Discharge: HOME | End: 2019-06-07
Attending: ORTHOPAEDIC SURGERY
Payer: MEDICARE

## 2019-06-07 DIAGNOSIS — Z96.641 STATUS POST TOTAL REPLACEMENT OF RIGHT HIP: ICD-10-CM

## 2019-06-07 PROCEDURE — 97162 PT EVAL MOD COMPLEX 30 MIN: CPT

## 2019-06-07 PROCEDURE — 97110 THERAPEUTIC EXERCISES: CPT

## 2019-06-07 NOTE — PROGRESS NOTES
POST-OP HIP EVALUATION:   Referring Physician: Dr. Mariangel Copeland  Diagnosis: s/p R SAIMA  Date of Service: 6/7/2019     PATIENT SUMMARY   Lakshmi Gu is a 70year old y/o female who presents to therapy today s/p R SAIMA with posterior approach on 5/10/19.  Current addition to post-op ms weakness limiting amb quality and endurance  Sumeet Dionicio would benefit from skilled Physical Therapy to address the above impairments to improve hip flexor extensibility and Functional strength for amb    Precautions:  No abd or ext for 6 reciprocally with a single railing  · Pt will be independent and compliant with comprehensive HEP to maintain progress achieved in PT    Frequency / Duration: Patient will be seen for 1-2 x/week or a total of 8 visits over a 90 day period.   Treatment will

## 2019-06-10 ENCOUNTER — OFFICE VISIT (OUTPATIENT)
Dept: CARDIOLOGY | Age: 69
End: 2019-06-10

## 2019-06-10 VITALS
DIASTOLIC BLOOD PRESSURE: 64 MMHG | HEIGHT: 70 IN | BODY MASS INDEX: 28.35 KG/M2 | WEIGHT: 198 LBS | HEART RATE: 82 BPM | SYSTOLIC BLOOD PRESSURE: 102 MMHG

## 2019-06-10 DIAGNOSIS — Z95.810 ICD (IMPLANTABLE CARDIOVERTER-DEFIBRILLATOR) IN PLACE: ICD-10-CM

## 2019-06-10 DIAGNOSIS — I25.758 CORONARY ARTERY DISEASE OF NATIVE ARTERY OF TRANSPLANTED HEART WITH STABLE ANGINA PECTORIS (CMD): Primary | ICD-10-CM

## 2019-06-10 DIAGNOSIS — Z86.79 ATRIAL FIBRILLATION, CURRENTLY IN SINUS RHYTHM: ICD-10-CM

## 2019-06-10 PROCEDURE — 99215 OFFICE O/P EST HI 40 MIN: CPT | Performed by: INTERNAL MEDICINE

## 2019-06-10 SDOH — SOCIAL STABILITY: SOCIAL NETWORK: ARE YOU MARRIED, WIDOWED, DIVORCED, SEPARATED, NEVER MARRIED, OR LIVING WITH A PARTNER?: MARRIED

## 2019-06-13 ENCOUNTER — HOSPITAL ENCOUNTER (OUTPATIENT)
Dept: PHYSICAL THERAPY | Facility: HOSPITAL | Age: 69
Setting detail: THERAPIES SERIES
Discharge: HOME | End: 2019-06-13
Attending: ORTHOPAEDIC SURGERY
Payer: MEDICARE

## 2019-06-13 PROCEDURE — 97110 THERAPEUTIC EXERCISES: CPT

## 2019-06-13 NOTE — PROGRESS NOTES
Dx: s/p R SAIMA       Authorized # of Visits: 8        Next MD visit: none scheduled  Fall Risk: standard         Precautions: n/a             Subjective: ambulating w/o cane. Aware of not leaning as much when walking.  Still not sleeping well and sleeping in

## 2019-06-14 ENCOUNTER — MED REC SCAN ONLY (OUTPATIENT)
Dept: FAMILY MEDICINE CLINIC | Facility: CLINIC | Age: 69
End: 2019-06-14

## 2019-06-20 ENCOUNTER — TELEPHONE (OUTPATIENT)
Dept: PHYSICAL THERAPY | Facility: HOSPITAL | Age: 69
End: 2019-06-20

## 2019-06-20 ENCOUNTER — APPOINTMENT (OUTPATIENT)
Dept: PHYSICAL THERAPY | Facility: HOSPITAL | Age: 69
End: 2019-06-20
Attending: ORTHOPAEDIC SURGERY
Payer: MEDICARE

## 2019-06-25 ENCOUNTER — TELEPHONE (OUTPATIENT)
Dept: PHYSICAL THERAPY | Facility: HOSPITAL | Age: 69
End: 2019-06-25

## 2019-06-25 ENCOUNTER — APPOINTMENT (OUTPATIENT)
Dept: PHYSICAL THERAPY | Facility: HOSPITAL | Age: 69
End: 2019-06-25
Attending: ORTHOPAEDIC SURGERY
Payer: MEDICARE

## 2019-06-27 PROBLEM — Z96.641 STATUS POST RIGHT HIP REPLACEMENT: Status: ACTIVE | Noted: 2019-06-27

## 2019-06-28 ENCOUNTER — APPOINTMENT (OUTPATIENT)
Dept: PHYSICAL THERAPY | Facility: HOSPITAL | Age: 69
End: 2019-06-28
Attending: ORTHOPAEDIC SURGERY
Payer: MEDICARE

## 2019-07-03 ENCOUNTER — APPOINTMENT (OUTPATIENT)
Dept: PHYSICAL THERAPY | Facility: HOSPITAL | Age: 69
End: 2019-07-03
Attending: ORTHOPAEDIC SURGERY
Payer: MEDICARE

## 2019-07-03 ENCOUNTER — ANCILLARY ORDERS (OUTPATIENT)
Dept: CARDIOLOGY | Age: 69
End: 2019-07-03

## 2019-07-03 ENCOUNTER — ANCILLARY PROCEDURE (OUTPATIENT)
Dept: CARDIOLOGY | Age: 69
End: 2019-07-03
Attending: INTERNAL MEDICINE

## 2019-07-03 DIAGNOSIS — Z95.810 ICD (IMPLANTABLE CARDIOVERTER-DEFIBRILLATOR) IN PLACE: ICD-10-CM

## 2019-07-03 PROCEDURE — 93295 DEV INTERROG REMOTE 1/2/MLT: CPT | Performed by: INTERNAL MEDICINE

## 2019-07-05 ENCOUNTER — APPOINTMENT (OUTPATIENT)
Dept: PHYSICAL THERAPY | Facility: HOSPITAL | Age: 69
End: 2019-07-05
Attending: ORTHOPAEDIC SURGERY
Payer: MEDICARE

## 2019-07-08 ENCOUNTER — APPOINTMENT (OUTPATIENT)
Dept: PHYSICAL THERAPY | Facility: HOSPITAL | Age: 69
End: 2019-07-08
Attending: ORTHOPAEDIC SURGERY
Payer: MEDICARE

## 2019-07-10 ENCOUNTER — APPOINTMENT (OUTPATIENT)
Dept: PHYSICAL THERAPY | Facility: HOSPITAL | Age: 69
End: 2019-07-10
Attending: ORTHOPAEDIC SURGERY
Payer: MEDICARE

## 2019-07-15 ENCOUNTER — APPOINTMENT (OUTPATIENT)
Dept: PHYSICAL THERAPY | Facility: HOSPITAL | Age: 69
End: 2019-07-15
Attending: ORTHOPAEDIC SURGERY
Payer: MEDICARE

## 2019-08-01 PROCEDURE — 87186 SC STD MICRODIL/AGAR DIL: CPT | Performed by: PHYSICIAN ASSISTANT

## 2019-08-01 PROCEDURE — 87077 CULTURE AEROBIC IDENTIFY: CPT | Performed by: PHYSICIAN ASSISTANT

## 2019-08-01 PROCEDURE — 87086 URINE CULTURE/COLONY COUNT: CPT | Performed by: PHYSICIAN ASSISTANT

## 2019-08-05 RX ORDER — ROSUVASTATIN CALCIUM 5 MG/1
TABLET, COATED ORAL
Qty: 30 TABLET | Refills: 9 | Status: SHIPPED | OUTPATIENT
Start: 2019-08-05 | End: 2019-08-26 | Stop reason: CLARIF

## 2019-08-09 RX ORDER — METOPROLOL SUCCINATE 50 MG/1
TABLET, EXTENDED RELEASE ORAL
Qty: 180 TABLET | Refills: 2 | Status: SHIPPED | OUTPATIENT
Start: 2019-08-09 | End: 2020-03-09 | Stop reason: SDUPTHER

## 2019-08-12 ENCOUNTER — HOSPITAL ENCOUNTER (OUTPATIENT)
Dept: CV DIAGNOSTICS | Facility: HOSPITAL | Age: 69
Discharge: HOME | End: 2019-08-12
Attending: INTERNAL MEDICINE
Payer: MEDICARE

## 2019-08-12 DIAGNOSIS — I25.5 CARDIOMYOPATHY, ISCHEMIC: ICD-10-CM

## 2019-08-12 PROCEDURE — 93306 TTE W/DOPPLER COMPLETE: CPT | Performed by: INTERNAL MEDICINE

## 2019-08-13 DIAGNOSIS — I25.5 CARDIOMYOPATHY, ISCHEMIC: ICD-10-CM

## 2019-08-22 ENCOUNTER — OFFICE VISIT (OUTPATIENT)
Dept: FAMILY MEDICINE CLINIC | Facility: CLINIC | Age: 69
End: 2019-08-22
Payer: MEDICARE

## 2019-08-22 VITALS
SYSTOLIC BLOOD PRESSURE: 118 MMHG | HEIGHT: 70 IN | HEART RATE: 80 BPM | RESPIRATION RATE: 18 BRPM | WEIGHT: 200 LBS | DIASTOLIC BLOOD PRESSURE: 66 MMHG | BODY MASS INDEX: 28.63 KG/M2

## 2019-08-22 DIAGNOSIS — R60.0 PEDAL EDEMA: Primary | ICD-10-CM

## 2019-08-22 PROCEDURE — 99214 OFFICE O/P EST MOD 30 MIN: CPT | Performed by: FAMILY MEDICINE

## 2019-08-22 RX ORDER — PREDNISONE 20 MG/1
TABLET ORAL
Qty: 20 TABLET | Refills: 0 | Status: SHIPPED | OUTPATIENT
Start: 2019-08-22 | End: 2019-09-10 | Stop reason: ALTCHOICE

## 2019-08-22 NOTE — PROGRESS NOTES
[address] [facility] Office Note  Chief Complaint:   Patient presents with:  Swelling: feet      HPI:   This is a 78year old female coming in for swelling in both feet over the last 2 weeks.   Patient originally started with swelling of the STENT     • EXPLORATORY LAPAROTOMY N/A 8/6/2017    Performed by Dionicio Schwarz MD at [address] [place] [address] GRAFT N/A 5/25/2017    Performed by Bill Ibarra MD at [facility] 58 Right 5/10/2019    Performed by Juan David Carranza well in advance. Disp: 40 tablet Rfl: 0   KRILL OIL OR Take 1 capsule by mouth 2 (two) times daily. Disp:  Rfl:    CRANBERRY CONCENTRATE OR Take 1 tablet by mouth 2 (two) times daily. Disp:  Rfl:    NON FORMULARY 2 (two) times daily.  Disp:  Rfl:    Antonella Swann groomed.   Physical Exam:  GEN:  Patient is alert and oriented x3, no apparent distress  HEAD:  Normocephalic, atraumatic  LUNGS: clear to auscultation bilaterally, no rales/rhonchi/wheezing  HEART:  Regular rate and rhythm, no murmurs, rubs or gallops  ABD [facility])     Coronary artery disease involving native heart without angina pectoris     History of endovascular stent graft for abdominal aortic aneurysm (AAA)     Ischemic cardiomyopathy     S/P CABG x 3     S/P ablation of atrial fibrillation     Dyslipidem

## 2019-08-22 NOTE — PATIENT INSTRUCTIONS
Eating to Prevent Gout  Gout is a painful form of arthritis caused by an excess of uric acid. This is a waste product made by the body. It builds up in the body and forms crystals that collect in the joints, causing a gout attack.  Alcohol and certain tamara The following guidelines are recommended by the [address] [place] [address] for people with gout. Your diet should be:  · High in fiber, whole grains, fruits, and vegetables. · Low in protein (15% of calories should come from protein.  Choose lean sources

## 2019-08-26 ENCOUNTER — TELEPHONE (OUTPATIENT)
Dept: CARDIOLOGY | Age: 69
End: 2019-08-26

## 2019-08-26 ENCOUNTER — OFFICE VISIT (OUTPATIENT)
Dept: CARDIOLOGY | Age: 69
End: 2019-08-26

## 2019-08-26 VITALS
WEIGHT: 156 LBS | HEIGHT: 70 IN | BODY MASS INDEX: 22.33 KG/M2 | HEART RATE: 80 BPM | SYSTOLIC BLOOD PRESSURE: 110 MMHG | DIASTOLIC BLOOD PRESSURE: 58 MMHG

## 2019-08-26 DIAGNOSIS — I25.10 CORONARY ARTERY DISEASE INVOLVING NATIVE CORONARY ARTERY OF NATIVE HEART WITHOUT ANGINA PECTORIS: ICD-10-CM

## 2019-08-26 DIAGNOSIS — E78.00 PURE HYPERCHOLESTEROLEMIA: ICD-10-CM

## 2019-08-26 DIAGNOSIS — Z95.1 HX OF CABG: Primary | ICD-10-CM

## 2019-08-26 DIAGNOSIS — I65.21 STENOSIS OF RIGHT CAROTID ARTERY: ICD-10-CM

## 2019-08-26 DIAGNOSIS — I25.5 CARDIOMYOPATHY, ISCHEMIC: ICD-10-CM

## 2019-08-26 PROCEDURE — 99214 OFFICE O/P EST MOD 30 MIN: CPT | Performed by: INTERNAL MEDICINE

## 2019-08-28 ENCOUNTER — TELEPHONE (OUTPATIENT)
Dept: FAMILY MEDICINE CLINIC | Facility: CLINIC | Age: 69
End: 2019-08-28

## 2019-08-28 NOTE — TELEPHONE ENCOUNTER
Advised pt of below. She said she cannot come tomorrow or Friday. Leela Levine \"you know what, just forget it\". Reports she feels all she does is go to Dr debbie Cervantes nothing is done\". Said she will think about this and maybe just find someone else.  States

## 2019-08-28 NOTE — TELEPHONE ENCOUNTER
Pt was seen for gout and pt was given predniSONE 20 MG Oral Tab. Pt stated that it is better but it isn't gone. Please advise.

## 2019-08-28 NOTE — TELEPHONE ENCOUNTER
Pt was seen for possible gout. Reported pain/swelling b/l ankles and tops of feet. Now reports pain/swelling b/l ankles and top of rt foot. Was started on prednisone taper. Currently on 20mg daily x 3 day portion.   Reports the first day she felt better

## 2019-08-30 ENCOUNTER — DOCUMENTATION (OUTPATIENT)
Dept: CARDIOLOGY | Age: 69
End: 2019-08-30

## 2019-08-31 ENCOUNTER — MED REC SCAN ONLY (OUTPATIENT)
Dept: FAMILY MEDICINE CLINIC | Facility: CLINIC | Age: 69
End: 2019-08-31

## 2019-09-04 ENCOUNTER — TELEPHONE (OUTPATIENT)
Dept: CARDIOLOGY | Age: 69
End: 2019-09-04

## 2019-09-10 ENCOUNTER — LAB ENCOUNTER (OUTPATIENT)
Dept: LAB | Age: 69
End: 2019-09-10
Attending: FAMILY MEDICINE
Payer: MEDICARE

## 2019-09-10 ENCOUNTER — OFFICE VISIT (OUTPATIENT)
Dept: FAMILY MEDICINE CLINIC | Facility: CLINIC | Age: 69
End: 2019-09-10
Payer: MEDICARE

## 2019-09-10 VITALS
HEART RATE: 88 BPM | RESPIRATION RATE: 20 BRPM | BODY MASS INDEX: 28.63 KG/M2 | WEIGHT: 200 LBS | DIASTOLIC BLOOD PRESSURE: 70 MMHG | HEIGHT: 70 IN | SYSTOLIC BLOOD PRESSURE: 122 MMHG

## 2019-09-10 DIAGNOSIS — R60.0 PEDAL EDEMA: ICD-10-CM

## 2019-09-10 DIAGNOSIS — R20.2 NUMBNESS AND TINGLING OF BOTH FEET: ICD-10-CM

## 2019-09-10 DIAGNOSIS — R20.0 NUMBNESS AND TINGLING OF BOTH FEET: ICD-10-CM

## 2019-09-10 DIAGNOSIS — R60.0 PEDAL EDEMA: Primary | ICD-10-CM

## 2019-09-10 LAB
ALBUMIN SERPL-MCNC: 3.2 G/DL (ref 3.4–5)
ALBUMIN/GLOB SERPL: 0.6 {RATIO} (ref 1–2)
ALP LIVER SERPL-CCNC: 91 U/L (ref 55–142)
ALT SERPL-CCNC: 15 U/L (ref 13–56)
ANION GAP SERPL CALC-SCNC: 9 MMOL/L (ref 0–18)
AST SERPL-CCNC: 13 U/L (ref 15–37)
BASOPHILS # BLD AUTO: 0.04 X10(3) UL (ref 0–0.2)
BASOPHILS NFR BLD AUTO: 0.3 %
BILIRUB SERPL-MCNC: 0.3 MG/DL (ref 0.1–2)
BUN BLD-MCNC: 24 MG/DL (ref 7–18)
BUN/CREAT SERPL: 25.8 (ref 10–20)
CALCIUM BLD-MCNC: 9.9 MG/DL (ref 8.5–10.1)
CHLORIDE SERPL-SCNC: 102 MMOL/L (ref 98–112)
CO2 SERPL-SCNC: 26 MMOL/L (ref 21–32)
CREAT BLD-MCNC: 0.93 MG/DL (ref 0.55–1.02)
CRP SERPL-MCNC: 7.92 MG/DL (ref ?–0.3)
DEPRECATED RDW RBC AUTO: 47.3 FL (ref 35.1–46.3)
EOSINOPHIL # BLD AUTO: 0.12 X10(3) UL (ref 0–0.7)
EOSINOPHIL NFR BLD AUTO: 1 %
ERYTHROCYTE [DISTWIDTH] IN BLOOD BY AUTOMATED COUNT: 14.8 % (ref 11–15)
FOLATE SERPL-MCNC: 36.3 NG/ML (ref 8.7–?)
GLOBULIN PLAS-MCNC: 5.2 G/DL (ref 2.8–4.4)
GLUCOSE BLD-MCNC: 103 MG/DL (ref 70–99)
HCT VFR BLD AUTO: 38.1 % (ref 35–48)
HGB BLD-MCNC: 12.2 G/DL (ref 12–16)
IMM GRANULOCYTES # BLD AUTO: 0.04 X10(3) UL (ref 0–1)
IMM GRANULOCYTES NFR BLD: 0.3 %
LYMPHOCYTES # BLD AUTO: 2.8 X10(3) UL (ref 1–4)
LYMPHOCYTES NFR BLD AUTO: 24.1 %
M PROTEIN MFR SERPL ELPH: 8.4 G/DL (ref 6.4–8.2)
MCH RBC QN AUTO: 28 PG (ref 26–34)
MCHC RBC AUTO-ENTMCNC: 32 G/DL (ref 31–37)
MCV RBC AUTO: 87.4 FL (ref 80–100)
MONOCYTES # BLD AUTO: 1.22 X10(3) UL (ref 0.1–1)
MONOCYTES NFR BLD AUTO: 10.5 %
NEUTROPHILS # BLD AUTO: 7.4 X10 (3) UL (ref 1.5–7.7)
NEUTROPHILS # BLD AUTO: 7.4 X10(3) UL (ref 1.5–7.7)
NEUTROPHILS NFR BLD AUTO: 63.8 %
OSMOLALITY SERPL CALC.SUM OF ELEC: 288 MOSM/KG (ref 275–295)
PLATELET # BLD AUTO: 335 10(3)UL (ref 150–450)
POTASSIUM SERPL-SCNC: 4.2 MMOL/L (ref 3.5–5.1)
RBC # BLD AUTO: 4.36 X10(6)UL (ref 3.8–5.3)
RHEUMATOID FACT SERPL-ACNC: <10 IU/ML (ref ?–15)
SED RATE-ML: 97 MM/HR (ref 0–25)
SODIUM SERPL-SCNC: 137 MMOL/L (ref 136–145)
T4 FREE SERPL-MCNC: 2 NG/DL (ref 0.8–1.7)
TSI SER-ACNC: 0.02 MIU/ML (ref 0.36–3.74)
URATE SERPL-MCNC: 7 MG/DL (ref 2.6–6)
WBC # BLD AUTO: 11.6 X10(3) UL (ref 4–11)

## 2019-09-10 PROCEDURE — 84550 ASSAY OF BLOOD/URIC ACID: CPT

## 2019-09-10 PROCEDURE — 85025 COMPLETE CBC W/AUTO DIFF WBC: CPT

## 2019-09-10 PROCEDURE — 99214 OFFICE O/P EST MOD 30 MIN: CPT | Performed by: FAMILY MEDICINE

## 2019-09-10 PROCEDURE — 80053 COMPREHEN METABOLIC PANEL: CPT

## 2019-09-10 PROCEDURE — 86200 CCP ANTIBODY: CPT

## 2019-09-10 PROCEDURE — 86038 ANTINUCLEAR ANTIBODIES: CPT

## 2019-09-10 PROCEDURE — 84425 ASSAY OF VITAMIN B-1: CPT

## 2019-09-10 PROCEDURE — 86140 C-REACTIVE PROTEIN: CPT

## 2019-09-10 PROCEDURE — 84439 ASSAY OF FREE THYROXINE: CPT

## 2019-09-10 PROCEDURE — 86431 RHEUMATOID FACTOR QUANT: CPT

## 2019-09-10 PROCEDURE — 36415 COLL VENOUS BLD VENIPUNCTURE: CPT

## 2019-09-10 PROCEDURE — 82746 ASSAY OF FOLIC ACID SERUM: CPT

## 2019-09-10 PROCEDURE — 84443 ASSAY THYROID STIM HORMONE: CPT

## 2019-09-10 PROCEDURE — 85652 RBC SED RATE AUTOMATED: CPT

## 2019-09-10 RX ORDER — COLCHICINE 0.6 MG/1
TABLET ORAL
Qty: 3 TABLET | Refills: 0 | Status: SHIPPED | OUTPATIENT
Start: 2019-09-10 | End: 2019-10-13 | Stop reason: CLARIF

## 2019-09-10 NOTE — PROGRESS NOTES
[facility] Family [facility] Office Note  Chief Complaint:   Patient presents with: Foot Pain: bilateral - x1 month      HPI:   This is a 78year old female coming in for persistent bilateral foot pain and swelling.   Patient describes pain as numb [facility] MAIN OR   • HEART CORONARY ARTERY BYPASS GRAFT N/A 5/25/2017    Performed by Erich Ron MD at [address] Right 5/10/2019    Performed by Nelly Vazquez MD at [facility] MAIN OR   • [address] W/O BYPASS     • OT call well in advance. Disp: 40 tablet Rfl: 0   KRILL OIL OR Take 1 capsule by mouth 2 (two) times daily. Disp:  Rfl:    CRANBERRY CONCENTRATE OR Take 1 tablet by mouth 2 (two) times daily. Disp:  Rfl:    NON FORMULARY 2 (two) times daily.  Disp:  Rfl: bilaterally, no rales/rhonchi/wheezing  HEART:  Regular rate and rhythm, no murmurs, rubs or gallops  ABDOMEN:  Soft, nondistended, nontender, bowel sounds normal in all 4 quadrants, no hepatosplenomegaly  EXTREMITIES:  Strength intact with 5/5 bilaterally 06/23/1999  DEXA Scan due on 06/23/2014  Pneumococcal PPSV23/PCV13 65+ Years / Low and Medium Risk(1 of 2 - PCV13) due on 06/23/2014  Annual Physical due on 08/09/2019  Influenza Vaccine(1) due on 09/01/2019    Patient/Caregiver Education: Patient/Caregive

## 2019-09-11 ENCOUNTER — TELEPHONE (OUTPATIENT)
Dept: CARDIOLOGY | Age: 69
End: 2019-09-11

## 2019-09-11 LAB — CCP IGG SERPL-ACNC: 1.8 U/ML (ref 0–6.9)

## 2019-09-12 ENCOUNTER — TELEPHONE (OUTPATIENT)
Dept: FAMILY MEDICINE CLINIC | Facility: CLINIC | Age: 69
End: 2019-09-12

## 2019-09-12 DIAGNOSIS — G62.9 NEUROPATHY: Primary | ICD-10-CM

## 2019-09-12 LAB — ANA SCREEN: NEGATIVE

## 2019-09-12 NOTE — TELEPHONE ENCOUNTER
Spoke with Pt's  and he states that Pt took the Colchicine and without relief. Also tried Norco and Aleve and also no relief. The only that helped is the first 3 days of Prednisone, but when it was time to decrease dose, pain returned.

## 2019-09-12 NOTE — TELEPHONE ENCOUNTER
Pt's  Liza Valentine called with questions about the medication that was prescribed at her visit on 9/10. colchicine 0.6 MG Oral Tab 3 tablet     Abel stated the only medication that has given her any relief is when she took prednisone.  Pt is in a great de

## 2019-09-12 NOTE — TELEPHONE ENCOUNTER
Would recommend doing prednisone 60 mg daily for 5 days then 40 mg daily for 5 days then 20 mg daily for 5 days then 10 mg daily for 5 days then discontinue. Her inflammatory markers were high.   She likely has some inflammatory process going on in the tamara

## 2019-09-13 RX ORDER — PREDNISONE 10 MG/1
TABLET ORAL
Qty: 65 TABLET | Refills: 0 | Status: SHIPPED | OUTPATIENT
Start: 2019-09-13 | End: 2019-10-13 | Stop reason: CLARIF

## 2019-09-13 NOTE — TELEPHONE ENCOUNTER
Pt. Given the instructions for the prednisone. Spouse aware she needs to make an appointment with Rheumatology. Rx sent to pharmacy.

## 2019-09-14 LAB — VITAMIN B1 (THIAMINE), WHOLE B: 107 NMOL/L

## 2019-09-19 ENCOUNTER — PATIENT MESSAGE (OUTPATIENT)
Dept: FAMILY MEDICINE CLINIC | Facility: CLINIC | Age: 69
End: 2019-09-19

## 2019-09-19 RX ORDER — PREGABALIN 50 MG/1
50 CAPSULE ORAL 2 TIMES DAILY
Qty: 60 CAPSULE | Refills: 2 | Status: SHIPPED | OUTPATIENT
Start: 2019-09-19 | End: 2019-09-30 | Stop reason: DRUGHIGH

## 2019-09-19 NOTE — TELEPHONE ENCOUNTER
Pt's spouse called. He states the pharmacy has no received the Lyrica prescription./  He states pt is in a lot of pain and would like to  the prescription today.   Pt's spouse was advised that we are waiting on Dr Matilde Lemos to approve the medication

## 2019-09-19 NOTE — TELEPHONE ENCOUNTER
Please call , Breanna Alexandra at [phone]. Pt is still in a lot of pain where it is almost unbearable. She did schedule appt w/Dr. Albino Villatoro but they can't see her till December 17.  asking what she can do for the pain.

## 2019-09-19 NOTE — TELEPHONE ENCOUNTER
Lm on vm to cb. RX pended.     Linda Guzman MD   Rheumatology   [address]   Phone: [phone]

## 2019-09-19 NOTE — TELEPHONE ENCOUNTER
Has she taken the Norco for her pain? If so, we could try her Lyrica 50 mg p.o. twice daily. Try Dr. Adrien Strauss office to see if we can get her in any sooner with a provider there.

## 2019-09-19 NOTE — TELEPHONE ENCOUNTER
Per pt's , pt is currently on the second day of the Prednisone 40 mg and still with severe pain. No relief. Please also message below. Thanks.

## 2019-09-19 NOTE — TELEPHONE ENCOUNTER
Dr. Rukhsana Benitez please read below. Per pt's  he refused to take her to the . Would like to know if there's anything else to do. Pt's  states pt c/o prickly pain on both feet. Please advise.

## 2019-09-20 ENCOUNTER — HOSPITAL ENCOUNTER (OUTPATIENT)
Dept: CARDIOLOGY CLINIC | Facility: HOSPITAL | Age: 69
Discharge: HOME | End: 2019-09-20
Attending: INTERNAL MEDICINE
Payer: MEDICARE

## 2019-09-20 ENCOUNTER — DOCUMENTATION (OUTPATIENT)
Dept: CARDIOLOGY | Age: 69
End: 2019-09-20

## 2019-09-20 DIAGNOSIS — I65.21 STENOSIS OF RIGHT CAROTID ARTERY: ICD-10-CM

## 2019-09-20 PROCEDURE — 93880 EXTRACRANIAL BILAT STUDY: CPT | Performed by: INTERNAL MEDICINE

## 2019-09-20 NOTE — TELEPHONE ENCOUNTER
From: Ela Escalona  To: Tenzin Basurto MD  Sent: 9/19/2019 6:35 PM CDT  Subject: Prescription Question    After two calls to your office this afternoon, why wasn't Sonia's prescription for Lyrica sent to [facility] today as promised during zeinab

## 2019-09-23 ENCOUNTER — TELEPHONE (OUTPATIENT)
Dept: CARDIOLOGY | Age: 69
End: 2019-09-23

## 2019-09-23 DIAGNOSIS — I65.21 STENOSIS OF RIGHT CAROTID ARTERY: ICD-10-CM

## 2019-09-26 NOTE — TELEPHONE ENCOUNTER
Pt's  called and with an update.  states since pt started the Lyrica, pt's pain has been relieved. Though better,  wants to know if Lyrica can be increase to 100 mg BID?  States after the first dose, she starts to have the sensation a

## 2019-09-27 ENCOUNTER — TELEPHONE (OUTPATIENT)
Dept: CARDIOLOGY | Age: 69
End: 2019-09-27

## 2019-09-27 RX ORDER — TORSEMIDE 20 MG/1
TABLET ORAL
Qty: 60 TABLET | Refills: 1 | Status: SHIPPED | OUTPATIENT
Start: 2019-09-27 | End: 2019-12-09 | Stop reason: SDUPTHER

## 2019-09-30 RX ORDER — PREGABALIN 75 MG/1
75 CAPSULE ORAL 2 TIMES DAILY
Qty: 60 CAPSULE | Refills: 0 | Status: SHIPPED | OUTPATIENT
Start: 2019-09-30 | End: 2019-11-06

## 2019-10-05 ENCOUNTER — ANCILLARY PROCEDURE (OUTPATIENT)
Dept: CARDIOLOGY | Age: 69
End: 2019-10-05
Attending: INTERNAL MEDICINE

## 2019-10-05 ENCOUNTER — ANCILLARY ORDERS (OUTPATIENT)
Dept: CARDIOLOGY | Age: 69
End: 2019-10-05

## 2019-10-05 DIAGNOSIS — Z95.810 ICD (IMPLANTABLE CARDIOVERTER-DEFIBRILLATOR) IN PLACE: ICD-10-CM

## 2019-10-05 PROCEDURE — X1114 CARDIAC DEVICE HOME CHECK - REMOTE UNSCHEDULED: HCPCS | Performed by: INTERNAL MEDICINE

## 2019-10-07 PROCEDURE — 93295 DEV INTERROG REMOTE 1/2/MLT: CPT | Performed by: INTERNAL MEDICINE

## 2019-10-13 ENCOUNTER — HOSPITAL ENCOUNTER (INPATIENT)
Facility: HOSPITAL | Age: 69
LOS: 3 days | Discharge: HOME | DRG: 300 | End: 2019-10-16
Attending: EMERGENCY MEDICINE | Admitting: HOSPITALIST
Payer: MEDICARE

## 2019-10-13 ENCOUNTER — APPOINTMENT (OUTPATIENT)
Dept: CT IMAGING | Facility: HOSPITAL | Age: 69
DRG: 300 | End: 2019-10-13
Attending: EMERGENCY MEDICINE
Payer: MEDICARE

## 2019-10-13 ENCOUNTER — APPOINTMENT (OUTPATIENT)
Dept: GENERAL RADIOLOGY | Facility: HOSPITAL | Age: 69
DRG: 300 | End: 2019-10-13
Attending: EMERGENCY MEDICINE
Payer: MEDICARE

## 2019-10-13 ENCOUNTER — APPOINTMENT (OUTPATIENT)
Dept: ULTRASOUND IMAGING | Facility: HOSPITAL | Age: 69
DRG: 300 | End: 2019-10-13
Attending: EMERGENCY MEDICINE
Payer: MEDICARE

## 2019-10-13 DIAGNOSIS — I99.8 VASCULAR INSUFFICIENCY: Primary | ICD-10-CM

## 2019-10-13 PROCEDURE — 93925 LOWER EXTREMITY STUDY: CPT | Performed by: EMERGENCY MEDICINE

## 2019-10-13 PROCEDURE — 75635 CT ANGIO ABDOMINAL ARTERIES: CPT | Performed by: EMERGENCY MEDICINE

## 2019-10-13 PROCEDURE — 73630 X-RAY EXAM OF FOOT: CPT | Performed by: EMERGENCY MEDICINE

## 2019-10-13 PROCEDURE — 99223 1ST HOSP IP/OBS HIGH 75: CPT | Performed by: HOSPITALIST

## 2019-10-13 RX ORDER — HYDROCODONE BITARTRATE AND ACETAMINOPHEN 10; 325 MG/1; MG/1
1 TABLET ORAL EVERY 6 HOURS PRN
Status: DISCONTINUED | OUTPATIENT
Start: 2019-10-13 | End: 2019-10-16

## 2019-10-13 RX ORDER — HEPARIN SODIUM AND DEXTROSE 10000; 5 [USP'U]/100ML; G/100ML
12 INJECTION INTRAVENOUS ONCE
Status: COMPLETED | OUTPATIENT
Start: 2019-10-13 | End: 2019-10-15

## 2019-10-13 RX ORDER — SPIRONOLACTONE 25 MG/1
12.5 TABLET ORAL DAILY
Status: DISCONTINUED | OUTPATIENT
Start: 2019-10-14 | End: 2019-10-16

## 2019-10-13 RX ORDER — METOPROLOL SUCCINATE 50 MG/1
50 TABLET, EXTENDED RELEASE ORAL
Status: DISCONTINUED | OUTPATIENT
Start: 2019-10-13 | End: 2019-10-14

## 2019-10-13 RX ORDER — HEPARIN SODIUM AND DEXTROSE 10000; 5 [USP'U]/100ML; G/100ML
INJECTION INTRAVENOUS CONTINUOUS
Status: DISCONTINUED | OUTPATIENT
Start: 2019-10-14 | End: 2019-10-13

## 2019-10-13 RX ORDER — HYDROCODONE BITARTRATE AND ACETAMINOPHEN 5; 325 MG/1; MG/1
1 TABLET ORAL ONCE
Status: COMPLETED | OUTPATIENT
Start: 2019-10-13 | End: 2019-10-13

## 2019-10-13 RX ORDER — ROSUVASTATIN CALCIUM 20 MG/1
5 TABLET, COATED ORAL NIGHTLY
Status: DISCONTINUED | OUTPATIENT
Start: 2019-10-13 | End: 2019-10-16

## 2019-10-13 RX ORDER — ASPIRIN 81 MG/1
81 TABLET, CHEWABLE ORAL DAILY
Status: DISCONTINUED | OUTPATIENT
Start: 2019-10-13 | End: 2019-10-16

## 2019-10-13 RX ORDER — SODIUM CHLORIDE 9 MG/ML
INJECTION, SOLUTION INTRAVENOUS CONTINUOUS
Status: ACTIVE | OUTPATIENT
Start: 2019-10-13 | End: 2019-10-13

## 2019-10-13 RX ORDER — HEPARIN SODIUM 5000 [USP'U]/ML
5000 INJECTION INTRAVENOUS; SUBCUTANEOUS ONCE
Status: COMPLETED | OUTPATIENT
Start: 2019-10-13 | End: 2019-10-13

## 2019-10-13 RX ORDER — HEPARIN SODIUM AND DEXTROSE 10000; 5 [USP'U]/100ML; G/100ML
INJECTION INTRAVENOUS CONTINUOUS
Status: DISCONTINUED | OUTPATIENT
Start: 2019-10-13 | End: 2019-10-15

## 2019-10-13 RX ORDER — HEPARIN SODIUM AND DEXTROSE 10000; 5 [USP'U]/100ML; G/100ML
INJECTION INTRAVENOUS CONTINUOUS
Status: DISCONTINUED | OUTPATIENT
Start: 2019-10-13 | End: 2019-10-13

## 2019-10-13 RX ORDER — ONDANSETRON 2 MG/ML
4 INJECTION INTRAMUSCULAR; INTRAVENOUS EVERY 4 HOURS PRN
Status: DISCONTINUED | OUTPATIENT
Start: 2019-10-13 | End: 2019-10-16

## 2019-10-13 RX ORDER — MORPHINE SULFATE 4 MG/ML
4 INJECTION, SOLUTION INTRAMUSCULAR; INTRAVENOUS EVERY 30 MIN PRN
Status: ACTIVE | OUTPATIENT
Start: 2019-10-13 | End: 2019-10-13

## 2019-10-13 RX ORDER — HEPARIN SODIUM 5000 [USP'U]/ML
80 INJECTION INTRAVENOUS; SUBCUTANEOUS ONCE
Status: DISCONTINUED | OUTPATIENT
Start: 2019-10-13 | End: 2019-10-13

## 2019-10-13 RX ORDER — PREGABALIN 75 MG/1
75 CAPSULE ORAL 2 TIMES DAILY
Status: DISCONTINUED | OUTPATIENT
Start: 2019-10-13 | End: 2019-10-16

## 2019-10-13 RX ORDER — HEPARIN SODIUM AND DEXTROSE 10000; 5 [USP'U]/100ML; G/100ML
18 INJECTION INTRAVENOUS ONCE
Status: DISCONTINUED | OUTPATIENT
Start: 2019-10-13 | End: 2019-10-13

## 2019-10-13 RX ORDER — TORSEMIDE 20 MG/1
40 TABLET ORAL DAILY
Status: DISCONTINUED | OUTPATIENT
Start: 2019-10-13 | End: 2019-10-16

## 2019-10-13 RX ORDER — DOCUSATE SODIUM 100 MG/1
100 CAPSULE, LIQUID FILLED ORAL DAILY
Status: DISCONTINUED | OUTPATIENT
Start: 2019-10-13 | End: 2019-10-16

## 2019-10-13 NOTE — H&P
[facility] HOSPITALIST  History and Physical     Winston Gustavo Patient Status:  Emergency    1950 MRN [id number]   Location [facility] [facility] Attending Deborah Fischer MD   Hosp Day # 0 PCP Álvaro Duff MD     Chief Compla abuse 9/17/2014   • Visual impairment     reading glasses        Past Surgical History:   Past Surgical History:   Procedure Laterality Date   • ABDOMINAL AORTIC ANEURYSM N/A 5/9/2017    Performed by Duane Zavala MD at \[facility]\""     • AN Oral Tab, Take 2.5 mg by mouth 2 (two) times daily with meals. , Disp: , Rfl:   pregabalin (LYRICA) 75 MG Oral Cap, Take 1 capsule (75 mg total) by mouth 2 (two) times daily. , Disp: 60 capsule, Rfl: 0  aspirin 81 MG Oral Tab, Take 81 mg by mouth daily. , Dis Regular rate and rhythm. No murmurs, rubs or gallops. Chest and Back: No tenderness or deformity. Abdomen: Soft, nontender, nondistended. Positive bowel sounds. No rebound, guarding or organomegaly. Neurologic: No focal neurological deficits.  CNII-XII MD  10/13/2019

## 2019-10-13 NOTE — ED PROVIDER NOTES
Patient Seen in: [facility] Emergency Department      History   Patient presents with:  Pain (neurologic)    Stated Complaint: bilateral pain in feet     HPI    Th this is a 50-year-old female who presents with bilateral this is a 8year-old female LAPAROTOMY N/A 8/6/2017    Performed by Bhargav Foster MD at [address] [facility] [address] GRAFT N/A 5/25/2017    Performed by Nayely Miller MD at [facility] 58 Right 5/10/2019    Performed by Ondina Greenwood MD at [address] [facility] The abdomen is soft nondistended nontender. There is no rebound. There is no guarding. EXT: There is good pulses bilaterally. There is no calf tenderness. There is findings of bilateral feet tenderness.   She does have pulses capillary refill less th oblique, and lateral views were obtained. COMPARISON:  None. INDICATIONS:  bilateral pain in feet  PATIENT STATED HISTORY: (As transcribed by Technologist)  Bilateral feet swelling. Unable to ambulate normally.     FINDINGS:  No acute fractures or osseous Technologist)     FINDINGS:  Bilateral heavily calcified vessels. On the right side, there is nonvisualization of blood flow within mid and distal superficial femoral artery, proximal popliteal artery, and nonvisualization of the anterior tibial artery. acute ST elevations when compared to an old EKG there is no significant changes. The patient's CBC shows a white count of 11 hemoglobin 10.5. The patient's case was discussed with Dr. Heloise Burkitt from vascular after finding the results of the ultrasound. (FAN=[id number])  COMPARISON:  None. INDICATIONS:  bilateral pain in feet  TECHNIQUE:  A comprehensive color duplex Doppler ultrasound examination of the bilateral lower extremities was performed.   Color imaging, Doppler wave form analysis, and peak systolic f He recommended patient be heparinized she had taken her normal normal dose of Xarelto in the morning but not taken her evening dose He recommended patient be admitted and he did recommend to discuss to [facility] Portillo Li

## 2019-10-13 NOTE — ED INITIAL ASSESSMENT (HPI)
Bilateral foot pain and swelling since August 10. States PMD initially thought it was gout and took medication for gout which did not help, started on lyrica, states increased pain and swelling today.

## 2019-10-14 ENCOUNTER — APPOINTMENT (OUTPATIENT)
Dept: ULTRASOUND IMAGING | Facility: HOSPITAL | Age: 69
DRG: 300 | End: 2019-10-14
Attending: SURGERY
Payer: MEDICARE

## 2019-10-14 PROCEDURE — 99222 1ST HOSP IP/OBS MODERATE 55: CPT | Performed by: INTERNAL MEDICINE

## 2019-10-14 PROCEDURE — 99233 SBSQ HOSP IP/OBS HIGH 50: CPT | Performed by: HOSPITALIST

## 2019-10-14 PROCEDURE — 76770 US EXAM ABDO BACK WALL COMP: CPT | Performed by: SURGERY

## 2019-10-14 PROCEDURE — 93970 EXTREMITY STUDY: CPT | Performed by: SURGERY

## 2019-10-14 RX ORDER — ACETAMINOPHEN 500 MG
500 TABLET ORAL EVERY 6 HOURS PRN
Status: DISCONTINUED | OUTPATIENT
Start: 2019-10-14 | End: 2019-10-16

## 2019-10-14 RX ORDER — ACETAMINOPHEN 500 MG
1000 TABLET ORAL EVERY 6 HOURS PRN
Status: DISCONTINUED | OUTPATIENT
Start: 2019-10-14 | End: 2019-10-16

## 2019-10-14 RX ORDER — SODIUM CHLORIDE 9 MG/ML
INJECTION, SOLUTION INTRAVENOUS CONTINUOUS
Status: DISCONTINUED | OUTPATIENT
Start: 2019-10-15 | End: 2019-10-15

## 2019-10-14 RX ORDER — METOPROLOL SUCCINATE 25 MG/1
25 TABLET, EXTENDED RELEASE ORAL
Status: DISCONTINUED | OUTPATIENT
Start: 2019-10-14 | End: 2019-10-16

## 2019-10-14 NOTE — CONSULTS
[facility]    PATIENT'S NAME: Blaire Mirandakalani   ATTENDING PHYSICIAN: Sabrina Pearce M.D.   [facility] Medin: Zakiya Wu M.D.    PATIENT ACCOUNT#:   [de-identified]    LOCATION:  [address] [place] [address]  MEDICAL RECORD #:   [id number]       DATE OF BIRTH:  [birth year] She is also taking aspirin, pain medications, metoprolol succinate, Lyrica, Crestor, Entresto, Aldactone, Demadex. ALLERGIES:  Reportedly, she has allergy to penicillin. SOCIAL HISTORY:  She is  with 3 children. She is retired.          RE history, with a ruptured aneurysm with intact aorto bi-iliac bypass graft, with a past history of bowel obstruction. At this time, discussed with the patient, I will try to find out the etiology for this.   Would still do an angiogram on her to see and a

## 2019-10-14 NOTE — CONSULTS
[facility]  Report of Consultation    Letha Vallejo Patient Status:  Inpatient    1950 MRN [id number]   [facility] 7NE-A Attending Shayan Gaytan MD   Hosp Day # 1 PCP Dinesh Mccray MD     Reason for Consultation:  Foot pa [employer]   • COLECTOMY      due to bowel obstruction   • CYSTOSCOPY,INSERT URETERAL STENT     • EXPLORATORY LAPAROTOMY N/A 8/6/2017    Performed by Mel Duque MD at [address] [place] [address] GRAFT N/A 5/25/2017    Performed 12.5 mg, 12.5 mg, Oral, Daily  •  torsemide (DEMADEX) tab 40 mg, 40 mg, Oral, Daily  •  heparin (PORCINE) drip [id number]/250mL infusion, 200-3,000 Units/hr, Intravenous, Continuous    Review of Systems:  All systems were reviewed and are negative except a of angina or heart failure. We will continue her usual medicines. Xarelto is on hold. 3.  History of AAA repair -this appears to be intact based on CTA    4.   Hyperlipidemia -continue rosuvastatin    Carmencita White MD  10/14/2019  12:11 PM

## 2019-10-14 NOTE — PLAN OF CARE
Assumed patient care at 1900  Patient states pain to feet 6/10 with relief from norco.   Patients feet are red, swollen, patient denies numbness and tingling  Right pedal pulse +1, Left pedal pulse confirmed by doppler  Heparin gtt infusing at 11.5 mL/Hr, injections, enemas and rectal medication administration  - Ensure safe mobilization of patient  - Hold pressure on venipuncture sites to achieve adequate hemostasis  - Assess for signs and symptoms of internal bleeding  - Monitor lab trends  Outcome: Progr transportation as appropriate  - Identify discharge learning needs (meds, wound care, etc)  - Arrange for interpreters to assist at discharge as needed  - Consider post-discharge preferences of patient/family/discharge partner  - Complete POLST form as eda

## 2019-10-14 NOTE — PROGRESS NOTES
Confirmed with Dr. Erlinda Shen to follow Hpearin gtt per ACS protocol. Orders modified. [address] Ana Watkins RN Endorsed.

## 2019-10-14 NOTE — HISTORICAL OFFICE NOTE
Sharron Saavedra MD - 2019 10:45 AM CDT  Formatting of this note might be different from the original.      2019  [facility]  Clinic Note    Hannah Keyon  : 1950  PCP: Radhika Her MD    Reason for Visit:    C tightly blocked SFA without any kind of thromboembolic issue. Back in October 2018 both common iliac arteries were aneurysmal about 2.2 cm in the right internal iliac is 1.6 cm.     PMHx:    Past Medical History:   Diagnosis Date   • Atrial fibrillation (CM Medication Sig Dispense Refill   • metoPROLOL succinate (TOPROL-XL) 50 MG 24 hr tablet TAKE 1 TABLET BY MOUTH TWO TIMES A  tablet 2   • spironolactone (ALDACTONE) 25 MG tablet TAKE 1/2 TABLET BY MOUTH ONE TIME A DAY 45 tablet 3   • aspirin 81 MG t Triglycerides (mg/dL)   Date Value   10/25/2018 110     No results found for: AST  No results found for: ALT  No components found for: BMP    Impression:      1. Hx of CABG   2. Pure hypercholesterolemia   3.  Coronary artery disease involving native

## 2019-10-14 NOTE — PLAN OF CARE
Assumed care at 0700. Pt A/O x4. RA. NSR on tele. VSS. SBP low overnight. Morning BP meds held per hospitalist. SBP in the 90s throughout   the day. Spoke with APN and Spironolactone Given. BLE are warm to touch. Pedal pulses   1+.  Heparin gtt infusing at products/factors as ordered and appropriate  Outcome: Progressing  Goal: Free from bleeding injury  Description  (Example usage: patient with low platelets)  INTERVENTIONS:  - Avoid intramuscular injections, enemas and rectal medication administration  - E resources  Description  INTERVENTIONS:  - Identify barriers to discharge w/pt and caregiver  - Include patient/family/discharge partner in discharge planning  - Arrange for needed discharge resources and transportation as appropriate  - Identify discharge

## 2019-10-15 ENCOUNTER — APPOINTMENT (OUTPATIENT)
Dept: INTERVENTIONAL RADIOLOGY/VASCULAR | Facility: HOSPITAL | Age: 69
DRG: 300 | End: 2019-10-15
Attending: INTERNAL MEDICINE
Payer: MEDICARE

## 2019-10-15 PROCEDURE — 99232 SBSQ HOSP IP/OBS MODERATE 35: CPT | Performed by: HOSPITALIST

## 2019-10-15 PROCEDURE — B41DYZZ FLUOROSCOPY OF AORTA AND BILATERAL LOWER EXTREMITY ARTERIES USING OTHER CONTRAST: ICD-10-PCS | Performed by: INTERNAL MEDICINE

## 2019-10-15 PROCEDURE — 75625 CONTRAST EXAM ABDOMINL AORTA: CPT | Performed by: INTERNAL MEDICINE

## 2019-10-15 PROCEDURE — 36245 INS CATH ABD/L-EXT ART 1ST: CPT | Performed by: INTERNAL MEDICINE

## 2019-10-15 PROCEDURE — 99152 MOD SED SAME PHYS/QHP 5/>YRS: CPT | Performed by: INTERNAL MEDICINE

## 2019-10-15 PROCEDURE — 99223 1ST HOSP IP/OBS HIGH 75: CPT | Performed by: OTHER

## 2019-10-15 PROCEDURE — 75710 ARTERY X-RAYS ARM/LEG: CPT | Performed by: INTERNAL MEDICINE

## 2019-10-15 RX ORDER — DULOXETIN HYDROCHLORIDE 30 MG/1
30 CAPSULE, DELAYED RELEASE ORAL DAILY
Status: DISCONTINUED | OUTPATIENT
Start: 2019-10-15 | End: 2019-10-16

## 2019-10-15 RX ORDER — HEPARIN SODIUM 5000 [USP'U]/ML
INJECTION, SOLUTION INTRAVENOUS; SUBCUTANEOUS
Status: COMPLETED
Start: 2019-10-15 | End: 2019-10-15

## 2019-10-15 RX ORDER — MIDAZOLAM HYDROCHLORIDE 1 MG/ML
INJECTION INTRAMUSCULAR; INTRAVENOUS
Status: COMPLETED
Start: 2019-10-15 | End: 2019-10-15

## 2019-10-15 RX ORDER — LIDOCAINE HYDROCHLORIDE 10 MG/ML
INJECTION, SOLUTION EPIDURAL; INFILTRATION; INTRACAUDAL; PERINEURAL
Status: COMPLETED
Start: 2019-10-15 | End: 2019-10-15

## 2019-10-15 NOTE — PLAN OF CARE
Received patient A/Ox4, RA, NSR on tele at 0700  Angio completed today   Heparin gtt d/c, 2.5mg Xarelto BID started   Cath incision to right groin d/c/i, right foot warm, right and left pedal pulses via doppler  Patient diet changed back to cardiac, tolera Hold pressure on venipuncture sites to achieve adequate hemostasis  - Assess for signs and symptoms of internal bleeding  - Monitor lab trends  Outcome: Progressing     Problem: PAIN - ADULT  Goal: Verbalizes/displays adequate comfort level or patient's st interpreters to assist at discharge as needed  - Consider post-discharge preferences of patient/family/discharge partner  - Complete POLST form as appropriate  - Assess patient's ability to be responsible for managing their own health  - Refer to [facility]

## 2019-10-15 NOTE — PROGRESS NOTES
Notes read/ discussed with Dr. Ana So- worsening flow from 2 years ago by [address]-

## 2019-10-15 NOTE — PLAN OF CARE
Assumed patient care at 1900, patient states pain is improving  Pain to feet and ankles, pedal pulses by doppler  Patient up to bathroom X 1 with walker  Heparin gtt infusing at 13 mL/Hr, xarelto on hold  Entresto held at night due to patients low BP  Cons medication administration  - Ensure safe mobilization of patient  - Hold pressure on venipuncture sites to achieve adequate hemostasis  - Assess for signs and symptoms of internal bleeding  - Monitor lab trends  Outcome: Progressing     Problem: PAIN - PROSPER Identify discharge learning needs (meds, wound care, etc)  - Arrange for interpreters to assist at discharge as needed  - Consider post-discharge preferences of patient/family/discharge partner  - Complete POLST form as appropriate  - Assess patient's abil

## 2019-10-15 NOTE — PROGRESS NOTES
[facility] HOSPITALIST  Progress Note     Ana Guy Patient Status:  Inpatient    1950 MRN [id number]   [facility] 7NE-A Attending Gerardo Romo MD   Hosp Day # 2 PCP Jus Lew MD     Chief Complaint: les pain and edema    S: 81 mg Oral Daily   • docusate sodium  100 mg Oral Daily   • pregabalin  75 mg Oral BID   • Rosuvastatin Calcium  5 mg Oral Nightly   • Sacubitril-Valsartan  1 tablet Oral BID   • spironolactone  12.5 mg Oral Daily   • torsemide  40 mg Oral Daily       SIMA

## 2019-10-15 NOTE — CONSULTS
Neurology H&P    Winston Chen Patient Status:  Inpatient    1950 MRN [id number]   [facility] 7NE-A Attending Jarocho Coombs MD   [facility] Day # 2 PCP Álvaro Duff MD     Subjective:  Winston Chen is a(n) 78year old female with a Peripheral vascular disease ([facility] [address]     Other specified hypothyroidism     Primary osteoarthritis of right hip  Global 08/08/2019     Orthopedic aftercare     Status post right hip replacement     Vascular insufficiency      PMHx:  Past Medical History:   D 47.00        Pack years: 23.5        Types: Cigarettes        Quit date: 2017        Years since quittin.4      Smokeless tobacco: Never Used    Alcohol use:  Yes      Alcohol/week: 1.0 - 2.0 standard drinks      Types: 1 - 2 Glasses of wine per wee Component      Latest Ref Rng & Units 9/10/2019 4/26/2019   HCV AB      Nonreactive   Nonreactive   RHEUMATOID FACTOR      <15 IU/mL <10    JEANNETTE SCREEN      Negative Negative    SED RATE      0 - 25 mm/Hr 97 (H)    C-REACTIVE PROTEIN      <0.30 mg/dL 7.

## 2019-10-15 NOTE — PROGRESS NOTES
[facility] HOSPITALIST  Progress Note     Ana Guy Patient Status:  Inpatient    1950 MRN [id number]   [facility] 7NE-A Attending Gerardo Romo MD   Hosp Day # 2 PCP Jus Lew MD     Chief Complaint: les edema and pain    S: Blocker)   • aspirin  81 mg Oral Daily   • docusate sodium  100 mg Oral Daily   • pregabalin  75 mg Oral BID   • Rosuvastatin Calcium  5 mg Oral Nightly   • Sacubitril-Valsartan  1 tablet Oral BID   • spironolactone  12.5 mg Oral Daily   • torsemide  40 mg

## 2019-10-15 NOTE — OPERATIVE REPORT
Full note dictated,    Very tortuous aorto-bifem graft    100% SFA bilaterall    Profunda collaterals to pop    2 vessel run off to both Tony Gill MD

## 2019-10-16 ENCOUNTER — TELEPHONE (OUTPATIENT)
Dept: CARDIOLOGY | Age: 69
End: 2019-10-16

## 2019-10-16 VITALS
WEIGHT: 187.69 LBS | HEART RATE: 97 BPM | RESPIRATION RATE: 18 BRPM | SYSTOLIC BLOOD PRESSURE: 105 MMHG | HEIGHT: 70 IN | DIASTOLIC BLOOD PRESSURE: 60 MMHG | BODY MASS INDEX: 26.87 KG/M2 | TEMPERATURE: 98 F | OXYGEN SATURATION: 95 %

## 2019-10-16 PROCEDURE — 99239 HOSP IP/OBS DSCHRG MGMT >30: CPT | Performed by: HOSPITALIST

## 2019-10-16 PROCEDURE — 99232 SBSQ HOSP IP/OBS MODERATE 35: CPT | Performed by: INTERNAL MEDICINE

## 2019-10-16 PROCEDURE — 99233 SBSQ HOSP IP/OBS HIGH 50: CPT | Performed by: OTHER

## 2019-10-16 RX ORDER — METOPROLOL SUCCINATE 25 MG/1
25 TABLET, EXTENDED RELEASE ORAL
Qty: 60 TABLET | Refills: 3 | Status: ON HOLD | OUTPATIENT
Start: 2019-10-16 | End: 2020-10-29

## 2019-10-16 RX ORDER — DULOXETIN HYDROCHLORIDE 30 MG/1
30 CAPSULE, DELAYED RELEASE ORAL DAILY
Qty: 30 CAPSULE | Refills: 0 | Status: SHIPPED | OUTPATIENT
Start: 2019-10-17 | End: 2019-11-14

## 2019-10-16 NOTE — PLAN OF CARE
Assumed care of pt at 1900. A&Ox4, RA, NSR on tele. Pain to bilat feet improving. Dressing to right groin dry and intact, site soft and non-tender. Pedal pulses per doppler. Up with assist x1 and a walker. Needs attended to. Will monitor.        Problem: CA Anticipate increased pain with activity and pre-medicate as appropriate  Outcome: Progressing     Problem: DISCHARGE PLANNING  Goal: Discharge to home or other facility with appropriate resources  Description  INTERVENTIONS:  - Identify barriers to dischar

## 2019-10-16 NOTE — PROGRESS NOTES
JIMMY HOSPITALIST  Progress Note     Norm Edgar Patient Status:  Inpatient    1950 MRN [id number]   [facility] 7NE-A Attending Shantel Hayward MD   [facility] Day # 3 PCP Matthew Kwan MD     Chief Complaint: feet pain, patient is ad results for input(s): TROP, CK in the last 168 hours. Imaging: Imaging data reviewed in Epic.     Medications:   • DULoxetine HCl  30 mg Oral Daily   • rivaroxaban  2.5 mg Oral BID with meals   • Metoprolol Succinate ER  25 mg Oral 2x Daily(Beta Blo

## 2019-10-16 NOTE — PROGRESS NOTES
[facility] [facility] Neu[facility] Progress Note    Ela Escalona Patient Status:  Inpatient    1950 MRN [id number]   [facility] 7NE-A Attending Guicho Pandya MD   [facility] Day # 3 PCP Iline Felty, MD         Subjective:  Ela Escalona ER  25 mg Oral 2x Daily(Beta Blocker)   • aspirin  81 mg Oral Daily   • docusate sodium  100 mg Oral Daily   • pregabalin  75 mg Oral BID   • Rosuvastatin Calcium  5 mg Oral Nightly   • Sacubitril-Valsartan  1 tablet Oral BID   • spironolactone  12.5 mg Or reviewed history, labs and imaging, and agree with above note with following additions:  S: no new complaints, improving  O: /60 (BP Location: Right arm)   Pulse 97   Temp 98.2 °F (36.8 °C) (Oral)   Resp 18   Ht 70\"   Wt 187 lb 11.2 oz (85.1 kg)   S

## 2019-10-16 NOTE — PROGRESS NOTES
[facility]  Cardiology Progress Note    Subjective:  No chest pain or shortness of breath. Patient seems frustrated, wants to go home today.      Objective:  /60 (BP Location: Right arm)   Pulse 86   Temp 98.2 °F (36.8 °C) (Oral)   Resp 19   Ht 5 AAA repair  · HLD: on statin    Plan:  · Neurology consult for LE neuropathy  · Case discussed with dr Vanessa Khan, ok to d/c from cardiology standpoint. Follow up in 2 weeks w the APN in the office. · Dr Mehnaz Moran to see today.      YULIET Oglesby  10/16

## 2019-10-16 NOTE — PROCEDURES
[address]    PATIENT'S NAME: Dakota Gonzalez   ATTENDING PHYSICIAN: Kal Bashir M.D. OPERATING PHYSICIAN: Gamal Lopez M.D.    PATIENT ACCOUNT#:   [de-identified]    LOCATION:  [address] [place] [address]  MEDICAL RECORD #:   [id number]       DATE OF BIRTH:  0 external iliac artery. Injections revealed no significant iliac disease. Common femoral has some mild disease. The SFA is occluded proximally. The profunda femoris appears to be patent.   This gives an extensive collateral network to the popliteal arter procedure well. SEDATION:  The patient received conscious sedation. This was monitored by myself and the catheterization lab staff. This started at [phone] and ended at 36.     SUMMARY:  In summary, the patient is noted to have a patent aortoiliac bypas

## 2019-10-16 NOTE — PLAN OF CARE
NURSING DISCHARGE NOTE    Discharged Home via Wheelchair. Accompanied by Spouse  Belongings Taken by patient/family.     Received Patient A/Ox4, RA, NSR on tele at 0700  Right groin incision soft and non tender, dressing d/c/i  Educated patient and spo with low platelets)  INTERVENTIONS:  - Avoid intramuscular injections, enemas and rectal medication administration  - Ensure safe mobilization of patient  - Hold pressure on venipuncture sites to achieve adequate hemostasis  - Assess for signs and symptoms patient/family/discharge partner in discharge planning  - Arrange for needed discharge resources and transportation as appropriate  - Identify discharge learning needs (meds, wound care, etc)  - Arrange for interpreters to assist at discharge as needed  -

## 2019-10-16 NOTE — CONSULTS
[facility]    PATIENT'S NAME: Karthikeyan Simon   ATTENDING PHYSICIAN: AYSE Meredith Ro: Noe Kay M.D.    PATIENT ACCOUNT#:   [de-identified]    LOCATION:  [address] [place] [address]  MEDICAL RECORD #:   [id number]       DATE OF BIRTH:  [birth year] The patient has been offered a right femoral distal bypass.   She has a good vein in the right leg, but she is aware that there is a risk of death, myocardial infarction, bleeding, infection, graft thrombosis, limb loss, future graft thrombosis, future

## 2019-10-17 ENCOUNTER — PATIENT OUTREACH (OUTPATIENT)
Dept: CASE MANAGEMENT | Age: 69
End: 2019-10-17

## 2019-10-17 NOTE — PROGRESS NOTES
ANASTACIA spoke with patient's  Wesley Lovelace he states Onofre is sleeping and he will have her call ANASTACIA back when she wakes up.

## 2019-10-19 NOTE — DISCHARGE SUMMARY
[facility] HOSPITAL[facility]  DISCHARGE SUMMARY     Myrna Tellez Patient Status:  Inpatient    1950 MRN [id number]   [facility] 7NE-A Attending No att. providers found   Hosp Day # 3 PCP James Collier MD     Date of Admission: 10/13/2019 tablet  Refills:  3        CONTINUE taking these medications      Instructions Prescription details   aspirin 81 MG Tabs      Take 81 mg by mouth daily. Refills:  0     CRANBERRY CONCENTRATE OR      Take 1 tablet by mouth 2 (two) times daily.    Refills: Follow-up appointment:   Radha Gaming DO  [address]  [address]  [phone]      follow up on 11/14 at 1:20 pm    MD Lefty [place]  [phone]    Call in

## 2019-10-21 ENCOUNTER — APPOINTMENT (OUTPATIENT)
Dept: CARDIOLOGY | Age: 69
End: 2019-10-21

## 2019-10-28 ENCOUNTER — OFFICE VISIT (OUTPATIENT)
Dept: CARDIOLOGY | Age: 69
End: 2019-10-28

## 2019-10-28 VITALS
WEIGHT: 182 LBS | SYSTOLIC BLOOD PRESSURE: 110 MMHG | DIASTOLIC BLOOD PRESSURE: 60 MMHG | BODY MASS INDEX: 26.11 KG/M2 | HEART RATE: 96 BPM

## 2019-10-28 DIAGNOSIS — E05.90 HYPERTHYROIDISM: ICD-10-CM

## 2019-10-28 DIAGNOSIS — I25.5 CARDIOMYOPATHY, ISCHEMIC: ICD-10-CM

## 2019-10-28 DIAGNOSIS — I65.23 BILATERAL CAROTID ARTERY STENOSIS: Primary | ICD-10-CM

## 2019-10-28 DIAGNOSIS — I25.10 CORONARY ARTERY DISEASE INVOLVING NATIVE CORONARY ARTERY OF NATIVE HEART WITHOUT ANGINA PECTORIS: ICD-10-CM

## 2019-10-28 DIAGNOSIS — E78.00 PURE HYPERCHOLESTEROLEMIA: ICD-10-CM

## 2019-10-28 DIAGNOSIS — Z95.1 HX OF CABG: ICD-10-CM

## 2019-10-28 PROCEDURE — 99214 OFFICE O/P EST MOD 30 MIN: CPT | Performed by: INTERNAL MEDICINE

## 2019-10-28 RX ORDER — ROSUVASTATIN CALCIUM 5 MG/1
5 TABLET, COATED ORAL DAILY
COMMUNITY
End: 2020-02-03 | Stop reason: ALTCHOICE

## 2019-10-28 RX ORDER — DULOXETIN HYDROCHLORIDE 30 MG/1
30 CAPSULE, DELAYED RELEASE ORAL 2 TIMES DAILY
COMMUNITY
End: 2021-02-05

## 2019-10-28 RX ORDER — METOPROLOL SUCCINATE 25 MG/1
25 TABLET, EXTENDED RELEASE ORAL 2 TIMES DAILY
COMMUNITY
End: 2020-02-09 | Stop reason: SDUPTHER

## 2019-10-28 RX ORDER — PREGABALIN 150 MG/1
150 CAPSULE ORAL 2 TIMES DAILY
COMMUNITY
End: 2021-06-16

## 2019-11-11 RX ORDER — PREGABALIN 75 MG/1
CAPSULE ORAL
Qty: 60 CAPSULE | Refills: 0 | Status: SHIPPED | OUTPATIENT
Start: 2019-11-11 | End: 2019-12-09

## 2019-11-12 ENCOUNTER — MED REC SCAN ONLY (OUTPATIENT)
Dept: FAMILY MEDICINE CLINIC | Facility: CLINIC | Age: 69
End: 2019-11-12

## 2019-11-14 ENCOUNTER — OFFICE VISIT (OUTPATIENT)
Dept: NEUROLOGY | Facility: CLINIC | Age: 69
End: 2019-11-14
Payer: MEDICARE

## 2019-11-14 VITALS
WEIGHT: 182 LBS | DIASTOLIC BLOOD PRESSURE: 58 MMHG | SYSTOLIC BLOOD PRESSURE: 102 MMHG | HEART RATE: 84 BPM | RESPIRATION RATE: 16 BRPM | BODY MASS INDEX: 26 KG/M2

## 2019-11-14 DIAGNOSIS — R20.8 ALLODYNIA: Primary | ICD-10-CM

## 2019-11-14 DIAGNOSIS — G62.9 NEUROPATHY: ICD-10-CM

## 2019-11-14 PROCEDURE — 99213 OFFICE O/P EST LOW 20 MIN: CPT | Performed by: OTHER

## 2019-11-14 RX ORDER — DULOXETIN HYDROCHLORIDE 30 MG/1
60 CAPSULE, DELAYED RELEASE ORAL DAILY
Qty: 60 CAPSULE | Refills: 2 | Status: SHIPPED | OUTPATIENT
Start: 2019-11-14 | End: 2020-01-24

## 2019-11-14 NOTE — PROGRESS NOTES
Neurology H&P    Rashid Nayak Patient Status:  No patient class for patient encounter    1950 MRN [id number]   Location [facility], [address] [place] [address] Drive, [address] [place] [address] Attending No att. providers found   [facility] Day # 0 PCP Ingris Pandya MD MG Oral Cap DR Particles Take 1 capsule (30 mg total) by mouth daily. 30 capsule 0   • Sacubitril-Valsartan 24-26 MG Oral Tab Take 1 tablet by mouth 2 (two) times daily.      • rivaroxaban (XARELTO) 2.5 MG Oral Tab Take 2.5 mg by mouth 2 (two) times daily w aftercare     Status post right hip replacement     Vascular insufficiency     Allodynia      PMHx:  Past Medical History:   Diagnosis Date   • AAA (abdominal aortic aneurysm) (HCC)    • Anemia, unspecified 8/15/2017   • Arrhythmia     a fib, [place]   • Athero Years since quittin.5      Smokeless tobacco: Never Used    Alcohol use:  Yes      Alcohol/week: 1.0 - 2.0 standard drinks      Types: 1 - 2 Glasses of wine per week      Comment: LAST DRINK WED    Drug use: No      Family History:  Family History   Pro 5.5 - 18.0 mg/L 8.7    GAMMA-TOCOPHEROL (VIT E) -MG/L      0.0 - 6.0 mg/L 0.9    RHEUMATOID FACTOR      <15 IU/mL  <10   JEANNETTE SCREEN      Negative  Negative   C-Citrullinated Peptide IgG AB      0.0 - 6.9 U/mL  1.8   SED RATE      0 - 25 mm/Hr  97 (H)   C-R

## 2019-11-14 NOTE — PATIENT INSTRUCTIONS
Refill policies:    • Allow 2-3 business days for refills; controlled substances may take longer.   • Contact your pharmacy at least 5 days prior to running out of medication and have them send an electronic request or submit request through the “request re Depending on your insurance carrier, approval may take 3-10 days. It is highly recommended patients contact their insurance carrier directly to determine coverage.   If test is done without insurance authorization, patient may be responsible for the entire or affect the results of the test, so please read through the instructions carefully:    1. Do not put lotion on the arms or legs that are going to be examined.   If you are not sure what side or limbs are to be tested, avoid putting lotion near the spine a

## 2019-12-10 ENCOUNTER — ORDER TRANSCRIPTION (OUTPATIENT)
Dept: ADMINISTRATIVE | Facility: HOSPITAL | Age: 69
End: 2019-12-10

## 2019-12-10 DIAGNOSIS — G62.9 NEUROPATHY: Primary | ICD-10-CM

## 2019-12-10 DIAGNOSIS — R20.8 SKIN PAIN: ICD-10-CM

## 2019-12-10 RX ORDER — TORSEMIDE 20 MG/1
TABLET ORAL
Qty: 180 TABLET | Refills: 0 | Status: SHIPPED | OUTPATIENT
Start: 2019-12-10 | End: 2020-03-09 | Stop reason: SDUPTHER

## 2019-12-12 RX ORDER — PREGABALIN 75 MG/1
CAPSULE ORAL
Qty: 60 CAPSULE | Refills: 0 | Status: SHIPPED | OUTPATIENT
Start: 2019-12-12 | End: 2020-01-07

## 2019-12-12 NOTE — TELEPHONE ENCOUNTER
Pt's  called for status. Pt is out of med - please send to [facility]. Also, pt will have new insurance next year.    will call once he gets insurance information to schedule her Medicare Annual.

## 2019-12-17 ENCOUNTER — OFFICE VISIT (OUTPATIENT)
Dept: RHEUMATOLOGY | Facility: CLINIC | Age: 69
End: 2019-12-17
Payer: MEDICARE

## 2019-12-17 ENCOUNTER — TELEPHONE (OUTPATIENT)
Dept: FAMILY MEDICINE CLINIC | Facility: CLINIC | Age: 69
End: 2019-12-17

## 2019-12-17 VITALS
HEIGHT: 70 IN | HEART RATE: 76 BPM | WEIGHT: 184.38 LBS | DIASTOLIC BLOOD PRESSURE: 56 MMHG | BODY MASS INDEX: 26.4 KG/M2 | SYSTOLIC BLOOD PRESSURE: 96 MMHG

## 2019-12-17 DIAGNOSIS — E79.0 HYPERURICEMIA: ICD-10-CM

## 2019-12-17 DIAGNOSIS — M79.89 FOOT SWELLING: Primary | ICD-10-CM

## 2019-12-17 DIAGNOSIS — R79.82 CRP ELEVATED: ICD-10-CM

## 2019-12-17 DIAGNOSIS — G62.9 NEUROPATHY: ICD-10-CM

## 2019-12-17 DIAGNOSIS — I73.9 PERIPHERAL VASCULAR DISEASE (HCC): ICD-10-CM

## 2019-12-17 DIAGNOSIS — R20.8 ALLODYNIA: ICD-10-CM

## 2019-12-17 DIAGNOSIS — R70.0 ELEVATED SED RATE: ICD-10-CM

## 2019-12-17 PROCEDURE — 99204 OFFICE O/P NEW MOD 45 MIN: CPT | Performed by: INTERNAL MEDICINE

## 2019-12-17 NOTE — PROGRESS NOTES
?  RHEUMATOLOGY NEW PATIENT   Date of visit: 12/17/2019  ? Patient presents with: Foot Pain: New pt in for Bilateral foot pain, since August 10th. CT, US and xray for feet done. Was seen in ER for feet on 10/2019.  Patient currently taking lyrica and cy instructions. No further questions at this time. ?  Plan:  Diagnoses and all orders for this visit:    Foot swelling  -     ANCA PANEL VASCULITIS W/REFLEX; Future  -     SED RATE, WESTRACHELREN (AUTOMATED);  Future  -     C-REACTIVE PROTEIN; Future  -     M toenails. States this is the first time anything like this has happened in the past. Pain present throughout the foot but particularly over midfoot and bottom of the feet.  States pain worst when she first wakes up in the morning and does improve as day pro white with cold exposure), prior hematologic abnormality, prior renal or liver disease, or history of seizures. Denies hx of pericarditis or pleuritis.    Denies nonhealing ulcers on the fingertips, skin calcifications, trouble swallowing, or severe acid re Scientific   • COLECTOMY      due to bowel obstruction   • CYSTOSCOPY,INSERT URETERAL STENT     • EXPLORATORY LAPAROTOMY N/A 8/6/2017    Performed by Baldomero Marroquin MD at [address] GRAFT N/A 5/25/2017    Performed by Latanya Roque, (two) times daily. , Disp: , Rfl:   CRANBERRY CONCENTRATE OR, Take 1 tablet by mouth 2 (two) times daily. , Disp: , Rfl:   Multiple Vitamin (MULTI-VITAMIN DAILY OR), Take 1 tablet by mouth daily.   , Disp: , Rfl:   Probiotic Product (PROBIOTIC OR), Take 1 Mouth/Throat: Oropharynx is clear and moist. No oropharyngeal exudate. Eyes: Pupils are equal, round, and reactive to light. Conjunctivae and EOM are normal. No scleral icterus. Neck: Normal range of motion. Neck supple. No JVD present.  No tracheal d Multi-planar reformatted/3-D images were created to optimize visualization of vascular anatomy. Dose   reduction techniques were used.  Dose information is transmitted to the ACR ([employer]) [address] ([address]) which but with muscular branches arising from the profunda femoris bilaterally, to show   opacification of the trifurcation vessels of the right and left leg below the knee.   This is a chronic appearance, present on the prior exam from 2018, although at that asya imaging. No aneurysm identified. Diminished great toe pressure bilaterally, 35 mm mercury on the right, 45 on the left.        =====  CONCLUSION:       Areas of apparent occlusion without blood flow identified within some vessels bilaterally.   On acute osseous findings. Osteoarthritic changes greatest at the 1st MTP joint. Dictated by: Marcelina Blanton MD on 10/13/2019 at 13:36             Labs:  Lab Results   Component Value Date    WBC 10.6 10/16/2019    RBC 3.69 (L) 10/16/2019    HGB 10.

## 2019-12-20 PROCEDURE — 93296 REM INTERROG EVL PM/IDS: CPT | Performed by: INTERNAL MEDICINE

## 2019-12-20 PROCEDURE — 93295 DEV INTERROG REMOTE 1/2/MLT: CPT | Performed by: INTERNAL MEDICINE

## 2019-12-30 ENCOUNTER — TELEPHONE (OUTPATIENT)
Dept: NEUROLOGY | Facility: CLINIC | Age: 69
End: 2019-12-30

## 2019-12-30 NOTE — TELEPHONE ENCOUNTER
I called . Juwan Stevens regarding his wife's neuropathy. I do not recommend going up on Cymbalta at this time. She is already on 30mg BID. I did discuss that we can consider going up on Cymbalta. She has a scheduled EMG with me and a follow up shortly after his. We will discuss medication adjustments at that visit.  He verbalized agreement/understanding

## 2020-01-07 ENCOUNTER — TELEPHONE (OUTPATIENT)
Dept: FAMILY MEDICINE CLINIC | Facility: CLINIC | Age: 70
End: 2020-01-07

## 2020-01-07 RX ORDER — PREGABALIN 75 MG/1
CAPSULE ORAL
Qty: 60 CAPSULE | Refills: 0 | Status: SHIPPED | OUTPATIENT
Start: 2020-01-07 | End: 2020-01-08

## 2020-01-07 NOTE — TELEPHONE ENCOUNTER
Patient's  Bebeto Buck called. [facility] told him this prescription was 3 days early and would not fill for 3 days from now as it controlled. .  [facility] patient only has 1 pill left.  Verified Mobile number on file which is Abel her  on HIPAA

## 2020-01-08 RX ORDER — PREGABALIN 75 MG/1
CAPSULE ORAL
Qty: 90 CAPSULE | Refills: 5 | Status: SHIPPED | OUTPATIENT
Start: 2020-01-08 | End: 2020-01-24

## 2020-01-08 NOTE — TELEPHONE ENCOUNTER
Last RF 12/12/19 qty 60     75 MG Oral Cap 60 capsule 0 1/7/2020    Sig:   TAKE 1 CAPSULE BY MOUTH TWO TIMES A DAY      Route:   (none)     Order #:   [id number]

## 2020-01-08 NOTE — TELEPHONE ENCOUNTER
Okay to increase Lyrica to 75 mg in the morning and 75 mg times 2 in the evening.   #90 and 5 refills

## 2020-01-08 NOTE — TELEPHONE ENCOUNTER
Pt  Abel addl info    Wife started taking 1 tab in am and 2 tabs in pm per Brady advise    Been taking addl dose for a week that's why the she is completely out    They would like to know if you are ok increasing it?      Pls advise   Thank you

## 2020-01-08 NOTE — TELEPHONE ENCOUNTER
Pt , Yaya Resendiz, calling stating that pt took her last pill last night and the pharmacy won't refill it until the 10th.      Pt  calling looking for an update on this and wanting to talk to a nurse regarding the dosages of the medication and the re

## 2020-01-11 ENCOUNTER — ANCILLARY PROCEDURE (OUTPATIENT)
Dept: CARDIOLOGY | Age: 70
End: 2020-01-11
Attending: INTERNAL MEDICINE

## 2020-01-11 DIAGNOSIS — Z95.810 ICD (IMPLANTABLE CARDIOVERTER-DEFIBRILLATOR) IN PLACE: ICD-10-CM

## 2020-01-17 ENCOUNTER — TELEPHONE (OUTPATIENT)
Dept: FAMILY MEDICINE CLINIC | Facility: CLINIC | Age: 70
End: 2020-01-17

## 2020-01-17 ENCOUNTER — TELEPHONE (OUTPATIENT)
Dept: NEUROLOGY | Facility: CLINIC | Age: 70
End: 2020-01-17

## 2020-01-17 DIAGNOSIS — Z95.810 AICD (AUTOMATIC CARDIOVERTER/DEFIBRILLATOR) PRESENT: ICD-10-CM

## 2020-01-17 DIAGNOSIS — I73.9 PERIPHERAL VASCULAR DISEASE (HCC): ICD-10-CM

## 2020-01-17 DIAGNOSIS — I47.2 NSVT (NONSUSTAINED VENTRICULAR TACHYCARDIA) (HCC): ICD-10-CM

## 2020-01-17 DIAGNOSIS — G62.9 NEUROPATHY: ICD-10-CM

## 2020-01-17 DIAGNOSIS — Z95.828 HISTORY OF ENDOVASCULAR STENT GRAFT FOR ABDOMINAL AORTIC ANEURYSM (AAA): ICD-10-CM

## 2020-01-17 DIAGNOSIS — I48.0 PAROXYSMAL ATRIAL FIBRILLATION (HCC): ICD-10-CM

## 2020-01-17 DIAGNOSIS — R20.8 ALLODYNIA: Primary | ICD-10-CM

## 2020-01-17 DIAGNOSIS — I25.10 CORONARY ARTERY DISEASE WITHOUT ANGINA PECTORIS, UNSPECIFIED VESSEL OR LESION TYPE, UNSPECIFIED WHETHER NATIVE OR TRANSPLANTED HEART: Primary | ICD-10-CM

## 2020-01-17 DIAGNOSIS — I25.10 CORONARY ARTERY DISEASE INVOLVING NATIVE CORONARY ARTERY OF NATIVE HEART WITHOUT ANGINA PECTORIS: Primary | ICD-10-CM

## 2020-01-17 NOTE — TELEPHONE ENCOUNTER
Referral to Maria Eugenia Chirinos Rheumatologist (INTERNAL)    Reason for the order/referral:  RHEUMATOLOGY/F/UP   PCP:  FELICIA   Refer to Provider:  ARTURO DAVID   Specialty:  RHEUMATOLOGY   [place] / Plan: [place]ARE [place]

## 2020-01-17 NOTE — TELEPHONE ENCOUNTER
Referral to [facility] Cardiology (INTERNAL)    Reason for the order/referral:  CARDIOLOGY/F/UP   PCP:  FELICIA   Refer to Provider:  Peggy Nunn   Specialty:  CARDIOLOGY   [place] / Plan: MEDICARE PART B

## 2020-01-17 NOTE — TELEPHONE ENCOUNTER
Referral to Taina Client, Neurologist (INTERNAL)    Reason for the order/referral:NEUROLOGY/F/UP   PCP: FELICIA   Refer to Provider:  Coco Vargas   Specialty:  NEUROLOGY   [place]: [place] / Plan: [place]

## 2020-01-17 NOTE — TELEPHONE ENCOUNTER
Referral to [facility], Cardiologist (INTERNAL)    Reason for the order/referral:CARDIOLOGY/PACEMAKER/FU  PCP: FELICIA   Refer to Provider Wilber Lyons   Specialty:CARDIOLOGY   [place] / Plan: MEDICARE PART B

## 2020-01-17 NOTE — TELEPHONE ENCOUNTER
I called [facility] [phone] and spoke with Osei for EMG codes 95886 x 2 -EMG 2 extremities. Nerve conduction studies 01015, 90630,82863,36033 (unknown on how many studies for sure). Per Osei no authorization is required but a referral is.  She said it has

## 2020-01-20 ENCOUNTER — OFFICE VISIT (OUTPATIENT)
Dept: ELECTROPHYSIOLOGY | Facility: HOSPITAL | Age: 70
End: 2020-01-20
Attending: Other
Payer: MEDICARE

## 2020-01-20 DIAGNOSIS — R20.8 SKIN PAIN: ICD-10-CM

## 2020-01-20 DIAGNOSIS — G62.9 NEUROPATHY: ICD-10-CM

## 2020-01-20 PROCEDURE — 95909 NRV CNDJ TST 5-6 STUDIES: CPT | Performed by: OTHER

## 2020-01-20 PROCEDURE — 95886 MUSC TEST DONE W/N TEST COMP: CPT | Performed by: OTHER

## 2020-01-20 NOTE — PROCEDURES
[address] [place] [address]  Stefani, [address] [facility]  [phone]            Patient: Jasmin Foy Sex: Female  Patient ID: [id number] YOB: 1950      Visit Date: 1/20/2020 09:42  Patient Age On Visit Presley Spontaneous MUAP Recruitment   Muscle Nerve Roots IA Fib PSW Fasc H.F. Amp Dur. PPP Pattern   R. Tibialis anterior Deep peroneal (Fibular) L4-L5 N None None None None N N N N   R.  Gastrocnemius (Medial head) Tibial S1-S2 N None None None None N N N N   R.

## 2020-01-21 ENCOUNTER — LAB ENCOUNTER (OUTPATIENT)
Dept: LAB | Facility: HOSPITAL | Age: 70
End: 2020-01-21
Attending: INTERNAL MEDICINE
Payer: MEDICARE

## 2020-01-21 DIAGNOSIS — R79.82 CRP ELEVATED: ICD-10-CM

## 2020-01-21 DIAGNOSIS — G62.9 NEUROPATHY: ICD-10-CM

## 2020-01-21 DIAGNOSIS — M79.89 FOOT SWELLING: ICD-10-CM

## 2020-01-21 DIAGNOSIS — R70.0 ELEVATED SED RATE: ICD-10-CM

## 2020-01-21 DIAGNOSIS — E79.0 HYPERURICEMIA: ICD-10-CM

## 2020-01-21 LAB
CRP SERPL-MCNC: 0.72 MG/DL (ref ?–0.3)
SED RATE-ML: 30 MM/HR (ref 0–25)
URATE SERPL-MCNC: 7.3 MG/DL (ref 2.6–6)

## 2020-01-21 PROCEDURE — 86255 FLUORESCENT ANTIBODY SCREEN: CPT

## 2020-01-21 PROCEDURE — 83883 ASSAY NEPHELOMETRY NOT SPEC: CPT

## 2020-01-21 PROCEDURE — 36415 COLL VENOUS BLD VENIPUNCTURE: CPT

## 2020-01-21 PROCEDURE — 85652 RBC SED RATE AUTOMATED: CPT

## 2020-01-21 PROCEDURE — 86140 C-REACTIVE PROTEIN: CPT

## 2020-01-21 PROCEDURE — 83516 IMMUNOASSAY NONANTIBODY: CPT

## 2020-01-21 PROCEDURE — 84165 PROTEIN E-PHORESIS SERUM: CPT

## 2020-01-21 PROCEDURE — 83876 ASSAY MYELOPEROXIDASE: CPT

## 2020-01-21 PROCEDURE — 84550 ASSAY OF BLOOD/URIC ACID: CPT

## 2020-01-21 PROCEDURE — 82595 ASSAY OF CRYOGLOBULIN: CPT

## 2020-01-21 PROCEDURE — 86334 IMMUNOFIX E-PHORESIS SERUM: CPT

## 2020-01-22 LAB
KAPPA FREE LIGHT CHAIN: 5.66 MG/DL (ref 0.33–1.94)
KAPPA/LAMBDA FLC RATIO: 2.09 (ref 0.26–1.65)
LAMBDA FREE LIGHT CHAIN: 2.71 MG/DL (ref 0.57–2.63)

## 2020-01-22 NOTE — TELEPHONE ENCOUNTER
Call placed to clinical requesting bed, per clinical hospitalist is in ICU intubating a patient and it will be reviewed with hospitalist of placement for patient.       Rj Lima RN  04/08/19 2525 Per  referral placed.

## 2020-01-23 ENCOUNTER — HOSPITAL ENCOUNTER (OUTPATIENT)
Dept: MRI IMAGING | Facility: HOSPITAL | Age: 70
Discharge: HOME | End: 2020-01-23
Attending: INTERNAL MEDICINE
Payer: MEDICARE

## 2020-01-23 VITALS — SYSTOLIC BLOOD PRESSURE: 95 MMHG | DIASTOLIC BLOOD PRESSURE: 56 MMHG | OXYGEN SATURATION: 100 % | HEART RATE: 80 BPM

## 2020-01-23 DIAGNOSIS — M79.89 FOOT SWELLING: ICD-10-CM

## 2020-01-23 DIAGNOSIS — R70.0 ELEVATED SED RATE: ICD-10-CM

## 2020-01-23 DIAGNOSIS — G62.9 NEUROPATHY: ICD-10-CM

## 2020-01-23 DIAGNOSIS — R79.82 CRP ELEVATED: ICD-10-CM

## 2020-01-23 LAB
ALBUMIN SERPL ELPH-MCNC: 3.77 G/DL (ref 3.75–5.21)
ALBUMIN/GLOB SERPL: 1.1 {RATIO} (ref 1–2)
ALPHA1 GLOB SERPL ELPH-MCNC: 0.34 G/DL (ref 0.19–0.46)
ALPHA2 GLOB SERPL ELPH-MCNC: 0.84 G/DL (ref 0.48–1.05)
B-GLOBULIN SERPL ELPH-MCNC: 0.73 G/DL (ref 0.68–1.23)
GAMMA GLOB SERPL ELPH-MCNC: 1.52 G/DL (ref 0.62–1.7)
M PROTEIN MFR SERPL ELPH: 7.2 G/DL (ref 6.4–8.2)
MYELOPEROX ANTIBODIES, IGG: 0 AU/ML
SERINE PROTEASE3, IGG: 3 AU/ML

## 2020-01-23 PROCEDURE — 73721 MRI JNT OF LWR EXTRE W/O DYE: CPT | Performed by: INTERNAL MEDICINE

## 2020-01-23 PROCEDURE — 73718 MRI LOWER EXTREMITY W/O DYE: CPT | Performed by: INTERNAL MEDICINE

## 2020-01-23 NOTE — IMAGING NOTE
Pt verified name, , and allergies upon arrival to MRI. Pt here for MRI of foot and ankle with MRI compatible pacemaker and ICD.  [employer] rep placed pt in MRI safe mode with ICD off (per rep no ICD events) as ordered by cardiologist.  Continuous

## 2020-01-24 ENCOUNTER — OFFICE VISIT (OUTPATIENT)
Dept: NEUROLOGY | Facility: CLINIC | Age: 70
End: 2020-01-24
Payer: MEDICARE

## 2020-01-24 VITALS
RESPIRATION RATE: 16 BRPM | WEIGHT: 182 LBS | BODY MASS INDEX: 26 KG/M2 | HEART RATE: 78 BPM | SYSTOLIC BLOOD PRESSURE: 100 MMHG | DIASTOLIC BLOOD PRESSURE: 62 MMHG

## 2020-01-24 DIAGNOSIS — G62.9 NEUROPATHY: Primary | ICD-10-CM

## 2020-01-24 PROCEDURE — 99213 OFFICE O/P EST LOW 20 MIN: CPT | Performed by: OTHER

## 2020-01-24 RX ORDER — PREGABALIN 75 MG/1
150 CAPSULE ORAL 2 TIMES DAILY
Qty: 120 CAPSULE | Refills: 2 | Status: SHIPPED | OUTPATIENT
Start: 2020-01-24 | End: 2020-02-21

## 2020-01-24 RX ORDER — DULOXETIN HYDROCHLORIDE 30 MG/1
60 CAPSULE, DELAYED RELEASE ORAL DAILY
Qty: 60 CAPSULE | Refills: 2 | Status: SHIPPED | OUTPATIENT
Start: 2020-01-24 | End: 2020-05-11

## 2020-01-24 NOTE — PROGRESS NOTES
Neurology H&P    Simab Hart Patient Status:  No patient class for patient encounter    1950 MRN [id number]   Location [address] [place] [address], [address] [place] [address] Drive, 232 [facility] Attending No att. providers found   [facility] Day # 0 PCP Deb Davidson MD MRI of her R foot and has a little arthritis. She states foot and ankle swelling is a bit better but still present. She is walking better. She still has ankle joint pain and pain in the joint in the toes.  Has been seeing rheumatology as well and has follow Product (PROBIOTIC OR) Take 1 tablet by mouth daily.            Problem List:  Patient Active Problem List:     NSVT (nonsustained ventricular tachycardia) ([facility])     Paroxysmal atrial fibrillation ([facility]     Coronary artery disease involving native heart wit URETERAL STENT     • EXPLORATORY LAPAROTOMY N/A 8/6/2017    Performed by Katie Garnett MD at [address] GRAFT N/A 5/25/2017    Performed by Hernandez Allen MD at [address] [id number] Right 05/10/2019   • HIP fasciculations or atrophy, strong shoulder shrug.     MOTOR EXAMINATION: normal tone, no fasciculations, normal strength throughout in UEs and LEs except mild R HF 4+/5 weakness    SENSORY EXAMINATION:  UE: intact to light touch, pinprick intact  LE: intact increased the dose she noted much improved pain and gait. I will increase this to Lyrica 150mg BID and continue Cymbalta 60mg Daily. EMG did not suggest any neuropathy.  We did discuss that we could do a skin biopsy for evaluation of a small fibre neuropath

## 2020-01-27 ENCOUNTER — TELEPHONE (OUTPATIENT)
Dept: NEUROLOGY | Facility: CLINIC | Age: 70
End: 2020-01-27

## 2020-01-29 ENCOUNTER — OFFICE VISIT (OUTPATIENT)
Dept: RHEUMATOLOGY | Facility: CLINIC | Age: 70
End: 2020-01-29
Payer: MEDICARE

## 2020-01-29 ENCOUNTER — TELEPHONE (OUTPATIENT)
Dept: FAMILY MEDICINE CLINIC | Facility: CLINIC | Age: 70
End: 2020-01-29

## 2020-01-29 VITALS
RESPIRATION RATE: 16 BRPM | BODY MASS INDEX: 26 KG/M2 | WEIGHT: 182 LBS | SYSTOLIC BLOOD PRESSURE: 102 MMHG | HEART RATE: 60 BPM | DIASTOLIC BLOOD PRESSURE: 62 MMHG

## 2020-01-29 DIAGNOSIS — E79.0 HYPERURICEMIA: ICD-10-CM

## 2020-01-29 DIAGNOSIS — M79.89 FOOT SWELLING: ICD-10-CM

## 2020-01-29 DIAGNOSIS — R60.0 PEDAL EDEMA: ICD-10-CM

## 2020-01-29 DIAGNOSIS — R79.82 CRP ELEVATED: ICD-10-CM

## 2020-01-29 DIAGNOSIS — I73.9 PERIPHERAL VASCULAR DISEASE (HCC): ICD-10-CM

## 2020-01-29 DIAGNOSIS — G62.9 NEUROPATHY: ICD-10-CM

## 2020-01-29 DIAGNOSIS — R20.8 ALLODYNIA: ICD-10-CM

## 2020-01-29 DIAGNOSIS — M1A.0790 CHRONIC GOUT OF FOOT, UNSPECIFIED CAUSE, UNSPECIFIED LATERALITY: Primary | ICD-10-CM

## 2020-01-29 DIAGNOSIS — R76.8 ELEVATED SERUM IMMUNOGLOBULIN FREE LIGHT CHAIN LEVEL: ICD-10-CM

## 2020-01-29 DIAGNOSIS — R20.8 ALLODYNIA: Primary | ICD-10-CM

## 2020-01-29 PROCEDURE — 99214 OFFICE O/P EST MOD 30 MIN: CPT | Performed by: INTERNAL MEDICINE

## 2020-01-29 RX ORDER — ALLOPURINOL 100 MG/1
100 TABLET ORAL DAILY
Qty: 90 TABLET | Refills: 0 | Status: SHIPPED | OUTPATIENT
Start: 2020-01-29 | End: 2020-04-24

## 2020-01-29 RX ORDER — COLCHICINE 0.6 MG/1
0.6 TABLET ORAL DAILY
Qty: 30 TABLET | Refills: 2 | Status: SHIPPED | OUTPATIENT
Start: 2020-01-29 | End: 2020-03-04

## 2020-01-29 NOTE — TELEPHONE ENCOUNTER
Pt's  just called and stated they are at a follow up apt with Gray Wray and realized since they have changed insurance they need a referral. Please enter.

## 2020-01-29 NOTE — PROGRESS NOTES
RHEUMATOLOGY Follow up   Date of visit: 1/29/2020  ? Patient presents with:  Test Results: Pt here to discuss recent test results.     ?  ASSESSMENT, DISCUSSION & PLAN   Assessment:  Chronic gout of foot, unspecified cause, unspecified laterality  (prima flare.   -- Repeat uric acid and creatinine levels in one month. -- Follow up in 3 months or sooner as needed.      Patient and pt's  verbalized understanding of above instructions. No further questions at this time.   ?  Plan:  Diagnoses and all o increased dose of Lyrica which helped her symptoms. (taking 150mg twice daily) and continued Cymbalta 30mg twice daily.      HPI from initial consultation  referred for rheumatologic evaluation due to bilateral foot pain.       States specifically on August colonoscopy when she was younger but has not had one in over 10 years.    Never had a mammogram but followed with gynecology.      + some tightness over the Achilles which she attributes to the swelling of the foot/ankle  + some dry mouth, not as severe as (irritable bowel syndrome)    • Ischemic cardiomyopathy    • Kidney stone    • Peripheral vascular disease ([facility]    • Skin cancer 2001   • [facility] 8/1/2017   • Tobacco abuse 9/17/2014   • Visual impairment     reading glasses     Past tablet (100 mg total) by mouth daily. , Disp: 90 tablet, Rfl: 0  colchicine 0.6 MG Oral Tab, Take 1 tablet (0.6 mg total) by mouth daily. , Disp: 30 tablet, Rfl: 2      Modified Medications    No medications on file     There are no discontinued medications. Pupils are equal, round, and reactive to light. Conjunctivae and EOM are normal. No scleral icterus. Neck: Normal range of motion. Neck supple. No JVD present. No tracheal deviation present.    Cardiovascular: Normal rate, regular rhythm, normal heart darlin and toes. Patient denies injury. FINDINGS:    JOINT COMPARTMENTS:  No acute osseous injuries. No lytic, blastic or destructive osseous changes.   There is evidence of tibiotalar joint osteoarthritis with evidence of flattening and sclerosis along t details above. Dictated by:  Harjit Alvarado DO on 1/23/2020 at 17:06         Labs:  Lab Results   Component Value Date    WBC 10.6 10/16/2019    RBC 3.69 (L) 10/16/2019    HGB 10.1 (L) 10/16/2019    HCT 32.7 (L) 10/16/2019    .0 10/16/2019    M

## 2020-01-30 ENCOUNTER — TELEPHONE (OUTPATIENT)
Dept: FAMILY MEDICINE CLINIC | Facility: CLINIC | Age: 70
End: 2020-01-30

## 2020-01-30 ENCOUNTER — OFFICE VISIT (OUTPATIENT)
Dept: FAMILY MEDICINE CLINIC | Facility: CLINIC | Age: 70
End: 2020-01-30
Payer: MEDICARE

## 2020-01-30 ENCOUNTER — TELEPHONE (OUTPATIENT)
Dept: CARDIOLOGY | Age: 70
End: 2020-01-30

## 2020-01-30 VITALS
HEART RATE: 80 BPM | BODY MASS INDEX: 27.63 KG/M2 | HEIGHT: 70 IN | WEIGHT: 193 LBS | DIASTOLIC BLOOD PRESSURE: 52 MMHG | TEMPERATURE: 97 F | OXYGEN SATURATION: 96 % | SYSTOLIC BLOOD PRESSURE: 80 MMHG

## 2020-01-30 DIAGNOSIS — I73.9 PERIPHERAL VASCULAR DISEASE (HCC): ICD-10-CM

## 2020-01-30 DIAGNOSIS — I25.10 CORONARY ARTERY DISEASE INVOLVING NATIVE CORONARY ARTERY OF NATIVE HEART WITHOUT ANGINA PECTORIS: ICD-10-CM

## 2020-01-30 DIAGNOSIS — E79.0 HYPERURICEMIA: ICD-10-CM

## 2020-01-30 DIAGNOSIS — Z95.828 HISTORY OF ENDOVASCULAR STENT GRAFT FOR ABDOMINAL AORTIC ANEURYSM (AAA): ICD-10-CM

## 2020-01-30 DIAGNOSIS — G62.9 NEUROPATHY: ICD-10-CM

## 2020-01-30 DIAGNOSIS — E03.8 OTHER SPECIFIED HYPOTHYROIDISM: ICD-10-CM

## 2020-01-30 DIAGNOSIS — I48.0 PAROXYSMAL ATRIAL FIBRILLATION (HCC): ICD-10-CM

## 2020-01-30 DIAGNOSIS — Z79.01 CHRONIC ANTICOAGULATION: ICD-10-CM

## 2020-01-30 DIAGNOSIS — E78.5 DYSLIPIDEMIA: ICD-10-CM

## 2020-01-30 DIAGNOSIS — Z00.00 ENCOUNTER FOR ANNUAL HEALTH EXAMINATION: Primary | ICD-10-CM

## 2020-01-30 DIAGNOSIS — I25.5 ISCHEMIC CARDIOMYOPATHY: ICD-10-CM

## 2020-01-30 DIAGNOSIS — Z95.810 AICD (AUTOMATIC CARDIOVERTER/DEFIBRILLATOR) PRESENT: ICD-10-CM

## 2020-01-30 DIAGNOSIS — I47.2 NSVT (NONSUSTAINED VENTRICULAR TACHYCARDIA) (HCC): ICD-10-CM

## 2020-01-30 DIAGNOSIS — Z95.1 S/P CABG X 3: ICD-10-CM

## 2020-01-30 PROCEDURE — 99397 PER PM REEVAL EST PAT 65+ YR: CPT | Performed by: FAMILY MEDICINE

## 2020-01-30 PROCEDURE — G0439 PPPS, SUBSEQ VISIT: HCPCS | Performed by: FAMILY MEDICINE

## 2020-01-30 PROCEDURE — 96160 PT-FOCUSED HLTH RISK ASSMT: CPT | Performed by: FAMILY MEDICINE

## 2020-01-31 NOTE — PATIENT INSTRUCTIONS
Dannielle Martell's SCREENING SCHEDULE   Tests on this list are recommended by your physician but may not be covered, or covered at this frequency, by your insurer. Please check with your insurance carrier before scheduling to verify coverage.    PREVENTATI patients who meet one of the following criteria:   • Men who are 73-68 years old and have smoked more than 100 cigarettes in their lifetime   • Anyone with a family history    Colorectal Cancer Screening  Covered up to Age 76     Colonoscopy Screen   Cover No orders found for this or any previous visit. Please get every year    Pneumococcal 13 (Prevnar)  Covered Once after 65 No orders found for this or any previous visit.  Please get once after your 65th birthday    Pneumococcal 23 (Pneumovax)  Covered Once

## 2020-01-31 NOTE — TELEPHONE ENCOUNTER
I tried calling pt. Phone just rings and rings and rings. No prompt to leave a voicemail. I sent her [employer] msg with info below. Asked to cb if she has not had a mammo and we will order.

## 2020-01-31 NOTE — TELEPHONE ENCOUNTER
Please call the patient on 1/31/2020 and tell her that I noticed after her visit here that she had not had a mammogram done recently by our records. If that is the case she should have a mammogram sometime in the next few months.   Also I added a thyroid p

## 2020-02-03 ENCOUNTER — OFFICE VISIT (OUTPATIENT)
Dept: CARDIOLOGY | Age: 70
End: 2020-02-03

## 2020-02-03 ENCOUNTER — DOCUMENTATION (OUTPATIENT)
Dept: CARDIOLOGY | Age: 70
End: 2020-02-03

## 2020-02-03 ENCOUNTER — TELEPHONE (OUTPATIENT)
Dept: CARDIOLOGY | Age: 70
End: 2020-02-03

## 2020-02-03 VITALS
HEART RATE: 76 BPM | BODY MASS INDEX: 26.11 KG/M2 | HEIGHT: 70 IN | SYSTOLIC BLOOD PRESSURE: 100 MMHG | DIASTOLIC BLOOD PRESSURE: 60 MMHG

## 2020-02-03 DIAGNOSIS — I72.3 ANEURYSM OF ILIAC ARTERY (CMD): ICD-10-CM

## 2020-02-03 DIAGNOSIS — I25.10 CORONARY ARTERY DISEASE INVOLVING NATIVE CORONARY ARTERY OF NATIVE HEART WITHOUT ANGINA PECTORIS: ICD-10-CM

## 2020-02-03 DIAGNOSIS — I25.5 CARDIOMYOPATHY, ISCHEMIC: Primary | ICD-10-CM

## 2020-02-03 DIAGNOSIS — E78.00 PURE HYPERCHOLESTEROLEMIA: ICD-10-CM

## 2020-02-03 DIAGNOSIS — I65.21 STENOSIS OF RIGHT CAROTID ARTERY: ICD-10-CM

## 2020-02-03 DIAGNOSIS — Z95.1 HX OF CABG: ICD-10-CM

## 2020-02-03 DIAGNOSIS — E05.90 HYPERTHYROIDISM: ICD-10-CM

## 2020-02-03 PROCEDURE — 99214 OFFICE O/P EST MOD 30 MIN: CPT | Performed by: INTERNAL MEDICINE

## 2020-02-03 RX ORDER — COLCHICINE 0.6 MG/1
CAPSULE ORAL
COMMUNITY
End: 2020-06-15 | Stop reason: ALTCHOICE

## 2020-02-03 RX ORDER — ALLOPURINOL 100 MG/1
100 TABLET ORAL DAILY
COMMUNITY

## 2020-02-03 ASSESSMENT — PATIENT HEALTH QUESTIONNAIRE - PHQ9
SUM OF ALL RESPONSES TO PHQ9 QUESTIONS 1 AND 2: 0
SUM OF ALL RESPONSES TO PHQ9 QUESTIONS 1 AND 2: 0
1. LITTLE INTEREST OR PLEASURE IN DOING THINGS: NOT AT ALL
2. FEELING DOWN, DEPRESSED OR HOPELESS: NOT AT ALL

## 2020-02-11 RX ORDER — METOPROLOL SUCCINATE 25 MG/1
TABLET, EXTENDED RELEASE ORAL
Qty: 60 TABLET | Refills: 11 | Status: SHIPPED | OUTPATIENT
Start: 2020-02-11 | End: 2020-10-30 | Stop reason: DRUGHIGH

## 2020-02-19 ENCOUNTER — ANCILLARY PROCEDURE (OUTPATIENT)
Dept: CARDIOLOGY | Age: 70
End: 2020-02-19
Attending: INTERNAL MEDICINE

## 2020-02-19 VITALS
SYSTOLIC BLOOD PRESSURE: 98 MMHG | WEIGHT: 198 LBS | HEART RATE: 76 BPM | BODY MASS INDEX: 28.41 KG/M2 | DIASTOLIC BLOOD PRESSURE: 56 MMHG

## 2020-02-19 DIAGNOSIS — Z45.018 PACEMAKER REPROGRAMMING/CHECK: ICD-10-CM

## 2020-02-19 PROCEDURE — 93283 PRGRMG EVAL IMPLANTABLE DFB: CPT | Performed by: INTERNAL MEDICINE

## 2020-02-21 ENCOUNTER — TELEPHONE (OUTPATIENT)
Dept: NEUROLOGY | Facility: CLINIC | Age: 70
End: 2020-02-21

## 2020-02-21 DIAGNOSIS — M1A.0790 CHRONIC GOUT OF FOOT, UNSPECIFIED CAUSE, UNSPECIFIED LATERALITY: ICD-10-CM

## 2020-02-21 DIAGNOSIS — G62.9 NEUROPATHY: Primary | ICD-10-CM

## 2020-02-21 DIAGNOSIS — M79.89 FOOT SWELLING: ICD-10-CM

## 2020-02-21 DIAGNOSIS — E79.0 HYPERURICEMIA: ICD-10-CM

## 2020-02-21 RX ORDER — ALLOPURINOL 100 MG/1
100 TABLET ORAL DAILY
Qty: 90 TABLET | Refills: 0 | Status: CANCELLED | OUTPATIENT
Start: 2020-02-21

## 2020-02-21 RX ORDER — PREGABALIN 150 MG/1
CAPSULE ORAL
Qty: 60 CAPSULE | Refills: 2 | Status: SHIPPED | OUTPATIENT
Start: 2020-02-21 | End: 2020-06-19

## 2020-02-21 NOTE — TELEPHONE ENCOUNTER
Fax received stating a temporary supply of Pregabalin 75mg approved - subject to quantity limit or may be not covered by insurance.  New Rx Pregalin 150mg #60 1 cap bid pended for MD review and signature to verify I this is a quantity issue (patient was lida

## 2020-02-28 ENCOUNTER — TELEPHONE (OUTPATIENT)
Dept: FAMILY MEDICINE CLINIC | Facility: CLINIC | Age: 70
End: 2020-02-28

## 2020-02-28 ENCOUNTER — HOSPITAL ENCOUNTER (OUTPATIENT)
Dept: LAB | Facility: HOSPITAL | Age: 70
Discharge: HOME | End: 2020-02-28
Attending: INTERNAL MEDICINE
Payer: MEDICARE

## 2020-02-28 ENCOUNTER — APPOINTMENT (OUTPATIENT)
Dept: LAB | Facility: HOSPITAL | Age: 70
End: 2020-02-28
Attending: INTERNAL MEDICINE
Payer: MEDICARE

## 2020-02-28 ENCOUNTER — HOSPITAL ENCOUNTER (OUTPATIENT)
Dept: CV DIAGNOSTICS | Facility: HOSPITAL | Age: 70
Discharge: HOME | End: 2020-02-28
Attending: INTERNAL MEDICINE
Payer: MEDICARE

## 2020-02-28 DIAGNOSIS — I25.5 CARDIOMYOPATHY, ISCHEMIC: ICD-10-CM

## 2020-02-28 DIAGNOSIS — I25.10 CORONARY ARTERY DISEASE INVOLVING NATIVE CORONARY ARTERY OF NATIVE HEART WITHOUT ANGINA PECTORIS: ICD-10-CM

## 2020-02-28 DIAGNOSIS — Z12.31 ENCOUNTER FOR SCREENING MAMMOGRAM FOR BREAST CANCER: Primary | ICD-10-CM

## 2020-02-28 DIAGNOSIS — Z95.1 HX OF CABG: ICD-10-CM

## 2020-02-28 LAB
TSH SERPL-ACNC: 8.82 M[IU]/L
TSI SER-ACNC: 8.82 MIU/ML (ref 0.36–3.74)

## 2020-02-28 PROCEDURE — 36415 COLL VENOUS BLD VENIPUNCTURE: CPT | Performed by: INTERNAL MEDICINE

## 2020-02-28 PROCEDURE — 93017 CV STRESS TEST TRACING ONLY: CPT | Performed by: INTERNAL MEDICINE

## 2020-02-28 PROCEDURE — 84443 ASSAY THYROID STIM HORMONE: CPT | Performed by: INTERNAL MEDICINE

## 2020-02-28 PROCEDURE — 78452 HT MUSCLE IMAGE SPECT MULT: CPT | Performed by: INTERNAL MEDICINE

## 2020-02-28 PROCEDURE — 93018 CV STRESS TEST I&R ONLY: CPT | Performed by: INTERNAL MEDICINE

## 2020-03-04 ENCOUNTER — TELEPHONE (OUTPATIENT)
Dept: CARDIOLOGY | Age: 70
End: 2020-03-04

## 2020-03-04 ENCOUNTER — E-ADVICE (OUTPATIENT)
Dept: CARDIOLOGY | Age: 70
End: 2020-03-04

## 2020-03-04 ENCOUNTER — PATIENT MESSAGE (OUTPATIENT)
Dept: RHEUMATOLOGY | Facility: CLINIC | Age: 70
End: 2020-03-04

## 2020-03-04 DIAGNOSIS — M1A.0790 CHRONIC GOUT OF FOOT, UNSPECIFIED CAUSE, UNSPECIFIED LATERALITY: Primary | ICD-10-CM

## 2020-03-04 RX ORDER — COLCHICINE 0.6 MG/1
0.6 TABLET ORAL DAILY
Qty: 90 TABLET | Refills: 0 | Status: ON HOLD | OUTPATIENT
Start: 2020-03-04 | End: 2020-10-29

## 2020-03-04 NOTE — TELEPHONE ENCOUNTER
From: Lino Espinosa  To: Bonita Spencer DO  Sent: 3/4/2020 9:49 AM CST  Subject: Prescription Question    During my last visit you prescribed Colchicine 0.6 mg. You gave me 3/30 day supplies. I am currently out of the medication, but have 2 refills left.

## 2020-03-05 ENCOUNTER — TELEPHONE (OUTPATIENT)
Dept: CARDIOLOGY | Age: 70
End: 2020-03-05

## 2020-03-06 NOTE — TELEPHONE ENCOUNTER
Attempted to contact pt. Phone rang continuously and did not connect to . Sent unable to reach letter via [employer].

## 2020-03-09 ENCOUNTER — TELEPHONE (OUTPATIENT)
Dept: CARDIOLOGY | Age: 70
End: 2020-03-09

## 2020-03-09 ENCOUNTER — OFFICE VISIT (OUTPATIENT)
Dept: CARDIOLOGY | Age: 70
End: 2020-03-09

## 2020-03-09 VITALS
HEIGHT: 70 IN | HEART RATE: 71 BPM | BODY MASS INDEX: 28.35 KG/M2 | WEIGHT: 198 LBS | DIASTOLIC BLOOD PRESSURE: 50 MMHG | SYSTOLIC BLOOD PRESSURE: 100 MMHG

## 2020-03-09 DIAGNOSIS — I25.10 CORONARY ARTERY DISEASE INVOLVING NATIVE CORONARY ARTERY OF NATIVE HEART WITHOUT ANGINA PECTORIS: Primary | ICD-10-CM

## 2020-03-09 DIAGNOSIS — I72.3 ANEURYSM OF ILIAC ARTERY (CMD): ICD-10-CM

## 2020-03-09 DIAGNOSIS — Z95.1 HX OF CABG: ICD-10-CM

## 2020-03-09 DIAGNOSIS — Z95.828 HISTORY OF ENDOVASCULAR STENT GRAFT FOR ABDOMINAL AORTIC ANEURYSM (AAA): ICD-10-CM

## 2020-03-09 DIAGNOSIS — E78.00 PURE HYPERCHOLESTEROLEMIA: ICD-10-CM

## 2020-03-09 DIAGNOSIS — I71.40 ABDOMINAL AORTIC ANEURYSM (AAA) WITHOUT RUPTURE (CMD): Primary | ICD-10-CM

## 2020-03-09 DIAGNOSIS — I25.5 CARDIOMYOPATHY, ISCHEMIC: ICD-10-CM

## 2020-03-09 DIAGNOSIS — I65.21 STENOSIS OF RIGHT CAROTID ARTERY: ICD-10-CM

## 2020-03-09 DIAGNOSIS — I73.9 PAD (PERIPHERAL ARTERY DISEASE) (CMD): ICD-10-CM

## 2020-03-09 PROCEDURE — 99214 OFFICE O/P EST MOD 30 MIN: CPT | Performed by: INTERNAL MEDICINE

## 2020-03-09 RX ORDER — TORSEMIDE 20 MG/1
20 TABLET ORAL 2 TIMES DAILY
Qty: 180 TABLET | Refills: 3 | Status: SHIPPED | OUTPATIENT
Start: 2020-03-09 | End: 2021-05-10

## 2020-03-09 RX ORDER — ROSUVASTATIN CALCIUM 5 MG/1
5 TABLET, COATED ORAL DAILY
Refills: 9 | COMMUNITY
Start: 2020-03-01 | End: 2020-10-08

## 2020-03-26 ENCOUNTER — CLINICAL ABSTRACT (OUTPATIENT)
Dept: CARDIOLOGY | Age: 70
End: 2020-03-26

## 2020-04-24 DIAGNOSIS — M1A.0790 CHRONIC GOUT OF FOOT, UNSPECIFIED CAUSE, UNSPECIFIED LATERALITY: ICD-10-CM

## 2020-04-24 DIAGNOSIS — E79.0 HYPERURICEMIA: ICD-10-CM

## 2020-04-24 DIAGNOSIS — M79.89 FOOT SWELLING: ICD-10-CM

## 2020-04-24 RX ORDER — SPIRONOLACTONE 25 MG/1
TABLET ORAL
Qty: 45 TABLET | Refills: 3 | Status: SHIPPED | OUTPATIENT
Start: 2020-04-24 | End: 2021-05-03

## 2020-04-24 RX ORDER — ALLOPURINOL 100 MG/1
TABLET ORAL
Qty: 90 TABLET | Refills: 0 | Status: SHIPPED | OUTPATIENT
Start: 2020-04-24 | End: 2020-07-28

## 2020-05-05 ENCOUNTER — TELEMEDICINE (OUTPATIENT)
Dept: RHEUMATOLOGY | Facility: CLINIC | Age: 70
End: 2020-05-05

## 2020-05-05 VITALS — WEIGHT: 189 LBS | HEIGHT: 70 IN | BODY MASS INDEX: 27.06 KG/M2

## 2020-05-05 DIAGNOSIS — M79.89 FOOT SWELLING: Primary | ICD-10-CM

## 2020-05-05 DIAGNOSIS — M1A.0790 CHRONIC GOUT OF FOOT, UNSPECIFIED CAUSE, UNSPECIFIED LATERALITY: ICD-10-CM

## 2020-05-05 DIAGNOSIS — E79.0 HYPERURICEMIA: ICD-10-CM

## 2020-05-05 DIAGNOSIS — R76.8 ELEVATED SERUM IMMUNOGLOBULIN FREE LIGHT CHAIN LEVEL: ICD-10-CM

## 2020-05-05 PROCEDURE — 99213 OFFICE O/P EST LOW 20 MIN: CPT | Performed by: INTERNAL MEDICINE

## 2020-05-05 NOTE — PROGRESS NOTES
[facility] TeleHealth Audio and Visual Visit   Office visit canceled due to coronavirus pandemic    This was an audio/video conversation using Epic/Two Tap in lieu of an in-person visit due to need to limit person to person contact during the [facility] history of cardiovascular disease (hx of AAA, triple bypass surgery) who presented for evaluation of bilateral pain and swelling of her feet and ankles. This was more sudden in nature.  She has been tried on high dose oral prednisone (as high as follow-up today. She was seen as a new patient a few months ago for evaluation of pain and swelling of her feet/ankles. She presents today with her . Has been on allopurinol and colchicine. Since her last visit, she has been doing much better. October. Was told she has poor circulation in her legs but was told that her circulation has compensated and that less likely due to circulation issue itself. Denies cool to the touch. Did try water pills without significant improvement of swelling.   Con lymphadenopathy, night sweats, unexpected weight loss, or unexplained weakness. Denies chronic sinus infections/disease or epistaxis. Denies chronic cough or hemoptysis.    Denies obvious blood in urine.      Family hx:   Father with hx of rheumatoid arth appendage amputation   • OTHER SURGICAL HISTORY      maze procedure   • SYMP AAA URGENT REPAIR  2017   • TONSILLECTOMY       Family History:  Family History   Problem Relation Age of Onset   • Heart Disorder Father    • Cancer Mother         colon cancer Take 1 capsule by mouth nightly., Disp: , Rfl:   Diphenhydramine-APAP, sleep, (TYLENOL PM EXTRA STRENGTH)  MG Oral Tab, Take 1 tablet by mouth daily as needed. , Disp: , Rfl:   Multiple Vitamin (MULTI-VITAMIN DAILY OR), Take 1 tablet by mouth daily. surface area is 2.04 meters squared. Physical Exam   Constitutional: She is oriented to person, place, and time. She appears well-developed and well-nourished. No distress. HENT:   Head: Normocephalic.    Right Ear: External ear normal.   Left Ear performed without intravenous gadolinium contrast.     PATIENT STATED HISTORY: (As transcribed by Technologist)  Pain since August 10th, squeezing sensation around right ankle, pain to ball of right foot and toes. Patient denies injury.          FINDINGS: see further details above. 2. 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 Mild associated tendinosis of the PT tendon. 3. No acute process noted. Please see further details above. Dictated by:  Gabi Moreira, DO DO  [facility]  05/05/2020

## 2020-05-11 DIAGNOSIS — G62.9 NEUROPATHY: Primary | ICD-10-CM

## 2020-05-11 RX ORDER — DULOXETIN HYDROCHLORIDE 30 MG/1
CAPSULE, DELAYED RELEASE ORAL
Qty: 60 CAPSULE | Refills: 1 | Status: SHIPPED | OUTPATIENT
Start: 2020-05-11 | End: 2020-06-19

## 2020-05-11 NOTE — TELEPHONE ENCOUNTER
Medication: Duloxetine     Date of last refill: 1/24/2020 for #60/2 additional refills  [place]    Last office visit: 1/24/2020  Due back to clinic per last office note:  3 months  Date next office visit scheduled:

## 2020-06-12 ENCOUNTER — LAB ENCOUNTER (OUTPATIENT)
Dept: LAB | Facility: HOSPITAL | Age: 70
End: 2020-06-12
Attending: INTERNAL MEDICINE
Payer: MEDICARE

## 2020-06-12 DIAGNOSIS — R79.89 ELEVATED SERUM FREE T4 LEVEL: ICD-10-CM

## 2020-06-12 DIAGNOSIS — E78.00 PURE HYPERCHOLESTEROLEMIA: Primary | ICD-10-CM

## 2020-06-12 DIAGNOSIS — E79.0 HYPERURICEMIA: ICD-10-CM

## 2020-06-12 DIAGNOSIS — R76.8 ELEVATED SERUM IMMUNOGLOBULIN FREE LIGHT CHAIN LEVEL: ICD-10-CM

## 2020-06-12 DIAGNOSIS — M79.89 FOOT SWELLING: ICD-10-CM

## 2020-06-12 DIAGNOSIS — R79.89 LOW TSH LEVEL: ICD-10-CM

## 2020-06-12 DIAGNOSIS — M1A.0790 CHRONIC GOUT OF FOOT, UNSPECIFIED CAUSE, UNSPECIFIED LATERALITY: ICD-10-CM

## 2020-06-12 DIAGNOSIS — E03.8 OTHER SPECIFIED HYPOTHYROIDISM: ICD-10-CM

## 2020-06-12 PROCEDURE — 84165 PROTEIN E-PHORESIS SERUM: CPT

## 2020-06-12 PROCEDURE — 36415 COLL VENOUS BLD VENIPUNCTURE: CPT

## 2020-06-12 PROCEDURE — 84550 ASSAY OF BLOOD/URIC ACID: CPT

## 2020-06-12 PROCEDURE — 80053 COMPREHEN METABOLIC PANEL: CPT

## 2020-06-12 PROCEDURE — 84439 ASSAY OF FREE THYROXINE: CPT

## 2020-06-12 PROCEDURE — 80061 LIPID PANEL: CPT

## 2020-06-12 PROCEDURE — 84443 ASSAY THYROID STIM HORMONE: CPT

## 2020-06-12 PROCEDURE — 83883 ASSAY NEPHELOMETRY NOT SPEC: CPT

## 2020-06-12 PROCEDURE — 86334 IMMUNOFIX E-PHORESIS SERUM: CPT

## 2020-06-13 DIAGNOSIS — E03.9 HYPOTHYROIDISM, UNSPECIFIED TYPE: Primary | ICD-10-CM

## 2020-06-13 RX ORDER — LEVOTHYROXINE SODIUM 0.03 MG/1
25 TABLET ORAL
Qty: 30 TABLET | Refills: 2 | Status: SHIPPED | OUTPATIENT
Start: 2020-06-13 | End: 2020-09-24

## 2020-06-15 ENCOUNTER — OFFICE VISIT (OUTPATIENT)
Dept: CARDIOLOGY | Age: 70
End: 2020-06-15

## 2020-06-15 VITALS
WEIGHT: 189 LBS | HEART RATE: 80 BPM | BODY MASS INDEX: 27.12 KG/M2 | SYSTOLIC BLOOD PRESSURE: 100 MMHG | DIASTOLIC BLOOD PRESSURE: 54 MMHG

## 2020-06-15 VITALS
HEART RATE: 80 BPM | BODY MASS INDEX: 27.06 KG/M2 | SYSTOLIC BLOOD PRESSURE: 100 MMHG | HEIGHT: 70 IN | DIASTOLIC BLOOD PRESSURE: 54 MMHG | WEIGHT: 189 LBS

## 2020-06-15 DIAGNOSIS — Z95.810 ICD (IMPLANTABLE CARDIOVERTER-DEFIBRILLATOR) IN PLACE: ICD-10-CM

## 2020-06-15 DIAGNOSIS — I72.3 ANEURYSM OF ILIAC ARTERY (CMD): ICD-10-CM

## 2020-06-15 DIAGNOSIS — I25.5 CARDIOMYOPATHY, ISCHEMIC: Primary | ICD-10-CM

## 2020-06-15 DIAGNOSIS — I25.10 CORONARY ARTERY DISEASE INVOLVING NATIVE CORONARY ARTERY OF NATIVE HEART WITHOUT ANGINA PECTORIS: Primary | ICD-10-CM

## 2020-06-15 DIAGNOSIS — Z86.79 ATRIAL FIBRILLATION, CURRENTLY IN SINUS RHYTHM: ICD-10-CM

## 2020-06-15 DIAGNOSIS — Z95.1 HX OF CABG: ICD-10-CM

## 2020-06-15 DIAGNOSIS — I65.23 BILATERAL CAROTID ARTERY STENOSIS: ICD-10-CM

## 2020-06-15 DIAGNOSIS — E78.00 PURE HYPERCHOLESTEROLEMIA: ICD-10-CM

## 2020-06-15 DIAGNOSIS — I25.5 CARDIOMYOPATHY, ISCHEMIC: ICD-10-CM

## 2020-06-15 DIAGNOSIS — Z98.890 HISTORY OF REPAIR OF ANEURYSM OF ABDOMINAL AORTA: ICD-10-CM

## 2020-06-15 PROCEDURE — 99214 OFFICE O/P EST MOD 30 MIN: CPT | Performed by: INTERNAL MEDICINE

## 2020-06-15 PROCEDURE — 99213 OFFICE O/P EST LOW 20 MIN: CPT | Performed by: INTERNAL MEDICINE

## 2020-06-15 RX ORDER — LEVOTHYROXINE SODIUM 0.03 MG/1
25 TABLET ORAL DAILY
COMMUNITY
Start: 2020-06-14 | End: 2021-02-04 | Stop reason: DRUGHIGH

## 2020-06-19 ENCOUNTER — OFFICE VISIT (OUTPATIENT)
Dept: NEUROLOGY | Facility: CLINIC | Age: 70
End: 2020-06-19
Payer: MEDICARE

## 2020-06-19 VITALS
HEART RATE: 64 BPM | SYSTOLIC BLOOD PRESSURE: 102 MMHG | WEIGHT: 189 LBS | BODY MASS INDEX: 27 KG/M2 | RESPIRATION RATE: 16 BRPM | DIASTOLIC BLOOD PRESSURE: 62 MMHG

## 2020-06-19 DIAGNOSIS — G62.9 NEUROPATHY: Primary | ICD-10-CM

## 2020-06-19 PROCEDURE — 99213 OFFICE O/P EST LOW 20 MIN: CPT | Performed by: OTHER

## 2020-06-19 RX ORDER — DULOXETIN HYDROCHLORIDE 30 MG/1
60 CAPSULE, DELAYED RELEASE ORAL DAILY
Qty: 180 CAPSULE | Refills: 1 | Status: SHIPPED | OUTPATIENT
Start: 2020-06-19 | End: 2020-12-08

## 2020-06-19 RX ORDER — PREGABALIN 150 MG/1
CAPSULE ORAL
Qty: 180 CAPSULE | Refills: 1 | Status: SHIPPED | OUTPATIENT
Start: 2020-06-19 | End: 2020-12-08

## 2020-06-19 NOTE — PROGRESS NOTES
Neurology H&P    Kin Pat Patient Status:  No patient class for patient encounter    1950 MRN [id number]   Location [address], [address] [place] [address] Drive, 232 [facility] Attending No att. providers found   [facility] Day # 0 PCP Lindsey Jensen MD treatments    Current Medications:  Current Outpatient Medications   Medication Sig Dispense Refill   • Levothyroxine Sodium 25 MCG Oral Tab Take 1 tablet (25 mcg total) by mouth before breakfast. 30 tablet 2   • DULOXETINE HCL 30 MG Oral Cap DR Particles CABG x 3     S/P ablation of atrial fibrillation     Dyslipidemia     Bilateral iliac artery aneurysm (HCC)     Iliac artery aneurysm, bilateral (HCC)     S/P AAA repair     Superficial femoral artery occlusion (HCC)     Chronic anticoagulation     Nephrol • OTHER SURGICAL HISTORY  2011    facial skin cancer removal   • OTHER SURGICAL HISTORY      left atrial appendage amputation   • OTHER SURGICAL HISTORY      maze procedure   • SYMP AAA URGENT REPAIR  2017   • TONSILLECTOMY         SocHx:  Social History Labs:  Component      Latest Ref Rng & Units 10/15/2019 9/10/2019   HEMOGLOBIN A1c      <5.7 % 6.0 (H)    ESTIMATED AVERAGE GLUCOSE      68 - 126 mg/dL 126    ALPHA-TOCOPHEROL (VIT E) -MG/L      5.5 - 18.0 mg/L 8.7    GAMMA-TOCOPHEROL (VIT E) -MG/L

## 2020-06-22 ENCOUNTER — APPOINTMENT (OUTPATIENT)
Dept: CARDIOLOGY | Age: 70
End: 2020-06-22

## 2020-07-02 ENCOUNTER — ANCILLARY PROCEDURE (OUTPATIENT)
Dept: CARDIOLOGY | Age: 70
End: 2020-07-02
Attending: INTERNAL MEDICINE

## 2020-07-02 ENCOUNTER — ANCILLARY ORDERS (OUTPATIENT)
Dept: CARDIOLOGY | Age: 70
End: 2020-07-02

## 2020-07-02 DIAGNOSIS — Z95.810 ICD (IMPLANTABLE CARDIOVERTER-DEFIBRILLATOR) IN PLACE: ICD-10-CM

## 2020-07-02 PROCEDURE — 93295 DEV INTERROG REMOTE 1/2/MLT: CPT | Performed by: INTERNAL MEDICINE

## 2020-07-02 PROCEDURE — X1114 CARDIAC DEVICE HOME CHECK - REMOTE UNSCHEDULED: HCPCS | Performed by: INTERNAL MEDICINE

## 2020-07-02 PROCEDURE — 93296 REM INTERROG EVL PM/IDS: CPT | Performed by: INTERNAL MEDICINE

## 2020-07-28 DIAGNOSIS — M79.89 FOOT SWELLING: ICD-10-CM

## 2020-07-28 DIAGNOSIS — M1A.0790 CHRONIC GOUT OF FOOT, UNSPECIFIED CAUSE, UNSPECIFIED LATERALITY: ICD-10-CM

## 2020-07-28 DIAGNOSIS — E79.0 HYPERURICEMIA: ICD-10-CM

## 2020-07-28 RX ORDER — ALLOPURINOL 100 MG/1
TABLET ORAL
Qty: 90 TABLET | Refills: 0 | Status: SHIPPED | OUTPATIENT
Start: 2020-07-28 | End: 2020-10-23

## 2020-09-09 RX ORDER — RIVAROXABAN 2.5 MG/1
TABLET, FILM COATED ORAL
Qty: 60 TABLET | Refills: 3 | Status: SHIPPED | OUTPATIENT
Start: 2020-09-09 | End: 2021-01-12

## 2020-09-17 DIAGNOSIS — E03.9 HYPOTHYROIDISM, UNSPECIFIED TYPE: ICD-10-CM

## 2020-09-24 ENCOUNTER — PATIENT MESSAGE (OUTPATIENT)
Dept: FAMILY MEDICINE CLINIC | Facility: CLINIC | Age: 70
End: 2020-09-24

## 2020-09-24 DIAGNOSIS — E03.9 HYPOTHYROIDISM, UNSPECIFIED TYPE: ICD-10-CM

## 2020-09-24 RX ORDER — LEVOTHYROXINE SODIUM 0.03 MG/1
TABLET ORAL
Qty: 30 TABLET | Refills: 0 | OUTPATIENT
Start: 2020-09-24

## 2020-09-24 RX ORDER — LEVOTHYROXINE SODIUM 0.03 MG/1
25 TABLET ORAL
Qty: 30 TABLET | Refills: 0 | Status: SHIPPED | OUTPATIENT
Start: 2020-09-24 | End: 2020-10-23

## 2020-09-24 NOTE — TELEPHONE ENCOUNTER
From: Jordyn Burgess  To: Ursula Mohamud MD  Sent: 9/24/2020 7:01 AM CDT  Subject: Prescription Question    Please refill this prescription for levothyroxin 25 mcg. Thank you.

## 2020-10-02 ENCOUNTER — TELEPHONE (OUTPATIENT)
Dept: FAMILY MEDICINE CLINIC | Facility: CLINIC | Age: 70
End: 2020-10-02

## 2020-10-02 ENCOUNTER — OFFICE VISIT (OUTPATIENT)
Dept: CARDIOLOGY | Age: 70
End: 2020-10-02

## 2020-10-02 VITALS
WEIGHT: 217 LBS | HEART RATE: 86 BPM | BODY MASS INDEX: 31.07 KG/M2 | SYSTOLIC BLOOD PRESSURE: 90 MMHG | DIASTOLIC BLOOD PRESSURE: 52 MMHG | HEIGHT: 70 IN

## 2020-10-02 DIAGNOSIS — E78.00 PURE HYPERCHOLESTEROLEMIA: ICD-10-CM

## 2020-10-02 DIAGNOSIS — Z86.79 ATRIAL FIBRILLATION, CURRENTLY IN SINUS RHYTHM: ICD-10-CM

## 2020-10-02 DIAGNOSIS — I71.40 ABDOMINAL AORTIC ANEURYSM (AAA) WITHOUT RUPTURE (CMD): Primary | ICD-10-CM

## 2020-10-02 DIAGNOSIS — Z95.1 HX OF CABG: ICD-10-CM

## 2020-10-02 DIAGNOSIS — I25.10 CORONARY ARTERY DISEASE INVOLVING NATIVE CORONARY ARTERY OF NATIVE HEART WITHOUT ANGINA PECTORIS: Primary | ICD-10-CM

## 2020-10-02 DIAGNOSIS — I72.3 ANEURYSM OF ILIAC ARTERY (CMD): ICD-10-CM

## 2020-10-02 DIAGNOSIS — Z98.890 HISTORY OF REPAIR OF ANEURYSM OF ABDOMINAL AORTA: ICD-10-CM

## 2020-10-02 DIAGNOSIS — I65.23 BILATERAL CAROTID ARTERY STENOSIS: ICD-10-CM

## 2020-10-02 DIAGNOSIS — I25.5 CARDIOMYOPATHY, ISCHEMIC: ICD-10-CM

## 2020-10-02 DIAGNOSIS — I25.10 CORONARY ARTERY DISEASE INVOLVING NATIVE CORONARY ARTERY OF NATIVE HEART WITHOUT ANGINA PECTORIS: ICD-10-CM

## 2020-10-02 DIAGNOSIS — I47.20 VENTRICULAR TACHYCARDIA (CMD): ICD-10-CM

## 2020-10-02 DIAGNOSIS — Z95.810 ICD (IMPLANTABLE CARDIOVERTER-DEFIBRILLATOR) IN PLACE: ICD-10-CM

## 2020-10-02 DIAGNOSIS — I73.9 PAD (PERIPHERAL ARTERY DISEASE) (CMD): ICD-10-CM

## 2020-10-02 DIAGNOSIS — I70.209 SUPERFICIAL FEMORAL ARTERY OCCLUSION (CMD): ICD-10-CM

## 2020-10-02 PROCEDURE — 99215 OFFICE O/P EST HI 40 MIN: CPT | Performed by: INTERNAL MEDICINE

## 2020-10-02 SDOH — HEALTH STABILITY: MENTAL HEALTH: HOW OFTEN DO YOU HAVE 6 OR MORE DRINKS ON ONE OCCASION?: NEVER

## 2020-10-02 SDOH — HEALTH STABILITY: PHYSICAL HEALTH: ON AVERAGE, HOW MANY MINUTES DO YOU ENGAGE IN EXERCISE AT THIS LEVEL?: 0 MIN

## 2020-10-02 SDOH — HEALTH STABILITY: MENTAL HEALTH: HOW OFTEN DO YOU HAVE A DRINK CONTAINING ALCOHOL?: 4 OR MORE TIMES A WEEK

## 2020-10-02 SDOH — HEALTH STABILITY: MENTAL HEALTH: HOW MANY STANDARD DRINKS CONTAINING ALCOHOL DO YOU HAVE ON A TYPICAL DAY?: 1 OR 2

## 2020-10-02 SDOH — HEALTH STABILITY: PHYSICAL HEALTH: ON AVERAGE, HOW MANY DAYS PER WEEK DO YOU ENGAGE IN MODERATE TO STRENUOUS EXERCISE (LIKE A BRISK WALK)?: 0 DAYS

## 2020-10-02 ASSESSMENT — PATIENT HEALTH QUESTIONNAIRE - PHQ9
1. LITTLE INTEREST OR PLEASURE IN DOING THINGS: NOT AT ALL
SUM OF ALL RESPONSES TO PHQ9 QUESTIONS 1 AND 2: 0
CLINICAL INTERPRETATION OF PHQ2 SCORE: NO FURTHER SCREENING NEEDED
CLINICAL INTERPRETATION OF PHQ9 SCORE: NO FURTHER SCREENING NEEDED
2. FEELING DOWN, DEPRESSED OR HOPELESS: NOT AT ALL
SUM OF ALL RESPONSES TO PHQ9 QUESTIONS 1 AND 2: 0

## 2020-10-02 ASSESSMENT — ENCOUNTER SYMPTOMS
HEMATOCHEZIA: 0
HEMOPTYSIS: 0
SUSPICIOUS LESIONS: 0
WEIGHT GAIN: 0
WEIGHT LOSS: 0
FEVER: 0
BRUISES/BLEEDS EASILY: 0
COUGH: 0
ALLERGIC/IMMUNOLOGIC COMMENTS: NO NEW FOOD ALLERGIES
CHILLS: 0

## 2020-10-02 NOTE — TELEPHONE ENCOUNTER
Referral to Zabrina Garcia Cardiologist (INTERNAL PER [facility])    Reason for the order/referral:  CARDIOLOGY/CONSULT   PCP:  FELICIA   Refer to Provider:  Kei Pratt   [place]: [place]Ronni Cobos[place] / Plan: Louis Meredith

## 2020-10-05 ENCOUNTER — TELEPHONE (OUTPATIENT)
Dept: CARDIOLOGY | Age: 70
End: 2020-10-05

## 2020-10-08 PROCEDURE — 93296 REM INTERROG EVL PM/IDS: CPT | Performed by: INTERNAL MEDICINE

## 2020-10-08 PROCEDURE — 93295 DEV INTERROG REMOTE 1/2/MLT: CPT | Performed by: INTERNAL MEDICINE

## 2020-10-08 RX ORDER — ROSUVASTATIN CALCIUM 5 MG/1
TABLET, COATED ORAL
Qty: 30 TABLET | Refills: 11 | Status: SHIPPED | OUTPATIENT
Start: 2020-10-08

## 2020-10-13 ENCOUNTER — ANCILLARY ORDERS (OUTPATIENT)
Dept: CARDIOLOGY | Age: 70
End: 2020-10-13

## 2020-10-13 ENCOUNTER — ANCILLARY PROCEDURE (OUTPATIENT)
Dept: CARDIOLOGY | Age: 70
End: 2020-10-13
Attending: INTERNAL MEDICINE

## 2020-10-13 DIAGNOSIS — Z95.810 ICD (IMPLANTABLE CARDIOVERTER-DEFIBRILLATOR) IN PLACE: ICD-10-CM

## 2020-10-13 PROCEDURE — X1114 CARDIAC DEVICE HOME CHECK - REMOTE UNSCHEDULED: HCPCS | Performed by: INTERNAL MEDICINE

## 2020-10-19 ENCOUNTER — TELEPHONE (OUTPATIENT)
Dept: CARDIOLOGY | Age: 70
End: 2020-10-19

## 2020-10-20 ENCOUNTER — HOSPITAL ENCOUNTER (OUTPATIENT)
Dept: LAB | Facility: HOSPITAL | Age: 70
Discharge: HOME | End: 2020-10-20
Attending: INTERNAL MEDICINE
Payer: MEDICARE

## 2020-10-20 PROCEDURE — 80061 LIPID PANEL: CPT | Performed by: INTERNAL MEDICINE

## 2020-10-20 PROCEDURE — 80053 COMPREHEN METABOLIC PANEL: CPT | Performed by: INTERNAL MEDICINE

## 2020-10-20 PROCEDURE — 36415 COLL VENOUS BLD VENIPUNCTURE: CPT | Performed by: INTERNAL MEDICINE

## 2020-10-22 DIAGNOSIS — E03.9 HYPOTHYROIDISM, UNSPECIFIED TYPE: ICD-10-CM

## 2020-10-23 ENCOUNTER — E-ADVICE (OUTPATIENT)
Dept: CARDIOLOGY | Age: 70
End: 2020-10-23

## 2020-10-23 DIAGNOSIS — E79.0 HYPERURICEMIA: ICD-10-CM

## 2020-10-23 DIAGNOSIS — M1A.0790 CHRONIC GOUT OF FOOT, UNSPECIFIED CAUSE, UNSPECIFIED LATERALITY: ICD-10-CM

## 2020-10-23 DIAGNOSIS — M79.89 FOOT SWELLING: ICD-10-CM

## 2020-10-23 RX ORDER — ALLOPURINOL 100 MG/1
TABLET ORAL
Qty: 90 TABLET | Refills: 0 | Status: SHIPPED | OUTPATIENT
Start: 2020-10-23

## 2020-10-23 RX ORDER — LEVOTHYROXINE SODIUM 0.03 MG/1
TABLET ORAL
Qty: 30 TABLET | Refills: 2 | Status: ON HOLD | OUTPATIENT
Start: 2020-10-23 | End: 2020-10-29

## 2020-10-27 ENCOUNTER — HOSPITAL ENCOUNTER (OUTPATIENT)
Facility: HOSPITAL | Age: 70
Setting detail: OBSERVATION
LOS: 1 days | Discharge: HOME | End: 2020-10-29
Attending: EMERGENCY MEDICINE | Admitting: HOSPITALIST
Payer: MEDICARE

## 2020-10-27 ENCOUNTER — APPOINTMENT (OUTPATIENT)
Dept: GENERAL RADIOLOGY | Facility: HOSPITAL | Age: 70
End: 2020-10-27
Attending: EMERGENCY MEDICINE
Payer: MEDICARE

## 2020-10-27 ENCOUNTER — APPOINTMENT (OUTPATIENT)
Dept: GENERAL RADIOLOGY | Facility: HOSPITAL | Age: 70
End: 2020-10-27
Payer: MEDICARE

## 2020-10-27 ENCOUNTER — APPOINTMENT (OUTPATIENT)
Dept: CT IMAGING | Facility: HOSPITAL | Age: 70
End: 2020-10-27
Attending: EMERGENCY MEDICINE
Payer: MEDICARE

## 2020-10-27 DIAGNOSIS — Z45.02 ICD (IMPLANTABLE CARDIOVERTER-DEFIBRILLATOR) DISCHARGE: Primary | ICD-10-CM

## 2020-10-27 DIAGNOSIS — S42.035A CLOSED NONDISPLACED FRACTURE OF ACROMIAL END OF LEFT CLAVICLE, INITIAL ENCOUNTER: ICD-10-CM

## 2020-10-27 DIAGNOSIS — R77.8 ELEVATED TROPONIN: ICD-10-CM

## 2020-10-27 DIAGNOSIS — I47.2 VENTRICULAR TACHYCARDIA (HCC): ICD-10-CM

## 2020-10-27 PROBLEM — R79.89 TROPONIN LEVEL ELEVATED: Status: ACTIVE | Noted: 2017-05-09

## 2020-10-27 PROBLEM — R79.89 ELEVATED TROPONIN: Status: ACTIVE | Noted: 2020-10-27

## 2020-10-27 PROBLEM — I47.20 VENTRICULAR TACHYCARDIA: Status: ACTIVE | Noted: 2020-10-27

## 2020-10-27 PROBLEM — I47.20 VENTRICULAR TACHYCARDIA (HCC): Status: ACTIVE | Noted: 2020-10-27
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
## 2020-10-27 PROCEDURE — 73030 X-RAY EXAM OF SHOULDER: CPT | Performed by: EMERGENCY MEDICINE

## 2020-10-27 PROCEDURE — 71045 X-RAY EXAM CHEST 1 VIEW: CPT | Performed by: EMERGENCY MEDICINE

## 2020-10-27 PROCEDURE — 70450 CT HEAD/BRAIN W/O DYE: CPT | Performed by: EMERGENCY MEDICINE

## 2020-10-27 PROCEDURE — 99220 INITIAL OBSERVATION CARE,LEVL III: CPT | Performed by: HOSPITALIST

## 2020-10-27 RX ORDER — PREGABALIN 75 MG/1
75 CAPSULE ORAL 2 TIMES DAILY
Status: DISCONTINUED | OUTPATIENT
Start: 2020-10-28 | End: 2020-10-29

## 2020-10-27 RX ORDER — ROSUVASTATIN CALCIUM 5 MG/1
5 TABLET, COATED ORAL NIGHTLY
Status: DISCONTINUED | OUTPATIENT
Start: 2020-10-28 | End: 2020-10-29

## 2020-10-27 RX ORDER — MAGNESIUM HYDROXIDE/ALUMINUM HYDROXICE/SIMETHICONE 120; 1200; 1200 MG/30ML; MG/30ML; MG/30ML
30 SUSPENSION ORAL DAILY PRN
Status: DISCONTINUED | OUTPATIENT
Start: 2020-10-27 | End: 2020-10-29

## 2020-10-27 RX ORDER — ALLOPURINOL 100 MG/1
100 TABLET ORAL DAILY
Status: DISCONTINUED | OUTPATIENT
Start: 2020-10-28 | End: 2020-10-29

## 2020-10-27 RX ORDER — CALCIUM CARBONATE 200(500)MG
500 TABLET,CHEWABLE ORAL
Status: DISCONTINUED | OUTPATIENT
Start: 2020-10-27 | End: 2020-10-29

## 2020-10-27 RX ORDER — BISACODYL 10 MG
10 SUPPOSITORY, RECTAL RECTAL
Status: DISCONTINUED | OUTPATIENT
Start: 2020-10-27 | End: 2020-10-29

## 2020-10-27 RX ORDER — BISACODYL 10 MG
10 SUPPOSITORY, RECTAL RECTAL
Status: DISCONTINUED | OUTPATIENT
Start: 2020-10-27 | End: 2020-10-28

## 2020-10-27 RX ORDER — LOSARTAN POTASSIUM 50 MG/1
50 TABLET ORAL DAILY
Status: DISCONTINUED | OUTPATIENT
Start: 2020-10-28 | End: 2020-10-28

## 2020-10-27 RX ORDER — POLYETHYLENE GLYCOL 3350 17 G/17G
17 POWDER, FOR SOLUTION ORAL DAILY PRN
Status: DISCONTINUED | OUTPATIENT
Start: 2020-10-27 | End: 2020-10-29

## 2020-10-27 RX ORDER — ONDANSETRON 2 MG/ML
8 INJECTION INTRAMUSCULAR; INTRAVENOUS EVERY 6 HOURS PRN
Status: DISCONTINUED | OUTPATIENT
Start: 2020-10-27 | End: 2020-10-29

## 2020-10-27 RX ORDER — ASPIRIN 81 MG/1
81 TABLET, CHEWABLE ORAL DAILY
Status: DISCONTINUED | OUTPATIENT
Start: 2020-10-28 | End: 2020-10-29

## 2020-10-27 RX ORDER — LEVOTHYROXINE SODIUM 0.03 MG/1
25 TABLET ORAL
Status: DISCONTINUED | OUTPATIENT
Start: 2020-10-28 | End: 2020-10-28

## 2020-10-27 RX ORDER — DULOXETIN HYDROCHLORIDE 30 MG/1
30 CAPSULE, DELAYED RELEASE ORAL DAILY
Status: DISCONTINUED | OUTPATIENT
Start: 2020-10-28 | End: 2020-10-29

## 2020-10-27 RX ORDER — SPIRONOLACTONE 25 MG/1
12.5 TABLET ORAL DAILY
Status: DISCONTINUED | OUTPATIENT
Start: 2020-10-28 | End: 2020-10-29

## 2020-10-27 RX ORDER — TORSEMIDE 20 MG/1
20 TABLET ORAL
Status: DISCONTINUED | OUTPATIENT
Start: 2020-10-28 | End: 2020-10-29

## 2020-10-27 RX ORDER — METOPROLOL SUCCINATE 25 MG/1
25 TABLET, EXTENDED RELEASE ORAL
Status: DISCONTINUED | OUTPATIENT
Start: 2020-10-28 | End: 2020-10-28

## 2020-10-27 RX ORDER — ACETAMINOPHEN 325 MG/1
650 TABLET ORAL EVERY 6 HOURS PRN
Status: DISCONTINUED | OUTPATIENT
Start: 2020-10-27 | End: 2020-10-29

## 2020-10-27 RX ORDER — LOSARTAN POTASSIUM 50 MG/1
TABLET ORAL DAILY
Status: ON HOLD | COMMUNITY
End: 2020-10-28

## 2020-10-27 RX ORDER — BENZONATATE 100 MG/1
100 CAPSULE ORAL 3 TIMES DAILY PRN
Status: DISCONTINUED | OUTPATIENT
Start: 2020-10-27 | End: 2020-10-29

## 2020-10-27 RX ORDER — SIMETHICONE 80 MG
80 TABLET,CHEWABLE ORAL 4 TIMES DAILY PRN
Status: DISCONTINUED | OUTPATIENT
Start: 2020-10-27 | End: 2020-10-29

## 2020-10-28 ENCOUNTER — TELEPHONE (OUTPATIENT)
Dept: CARDIOLOGY | Age: 70
End: 2020-10-28

## 2020-10-28 ENCOUNTER — ANCILLARY PROCEDURE (OUTPATIENT)
Dept: CARDIOLOGY | Age: 70
End: 2020-10-28
Attending: INTERNAL MEDICINE

## 2020-10-28 ENCOUNTER — APPOINTMENT (OUTPATIENT)
Dept: CV DIAGNOSTICS | Facility: HOSPITAL | Age: 70
End: 2020-10-28
Attending: HOSPITALIST
Payer: MEDICARE

## 2020-10-28 DIAGNOSIS — Z95.810 ICD (IMPLANTABLE CARDIOVERTER-DEFIBRILLATOR) IN PLACE: ICD-10-CM

## 2020-10-28 PROCEDURE — 99225 SUBSEQUENT OBSERVATION CARE: CPT | Performed by: HOSPITALIST

## 2020-10-28 PROCEDURE — 99215 OFFICE O/P EST HI 40 MIN: CPT | Performed by: INTERNAL MEDICINE

## 2020-10-28 PROCEDURE — B246ZZZ ULTRASONOGRAPHY OF RIGHT AND LEFT HEART: ICD-10-PCS | Performed by: INTERNAL MEDICINE

## 2020-10-28 PROCEDURE — 93306 TTE W/DOPPLER COMPLETE: CPT | Performed by: HOSPITALIST

## 2020-10-28 RX ORDER — POTASSIUM CHLORIDE 20 MEQ/1
40 TABLET, EXTENDED RELEASE ORAL EVERY 4 HOURS
Status: COMPLETED | OUTPATIENT
Start: 2020-10-28 | End: 2020-10-28

## 2020-10-28 RX ORDER — AMIODARONE HYDROCHLORIDE 200 MG/1
400 TABLET ORAL ONCE
Status: COMPLETED | OUTPATIENT
Start: 2020-10-28 | End: 2020-10-28

## 2020-10-28 RX ORDER — LEVOTHYROXINE SODIUM 0.05 MG/1
50 TABLET ORAL
Status: DISCONTINUED | OUTPATIENT
Start: 2020-10-29 | End: 2020-10-29

## 2020-10-28 NOTE — CONSULTS
[facility] Cardiology  Report of Consultation    Lonza Rubinstein Patient Status:  Inpatient    1950 MRN [id number]   [facility] 8NE-A Attending Mariangel Segura MD   Hosp Day # 1 PCP Mi Clifton MD     Reason for Co ischemic cardiomyopathy. She rested and then she had a shock 5 hours later. No angina or congestive-like symptoms. No palpitations. She is back to her normal baseline.     Since patient reported left shoulder discomfort and she has ICD implanted there, • Skin cancer 2001   • Small bowel obstruction ([facility] [address] 8/1/2017   • Tobacco abuse 9/17/2014   • Visual impairment     reading glasses     Past Surgical History:   Procedure Laterality Date   • ABDOMINAL AORTIC ANEURYSM N/A 5/9/2017    Performed by Edilberto Cunningham, PRN  •  bisacodyl (DULCOLAX) rectal suppository 10 mg, 10 mg, Rectal, Daily PRN  •  ondansetron HCl (ZOFRAN) injection 8 mg, 8 mg, Intravenous, Q6H PRN  •  Alum & Mag Hydroxide-Simeth (MAALOX) oral suspension 30 mL, 30 mL, Oral, Daily PRN  •  benzocaine-me repair with graft, PAD, on low-dose Xarelto for PAD and CAD. Respiratory: No cough, wheezing or hemoptysis, no dyspnea. Hematologic/Lymphatic: No easy bruising or bleeding. Integumentary: No rash or suspicious lesions on the skin.    Musculoskeletal: Ar LEFT (CPT=73030)     TECHNIQUE:  Multiple views were obtained. COMPARISON:  None. INDICATIONS:  fall, left shoulder pain  PATIENT STATED HISTORY: (As transcribed by Technologist)  Patient arrives by EMS.  States she fell down approx 8 stairs around 1300 t 10/27/2020 at 9:29 PM     Finalized by (CST): Mau Rodriguez MD on 10/27/2020 at 9:32 PM       Xr Chest Ap Portable  (cpt=71045)    Result Date: 10/27/2020  PROCEDURE:  XR CHEST AP PORTABLE  (CPT=71045)  TECHNIQUE:  AP chest radiograph was obtained.   Kassy Connelly consciousness. 2.  Ventricular tachycardia proven by remote interrogation of EP device in ER -as per ER physician -no printout available now regarding more details -we will ask for report in a.m. No prior ICD shocks. Stable sinus.   3.  Left shoulder patricia triggered by discomfort -we will decide if we need short-term amnio versus none - currently stays in sinus in telemetry -we will involve EP in a.m.  -Obtain full printout from remote check of EP device to review VT if device is functioning well post fall o

## 2020-10-28 NOTE — CM/SW NOTE
Patient failed inpatient criteria. Second level of review completed and supports observation. UR committee in agreement. Discussed with Dr Martinez Gonzalez who approves observation status. MOON given to the patient and order written.
Spoke with patient  Acknowledged receipt of SOL  All questions answered  Copy of SOL to chart  Bessie JOHNSON, 10/28/20, 2:35 PM
English

## 2020-10-28 NOTE — ED INITIAL ASSESSMENT (HPI)
Pt arrives by EMS. States she fell down approx 8 stairs around 1300 today. Denies LOC, admits to left shoulder pain. Pt was resting at home and felt her ICD fire around 1830.  Pt states she was sitting in a chair and felt \"palpitations\" shortly before [facility]

## 2020-10-28 NOTE — PLAN OF CARE
NURSING ADMISSION NOTE      Patient admitted via CART. Oriented to room. Safety precautions initiated. Bed in low position. Call light in reach. ADMITTED A 70 YRS OLD FEMALE FROM ER ALERT  AND ORIENTED X4 WITH DIAGNOSIS OF AICD DISCHARGE.  PLACED O

## 2020-10-28 NOTE — PROGRESS NOTES
[facility] HOSPITAL[facility]  Progress Note     Inocencio Gonzalez Patient Status:  Inpatient    1950 MRN [id number]   [facility] 8NE-A Attending Doris Ward MD   Hosp Day # 1 PCP Flavio Monroe MD     Chief Complaint: AICD firing    S: Ashley Chimera Imaging: Imaging data reviewed in Epic.     Medications:   • Sacubitril-Valsartan  1 tablet Oral BID   • Metoprolol Succinate ER  37.5 mg Oral 2x Daily(Beta Blocker)   • allopurinol  100 mg Oral Daily   • aspirin  81 mg Oral Daily   • DULoxetine HCl

## 2020-10-28 NOTE — PAYOR COMM NOTE
JIMMY BRYANT STATUS    Still in house    --------------  ADMISSION REVIEW     Payor: [place] [id number]  Subscriber #:  [id number]  Authorization Number: [id number]

## 2020-10-28 NOTE — ED PROVIDER NOTES
Patient Seen in: [facility] Emergency Department      History   Patient presents with:  Fall    Stated Complaint:     HPI    The patient is a 58-year-old female history of ventricular tachycardia, A. fib, who has an ICD, for the last 3 years, is on Date   • ABDOMINAL AORTIC ANEURYSM N/A 5/9/2017    Performed by Freedom Roblero MD at / [place] [id number]     • CABG  05/25/2017    3 vessel   • CARDIAC DEFIBRILLATOR PLACEMENT      [employer]   • CARDIAC PACEMAKER PLACEMENT  1 and sensation proximally and distally throughout all 4 extremities. Sensation and axillary, median, radial, and ulnar nerve distributions intact. Motor strength in axillary, median, radial, and ulnar nerve distributions 5 out of 5 and symmetric.    HEENT: WBC 14.2 (*)     RDW-SD 51.1 (*)     Neutrophil Absolute Prelim 9.80 (*)     Neutrophil Absolute 9.80 (*)     Monocyte Absolute 1.27 (*)     All other components within normal limits   CBC WITH DIFFERENTIAL WITH PLATELET    Narrative:      The following Dose information is transmitted to the [facility]     [facility] [employer] [facility] Radiology) NRDR ([address])     which includes the Dose Index     Registry.          PATIENT STATED HISTORY: (As transcribed by Technologist)  Patient fell     down s fracture.               Dictated by (CST): Aundrea Mccarthy MD on 10/27/2020 at 9:06 PM         Finalized by (CST): Aundrea Mccarthy MD on 10/27/2020 at 9:07 PM        XR CHEST AP PORTABLE  (CPT=71045)   Final Result    PROCEDURE:  XR CHEST AP PORTABLE  (CPT=71045) Disposition and Plan     Clinical Impression:  ICD (implantable cardioverter-defibrillator) discharge  (primary encounter diagnosis)  Ventricular tachycardia (HCC)  Elevated troponin  Closed nondisplaced fracture of acromial end of left clavicle, init

## 2020-10-28 NOTE — CONSULTS
[facility]  Electrophysiology Initial Consult Note      Julieta Hobbs Patient Status:  Inpatient    1950 MRN [id number]   [facility] 8NE-A Attending Bennett Mack MD   Hosp Day # 1 PCP Jaci Mccrary MD HIP REPLACEMENT SURGERY Right 05/10/2019   • HIP TOTAL REPLACEMENT Right 5/10/2019    Performed by Nikhil Chinchilla MD at [facility] MAIN OR   • [address] W/O BYPASS     • OTHER SURGICAL HISTORY  2011    facial skin cancer removal   • OTHER PRN  •  allopurinol (ZYLOPRIM) tab 100 mg, 100 mg, Oral, Daily  •  aspirin chewable tab 81 mg, 81 mg, Oral, Daily  •  DULoxetine HCl (CYMBALTA) DR particles cap 30 mg, 30 mg, Oral, Daily  •  Levothyroxine Sodium tab 25 mcg, 25 mcg, Oral, QAM AC  •  Metopro MCV 95.4 93.9   MCH 30.9 31.1   MCHC 32.4 33.1   RDW 14.7 14.8   NEPRELIM 9.80*  --    WBC 14.2* 10.2   .0 182.0       No results for input(s): PT, INR, PTT in the last 168 hours. Data:    Telemetry: personally reviewed. ; sinus rhythm ([facility]     Elevated troponin     Closed nondisplaced fracture of acromial end of left clavicle, initial encounter     [facility]

## 2020-10-29 VITALS
OXYGEN SATURATION: 98 % | HEIGHT: 71 IN | TEMPERATURE: 98 F | WEIGHT: 220 LBS | RESPIRATION RATE: 16 BRPM | DIASTOLIC BLOOD PRESSURE: 59 MMHG | HEART RATE: 69 BPM | SYSTOLIC BLOOD PRESSURE: 102 MMHG | BODY MASS INDEX: 30.8 KG/M2

## 2020-10-29 PROCEDURE — 99214 OFFICE O/P EST MOD 30 MIN: CPT | Performed by: NURSE PRACTITIONER

## 2020-10-29 PROCEDURE — 99217 OBSERVATION CARE DISCHARGE: CPT | Performed by: HOSPITALIST

## 2020-10-29 RX ORDER — LEVOTHYROXINE SODIUM 0.05 MG/1
50 TABLET ORAL
Qty: 90 TABLET | Refills: 0 | Status: SHIPPED | OUTPATIENT
Start: 2020-10-30 | End: 2021-02-15 | Stop reason: DRUGHIGH

## 2020-10-29 RX ORDER — METOPROLOL SUCCINATE 25 MG/1
37.5 TABLET, EXTENDED RELEASE ORAL
Qty: 120 TABLET | Refills: 3 | Status: SHIPPED | OUTPATIENT
Start: 2020-10-29

## 2020-10-29 NOTE — PROGRESS NOTES
Assumed care of pt at 1100. Pt denies pain. Pt is alert and oriented x 4. Pt is NSR on tele, S1 and S2 present and pt denies cardiac symptoms. Lung sounds clear. Abdomen soft. Plan is to discharge today.

## 2020-10-29 NOTE — PLAN OF CARE
ALERT AND ORIENTED X4 ON TELE MONITOR HR 70'S SINUS RHYTHM. LEFT ARM SLING IN USED. TYLENOL 2 TABS PO GIVEN FOR PAIN AS ORDERED. UPDATED W/ POC AND VERBALIZED UNDERSTANDING. ALL NEEDS ATTENDED AND WILL CONTINUE TO MONITOR. CALL LIGHT WITHIN REACH.   Problem

## 2020-10-29 NOTE — H&P
[facility]    PATIENT'S NAME: Alessandro Anninderjit   ATTENDING PHYSICIAN: Aly Fernandes M.D.    PATIENT ACCOUNT#:   [de-identified]    LOCATION:  [address] [place] [address]  MEDICAL RECORD #:   [id number]       YOB: 1950  ADMISSION DATE:       10/27/2020 due to bowel obstruction, exploratory laparotomy in 2017, hip replacement surgery in May 2019, cardiac ablation 2011, facial skin cancer removal, Watchman procedure, maze procedure, tonsillectomy.     MEDICATIONS:  Losartan potassium, Synthroid, allopurinol

## 2020-10-29 NOTE — PROGRESS NOTES
[facility]  Cardiology Progress Note    Subjective:  No chest pain or shortness of breath.     Objective:  /59 (BP Location: Right arm)   Pulse 69   Temp 97.9 °F (36.6 °C) (Oral)   Resp 14   Ht 5' 11\" (1.803 m)   Wt 220 lb (99.8 kg)   SpO2 100%

## 2020-10-29 NOTE — PLAN OF CARE
Pt. Is alert and oriented times four. Lungs clear on auscultation. Pt. States that she has some pain left clavicle but that tylenol helps her pain. Pt. Is sinus rhythm on monitor. Left foot wound soaked and dressing changed.  Dr. Lauri Conway in to see and discha

## 2020-10-29 NOTE — PROGRESS NOTES
NURSING DISCHARGE NOTE    Discharged Home via Wheelchair. Accompanied by Family member and RN  Belongings Taken by patient/family.

## 2020-10-29 NOTE — PROGRESS NOTES
[facility] HOSPITALIST  Progress Note     Kingsley Mcnamara Patient Status:  Inpatient    1950 MRN [id number]   [facility] Attending Shannon Simons MD   Hosp Day # 1 PCP David Blanc MD     Chief Complaint: AICD firing    S: [facility] Sacubitril-Valsartan  1 tablet Oral BID   • Levothyroxine Sodium  50 mcg Oral QAM AC   • Metoprolol Succinate ER  37.5 mg Oral 2x Daily(Beta Blocker)   • allopurinol  100 mg Oral Daily   • aspirin  81 mg Oral Daily   • DULoxetine HCl  30 mg Oral Daily   •

## 2020-10-30 ENCOUNTER — PATIENT OUTREACH (OUTPATIENT)
Dept: CASE MANAGEMENT | Age: 70
End: 2020-10-30

## 2020-10-30 ENCOUNTER — TELEPHONE (OUTPATIENT)
Dept: CARDIOLOGY | Age: 70
End: 2020-10-30

## 2020-10-30 DIAGNOSIS — I73.9 PERIPHERAL VASCULAR DISEASE (HCC): ICD-10-CM

## 2020-10-30 DIAGNOSIS — I48.0 PAROXYSMAL ATRIAL FIBRILLATION (HCC): ICD-10-CM

## 2020-10-30 DIAGNOSIS — I25.10 CORONARY ARTERY DISEASE INVOLVING NATIVE CORONARY ARTERY OF NATIVE HEART WITHOUT ANGINA PECTORIS: Primary | ICD-10-CM

## 2020-10-30 DIAGNOSIS — Z02.9 ENCOUNTERS FOR UNSPECIFIED ADMINISTRATIVE PURPOSE: ICD-10-CM

## 2020-10-30 RX ORDER — METOPROLOL SUCCINATE 25 MG/1
75 TABLET, EXTENDED RELEASE ORAL DAILY
Qty: 1 TABLET | Refills: 0 | Status: SHIPPED | OUTPATIENT
Start: 2020-10-30 | End: 2021-03-16 | Stop reason: DRUGHIGH

## 2020-10-30 RX ORDER — METOPROLOL SUCCINATE 50 MG/1
50 TABLET, EXTENDED RELEASE ORAL DAILY
COMMUNITY
End: 2020-10-30 | Stop reason: DRUGHIGH

## 2020-10-30 RX ORDER — METOPROLOL SUCCINATE 25 MG/1
25 TABLET, EXTENDED RELEASE ORAL DAILY
COMMUNITY
End: 2020-10-30 | Stop reason: DRUGHIGH

## 2020-10-30 NOTE — PROGRESS NOTES
Attempted to reach the patient to complete [facility]-Hospital FU call. The phone rang multiple times without an option for voicemail. NCM unable able to leave a message. [facility] will try to reach the patient again on Monday, 11/2/2020.      A CCM referral was entered

## 2020-10-30 NOTE — DISCHARGE SUMMARY
[facility] HOSPITALIST  DISCHARGE SUMMARY     Paul Rosario Patient Status:  Observation    1950 MRN [id number]   [facility] 8NE-A Attending No att. providers found   Hosp Day # 1 PCP Stacy Spencer MD     Date of Admission: 10/27/20 · None    Consultants:  • [facility], Ortho    Discharge Medication List:     Discharge Medications      CHANGE how you take these medications      Instructions Prescription details   Levothyroxine Sodium 50 MCG Tabs  Commonly known as: SYNTHROID  What changed: Take 20 mg by mouth 2 (two) times a day. Refills: 0     Tylenol PM Extra Strength  MG Tabs  Generic drug: diphenhydrAMINE-APAP (sleep)      Take 1 tablet by mouth daily as needed.    Refills: 0     Xarelto 2.5 MG Tabs  Generic drug: rivaroxaban chart    Eliza Amador MD    Time spent:  > 30 minutes

## 2020-11-02 ENCOUNTER — TELEPHONE (OUTPATIENT)
Dept: FAMILY MEDICINE CLINIC | Facility: CLINIC | Age: 70
End: 2020-11-02

## 2020-11-02 ENCOUNTER — TELEPHONE (OUTPATIENT)
Dept: CARDIOLOGY | Age: 70
End: 2020-11-02

## 2020-11-02 NOTE — TELEPHONE ENCOUNTER
Attempted to reach the patient for TCM and follow up on the recent hospitalization/care for AICD discharge and a left clavicular fracture. The pt does not have HFU appt scheduled at this time.   TCM/HFU appt recommended by 11/5/2020 as pt is a high risk for

## 2020-11-02 NOTE — TELEPHONE ENCOUNTER
Appt scheduled to see Dr. Alpa Bashir on 11/05/2020 at 1:30. Pt agreed to plan and verbalized understanding.

## 2020-11-02 NOTE — PROGRESS NOTES
Attempted to reach the patient to complete [facility]Hospital FU call. The phone rang multiple times without an option for voicemail. NCM unable to leave a message.    A TE was sent to the PCP's office for an appointment request.

## 2020-11-03 RX ORDER — SACUBITRIL AND VALSARTAN 97; 103 MG/1; MG/1
TABLET, FILM COATED ORAL
Qty: 180 TABLET | Refills: 0 | OUTPATIENT
Start: 2020-11-03

## 2020-11-05 ENCOUNTER — OFFICE VISIT (OUTPATIENT)
Dept: FAMILY MEDICINE CLINIC | Facility: CLINIC | Age: 70
End: 2020-11-05
Payer: MEDICARE

## 2020-11-05 VITALS
HEIGHT: 71 IN | DIASTOLIC BLOOD PRESSURE: 50 MMHG | WEIGHT: 220 LBS | SYSTOLIC BLOOD PRESSURE: 86 MMHG | RESPIRATION RATE: 16 BRPM | TEMPERATURE: 98 F | HEART RATE: 76 BPM | BODY MASS INDEX: 30.8 KG/M2

## 2020-11-05 DIAGNOSIS — S42.035D CLOSED NONDISPLACED FRACTURE OF ACROMIAL END OF LEFT CLAVICLE WITH ROUTINE HEALING, SUBSEQUENT ENCOUNTER: ICD-10-CM

## 2020-11-05 DIAGNOSIS — Z95.810 AICD (AUTOMATIC CARDIOVERTER/DEFIBRILLATOR) PRESENT: ICD-10-CM

## 2020-11-05 DIAGNOSIS — S20.20XD TRAUMATIC ECCHYMOSIS OF CHEST, SUBSEQUENT ENCOUNTER: ICD-10-CM

## 2020-11-05 DIAGNOSIS — Z45.02 AICD DISCHARGE: Primary | ICD-10-CM

## 2020-11-05 DIAGNOSIS — I25.10 CORONARY ARTERY DISEASE INVOLVING NATIVE CORONARY ARTERY OF NATIVE HEART WITHOUT ANGINA PECTORIS: ICD-10-CM

## 2020-11-05 PROCEDURE — 3078F DIAST BP <80 MM HG: CPT | Performed by: FAMILY MEDICINE

## 2020-11-05 PROCEDURE — 1111F DSCHRG MED/CURRENT MED MERGE: CPT | Performed by: FAMILY MEDICINE

## 2020-11-05 PROCEDURE — 3008F BODY MASS INDEX DOCD: CPT | Performed by: FAMILY MEDICINE

## 2020-11-05 PROCEDURE — 3074F SYST BP LT 130 MM HG: CPT | Performed by: FAMILY MEDICINE

## 2020-11-05 PROCEDURE — 99495 TRANSJ CARE MGMT MOD F2F 14D: CPT | Performed by: FAMILY MEDICINE

## 2020-11-05 NOTE — PROGRESS NOTES
HPI:    Myrna Tellez is a 69year old female here today for hospital follow up.    She was discharged from Inpatient hospital, [facility] to Home   Admission Date: 10/27/20   Discharge Date: 10/29/20  Hospital Discharge Diagnoses (since 10/6/2020) the emergency department the patient was admitted with AICD discharge and evaluated by cardiology. Patient's beta-blocker was uptitrated, she was additionally noted to have a elevated TSH and her levothyroxine dose has been increased.   She did suffer a di facility-administered medications on file prior to visit.          HISTORY: reconciled and reviewed with patient  She  has a past medical history of AAA (abdominal aortic aneurysm) ([facility] [address] Anemia, unspecified (8/15/2017), Arrhythmia, Atherosclerosis of coron denies depression or anxiety  HEMATOLOGIC: denies hx of anemia, or bruising, denies bleeding  ENDOCRINE: denies thyroid history  ALL/ASTHMA: denies hx of allergy or asthma    PHYSICAL EXAM:   No LMP recorded.  Patient is postmenopausal.  Estimated body mass service period of discharge to 30 days:   · Number of Possible Diagnoses and/or Management Options: moderate  · Amount and/or Complexity of Data to Be Reviewed: moderate  · Risk of Significant Complications, Morbidity, and/or Mortality: moderate    Overall

## 2020-11-06 NOTE — PROGRESS NOTES
Multiple attempts to contact the pt for TCM without a patient call back. The patient was seen for [facility]ospital FU on 11/5/2020. NCM closing encounter.

## 2020-12-08 ENCOUNTER — TELEMEDICINE (OUTPATIENT)
Dept: NEUROLOGY | Facility: CLINIC | Age: 70
End: 2020-12-08
Payer: MEDICARE

## 2020-12-08 DIAGNOSIS — G62.9 NEUROPATHY: Primary | ICD-10-CM

## 2020-12-08 PROCEDURE — 99213 OFFICE O/P EST LOW 20 MIN: CPT | Performed by: OTHER

## 2020-12-08 RX ORDER — PREGABALIN 150 MG/1
CAPSULE ORAL
Qty: 60 CAPSULE | Refills: 0 | Status: SHIPPED | OUTPATIENT
Start: 2020-12-08 | End: 2021-01-29

## 2020-12-08 NOTE — PROGRESS NOTES
This patient verbally consents a video and or audio Check-In service today. Patient understands and accepts financial responsibility for any deductible, co-insurance and/or co-pays associated with this service.     Neurology H&P    Bryn Page Patient she has continued Lyrica and Cymbalta. She states that she has been doing very well. She has no swelling in her feet. She does not have any pains. She feels well.      Current Medications:  Current Outpatient Medications   Medication Sig Dispense Refill   • endovascular stent graft for abdominal aortic aneurysm (AAA)     Ischemic cardiomyopathy     S/P CABG x 3     S/P ablation of atrial fibrillation     Dyslipidemia     Bilateral iliac artery aneurysm (HCC)     Iliac artery aneurysm, bilateral (HCC)     S/P Lizzeth Poe MD at [facility] MAIN OR   • HEART CORONARY ARTERY BYPASS GRAFT N/A 5/25/2017    Performed by Bill Ibarra MD at [facility] [address] Right 05/10/2019   • HIP TOTAL REPLACEMENT Right 5/10/2019    Performed by Clemencia Vallejo MD at [place] [address]. touch, pinprick intact      COORDINATION:  No dysmetria, or intention tremors    REFLEXES: 2+ at biceps, 2+ brachioradialis, 1+ at patella, 1+ at the ankles     GAIT:   normal gait,         Labs:  Component      Latest Ref Rng & Units 10/15/2019 9/10/2019 the following visit was completed using two-way, real-time interactive audio and/or video communication.   This has been done in good natali to provide continuity of care in the best interest of the provider-patient relationship, due to the ongoing public he

## 2021-01-04 ENCOUNTER — TELEPHONE (OUTPATIENT)
Dept: CARDIOLOGY | Age: 71
End: 2021-01-04

## 2021-01-12 RX ORDER — RIVAROXABAN 2.5 MG/1
TABLET, FILM COATED ORAL
Qty: 60 TABLET | Refills: 2 | Status: SHIPPED | OUTPATIENT
Start: 2021-01-12 | End: 2021-04-14

## 2021-01-13 ENCOUNTER — TELEPHONE (OUTPATIENT)
Dept: FAMILY MEDICINE CLINIC | Facility: CLINIC | Age: 71
End: 2021-01-13

## 2021-01-13 DIAGNOSIS — Z87.09 HISTORY OF URI (UPPER RESPIRATORY INFECTION): Primary | ICD-10-CM

## 2021-01-13 NOTE — TELEPHONE ENCOUNTER
Pt's  sent message through his chart requesting COVID antibody test. States he thinks he and pt had COVID last January or February. Order pended.

## 2021-01-14 PROCEDURE — 93296 REM INTERROG EVL PM/IDS: CPT | Performed by: INTERNAL MEDICINE

## 2021-01-22 ENCOUNTER — HOSPITAL ENCOUNTER (OUTPATIENT)
Dept: CARDIOLOGY CLINIC | Facility: HOSPITAL | Age: 71
Discharge: HOME | End: 2021-01-22
Attending: INTERNAL MEDICINE
Payer: MEDICARE

## 2021-01-22 DIAGNOSIS — I73.9 PAD (PERIPHERAL ARTERY DISEASE) (HCC): ICD-10-CM

## 2021-01-22 DIAGNOSIS — Z98.890 HISTORY OF REPAIR OF ANEURYSM OF ABDOMINAL AORTA: ICD-10-CM

## 2021-01-22 DIAGNOSIS — I72.3 ANEURYSM OF ILIAC ARTERY (HCC): ICD-10-CM

## 2021-01-22 DIAGNOSIS — I71.4 ABDOMINAL AORTIC ANEURYSM (AAA) WITHOUT RUPTURE (HCC): ICD-10-CM

## 2021-01-22 DIAGNOSIS — I70.209 SUPERFICIAL FEMORAL ARTERY OCCLUSION (HCC): ICD-10-CM

## 2021-01-22 PROCEDURE — 93978 VASCULAR STUDY: CPT | Performed by: INTERNAL MEDICINE

## 2021-01-22 PROCEDURE — 93925 LOWER EXTREMITY STUDY: CPT | Performed by: INTERNAL MEDICINE

## 2021-01-26 DIAGNOSIS — Z98.890 HISTORY OF REPAIR OF ANEURYSM OF ABDOMINAL AORTA: ICD-10-CM

## 2021-01-26 DIAGNOSIS — I73.9 PAD (PERIPHERAL ARTERY DISEASE) (CMD): ICD-10-CM

## 2021-01-26 DIAGNOSIS — I70.209 SUPERFICIAL FEMORAL ARTERY OCCLUSION (CMD): ICD-10-CM

## 2021-01-26 DIAGNOSIS — I71.40 ABDOMINAL AORTIC ANEURYSM (AAA) WITHOUT RUPTURE (CMD): ICD-10-CM

## 2021-01-26 DIAGNOSIS — I72.3 ANEURYSM OF ILIAC ARTERY (CMD): ICD-10-CM

## 2021-01-26 PROCEDURE — 93978 VASCULAR STUDY: CPT | Performed by: INTERNAL MEDICINE

## 2021-01-28 DIAGNOSIS — G62.9 NEUROPATHY: ICD-10-CM

## 2021-01-29 RX ORDER — PREGABALIN 150 MG/1
CAPSULE ORAL
Qty: 60 CAPSULE | Refills: 2 | Status: SHIPPED | OUTPATIENT
Start: 2021-01-29 | End: 2021-05-14

## 2021-01-29 NOTE — TELEPHONE ENCOUNTER
Medication: PREGABALIN 150 MG Oral Cap    Date of last refill: 12/08/2020 (#60/0)  [place] 803/99/0211    Last office visit: 12/08/2020  Due back to clinic per last office note:  Around 03/08/2021  Date next office visit

## 2021-01-30 ENCOUNTER — E-ADVICE (OUTPATIENT)
Dept: CARDIOLOGY | Age: 71
End: 2021-01-30

## 2021-02-02 ENCOUNTER — LAB ENCOUNTER (OUTPATIENT)
Dept: LAB | Facility: HOSPITAL | Age: 71
End: 2021-02-02
Attending: INTERNAL MEDICINE
Payer: MEDICARE

## 2021-02-02 DIAGNOSIS — I70.209 SUPERFICIAL FEMORAL ARTERY OCCLUSION (HCC): ICD-10-CM

## 2021-02-02 DIAGNOSIS — I72.3 ANEURYSM OF ILIAC ARTERY (HCC): ICD-10-CM

## 2021-02-02 DIAGNOSIS — I65.23 BILATERAL CAROTID ARTERY STENOSIS: ICD-10-CM

## 2021-02-02 DIAGNOSIS — I47.2 VENTRICULAR TACHYCARDIA (HCC): ICD-10-CM

## 2021-02-02 DIAGNOSIS — I73.9 PAD (PERIPHERAL ARTERY DISEASE) (HCC): ICD-10-CM

## 2021-02-02 DIAGNOSIS — Z23 NEED FOR VACCINATION: ICD-10-CM

## 2021-02-02 DIAGNOSIS — Z95.1 HX OF CABG: ICD-10-CM

## 2021-02-02 DIAGNOSIS — I71.4 ABDOMINAL AORTIC ANEURYSM WITHOUT RUPTURE (HCC): Primary | ICD-10-CM

## 2021-02-02 DIAGNOSIS — E03.9 HYPOTHYROIDISM, UNSPECIFIED TYPE: ICD-10-CM

## 2021-02-02 DIAGNOSIS — Z98.890 HISTORY OF REPAIR OF ANEURYSM OF ABDOMINAL AORTA: ICD-10-CM

## 2021-02-02 DIAGNOSIS — I42.9 CARDIOMYOPATHY (HCC): ICD-10-CM

## 2021-02-02 DIAGNOSIS — Z87.09 HISTORY OF URI (UPPER RESPIRATORY INFECTION): ICD-10-CM

## 2021-02-02 DIAGNOSIS — I25.10 CORONARY ARTERY DISEASE INVOLVING NATIVE CORONARY ARTERY OF NATIVE HEART WITHOUT ANGINA PECTORIS: ICD-10-CM

## 2021-02-02 LAB
ALBUMIN SERPL-MCNC: 3.6 G/DL (ref 3.4–5)
ALBUMIN/GLOB SERPL: 0.9 {RATIO} (ref 1–2)
ALP LIVER SERPL-CCNC: 62 U/L
ALT SERPL-CCNC: 17 U/L
ANION GAP SERPL CALC-SCNC: 5 MMOL/L (ref 0–18)
AST SERPL-CCNC: 16 U/L (ref 15–37)
BILIRUB SERPL-MCNC: 0.3 MG/DL (ref 0.1–2)
BUN BLD-MCNC: 21 MG/DL (ref 7–18)
BUN/CREAT SERPL: 18.9 (ref 10–20)
CALCIUM BLD-MCNC: 9.5 MG/DL (ref 8.5–10.1)
CHLORIDE SERPL-SCNC: 109 MMOL/L (ref 98–112)
CHOLEST SMN-MCNC: 147 MG/DL (ref ?–200)
CO2 SERPL-SCNC: 26 MMOL/L (ref 21–32)
CREAT BLD-MCNC: 1.11 MG/DL
GLOBULIN PLAS-MCNC: 3.9 G/DL (ref 2.8–4.4)
GLUCOSE BLD-MCNC: 87 MG/DL (ref 70–99)
HDLC SERPL-MCNC: 51 MG/DL (ref 40–59)
LDLC SERPL CALC-MCNC: 75 MG/DL (ref ?–100)
M PROTEIN MFR SERPL ELPH: 7.5 G/DL (ref 6.4–8.2)
NONHDLC SERPL-MCNC: 96 MG/DL (ref ?–130)
OSMOLALITY SERPL CALC.SUM OF ELEC: 292 MOSM/KG (ref 275–295)
PATIENT FASTING Y/N/NP: YES
PATIENT FASTING Y/N/NP: YES
POTASSIUM SERPL-SCNC: 4.3 MMOL/L (ref 3.5–5.1)
SARS-COV-2 IGG+IGM SERPL QL IA: NONREACTIVE
SODIUM SERPL-SCNC: 140 MMOL/L (ref 136–145)
T4 FREE SERPL-MCNC: 0.9 NG/DL (ref 0.8–1.7)
TRIGL SERPL-MCNC: 103 MG/DL (ref 30–149)
TSI SER-ACNC: 8.88 MIU/ML (ref 0.36–3.74)
VLDLC SERPL CALC-MCNC: 21 MG/DL (ref 0–30)

## 2021-02-02 PROCEDURE — 86769 SARS-COV-2 COVID-19 ANTIBODY: CPT

## 2021-02-02 PROCEDURE — 80061 LIPID PANEL: CPT

## 2021-02-02 PROCEDURE — 36415 COLL VENOUS BLD VENIPUNCTURE: CPT

## 2021-02-02 PROCEDURE — 84443 ASSAY THYROID STIM HORMONE: CPT

## 2021-02-02 PROCEDURE — 80053 COMPREHEN METABOLIC PANEL: CPT

## 2021-02-02 PROCEDURE — 84439 ASSAY OF FREE THYROXINE: CPT

## 2021-02-04 RX ORDER — LEVOTHYROXINE SODIUM 0.05 MG/1
TABLET ORAL
COMMUNITY
Start: 2020-10-29 | End: 2021-02-22 | Stop reason: DRUGHIGH

## 2021-02-05 ENCOUNTER — OFFICE VISIT (OUTPATIENT)
Dept: CARDIOLOGY | Age: 71
End: 2021-02-05

## 2021-02-05 VITALS
BODY MASS INDEX: 31.14 KG/M2 | SYSTOLIC BLOOD PRESSURE: 120 MMHG | DIASTOLIC BLOOD PRESSURE: 80 MMHG | WEIGHT: 217 LBS | HEART RATE: 80 BPM

## 2021-02-05 DIAGNOSIS — Z95.810 ICD (IMPLANTABLE CARDIOVERTER-DEFIBRILLATOR) IN PLACE: ICD-10-CM

## 2021-02-05 DIAGNOSIS — E78.00 PURE HYPERCHOLESTEROLEMIA: ICD-10-CM

## 2021-02-05 DIAGNOSIS — Z98.890 HISTORY OF REPAIR OF ANEURYSM OF ABDOMINAL AORTA: ICD-10-CM

## 2021-02-05 DIAGNOSIS — I71.40 ABDOMINAL AORTIC ANEURYSM (AAA) WITHOUT RUPTURE (CMD): ICD-10-CM

## 2021-02-05 DIAGNOSIS — I70.209 SUPERFICIAL FEMORAL ARTERY OCCLUSION (CMD): ICD-10-CM

## 2021-02-05 DIAGNOSIS — I25.10 CORONARY ARTERY DISEASE INVOLVING NATIVE CORONARY ARTERY OF NATIVE HEART WITHOUT ANGINA PECTORIS: Primary | ICD-10-CM

## 2021-02-05 DIAGNOSIS — I25.5 CARDIOMYOPATHY, ISCHEMIC: ICD-10-CM

## 2021-02-05 DIAGNOSIS — I73.9 PAD (PERIPHERAL ARTERY DISEASE) (CMD): ICD-10-CM

## 2021-02-05 DIAGNOSIS — Z95.1 HX OF CABG: ICD-10-CM

## 2021-02-05 PROBLEM — Z79.899 LONG TERM CURRENT USE OF AMIODARONE: Status: RESOLVED | Noted: 2017-06-19 | Resolved: 2021-02-05

## 2021-02-05 PROCEDURE — 99214 OFFICE O/P EST MOD 30 MIN: CPT | Performed by: CLINICAL NURSE SPECIALIST

## 2021-02-05 SDOH — HEALTH STABILITY: MENTAL HEALTH: HOW OFTEN DO YOU HAVE 6 OR MORE DRINKS ON ONE OCCASION?: NEVER

## 2021-02-05 SDOH — HEALTH STABILITY: MENTAL HEALTH: HOW MANY STANDARD DRINKS CONTAINING ALCOHOL DO YOU HAVE ON A TYPICAL DAY?: 1 OR 2

## 2021-02-05 SDOH — HEALTH STABILITY: MENTAL HEALTH: HOW OFTEN DO YOU HAVE A DRINK CONTAINING ALCOHOL?: 4 OR MORE TIMES A WEEK

## 2021-02-05 ASSESSMENT — ENCOUNTER SYMPTOMS
ALLERGIC/IMMUNOLOGIC COMMENTS: NO NEW FOOD ALLERGIES
HEMATOCHEZIA: 0
WEIGHT LOSS: 0
CHILLS: 0
FEVER: 0
BRUISES/BLEEDS EASILY: 0
SUSPICIOUS LESIONS: 0
COUGH: 0
WEIGHT GAIN: 0
HEMOPTYSIS: 0

## 2021-02-05 ASSESSMENT — PATIENT HEALTH QUESTIONNAIRE - PHQ9
SUM OF ALL RESPONSES TO PHQ9 QUESTIONS 1 AND 2: 0
CLINICAL INTERPRETATION OF PHQ9 SCORE: NO FURTHER SCREENING NEEDED
1. LITTLE INTEREST OR PLEASURE IN DOING THINGS: NOT AT ALL
2. FEELING DOWN, DEPRESSED OR HOPELESS: NOT AT ALL
SUM OF ALL RESPONSES TO PHQ9 QUESTIONS 1 AND 2: 0
CLINICAL INTERPRETATION OF PHQ2 SCORE: NO FURTHER SCREENING NEEDED

## 2021-02-11 ENCOUNTER — OFFICE VISIT (OUTPATIENT)
Dept: FAMILY MEDICINE CLINIC | Facility: CLINIC | Age: 71
End: 2021-02-11
Payer: MEDICARE

## 2021-02-11 VITALS
HEIGHT: 71 IN | TEMPERATURE: 98 F | DIASTOLIC BLOOD PRESSURE: 60 MMHG | BODY MASS INDEX: 31.5 KG/M2 | HEART RATE: 84 BPM | SYSTOLIC BLOOD PRESSURE: 98 MMHG | WEIGHT: 225 LBS | RESPIRATION RATE: 16 BRPM

## 2021-02-11 DIAGNOSIS — Z95.1 S/P CABG X 3: ICD-10-CM

## 2021-02-11 DIAGNOSIS — Z00.00 ENCOUNTER FOR ANNUAL HEALTH EXAMINATION: Primary | ICD-10-CM

## 2021-02-11 DIAGNOSIS — I25.10 CORONARY ARTERY DISEASE INVOLVING NATIVE CORONARY ARTERY OF NATIVE HEART WITHOUT ANGINA PECTORIS: ICD-10-CM

## 2021-02-11 DIAGNOSIS — Z12.31 VISIT FOR SCREENING MAMMOGRAM: ICD-10-CM

## 2021-02-11 DIAGNOSIS — I48.0 PAROXYSMAL ATRIAL FIBRILLATION (HCC): ICD-10-CM

## 2021-02-11 DIAGNOSIS — Z95.828 HISTORY OF ENDOVASCULAR STENT GRAFT FOR ABDOMINAL AORTIC ANEURYSM (AAA): ICD-10-CM

## 2021-02-11 DIAGNOSIS — E03.8 OTHER SPECIFIED HYPOTHYROIDISM: ICD-10-CM

## 2021-02-11 DIAGNOSIS — I25.5 ISCHEMIC CARDIOMYOPATHY: ICD-10-CM

## 2021-02-11 DIAGNOSIS — I73.9 PERIPHERAL VASCULAR DISEASE (HCC): ICD-10-CM

## 2021-02-11 DIAGNOSIS — Z79.01 CHRONIC ANTICOAGULATION: ICD-10-CM

## 2021-02-11 DIAGNOSIS — E79.0 HYPERURICEMIA: ICD-10-CM

## 2021-02-11 DIAGNOSIS — Z95.810 AICD (AUTOMATIC CARDIOVERTER/DEFIBRILLATOR) PRESENT: ICD-10-CM

## 2021-02-11 DIAGNOSIS — G62.9 NEUROPATHY: ICD-10-CM

## 2021-02-11 DIAGNOSIS — I47.2 NSVT (NONSUSTAINED VENTRICULAR TACHYCARDIA) (HCC): ICD-10-CM

## 2021-02-11 DIAGNOSIS — E78.5 DYSLIPIDEMIA: ICD-10-CM

## 2021-02-11 PROCEDURE — 3008F BODY MASS INDEX DOCD: CPT | Performed by: FAMILY MEDICINE

## 2021-02-11 PROCEDURE — 3078F DIAST BP <80 MM HG: CPT | Performed by: FAMILY MEDICINE

## 2021-02-11 PROCEDURE — 96160 PT-FOCUSED HLTH RISK ASSMT: CPT | Performed by: FAMILY MEDICINE

## 2021-02-11 PROCEDURE — 99397 PER PM REEVAL EST PAT 65+ YR: CPT | Performed by: FAMILY MEDICINE

## 2021-02-11 PROCEDURE — G0439 PPPS, SUBSEQ VISIT: HCPCS | Performed by: FAMILY MEDICINE

## 2021-02-11 PROCEDURE — 3074F SYST BP LT 130 MM HG: CPT | Performed by: FAMILY MEDICINE

## 2021-02-11 NOTE — PATIENT INSTRUCTIONS
Pascual Martell's SCREENING SCHEDULE   Tests on this list are recommended by your physician but may not be covered, or covered at this frequency, by your insurer. Please check with your insurance carrier before scheduling to verify coverage.    PREVENTATI one of the following criteria:   • Men who are 73-68 years old and have smoked more than 100 cigarettes in their lifetime   • Anyone with a family history    Colorectal Cancer Screening  Covered up to Age 76     Colonoscopy Screen   Covered every 10 years- for this or any previous visit. Please get every year    Pneumococcal 13 (Prevnar)  Covered Once after 65 No orders found for this or any previous visit.  Please get once after your 65th birthday    Pneumococcal 23 (Pneumovax)  Covered Once after 65 No orde

## 2021-02-11 NOTE — PROGRESS NOTES
HPI:   Ana Guy is a 69year old female who presents for a MA (Medicare Advantage) Supervisit (Once per calendar year). Patient is here for her Medicare subsequent annual wellness visit.   She states that she has been feeling reasonably well r Smoker        Packs/day: 0.50        Years: 47.00        Pack years: 23.5        Types: Cigarettes        Quit date: 5/9/2017        Years since quitting: 3.7      Smokeless tobacco: Never Used       Ms. Martell already takes aspirin and has it on her medic Last Chemistry Labs:   Lab Results   Component Value Date    AST 16 02/02/2021    ALT 17 02/02/2021    CA 9.5 02/02/2021    ALB 3.6 02/02/2021    TSH 8.880 (H) 02/02/2021    CREATSERUM 1.11 (H) 02/02/2021    GLU 87 02/02/2021        CBC  (most recent unspecified (8/15/2017), Arrhythmia, Atherosclerosis of coronary artery, Calculus of kidney, Cancer ([facility] [address] Chronic atrial fibrillation ([facility] [address] Coronary atherosclerosis, Disorder of thyroid, History of blood transfusion, IBS (irritable bowel syndrome), Ische Visual Acuity: Uncorrected Both Eyes Chart Acuity: 20/30   Able To Tolerate Visual Acuity: Yes      General appearance: alert, appears stated age and cooperative  Head: Normocephalic, without obvious abnormality, atraumatic  Neck: no adenopathy, no carotid graft for abdominal aortic aneurysm (AAA)  Continue follow-up with vascular surgery  - COMP METABOLIC PANEL (14); Future  - LIPID PANEL; Future    7.  Peripheral vascular disease ([address]  Continue follow-up with vascular surgery  - COMP METABOLIC PANEL (14); Friends; Visiting Family      This section provided for quick review of chart, separate sheet to patient  [address] [place] [address],[address] Internal Lab or Procedure External Lab or Procedure   Diabetes Screening      HbgA1C   Annually Lab Res get every year    Pneumococcal 13 (Prevnar)  Covered Once after 65 No vaccine history found Please get once after your 65th birthday    Pneumococcal 23 (Pneumovax)  Covered Once after 65 No vaccine history found Please get once after your 65th birthday

## 2021-02-15 RX ORDER — LEVOTHYROXINE SODIUM 0.07 MG/1
75 TABLET ORAL
Qty: 90 TABLET | Refills: 1 | Status: SHIPPED | OUTPATIENT
Start: 2021-02-15 | End: 2021-08-06

## 2021-02-15 NOTE — TELEPHONE ENCOUNTER
Pt spouse here for ov. Requesting refill on wife's levothyroxine. He reports Dr Omar Linda increased her to 75mcg at her vs last week. I discussed with Dr Margo Rodriguez. He agrees to dose increase. Rx sent. Med list updated.

## 2021-02-19 ENCOUNTER — APPOINTMENT (OUTPATIENT)
Dept: CARDIOLOGY | Age: 71
End: 2021-02-19
Attending: INTERNAL MEDICINE

## 2021-02-22 ENCOUNTER — ANCILLARY PROCEDURE (OUTPATIENT)
Dept: CARDIOLOGY | Age: 71
End: 2021-02-22
Attending: INTERNAL MEDICINE

## 2021-02-22 DIAGNOSIS — Z45.02 ENCOUNTER FOR ASSESSMENT OF IMPLANTABLE CARDIOVERTER-DEFIBRILLATOR (ICD): ICD-10-CM

## 2021-02-22 PROCEDURE — 93283 PRGRMG EVAL IMPLANTABLE DFB: CPT | Performed by: INTERNAL MEDICINE

## 2021-02-22 RX ORDER — LEVOTHYROXINE SODIUM 0.07 MG/1
75 TABLET ORAL
COMMUNITY
Start: 2021-02-15

## 2021-03-09 ENCOUNTER — TELEPHONE (OUTPATIENT)
Dept: CARDIOLOGY | Age: 71
End: 2021-03-09

## 2021-03-12 RX ORDER — METOPROLOL SUCCINATE 50 MG/1
TABLET, EXTENDED RELEASE ORAL
Qty: 30 TABLET | Refills: 0 | OUTPATIENT
Start: 2021-03-12

## 2021-03-16 RX ORDER — METOPROLOL SUCCINATE 25 MG/1
37.5 TABLET, EXTENDED RELEASE ORAL 2 TIMES DAILY
Qty: 90 TABLET | Refills: 11 | Status: SHIPPED | OUTPATIENT
Start: 2021-03-16 | End: 2021-06-07 | Stop reason: SDUPTHER

## 2021-03-17 ENCOUNTER — HOSPITAL ENCOUNTER (OUTPATIENT)
Dept: CV DIAGNOSTICS | Facility: HOSPITAL | Age: 71
Discharge: HOME | End: 2021-03-17
Attending: INTERNAL MEDICINE
Payer: MEDICARE

## 2021-03-17 ENCOUNTER — HOSPITAL ENCOUNTER (OUTPATIENT)
Dept: CARDIOLOGY CLINIC | Facility: HOSPITAL | Age: 71
Discharge: HOME | End: 2021-03-17
Attending: INTERNAL MEDICINE
Payer: MEDICARE

## 2021-03-17 DIAGNOSIS — I25.10 CORONARY ARTERY DISEASE INVOLVING NATIVE CORONARY ARTERY OF NATIVE HEART WITHOUT ANGINA PECTORIS: ICD-10-CM

## 2021-03-17 DIAGNOSIS — Z95.1 HX OF CABG: ICD-10-CM

## 2021-03-17 DIAGNOSIS — I65.23 BILATERAL CAROTID ARTERY STENOSIS: ICD-10-CM

## 2021-03-17 DIAGNOSIS — I25.5 CARDIOMYOPATHY, ISCHEMIC: ICD-10-CM

## 2021-03-17 DIAGNOSIS — Z95.810 ICD (IMPLANTABLE CARDIOVERTER-DEFIBRILLATOR) IN PLACE: ICD-10-CM

## 2021-03-17 DIAGNOSIS — Z86.79 ATRIAL FIBRILLATION, CURRENTLY IN SINUS RHYTHM: ICD-10-CM

## 2021-03-17 DIAGNOSIS — I73.9 PAD (PERIPHERAL ARTERY DISEASE) (HCC): ICD-10-CM

## 2021-03-17 DIAGNOSIS — I47.2 VENTRICULAR TACHYCARDIA (HCC): ICD-10-CM

## 2021-03-17 PROCEDURE — 93306 TTE W/DOPPLER COMPLETE: CPT | Performed by: INTERNAL MEDICINE

## 2021-03-17 PROCEDURE — 93880 EXTRACRANIAL BILAT STUDY: CPT | Performed by: INTERNAL MEDICINE

## 2021-03-26 ENCOUNTER — OFFICE VISIT (OUTPATIENT)
Dept: CARDIOLOGY | Age: 71
End: 2021-03-26

## 2021-03-26 VITALS
HEART RATE: 78 BPM | DIASTOLIC BLOOD PRESSURE: 58 MMHG | HEIGHT: 71 IN | BODY MASS INDEX: 31.64 KG/M2 | WEIGHT: 226 LBS | SYSTOLIC BLOOD PRESSURE: 96 MMHG

## 2021-03-26 DIAGNOSIS — I73.9 PAD (PERIPHERAL ARTERY DISEASE) (CMD): ICD-10-CM

## 2021-03-26 DIAGNOSIS — E78.00 PURE HYPERCHOLESTEROLEMIA: ICD-10-CM

## 2021-03-26 DIAGNOSIS — I25.5 CARDIOMYOPATHY, ISCHEMIC: ICD-10-CM

## 2021-03-26 DIAGNOSIS — I65.23 BILATERAL CAROTID ARTERY STENOSIS: ICD-10-CM

## 2021-03-26 DIAGNOSIS — Z95.1 HX OF CABG: ICD-10-CM

## 2021-03-26 DIAGNOSIS — I47.20 VENTRICULAR TACHYCARDIA (CMD): ICD-10-CM

## 2021-03-26 DIAGNOSIS — Z95.810 ICD (IMPLANTABLE CARDIOVERTER-DEFIBRILLATOR) IN PLACE: ICD-10-CM

## 2021-03-26 DIAGNOSIS — Z86.79 ATRIAL FIBRILLATION, CURRENTLY IN SINUS RHYTHM: ICD-10-CM

## 2021-03-26 DIAGNOSIS — I70.209 SUPERFICIAL FEMORAL ARTERY OCCLUSION (CMD): ICD-10-CM

## 2021-03-26 DIAGNOSIS — Z98.890 HISTORY OF REPAIR OF ANEURYSM OF ABDOMINAL AORTA: ICD-10-CM

## 2021-03-26 DIAGNOSIS — I71.40 ABDOMINAL AORTIC ANEURYSM (AAA) WITHOUT RUPTURE (CMD): Primary | ICD-10-CM

## 2021-03-26 DIAGNOSIS — I72.3 ANEURYSM OF ILIAC ARTERY (CMD): ICD-10-CM

## 2021-03-26 DIAGNOSIS — I25.10 CORONARY ARTERY DISEASE INVOLVING NATIVE CORONARY ARTERY OF NATIVE HEART WITHOUT ANGINA PECTORIS: ICD-10-CM

## 2021-03-26 PROCEDURE — 99215 OFFICE O/P EST HI 40 MIN: CPT | Performed by: INTERNAL MEDICINE

## 2021-03-26 SDOH — HEALTH STABILITY: MENTAL HEALTH: HOW MANY STANDARD DRINKS CONTAINING ALCOHOL DO YOU HAVE ON A TYPICAL DAY?: 1 OR 2

## 2021-03-26 SDOH — HEALTH STABILITY: MENTAL HEALTH: HOW OFTEN DO YOU HAVE 6 OR MORE DRINKS ON ONE OCCASION?: NEVER

## 2021-03-26 SDOH — HEALTH STABILITY: PHYSICAL HEALTH: ON AVERAGE, HOW MANY MINUTES DO YOU ENGAGE IN EXERCISE AT THIS LEVEL?: 30 MIN

## 2021-03-26 SDOH — HEALTH STABILITY: MENTAL HEALTH: HOW OFTEN DO YOU HAVE A DRINK CONTAINING ALCOHOL?: 4 OR MORE TIMES A WEEK

## 2021-03-26 SDOH — HEALTH STABILITY: PHYSICAL HEALTH: ON AVERAGE, HOW MANY DAYS PER WEEK DO YOU ENGAGE IN MODERATE TO STRENUOUS EXERCISE (LIKE A BRISK WALK)?: 7 DAYS

## 2021-03-26 ASSESSMENT — ENCOUNTER SYMPTOMS
WEIGHT GAIN: 0
ALLERGIC/IMMUNOLOGIC COMMENTS: NO NEW FOOD ALLERGIES
FEVER: 0
WEIGHT LOSS: 0
BRUISES/BLEEDS EASILY: 0
CHILLS: 0
HEMATOCHEZIA: 0
HEMOPTYSIS: 0
COUGH: 0
SUSPICIOUS LESIONS: 0

## 2021-03-26 ASSESSMENT — PATIENT HEALTH QUESTIONNAIRE - PHQ9
1. LITTLE INTEREST OR PLEASURE IN DOING THINGS: NOT AT ALL
CLINICAL INTERPRETATION OF PHQ9 SCORE: NO FURTHER SCREENING NEEDED
CLINICAL INTERPRETATION OF PHQ2 SCORE: NO FURTHER SCREENING NEEDED
2. FEELING DOWN, DEPRESSED OR HOPELESS: NOT AT ALL
SUM OF ALL RESPONSES TO PHQ9 QUESTIONS 1 AND 2: 0
SUM OF ALL RESPONSES TO PHQ9 QUESTIONS 1 AND 2: 0

## 2021-04-14 RX ORDER — RIVAROXABAN 2.5 MG/1
TABLET, FILM COATED ORAL
Qty: 60 TABLET | Refills: 11 | Status: SHIPPED | OUTPATIENT
Start: 2021-04-14

## 2021-04-22 ENCOUNTER — ANCILLARY ORDERS (OUTPATIENT)
Dept: CARDIOLOGY | Age: 71
End: 2021-04-22

## 2021-04-22 ENCOUNTER — ANCILLARY PROCEDURE (OUTPATIENT)
Dept: CARDIOLOGY | Age: 71
End: 2021-04-22
Attending: INTERNAL MEDICINE

## 2021-04-22 DIAGNOSIS — Z45.018 ENCOUNTER FOR CARE OF PACEMAKER: ICD-10-CM

## 2021-04-22 PROCEDURE — X1114 CARDIAC DEVICE HOME CHECK - REMOTE UNSCHEDULED: HCPCS | Performed by: INTERNAL MEDICINE

## 2021-04-22 PROCEDURE — 93296 REM INTERROG EVL PM/IDS: CPT | Performed by: INTERNAL MEDICINE

## 2021-04-22 PROCEDURE — 93295 DEV INTERROG REMOTE 1/2/MLT: CPT | Performed by: INTERNAL MEDICINE

## 2021-04-25 ENCOUNTER — TELEPHONE (OUTPATIENT)
Dept: FAMILY MEDICINE CLINIC | Facility: CLINIC | Age: 71
End: 2021-04-25

## 2021-04-25 NOTE — TELEPHONE ENCOUNTER
Please call patient to remind her to schedule her mammogram - Dr. Spenser Maloney has an order in place for her.

## 2021-04-27 NOTE — TELEPHONE ENCOUNTER
Phone rang and rang
Phone rang and rang.   Sent mychart letter
Please call pt to schedule med check with Dr. Suzy Barnett. LOV: 1/30/2020    Thank you.
Admission

## 2021-05-03 RX ORDER — SPIRONOLACTONE 25 MG/1
TABLET ORAL
Qty: 45 TABLET | Refills: 3 | Status: SHIPPED | OUTPATIENT
Start: 2021-05-03

## 2021-05-04 RX ORDER — SACUBITRIL AND VALSARTAN 97; 103 MG/1; MG/1
TABLET, FILM COATED ORAL
Qty: 180 TABLET | Refills: 1 | Status: SHIPPED | OUTPATIENT
Start: 2021-05-04

## 2021-05-10 RX ORDER — TORSEMIDE 20 MG/1
TABLET ORAL
Qty: 180 TABLET | Refills: 1 | Status: SHIPPED | OUTPATIENT
Start: 2021-05-10

## 2021-05-13 DIAGNOSIS — G62.9 NEUROPATHY: ICD-10-CM

## 2021-05-13 NOTE — TELEPHONE ENCOUNTER
Sent the patient a [employer] message to make an appt for further medication refills.        Medication: PREGABALIN 150 MG Oral Cap    Date of last refill: 01/29/2021 (#60/2)  [place] 49/44/3448    Last office visit: 12/08/2

## 2021-05-14 RX ORDER — PREGABALIN 150 MG/1
CAPSULE ORAL
Qty: 60 CAPSULE | Refills: 0 | Status: SHIPPED | OUTPATIENT
Start: 2021-05-14 | End: 2022-02-03

## 2021-06-07 ENCOUNTER — TELEPHONE (OUTPATIENT)
Dept: CARDIOLOGY | Age: 71
End: 2021-06-07

## 2021-06-07 RX ORDER — METOPROLOL SUCCINATE 25 MG/1
37.5 TABLET, EXTENDED RELEASE ORAL 2 TIMES DAILY
Qty: 90 TABLET | Refills: 11 | Status: SHIPPED | OUTPATIENT
Start: 2021-06-07 | End: 2022-06-07

## 2021-06-16 ENCOUNTER — OFFICE VISIT (OUTPATIENT)
Dept: CARDIOLOGY | Age: 71
End: 2021-06-16

## 2021-06-16 VITALS
DIASTOLIC BLOOD PRESSURE: 70 MMHG | SYSTOLIC BLOOD PRESSURE: 90 MMHG | WEIGHT: 214 LBS | HEIGHT: 71 IN | BODY MASS INDEX: 29.96 KG/M2 | HEART RATE: 87 BPM

## 2021-06-16 DIAGNOSIS — Z95.810 ICD (IMPLANTABLE CARDIOVERTER-DEFIBRILLATOR) IN PLACE: ICD-10-CM

## 2021-06-16 DIAGNOSIS — I25.5 CARDIOMYOPATHY, ISCHEMIC: Primary | ICD-10-CM

## 2021-06-16 PROCEDURE — 99213 OFFICE O/P EST LOW 20 MIN: CPT | Performed by: INTERNAL MEDICINE

## 2021-06-16 ASSESSMENT — PATIENT HEALTH QUESTIONNAIRE - PHQ9
CLINICAL INTERPRETATION OF PHQ2 SCORE: NO FURTHER SCREENING NEEDED
2. FEELING DOWN, DEPRESSED OR HOPELESS: NOT AT ALL
SUM OF ALL RESPONSES TO PHQ9 QUESTIONS 1 AND 2: 0
CLINICAL INTERPRETATION OF PHQ9 SCORE: NO FURTHER SCREENING NEEDED
1. LITTLE INTEREST OR PLEASURE IN DOING THINGS: NOT AT ALL
SUM OF ALL RESPONSES TO PHQ9 QUESTIONS 1 AND 2: 0

## 2021-08-05 ENCOUNTER — LAB ENCOUNTER (OUTPATIENT)
Dept: LAB | Facility: HOSPITAL | Age: 71
End: 2021-08-05
Attending: INTERNAL MEDICINE
Payer: MEDICARE

## 2021-08-05 DIAGNOSIS — I73.9 PERIPHERAL VASCULAR DISEASE (HCC): ICD-10-CM

## 2021-08-05 DIAGNOSIS — E78.5 DYSLIPIDEMIA: ICD-10-CM

## 2021-08-05 DIAGNOSIS — G62.9 NEUROPATHY: ICD-10-CM

## 2021-08-05 DIAGNOSIS — E03.8 OTHER SPECIFIED HYPOTHYROIDISM: ICD-10-CM

## 2021-08-05 DIAGNOSIS — E79.0 HYPERURICEMIA: ICD-10-CM

## 2021-08-05 DIAGNOSIS — Z95.828 HISTORY OF ENDOVASCULAR STENT GRAFT FOR ABDOMINAL AORTIC ANEURYSM (AAA): ICD-10-CM

## 2021-08-05 DIAGNOSIS — I25.10 CORONARY ARTERY DISEASE INVOLVING NATIVE CORONARY ARTERY OF NATIVE HEART WITHOUT ANGINA PECTORIS: ICD-10-CM

## 2021-08-05 DIAGNOSIS — Z95.1 S/P CABG X 3: ICD-10-CM

## 2021-08-05 DIAGNOSIS — I48.0 PAROXYSMAL ATRIAL FIBRILLATION (HCC): ICD-10-CM

## 2021-08-05 DIAGNOSIS — I47.2 NSVT (NONSUSTAINED VENTRICULAR TACHYCARDIA) (HCC): ICD-10-CM

## 2021-08-05 DIAGNOSIS — I25.5 ISCHEMIC CARDIOMYOPATHY: ICD-10-CM

## 2021-08-05 DIAGNOSIS — Z79.01 CHRONIC ANTICOAGULATION: ICD-10-CM

## 2021-08-05 LAB
ALBUMIN SERPL-MCNC: 3.6 G/DL (ref 3.4–5)
ALBUMIN/GLOB SERPL: 0.9 {RATIO} (ref 1–2)
ALP LIVER SERPL-CCNC: 60 U/L
ALT SERPL-CCNC: 21 U/L
ANION GAP SERPL CALC-SCNC: 5 MMOL/L (ref 0–18)
AST SERPL-CCNC: 15 U/L (ref 15–37)
BASOPHILS # BLD AUTO: 0.06 X10(3) UL (ref 0–0.2)
BASOPHILS NFR BLD AUTO: 0.6 %
BILIRUB SERPL-MCNC: 0.4 MG/DL (ref 0.1–2)
BUN BLD-MCNC: 12 MG/DL (ref 7–18)
CALCIUM BLD-MCNC: 9.7 MG/DL (ref 8.5–10.1)
CHLORIDE SERPL-SCNC: 109 MMOL/L (ref 98–112)
CHOLEST SMN-MCNC: 133 MG/DL (ref ?–200)
CO2 SERPL-SCNC: 26 MMOL/L (ref 21–32)
CREAT BLD-MCNC: 0.75 MG/DL
EOSINOPHIL # BLD AUTO: 0.77 X10(3) UL (ref 0–0.7)
EOSINOPHIL NFR BLD AUTO: 8.3 %
ERYTHROCYTE [DISTWIDTH] IN BLOOD BY AUTOMATED COUNT: 14.9 %
GLOBULIN PLAS-MCNC: 3.9 G/DL (ref 2.8–4.4)
GLUCOSE BLD-MCNC: 92 MG/DL (ref 70–99)
HAV IGM SER QL: 2.1 MG/DL (ref 1.6–2.6)
HCT VFR BLD AUTO: 43.2 %
HDLC SERPL-MCNC: 40 MG/DL (ref 40–59)
HGB BLD-MCNC: 13.9 G/DL
IMM GRANULOCYTES # BLD AUTO: 0.02 X10(3) UL (ref 0–1)
IMM GRANULOCYTES NFR BLD: 0.2 %
LDLC SERPL CALC-MCNC: 74 MG/DL (ref ?–100)
LYMPHOCYTES # BLD AUTO: 3.46 X10(3) UL (ref 1–4)
LYMPHOCYTES NFR BLD AUTO: 37.1 %
M PROTEIN MFR SERPL ELPH: 7.5 G/DL (ref 6.4–8.2)
MCH RBC QN AUTO: 30.2 PG (ref 26–34)
MCHC RBC AUTO-ENTMCNC: 32.2 G/DL (ref 31–37)
MCV RBC AUTO: 93.7 FL
MONOCYTES # BLD AUTO: 1.07 X10(3) UL (ref 0.1–1)
MONOCYTES NFR BLD AUTO: 11.5 %
NEUTROPHILS # BLD AUTO: 3.95 X10 (3) UL (ref 1.5–7.7)
NEUTROPHILS # BLD AUTO: 3.95 X10(3) UL (ref 1.5–7.7)
NEUTROPHILS NFR BLD AUTO: 42.3 %
NONHDLC SERPL-MCNC: 93 MG/DL (ref ?–130)
OSMOLALITY SERPL CALC.SUM OF ELEC: 289 MOSM/KG (ref 275–295)
PATIENT FASTING Y/N/NP: YES
PATIENT FASTING Y/N/NP: YES
PLATELET # BLD AUTO: 189 10(3)UL (ref 150–450)
POTASSIUM SERPL-SCNC: 4.3 MMOL/L (ref 3.5–5.1)
RBC # BLD AUTO: 4.61 X10(6)UL
SODIUM SERPL-SCNC: 140 MMOL/L (ref 136–145)
T4 FREE SERPL-MCNC: 1.2 NG/DL (ref 0.8–1.7)
TRIGL SERPL-MCNC: 103 MG/DL (ref 30–149)
TSI SER-ACNC: 4.54 MIU/ML (ref 0.36–3.74)
URATE SERPL-MCNC: 4.9 MG/DL
VLDLC SERPL CALC-MCNC: 16 MG/DL (ref 0–30)
WBC # BLD AUTO: 9.3 X10(3) UL (ref 4–11)

## 2021-08-05 PROCEDURE — 84550 ASSAY OF BLOOD/URIC ACID: CPT

## 2021-08-05 PROCEDURE — 80053 COMPREHEN METABOLIC PANEL: CPT

## 2021-08-05 PROCEDURE — 83735 ASSAY OF MAGNESIUM: CPT

## 2021-08-05 PROCEDURE — 36415 COLL VENOUS BLD VENIPUNCTURE: CPT

## 2021-08-05 PROCEDURE — 85025 COMPLETE CBC W/AUTO DIFF WBC: CPT

## 2021-08-05 PROCEDURE — 84439 ASSAY OF FREE THYROXINE: CPT

## 2021-08-05 PROCEDURE — 84443 ASSAY THYROID STIM HORMONE: CPT

## 2021-08-05 PROCEDURE — 80061 LIPID PANEL: CPT

## 2021-08-06 ENCOUNTER — TELEPHONE (OUTPATIENT)
Dept: FAMILY MEDICINE CLINIC | Facility: CLINIC | Age: 71
End: 2021-08-06

## 2021-08-06 DIAGNOSIS — E03.9 HYPOTHYROIDISM, UNSPECIFIED TYPE: Primary | ICD-10-CM

## 2021-08-06 RX ORDER — LEVOTHYROXINE SODIUM 88 UG/1
88 TABLET ORAL
Qty: 60 TABLET | Refills: 1 | Status: SHIPPED | OUTPATIENT
Start: 2021-08-06 | End: 2021-12-17

## 2021-09-17 ENCOUNTER — HOSPITAL ENCOUNTER (OUTPATIENT)
Dept: LAB | Facility: HOSPITAL | Age: 71
Discharge: HOME | End: 2021-09-17
Attending: INTERNAL MEDICINE
Payer: MEDICARE

## 2021-09-17 LAB
ANION GAP SERPL CALC-SCNC: 2 MMOL/L (ref 0–18)
BUN BLD-MCNC: 19 MG/DL (ref 7–18)
CALCIUM BLD-MCNC: 9.3 MG/DL (ref 8.5–10.1)
CHLORIDE SERPL-SCNC: 107 MMOL/L (ref 98–112)
CO2 SERPL-SCNC: 28 MMOL/L (ref 21–32)
CREAT BLD-MCNC: 0.84 MG/DL
GLUCOSE BLD-MCNC: 85 MG/DL (ref 70–99)
OSMOLALITY SERPL CALC.SUM OF ELEC: 286 MOSM/KG (ref 275–295)
PATIENT FASTING Y/N/NP: YES
POTASSIUM SERPL-SCNC: 4.3 MMOL/L (ref 3.5–5.1)
SODIUM SERPL-SCNC: 137 MMOL/L (ref 136–145)

## 2021-09-17 PROCEDURE — 36415 COLL VENOUS BLD VENIPUNCTURE: CPT | Performed by: INTERNAL MEDICINE

## 2021-09-17 PROCEDURE — 80048 BASIC METABOLIC PNL TOTAL CA: CPT | Performed by: INTERNAL MEDICINE

## 2021-12-17 ENCOUNTER — TELEPHONE (OUTPATIENT)
Dept: FAMILY MEDICINE CLINIC | Facility: CLINIC | Age: 71
End: 2021-12-17

## 2021-12-17 RX ORDER — LEVOTHYROXINE SODIUM 88 UG/1
88 TABLET ORAL
Qty: 30 TABLET | Refills: 1 | Status: SHIPPED | OUTPATIENT
Start: 2021-12-17

## 2021-12-17 NOTE — TELEPHONE ENCOUNTER
Pt was told to call by PCP by pharmacy  Requesting refill    Levothyroxine Sodium 88 MCG Oral Tab 60 tablet 1 8/6/2021     Sig - Route:  Take 1 tablet (88 mcg total) by mouth before breakfast. - Oral      [id number] - [place] , [place] - [address] [facility],[address] [place] [address]

## 2022-02-03 ENCOUNTER — OFFICE VISIT (OUTPATIENT)
Dept: FAMILY MEDICINE CLINIC | Facility: CLINIC | Age: 72
End: 2022-02-03
Payer: MEDICARE

## 2022-02-03 VITALS
HEIGHT: 71 IN | TEMPERATURE: 99 F | SYSTOLIC BLOOD PRESSURE: 92 MMHG | RESPIRATION RATE: 16 BRPM | BODY MASS INDEX: 29.26 KG/M2 | WEIGHT: 209 LBS | DIASTOLIC BLOOD PRESSURE: 64 MMHG | HEART RATE: 68 BPM

## 2022-02-03 DIAGNOSIS — Z95.810 AICD (AUTOMATIC CARDIOVERTER/DEFIBRILLATOR) PRESENT: ICD-10-CM

## 2022-02-03 DIAGNOSIS — I47.2 NSVT (NONSUSTAINED VENTRICULAR TACHYCARDIA) (HCC): ICD-10-CM

## 2022-02-03 DIAGNOSIS — E78.5 DYSLIPIDEMIA: ICD-10-CM

## 2022-02-03 DIAGNOSIS — Z79.01 CHRONIC ANTICOAGULATION: ICD-10-CM

## 2022-02-03 DIAGNOSIS — I48.0 PAROXYSMAL ATRIAL FIBRILLATION (HCC): ICD-10-CM

## 2022-02-03 DIAGNOSIS — E79.0 HYPERURICEMIA: ICD-10-CM

## 2022-02-03 DIAGNOSIS — I25.10 CORONARY ARTERY DISEASE INVOLVING NATIVE CORONARY ARTERY OF NATIVE HEART WITHOUT ANGINA PECTORIS: Primary | ICD-10-CM

## 2022-02-03 DIAGNOSIS — E03.9 HYPOTHYROIDISM, UNSPECIFIED TYPE: ICD-10-CM

## 2022-02-03 DIAGNOSIS — G62.9 NEUROPATHY: ICD-10-CM

## 2022-02-03 DIAGNOSIS — Z95.1 S/P CABG X 3: ICD-10-CM

## 2022-02-03 DIAGNOSIS — I73.9 PERIPHERAL VASCULAR DISEASE (HCC): ICD-10-CM

## 2022-02-03 DIAGNOSIS — I25.5 ISCHEMIC CARDIOMYOPATHY: ICD-10-CM

## 2022-02-03 PROCEDURE — 3078F DIAST BP <80 MM HG: CPT | Performed by: FAMILY MEDICINE

## 2022-02-03 PROCEDURE — 3008F BODY MASS INDEX DOCD: CPT | Performed by: FAMILY MEDICINE

## 2022-02-03 PROCEDURE — 99214 OFFICE O/P EST MOD 30 MIN: CPT | Performed by: FAMILY MEDICINE

## 2022-02-03 PROCEDURE — 3074F SYST BP LT 130 MM HG: CPT | Performed by: FAMILY MEDICINE

## 2022-02-03 RX ORDER — CHOLECALCIFEROL (VITAMIN D3) 50 MCG
TABLET ORAL
COMMUNITY
Start: 2020-09-09

## 2022-02-03 RX ORDER — MULTIVIT WITH MINERALS/LUTEIN
TABLET ORAL
COMMUNITY
Start: 2020-09-09

## 2022-02-12 RX ORDER — LEVOTHYROXINE SODIUM 88 UG/1
88 TABLET ORAL
Qty: 30 TABLET | Refills: 2 | Status: SHIPPED | OUTPATIENT
Start: 2022-02-12 | End: 2022-05-06

## 2022-03-08 ENCOUNTER — TELEPHONE (OUTPATIENT)
Dept: FAMILY MEDICINE CLINIC | Facility: CLINIC | Age: 72
End: 2022-03-08

## 2022-03-10 PROBLEM — I73.9 PAD (PERIPHERAL ARTERY DISEASE) (HCC): Status: ACTIVE | Noted: 2020-03-09

## 2022-03-10 PROBLEM — I73.9 PAD (PERIPHERAL ARTERY DISEASE): Status: ACTIVE | Noted: 2020-03-09

## 2022-03-10 PROBLEM — I25.10 CAD (CORONARY ARTERY DISEASE), NATIVE CORONARY ARTERY: Status: ACTIVE | Noted: 2017-06-19

## 2022-03-10 PROBLEM — I72.3 ANEURYSM OF ILIAC ARTERY: Status: ACTIVE | Noted: 2017-08-01

## 2022-03-10 PROBLEM — I25.5 CARDIOMYOPATHY, ISCHEMIC: Status: ACTIVE | Noted: 2017-06-19

## 2022-03-10 PROBLEM — I72.3 ANEURYSM OF ILIAC ARTERY (HCC): Status: ACTIVE | Noted: 2017-08-01

## 2022-03-10 PROBLEM — E78.00 PURE HYPERCHOLESTEROLEMIA: Status: ACTIVE | Noted: 2017-06-08

## 2022-03-10 PROBLEM — I65.23 BILATERAL CAROTID ARTERY STENOSIS: Status: ACTIVE | Noted: 2017-06-19

## 2022-05-06 RX ORDER — LEVOTHYROXINE SODIUM 88 UG/1
TABLET ORAL
Qty: 30 TABLET | Refills: 0 | Status: SHIPPED | OUTPATIENT
Start: 2022-05-06

## 2022-06-07 RX ORDER — LEVOTHYROXINE SODIUM 88 UG/1
88 TABLET ORAL
Qty: 15 TABLET | Refills: 0 | Status: SHIPPED | OUTPATIENT
Start: 2022-06-07 | End: 2022-06-28

## 2022-06-08 RX ORDER — LEVOTHYROXINE SODIUM 88 UG/1
88 TABLET ORAL
Qty: 15 TABLET | Refills: 0 | OUTPATIENT
Start: 2022-06-08

## 2022-06-28 RX ORDER — LEVOTHYROXINE SODIUM 88 UG/1
TABLET ORAL
Qty: 10 TABLET | Refills: 0 | Status: SHIPPED | OUTPATIENT
Start: 2022-06-28

## 2022-07-13 RX ORDER — LEVOTHYROXINE SODIUM 88 UG/1
TABLET ORAL
Qty: 10 TABLET | Refills: 0 | Status: SHIPPED | OUTPATIENT
Start: 2022-07-13

## 2022-07-26 ENCOUNTER — LAB ENCOUNTER (OUTPATIENT)
Dept: LAB | Facility: HOSPITAL | Age: 72
End: 2022-07-26
Attending: FAMILY MEDICINE
Payer: MEDICARE

## 2022-07-26 DIAGNOSIS — I25.10 CORONARY ARTERY DISEASE INVOLVING NATIVE CORONARY ARTERY OF NATIVE HEART WITHOUT ANGINA PECTORIS: ICD-10-CM

## 2022-07-26 DIAGNOSIS — Z79.01 CHRONIC ANTICOAGULATION: ICD-10-CM

## 2022-07-26 DIAGNOSIS — I48.0 PAROXYSMAL ATRIAL FIBRILLATION (HCC): ICD-10-CM

## 2022-07-26 DIAGNOSIS — I73.9 PERIPHERAL VASCULAR DISEASE (HCC): ICD-10-CM

## 2022-07-26 DIAGNOSIS — I25.5 ISCHEMIC CARDIOMYOPATHY: ICD-10-CM

## 2022-07-26 DIAGNOSIS — E03.9 HYPOTHYROIDISM, UNSPECIFIED TYPE: ICD-10-CM

## 2022-07-26 DIAGNOSIS — E78.5 DYSLIPIDEMIA: ICD-10-CM

## 2022-07-26 DIAGNOSIS — I47.2 NSVT (NONSUSTAINED VENTRICULAR TACHYCARDIA) (HCC): ICD-10-CM

## 2022-07-26 DIAGNOSIS — Z95.1 S/P CABG X 3: ICD-10-CM

## 2022-07-26 DIAGNOSIS — E79.0 HYPERURICEMIA: ICD-10-CM

## 2022-07-26 LAB
ALBUMIN SERPL-MCNC: 4 G/DL (ref 3.4–5)
ALBUMIN/GLOB SERPL: 1.1 {RATIO} (ref 1–2)
ALP LIVER SERPL-CCNC: 72 U/L
ALT SERPL-CCNC: 22 U/L
ANION GAP SERPL CALC-SCNC: 3 MMOL/L (ref 0–18)
AST SERPL-CCNC: 18 U/L (ref 15–37)
BASOPHILS # BLD AUTO: 0.07 X10(3) UL (ref 0–0.2)
BASOPHILS NFR BLD AUTO: 0.6 %
BILIRUB SERPL-MCNC: 0.4 MG/DL (ref 0.1–2)
BUN BLD-MCNC: 19 MG/DL (ref 7–18)
CALCIUM BLD-MCNC: 9.8 MG/DL (ref 8.5–10.1)
CHLORIDE SERPL-SCNC: 106 MMOL/L (ref 98–112)
CHOLEST SERPL-MCNC: 149 MG/DL (ref ?–200)
CO2 SERPL-SCNC: 28 MMOL/L (ref 21–32)
CREAT BLD-MCNC: 1.09 MG/DL
EOSINOPHIL # BLD AUTO: 0.43 X10(3) UL (ref 0–0.7)
EOSINOPHIL NFR BLD AUTO: 4 %
ERYTHROCYTE [DISTWIDTH] IN BLOOD BY AUTOMATED COUNT: 13.5 %
FASTING PATIENT LIPID ANSWER: YES
FASTING STATUS PATIENT QL REPORTED: YES
GLOBULIN PLAS-MCNC: 3.8 G/DL (ref 2.8–4.4)
GLUCOSE BLD-MCNC: 106 MG/DL (ref 70–99)
HCT VFR BLD AUTO: 43.5 %
HDLC SERPL-MCNC: 47 MG/DL (ref 40–59)
HGB BLD-MCNC: 13.9 G/DL
IMM GRANULOCYTES # BLD AUTO: 0.04 X10(3) UL (ref 0–1)
IMM GRANULOCYTES NFR BLD: 0.4 %
LDLC SERPL CALC-MCNC: 78 MG/DL (ref ?–100)
LYMPHOCYTES # BLD AUTO: 4.9 X10(3) UL (ref 1–4)
LYMPHOCYTES NFR BLD AUTO: 45 %
MCH RBC QN AUTO: 31 PG (ref 26–34)
MCHC RBC AUTO-ENTMCNC: 32 G/DL (ref 31–37)
MCV RBC AUTO: 97.1 FL
MONOCYTES # BLD AUTO: 1.07 X10(3) UL (ref 0.1–1)
MONOCYTES NFR BLD AUTO: 9.8 %
NEUTROPHILS # BLD AUTO: 4.37 X10 (3) UL (ref 1.5–7.7)
NEUTROPHILS # BLD AUTO: 4.37 X10(3) UL (ref 1.5–7.7)
NEUTROPHILS NFR BLD AUTO: 40.2 %
NONHDLC SERPL-MCNC: 102 MG/DL (ref ?–130)
OSMOLALITY SERPL CALC.SUM OF ELEC: 287 MOSM/KG (ref 275–295)
PLATELET # BLD AUTO: 188 10(3)UL (ref 150–450)
POTASSIUM SERPL-SCNC: 4 MMOL/L (ref 3.5–5.1)
PROT SERPL-MCNC: 7.8 G/DL (ref 6.4–8.2)
RBC # BLD AUTO: 4.48 X10(6)UL
SODIUM SERPL-SCNC: 137 MMOL/L (ref 136–145)
T4 FREE SERPL-MCNC: 1 NG/DL (ref 0.8–1.7)
TRIGL SERPL-MCNC: 139 MG/DL (ref 30–149)
TSI SER-ACNC: 2.86 MIU/ML (ref 0.36–3.74)
URATE SERPL-MCNC: 7.1 MG/DL
VLDLC SERPL CALC-MCNC: 22 MG/DL (ref 0–30)
WBC # BLD AUTO: 10.9 X10(3) UL (ref 4–11)

## 2022-07-26 PROCEDURE — 84550 ASSAY OF BLOOD/URIC ACID: CPT

## 2022-07-26 PROCEDURE — 80061 LIPID PANEL: CPT

## 2022-07-26 PROCEDURE — 84443 ASSAY THYROID STIM HORMONE: CPT

## 2022-07-26 PROCEDURE — 85025 COMPLETE CBC W/AUTO DIFF WBC: CPT

## 2022-07-26 PROCEDURE — 36415 COLL VENOUS BLD VENIPUNCTURE: CPT

## 2022-07-26 PROCEDURE — 84439 ASSAY OF FREE THYROXINE: CPT

## 2022-07-26 PROCEDURE — 80053 COMPREHEN METABOLIC PANEL: CPT

## 2022-07-27 RX ORDER — LEVOTHYROXINE SODIUM 88 UG/1
88 TABLET ORAL
Qty: 60 TABLET | Refills: 0 | Status: SHIPPED | OUTPATIENT
Start: 2022-07-27

## 2022-09-12 RX ORDER — LEVOTHYROXINE SODIUM 88 UG/1
88 TABLET ORAL
Qty: 30 TABLET | Refills: 0 | Status: SHIPPED | OUTPATIENT
Start: 2022-09-12 | End: 2022-09-27

## 2022-09-27 ENCOUNTER — OFFICE VISIT (OUTPATIENT)
Dept: FAMILY MEDICINE CLINIC | Facility: CLINIC | Age: 72
End: 2022-09-27
Payer: MEDICARE

## 2022-09-27 VITALS
OXYGEN SATURATION: 97 % | WEIGHT: 217 LBS | HEART RATE: 80 BPM | RESPIRATION RATE: 18 BRPM | SYSTOLIC BLOOD PRESSURE: 90 MMHG | BODY MASS INDEX: 30.38 KG/M2 | TEMPERATURE: 97 F | DIASTOLIC BLOOD PRESSURE: 52 MMHG | HEIGHT: 71 IN

## 2022-09-27 DIAGNOSIS — I25.10 CORONARY ARTERY DISEASE INVOLVING NATIVE CORONARY ARTERY OF NATIVE HEART WITHOUT ANGINA PECTORIS: ICD-10-CM

## 2022-09-27 DIAGNOSIS — I48.0 PAROXYSMAL ATRIAL FIBRILLATION (HCC): ICD-10-CM

## 2022-09-27 DIAGNOSIS — Z00.00 ENCOUNTER FOR ANNUAL HEALTH EXAMINATION: Primary | ICD-10-CM

## 2022-09-27 DIAGNOSIS — I25.5 ISCHEMIC CARDIOMYOPATHY: ICD-10-CM

## 2022-09-27 DIAGNOSIS — E03.9 HYPOTHYROIDISM, UNSPECIFIED TYPE: ICD-10-CM

## 2022-09-27 DIAGNOSIS — Z12.31 VISIT FOR SCREENING MAMMOGRAM: ICD-10-CM

## 2022-09-27 DIAGNOSIS — Z95.810 AICD (AUTOMATIC CARDIOVERTER/DEFIBRILLATOR) PRESENT: ICD-10-CM

## 2022-09-27 DIAGNOSIS — Z79.01 CHRONIC ANTICOAGULATION: ICD-10-CM

## 2022-09-27 DIAGNOSIS — I47.29 NSVT (NONSUSTAINED VENTRICULAR TACHYCARDIA): ICD-10-CM

## 2022-09-27 DIAGNOSIS — I73.9 PERIPHERAL VASCULAR DISEASE (HCC): ICD-10-CM

## 2022-09-27 DIAGNOSIS — G62.9 NEUROPATHY: ICD-10-CM

## 2022-09-27 DIAGNOSIS — E78.5 DYSLIPIDEMIA: ICD-10-CM

## 2022-09-27 DIAGNOSIS — M1A.0790 CHRONIC GOUT OF FOOT, UNSPECIFIED CAUSE, UNSPECIFIED LATERALITY: ICD-10-CM

## 2022-09-27 DIAGNOSIS — Z95.828 HISTORY OF ENDOVASCULAR STENT GRAFT FOR ABDOMINAL AORTIC ANEURYSM (AAA): ICD-10-CM

## 2022-09-27 DIAGNOSIS — Z95.1 S/P CABG X 3: ICD-10-CM

## 2022-09-27 PROCEDURE — 3008F BODY MASS INDEX DOCD: CPT | Performed by: FAMILY MEDICINE

## 2022-09-27 PROCEDURE — 99397 PER PM REEVAL EST PAT 65+ YR: CPT | Performed by: FAMILY MEDICINE

## 2022-09-27 PROCEDURE — G0439 PPPS, SUBSEQ VISIT: HCPCS | Performed by: FAMILY MEDICINE

## 2022-09-27 PROCEDURE — 3078F DIAST BP <80 MM HG: CPT | Performed by: FAMILY MEDICINE

## 2022-09-27 PROCEDURE — 96160 PT-FOCUSED HLTH RISK ASSMT: CPT | Performed by: FAMILY MEDICINE

## 2022-09-27 PROCEDURE — 3074F SYST BP LT 130 MM HG: CPT | Performed by: FAMILY MEDICINE

## 2022-09-27 PROCEDURE — 1125F AMNT PAIN NOTED PAIN PRSNT: CPT | Performed by: FAMILY MEDICINE

## 2022-09-27 RX ORDER — ALLOPURINOL 100 MG/1
100 TABLET ORAL DAILY
Qty: 90 TABLET | Refills: 1 | Status: SHIPPED | OUTPATIENT
Start: 2022-09-27

## 2022-09-27 RX ORDER — LEVOTHYROXINE SODIUM 0.1 MG/1
100 TABLET ORAL
Qty: 90 TABLET | Refills: 1 | Status: SHIPPED | OUTPATIENT
Start: 2022-09-27

## 2022-10-18 ENCOUNTER — TELEPHONE (OUTPATIENT)
Dept: FAMILY MEDICINE CLINIC | Facility: CLINIC | Age: 72
End: 2022-10-18

## 2022-10-18 RX ORDER — NIRMATRELVIR AND RITONAVIR 300-100 MG
KIT ORAL
Qty: 30 TABLET | Refills: 0 | Status: SHIPPED | OUTPATIENT
Start: 2022-10-18 | End: 2022-10-23

## 2022-10-18 NOTE — TELEPHONE ENCOUNTER
Suggest we get clearance from Dr. Bradley [facility] office.   She is on a relatively low dose of Xarelto and likely could stop it for 5 days however I would like to make sure Dr. Calvin Carlin is in agreement

## 2022-10-18 NOTE — TELEPHONE ENCOUNTER
Call from [place]/pharmacist/[facility] received order for paxlovid, however pt is on rosuvastatin and xarelto-both interact w paxlovid. Advised [place] pt has already been advised to hold rosuvastatin for the 5 days on paxlovid plus additional 2 days after finishing. Advised will speak w dr Arturo Simms and pt re Sandee Ramirez and will call back w further info. Call to pt-advised of above call from pharmacy. Pt states she does not know if she is on xarelto-asks what it's for. Advised it is a blood thinner. She asks her  if she is taking xarelto (rivaroxaban). At first [place]/spouse sts pt is taking xarelto, then sts no, then sts she IS taking xarelto 2.5 mg twice a day-ordered by dr Marguerite Everett. Raghav Case can just stop it for a few days\". Advised not to have pt stop xarelto-will update dr Arturo Simms, and if needed will call dr Poncho Mack ofc for direction on how to proceed. Either dr Poncho Mack ofc or our ofc will call back w further instructions. Pt and spouse voice understanding and no other questions. **update to dr Ifeanyi Graham!

## 2022-10-18 NOTE — TELEPHONE ENCOUNTER
Okay for Paxlovid. Have her stop her rosuvastatin while she is on the Paxlovid and for an additional 2 days.

## 2022-10-19 NOTE — TELEPHONE ENCOUNTER
Received messg from [facility] that cecile/TIM returned call while I was on a call. Call back to her now reaches [facility]. Left m req call back to me asap today. Provided our ofc  line #.

## 2022-10-19 NOTE — TELEPHONE ENCOUNTER
Call to TIM/nuria-advised left messg for cecile RN to return call, have not heard back, need response asap. Call transferred to cecile RN-sts pt is on a micro dose of xarelto-2.5 mg bid-for peripheral vascular disease, not for atrial fib. sts pt can stop the xarelto for the 5 days she takes paxlovid. Should resume xarelto the day after finishing paslovid. [facility] to [facility]/[place]/pharmacist-advised of above instructions from dr Rut rai/cardiology. She voices understanding, no other questions. Call to [place]/spouse-on hipaa-advised of dr stover's recommendation to confirm xarelto recommendations w dr Christine Norton. Provided dr Rut rai instructions noted above and reinforced instructions re holding the rosuvastatin. Informed I have notified [place]/pharmacist/[facility] of same instructions. She is filling med now  Time Kvng understanding, agrees w plan, no other questions.

## 2022-10-19 NOTE — TELEPHONE ENCOUNTER
Call to nuria/[facility] practice assistant-advised of info from dr Dionna Ochoa noted below. nuria sts she will forward question to triage nurses, have them call back after checking w dr Pamela Hollingsworth. Provided our ofc dr line #, to speak w me. Call to pt reaches identified voice mail-per hipaa consent, left vmm advising dr Dionna Ochoa does want input from dr Pamela Hollingsworth re [place] before starting paxlovid, we have contacted dr Eligio Waller ofc, will call back as soon as we receive call back w dr Eligio aWller instructions.

## 2022-11-04 ENCOUNTER — HOSPITAL ENCOUNTER (INPATIENT)
Facility: HOSPITAL | Age: 72
LOS: 1 days | Discharge: HOME | End: 2022-11-05
Attending: EMERGENCY MEDICINE | Admitting: INTERNAL MEDICINE
Payer: MEDICARE

## 2022-11-04 ENCOUNTER — APPOINTMENT (OUTPATIENT)
Dept: GENERAL RADIOLOGY | Facility: HOSPITAL | Age: 72
End: 2022-11-04
Attending: EMERGENCY MEDICINE
Payer: MEDICARE

## 2022-11-04 DIAGNOSIS — I47.20 VENTRICULAR TACHYCARDIA: Primary | ICD-10-CM

## 2022-11-04 DIAGNOSIS — I47.20 V TACH: ICD-10-CM

## 2022-11-04 LAB
ALBUMIN SERPL-MCNC: 3.9 G/DL (ref 3.4–5)
ALBUMIN/GLOB SERPL: 1 {RATIO} (ref 1–2)
ALP LIVER SERPL-CCNC: 82 U/L
ALT SERPL-CCNC: 23 U/L
ANION GAP SERPL CALC-SCNC: 10 MMOL/L (ref 0–18)
APTT PPP: 34.2 SECONDS (ref 23.3–35.6)
AST SERPL-CCNC: 24 U/L (ref 15–37)
ATRIAL RATE: 159 BPM
BASOPHILS # BLD AUTO: 0.05 X10(3) UL (ref 0–0.2)
BASOPHILS NFR BLD AUTO: 0.4 %
BILIRUB SERPL-MCNC: 0.5 MG/DL (ref 0.1–2)
BUN BLD-MCNC: 33 MG/DL (ref 7–18)
CALCIUM BLD-MCNC: 9.9 MG/DL (ref 8.5–10.1)
CHLORIDE SERPL-SCNC: 107 MMOL/L (ref 98–112)
CHOLEST SERPL-MCNC: 143 MG/DL (ref ?–200)
CO2 SERPL-SCNC: 23 MMOL/L (ref 21–32)
CREAT BLD-MCNC: 1.7 MG/DL
EOSINOPHIL # BLD AUTO: 0.23 X10(3) UL (ref 0–0.7)
EOSINOPHIL NFR BLD AUTO: 1.8 %
ERYTHROCYTE [DISTWIDTH] IN BLOOD BY AUTOMATED COUNT: 13.7 %
GFR SERPLBLD BASED ON 1.73 SQ M-ARVRAT: 31 ML/MIN/1.73M2 (ref 60–?)
GLOBULIN PLAS-MCNC: 3.9 G/DL (ref 2.8–4.4)
GLUCOSE BLD-MCNC: 122 MG/DL (ref 70–99)
HCT VFR BLD AUTO: 44.7 %
HDLC SERPL-MCNC: 44 MG/DL (ref 40–59)
HGB BLD-MCNC: 14.5 G/DL
IMM GRANULOCYTES # BLD AUTO: 0.07 X10(3) UL (ref 0–1)
IMM GRANULOCYTES NFR BLD: 0.5 %
INR BLD: 1.18 (ref 0.85–1.16)
LDLC SERPL CALC-MCNC: 68 MG/DL (ref ?–100)
LYMPHOCYTES # BLD AUTO: 4.33 X10(3) UL (ref 1–4)
LYMPHOCYTES NFR BLD AUTO: 33.7 %
MAGNESIUM SERPL-MCNC: 2.5 MG/DL (ref 1.6–2.6)
MCH RBC QN AUTO: 31 PG (ref 26–34)
MCHC RBC AUTO-ENTMCNC: 32.4 G/DL (ref 31–37)
MCV RBC AUTO: 95.7 FL
MONOCYTES # BLD AUTO: 1.31 X10(3) UL (ref 0.1–1)
MONOCYTES NFR BLD AUTO: 10.2 %
NEUTROPHILS # BLD AUTO: 6.84 X10 (3) UL (ref 1.5–7.7)
NEUTROPHILS # BLD AUTO: 6.84 X10(3) UL (ref 1.5–7.7)
NEUTROPHILS NFR BLD AUTO: 53.4 %
NONHDLC SERPL-MCNC: 99 MG/DL (ref ?–130)
NT-PROBNP SERPL-MCNC: 6371 PG/ML (ref ?–125)
OSMOLALITY SERPL CALC.SUM OF ELEC: 299 MOSM/KG (ref 275–295)
PLATELET # BLD AUTO: 221 10(3)UL (ref 150–450)
POTASSIUM SERPL-SCNC: 4.1 MMOL/L (ref 3.5–5.1)
PROT SERPL-MCNC: 7.8 G/DL (ref 6.4–8.2)
PROTHROMBIN TIME: 14.9 SECONDS (ref 11.6–14.8)
Q-T INTERVAL: 344 MS
QRS DURATION: 146 MS
QTC CALCULATION (BEZET): 564 MS
R AXIS: -84 DEGREES
RBC # BLD AUTO: 4.67 X10(6)UL
SARS-COV-2 RNA RESP QL NAA+PROBE: DETECTED
SODIUM SERPL-SCNC: 140 MMOL/L (ref 136–145)
T AXIS: 83 DEGREES
TRIGL SERPL-MCNC: 185 MG/DL (ref 30–149)
TROPONIN I HIGH SENSITIVITY: 1926 NG/L
TROPONIN I HIGH SENSITIVITY: 2170 NG/L
VENTRICULAR RATE: 162 BPM
VLDLC SERPL CALC-MCNC: 28 MG/DL (ref 0–30)
WBC # BLD AUTO: 12.8 X10(3) UL (ref 4–11)

## 2022-11-04 PROCEDURE — 71045 X-RAY EXAM CHEST 1 VIEW: CPT | Performed by: EMERGENCY MEDICINE

## 2022-11-04 PROCEDURE — 99223 1ST HOSP IP/OBS HIGH 75: CPT | Performed by: INTERNAL MEDICINE

## 2022-11-04 PROCEDURE — 5A2204Z RESTORATION OF CARDIAC RHYTHM, SINGLE: ICD-10-PCS | Performed by: EMERGENCY MEDICINE

## 2022-11-04 PROCEDURE — 3E033XZ INTRODUCTION OF VASOPRESSOR INTO PERIPHERAL VEIN, PERCUTANEOUS APPROACH: ICD-10-PCS | Performed by: INTERNAL MEDICINE

## 2022-11-04 RX ORDER — MIDAZOLAM HYDROCHLORIDE 1 MG/ML
2 INJECTION INTRAMUSCULAR; INTRAVENOUS ONCE
Status: COMPLETED | OUTPATIENT
Start: 2022-11-04 | End: 2022-11-04

## 2022-11-04 RX ORDER — DOCUSATE SODIUM 100 MG/1
100 CAPSULE, LIQUID FILLED ORAL EVERY EVENING
COMMUNITY

## 2022-11-04 RX ORDER — MIDAZOLAM HYDROCHLORIDE 1 MG/ML
INJECTION INTRAMUSCULAR; INTRAVENOUS
Status: COMPLETED
Start: 2022-11-04 | End: 2022-11-04

## 2022-11-04 RX ORDER — SODIUM CHLORIDE 9 MG/ML
1000 INJECTION, SOLUTION INTRAVENOUS ONCE
Status: COMPLETED | OUTPATIENT
Start: 2022-11-04 | End: 2022-11-04

## 2022-11-04 RX ORDER — ACETAMINOPHEN 500 MG
500 TABLET ORAL EVERY 4 HOURS PRN
Status: DISCONTINUED | OUTPATIENT
Start: 2022-11-04 | End: 2022-11-05

## 2022-11-04 RX ORDER — ONDANSETRON 2 MG/ML
4 INJECTION INTRAMUSCULAR; INTRAVENOUS EVERY 4 HOURS PRN
Status: DISCONTINUED | OUTPATIENT
Start: 2022-11-04 | End: 2022-11-04 | Stop reason: ALTCHOICE

## 2022-11-04 RX ORDER — SODIUM CHLORIDE 9 MG/ML
INJECTION, SOLUTION INTRAVENOUS CONTINUOUS
Status: DISCONTINUED | OUTPATIENT
Start: 2022-11-04 | End: 2022-11-04

## 2022-11-04 RX ORDER — MELATONIN
3 NIGHTLY PRN
Status: DISCONTINUED | OUTPATIENT
Start: 2022-11-04 | End: 2022-11-05

## 2022-11-04 RX ORDER — ONDANSETRON 2 MG/ML
4 INJECTION INTRAMUSCULAR; INTRAVENOUS EVERY 6 HOURS PRN
Status: DISCONTINUED | OUTPATIENT
Start: 2022-11-04 | End: 2022-11-05

## 2022-11-04 RX ORDER — ROSUVASTATIN CALCIUM 5 MG/1
5 TABLET, COATED ORAL NIGHTLY
Status: DISCONTINUED | OUTPATIENT
Start: 2022-11-04 | End: 2022-11-05

## 2022-11-04 NOTE — ED QUICK NOTES
Pt to start amiodarone drip. Per Md, pt still in [facility], alert awake speaking. Denies pain.  MD at bedside

## 2022-11-04 NOTE — ED INITIAL ASSESSMENT (HPI)
Pt arrived to room, placed on monitory pt appears to be in [facility] md Kiarra notified. Orders placed. EKG being done. Pt reports having palpitations since last night, denies any chest pain or shortness of breath. Pt reports that she got a call from her doctors office that her pacemaker detected an arrhythmia but pt states she does not know what it was. Pt reports feeling like her heart is racing.

## 2022-11-05 ENCOUNTER — APPOINTMENT (OUTPATIENT)
Dept: GENERAL RADIOLOGY | Facility: HOSPITAL | Age: 72
End: 2022-11-05
Attending: EMERGENCY MEDICINE
Payer: MEDICARE

## 2022-11-05 ENCOUNTER — HOSPITAL ENCOUNTER (INPATIENT)
Facility: HOSPITAL | Age: 72
LOS: 5 days | Discharge: HOME | End: 2022-11-10
Attending: EMERGENCY MEDICINE | Admitting: HOSPITALIST
Payer: MEDICARE

## 2022-11-05 VITALS
WEIGHT: 218.13 LBS | BODY MASS INDEX: 30.54 KG/M2 | RESPIRATION RATE: 25 BRPM | SYSTOLIC BLOOD PRESSURE: 125 MMHG | HEIGHT: 71 IN | HEART RATE: 79 BPM | DIASTOLIC BLOOD PRESSURE: 76 MMHG | OXYGEN SATURATION: 98 % | TEMPERATURE: 98 F

## 2022-11-05 DIAGNOSIS — Z45.02 DEFIBRILLATOR DISCHARGE: Primary | ICD-10-CM

## 2022-11-05 PROBLEM — R77.8 TROPONIN I ABOVE REFERENCE RANGE: Status: ACTIVE | Noted: 2020-10-27

## 2022-11-05 PROBLEM — R79.89 TROPONIN I ABOVE REFERENCE RANGE: Status: ACTIVE | Noted: 2020-10-27

## 2022-11-05 LAB
ALBUMIN SERPL-MCNC: 3.5 G/DL (ref 3.4–5)
ALBUMIN/GLOB SERPL: 1 {RATIO} (ref 1–2)
ALP LIVER SERPL-CCNC: 72 U/L
ALT SERPL-CCNC: 27 U/L
ANION GAP SERPL CALC-SCNC: 4 MMOL/L (ref 0–18)
ANION GAP SERPL CALC-SCNC: 8 MMOL/L (ref 0–18)
AST SERPL-CCNC: 31 U/L (ref 15–37)
ATRIAL RATE: 80 BPM
BASOPHILS # BLD AUTO: 0.05 X10(3) UL (ref 0–0.2)
BASOPHILS NFR BLD AUTO: 0.5 %
BILIRUB SERPL-MCNC: 0.4 MG/DL (ref 0.1–2)
BUN BLD-MCNC: 14 MG/DL (ref 7–18)
BUN BLD-MCNC: 22 MG/DL (ref 7–18)
CALCIUM BLD-MCNC: 9.1 MG/DL (ref 8.5–10.1)
CALCIUM BLD-MCNC: 9.3 MG/DL (ref 8.5–10.1)
CHLORIDE SERPL-SCNC: 111 MMOL/L (ref 98–112)
CHLORIDE SERPL-SCNC: 113 MMOL/L (ref 98–112)
CO2 SERPL-SCNC: 22 MMOL/L (ref 21–32)
CO2 SERPL-SCNC: 24 MMOL/L (ref 21–32)
CREAT BLD-MCNC: 0.87 MG/DL
CREAT BLD-MCNC: 0.88 MG/DL
EOSINOPHIL # BLD AUTO: 0.28 X10(3) UL (ref 0–0.7)
EOSINOPHIL NFR BLD AUTO: 2.8 %
ERYTHROCYTE [DISTWIDTH] IN BLOOD BY AUTOMATED COUNT: 13.8 %
GFR SERPLBLD BASED ON 1.73 SQ M-ARVRAT: 69 ML/MIN/1.73M2 (ref 60–?)
GFR SERPLBLD BASED ON 1.73 SQ M-ARVRAT: 70 ML/MIN/1.73M2 (ref 60–?)
GLOBULIN PLAS-MCNC: 3.5 G/DL (ref 2.8–4.4)
GLUCOSE BLD-MCNC: 109 MG/DL (ref 70–99)
GLUCOSE BLD-MCNC: 99 MG/DL (ref 70–99)
HCT VFR BLD AUTO: 40.3 %
HGB BLD-MCNC: 13.2 G/DL
IMM GRANULOCYTES # BLD AUTO: 0.05 X10(3) UL (ref 0–1)
IMM GRANULOCYTES NFR BLD: 0.5 %
LYMPHOCYTES # BLD AUTO: 3.26 X10(3) UL (ref 1–4)
LYMPHOCYTES NFR BLD AUTO: 32.5 %
MAGNESIUM SERPL-MCNC: 2.3 MG/DL (ref 1.6–2.6)
MCH RBC QN AUTO: 31.1 PG (ref 26–34)
MCHC RBC AUTO-ENTMCNC: 32.8 G/DL (ref 31–37)
MCV RBC AUTO: 95 FL
MONOCYTES # BLD AUTO: 1.17 X10(3) UL (ref 0.1–1)
MONOCYTES NFR BLD AUTO: 11.7 %
NEUTROPHILS # BLD AUTO: 5.21 X10 (3) UL (ref 1.5–7.7)
NEUTROPHILS # BLD AUTO: 5.21 X10(3) UL (ref 1.5–7.7)
NEUTROPHILS NFR BLD AUTO: 52 %
OSMOLALITY SERPL CALC.SUM OF ELEC: 289 MOSM/KG (ref 275–295)
OSMOLALITY SERPL CALC.SUM OF ELEC: 299 MOSM/KG (ref 275–295)
P AXIS: 42 DEGREES
P-R INTERVAL: 202 MS
PLATELET # BLD AUTO: 186 10(3)UL (ref 150–450)
POTASSIUM SERPL-SCNC: 3.6 MMOL/L (ref 3.5–5.1)
POTASSIUM SERPL-SCNC: 4.4 MMOL/L (ref 3.5–5.1)
POTASSIUM SERPL-SCNC: 5.3 MMOL/L (ref 3.5–5.1)
PROT SERPL-MCNC: 7 G/DL (ref 6.4–8.2)
Q-T INTERVAL: 368 MS
QRS DURATION: 98 MS
QTC CALCULATION (BEZET): 424 MS
R AXIS: 24 DEGREES
RBC # BLD AUTO: 4.24 X10(6)UL
SODIUM SERPL-SCNC: 139 MMOL/L (ref 136–145)
SODIUM SERPL-SCNC: 143 MMOL/L (ref 136–145)
T AXIS: -72 DEGREES
TROPONIN I HIGH SENSITIVITY: 1440 NG/L
TSI SER-ACNC: 1.73 MIU/ML (ref 0.36–3.74)
VENTRICULAR RATE: 80 BPM
WBC # BLD AUTO: 10 X10(3) UL (ref 4–11)

## 2022-11-05 PROCEDURE — 99223 1ST HOSP IP/OBS HIGH 75: CPT | Performed by: STUDENT IN AN ORGANIZED HEALTH CARE EDUCATION/TRAINING PROGRAM

## 2022-11-05 PROCEDURE — 99232 SBSQ HOSP IP/OBS MODERATE 35: CPT | Performed by: HOSPITALIST

## 2022-11-05 PROCEDURE — 71045 X-RAY EXAM CHEST 1 VIEW: CPT | Performed by: EMERGENCY MEDICINE

## 2022-11-05 PROCEDURE — 99497 ADVNCD CARE PLAN 30 MIN: CPT | Performed by: HOSPITALIST

## 2022-11-05 RX ORDER — LEVOTHYROXINE SODIUM 0.1 MG/1
100 TABLET ORAL
Status: DISCONTINUED | OUTPATIENT
Start: 2022-11-06 | End: 2022-11-10

## 2022-11-05 RX ORDER — ACETAMINOPHEN,DIPHENHYDRAMINE HCL 500; 25 MG/1; MG/1
1 TABLET, FILM COATED ORAL DAILY PRN
Status: DISCONTINUED | OUTPATIENT
Start: 2022-11-05 | End: 2022-11-05 | Stop reason: SDUPTHER

## 2022-11-05 RX ORDER — ALLOPURINOL 100 MG/1
100 TABLET ORAL DAILY
Status: DISCONTINUED | OUTPATIENT
Start: 2022-11-05 | End: 2022-11-10

## 2022-11-05 RX ORDER — POTASSIUM CHLORIDE 20 MEQ/1
40 TABLET, EXTENDED RELEASE ORAL EVERY 4 HOURS
Status: COMPLETED | OUTPATIENT
Start: 2022-11-05 | End: 2022-11-05

## 2022-11-05 RX ORDER — ASPIRIN 81 MG/1
81 TABLET ORAL DAILY
Status: DISCONTINUED | OUTPATIENT
Start: 2022-11-05 | End: 2022-11-10

## 2022-11-05 RX ORDER — ACETAMINOPHEN 500 MG
500 TABLET ORAL NIGHTLY PRN
Status: DISCONTINUED | OUTPATIENT
Start: 2022-11-05 | End: 2022-11-07

## 2022-11-05 RX ORDER — LEVOTHYROXINE SODIUM 0.1 MG/1
100 TABLET ORAL
Status: DISCONTINUED | OUTPATIENT
Start: 2022-11-05 | End: 2022-11-05

## 2022-11-05 RX ORDER — METOPROLOL SUCCINATE 50 MG/1
50 TABLET, EXTENDED RELEASE ORAL
Status: DISCONTINUED | OUTPATIENT
Start: 2022-11-05 | End: 2022-11-09

## 2022-11-05 RX ORDER — AMIODARONE HYDROCHLORIDE 200 MG/1
400 TABLET ORAL 3 TIMES DAILY
Status: DISCONTINUED | OUTPATIENT
Start: 2022-11-05 | End: 2022-11-05

## 2022-11-05 RX ORDER — AMIODARONE HYDROCHLORIDE 200 MG/1
400 TABLET ORAL 2 TIMES DAILY
Qty: 70 TABLET | Refills: 0 | Status: SHIPPED | OUTPATIENT
Start: 2022-11-05 | End: 2022-11-10

## 2022-11-05 RX ORDER — ROSUVASTATIN CALCIUM 5 MG/1
5 TABLET, COATED ORAL NIGHTLY
Status: DISCONTINUED | OUTPATIENT
Start: 2022-11-05 | End: 2022-11-10

## 2022-11-05 RX ORDER — AMIODARONE HYDROCHLORIDE 50 MG/ML
150 INJECTION, SOLUTION INTRAVENOUS ONCE
Status: COMPLETED | OUTPATIENT
Start: 2022-11-05 | End: 2022-11-05

## 2022-11-05 RX ORDER — AMIODARONE HYDROCHLORIDE 200 MG/1
400 TABLET ORAL 2 TIMES DAILY
Qty: 70 TABLET | Refills: 0 | Status: SHIPPED | OUTPATIENT
Start: 2022-11-05 | End: 2022-11-05

## 2022-11-05 RX ORDER — DOCUSATE SODIUM 100 MG/1
100 CAPSULE, LIQUID FILLED ORAL EVERY EVENING
Status: DISCONTINUED | OUTPATIENT
Start: 2022-11-05 | End: 2022-11-10

## 2022-11-05 RX ORDER — ALLOPURINOL 100 MG/1
100 TABLET ORAL EVERY EVENING
Status: DISCONTINUED | OUTPATIENT
Start: 2022-11-05 | End: 2022-11-05

## 2022-11-05 RX ORDER — ACETAMINOPHEN 500 MG
1000 TABLET ORAL ONCE
Status: COMPLETED | OUTPATIENT
Start: 2022-11-05 | End: 2022-11-05

## 2022-11-05 RX ORDER — DIPHENHYDRAMINE HCL 25 MG
25 CAPSULE ORAL NIGHTLY PRN
Status: DISCONTINUED | OUTPATIENT
Start: 2022-11-05 | End: 2022-11-10

## 2022-11-05 NOTE — PLAN OF CARE
Assumed patient care at 0730. Vital signs stable. Patient alert and oriented x 4. Patient denies pain. No ectopy on tele. Discharge orders for this evening received. Discharge instructions reviewed with patient. Paper script for amiodarone provided as her Ino Schafer is currently out. Patient and  verbalized understanding. Patient discharged home. Problem: CARDIOVASCULAR - ADULT  Goal: Maintains optimal cardiac output and hemodynamic stability  Description: INTERVENTIONS:  - Monitor vital signs, rhythm, and trends  - Monitor for bleeding, hypotension and signs of decreased cardiac output  - Evaluate effectiveness of vasoactive medications to optimize hemodynamic stability  - Monitor arterial and/or venous puncture sites for bleeding and/or hematoma  - Assess quality of pulses, skin color and temperature  - Assess for signs of decreased coronary artery perfusion - ex.  Angina  - Evaluate fluid balance, assess for edema, trend weights  Outcome: Adequate for Discharge  Goal: Absence of cardiac arrhythmias or at baseline  Description: INTERVENTIONS:  - Continuous cardiac monitoring, monitor vital signs, obtain 12 lead EKG if indicated  - Evaluate effectiveness of antiarrhythmic and heart rate control medications as ordered  - Initiate emergency measures for life threatening arrhythmias  - Monitor electrolytes and administer replacement therapy as ordered  Outcome: Adequate for Discharge

## 2022-11-05 NOTE — PLAN OF CARE
Admitted from ED around [address]. Aox4, vitals/HR stable. IVF running, diet ordered. Admission done. Ambulatory to bathroom. Ok per Dr. Karey Geronimo to discontinue COVID isolation.

## 2022-11-05 NOTE — PROGRESS NOTES
Primary RN called regarding pt's hypotension. SBP in the  80s. Per RN, pt's SBP at baseline and patient is asymptomatic. RN states pt given 1 liter bolus 0.9 NS and was on maintenance IV fluids at 100 ml/hr. EF 20%-discontinued IV fluids. Advised RN to monitor BP. If SBP drops to 70s or pt becomes asymptomatic, will initiate pressors.

## 2022-11-05 NOTE — PLAN OF CARE
Assumed care of the pt at approx. 1930 pm. A&O x4. Denies any pain. Denies SOB. RA. NSR. SBP 70-90s. Per pt her BP is low at baseline. Asymptomatic. Per discussion with Bob Dean, APN will monitor BP closely and do not intervene unless pt becomes symptomatic. IVF discontinued d/t hx of CHF. Tolerated diet. Voids. Ambulated in room with standby assist. Updated on POC.

## 2022-11-06 LAB
ATRIAL RATE: 97 BPM
NT-PROBNP SERPL-MCNC: 2943 PG/ML (ref ?–125)
P AXIS: 13 DEGREES
P-R INTERVAL: 182 MS
Q-T INTERVAL: 354 MS
QRS DURATION: 96 MS
QTC CALCULATION (BEZET): 449 MS
R AXIS: 12 DEGREES
T AXIS: -27 DEGREES
TROPONIN I HIGH SENSITIVITY: 1168 NG/L
VENTRICULAR RATE: 97 BPM

## 2022-11-06 PROCEDURE — 99233 SBSQ HOSP IP/OBS HIGH 50: CPT | Performed by: INTERNAL MEDICINE

## 2022-11-06 RX ORDER — TORSEMIDE 20 MG/1
20 TABLET ORAL DAILY
Status: DISCONTINUED | OUTPATIENT
Start: 2022-11-06 | End: 2022-11-09

## 2022-11-06 RX ORDER — AMIODARONE HYDROCHLORIDE 200 MG/1
400 TABLET ORAL 3 TIMES DAILY
Status: DISCONTINUED | OUTPATIENT
Start: 2022-11-06 | End: 2022-11-07

## 2022-11-06 RX ORDER — SPIRONOLACTONE 25 MG/1
12.5 TABLET ORAL DAILY
Status: DISCONTINUED | OUTPATIENT
Start: 2022-11-06 | End: 2022-11-10

## 2022-11-06 NOTE — PLAN OF CARE
Denies c/o pain, malaise, or cardiac symptoms. This am c/o left hand pain at the site of her right hand IV. IV amiodarone infusing and infiltrated. Drip stoppped, I elevated her left hand on rolled up towels and applied warm packs. Warm packs seemed to help the swelling almost immediately. Also paged  to order the Hyaluronidase antidote. Antidote given and tolerated well. Within hours of administration, many changes to the heat packs her swelling improved even more. Swelling only traveled a little bit up the wrist.  Pt. Is noticing improvement as well. Also advocated from changing her from IV amiodarone to PO in fear that her new IV started on the right hand might infiltrate with the use of IV amiodarone as well. Dr. Kirill Jackson agreed to switch her to PO amiodarone. Dose given and giving it some time for PO to kick in before stopping the drip. Thus far new IV doing well. Did tell Ana HORVATH with [facility] my concerns about the amio drip and her poor options for IV access and she did say it was ok to put in for vascular access team.  Remains in NSR, no episodes of VT on my watch thus far. NL S1, S2, RRR. Lungs clear on RA. Abdomen soft and non tender to touch with active bowel sounds in all four quadrants. All needs met by staff. Will continue to monitor. Problem: CARDIOVASCULAR - ADULT  Goal: Maintains optimal cardiac output and hemodynamic stability  Description: INTERVENTIONS:  - Monitor vital signs, rhythm, and trends  - Monitor for bleeding, hypotension and signs of decreased cardiac output  - Evaluate effectiveness of vasoactive medications to optimize hemodynamic stability  - Monitor arterial and/or venous puncture sites for bleeding and/or hematoma  - Assess quality of pulses, skin color and temperature  - Assess for signs of decreased coronary artery perfusion - ex.  Angina  - Evaluate fluid balance, assess for edema, trend weights  Outcome: Progressing  Goal: Absence of cardiac arrhythmias or at baseline  Description: INTERVENTIONS:  - Continuous cardiac monitoring, monitor vital signs, obtain 12 lead EKG if indicated  - Evaluate effectiveness of antiarrhythmic and heart rate control medications as ordered  - Initiate emergency measures for life threatening arrhythmias  - Monitor electrolytes and administer replacement therapy as ordered  Outcome: Progressing

## 2022-11-06 NOTE — PLAN OF CARE
Assumed care around 1930. A/Ox4. On RA w/ sats >90. Monitor shows NSR. Voids in BR. No reports of pain. Up SBA. Plan for cards to see, cont amiodarone drip. Safety precautions in place, call light within reach. Problem: CARDIOVASCULAR - ADULT  Goal: Maintains optimal cardiac output and hemodynamic stability  Description: INTERVENTIONS:  - Monitor vital signs, rhythm, and trends  - Monitor for bleeding, hypotension and signs of decreased cardiac output  - Evaluate effectiveness of vasoactive medications to optimize hemodynamic stability  - Monitor arterial and/or venous puncture sites for bleeding and/or hematoma  - Assess quality of pulses, skin color and temperature  - Assess for signs of decreased coronary artery perfusion - ex.  Angina  - Evaluate fluid balance, assess for edema, trend weights  Outcome: Progressing  Goal: Absence of cardiac arrhythmias or at baseline  Description: INTERVENTIONS:  - Continuous cardiac monitoring, monitor vital signs, obtain 12 lead EKG if indicated  - Evaluate effectiveness of antiarrhythmic and heart rate control medications as ordered  - Initiate emergency measures for life threatening arrhythmias  - Monitor electrolytes and administer replacement therapy as ordered  Outcome: Progressing

## 2022-11-06 NOTE — ED INITIAL ASSESSMENT (HPI)
Patient was just discharged from the hospital, once she got home her defibrillator fired. Yesterday while pt was in ER, pt had Vtach and did get cardioverted. Pt reports feeling dizzy, and some shortness of breath.

## 2022-11-06 NOTE — ED QUICK NOTES
Orders for admission, patient is aware of plan and ready to go upstairs. Any questions, please call ED RN JUSTIN at extension [phone].      Patient Covid vaccination status: Fully vaccinated     COVID Test Ordered in ED: None    COVID Suspicion at Admission: N/A    Running Infusions:  None    Mental Status/LOC at time of transport: alert and oriented x4,  at bedside    Other pertinent information:   CIWA score: N/A   NIH score:  N/A

## 2022-11-06 NOTE — PROGRESS NOTES
11/06/22 1311   Clinical Encounter Type   Visited With Patient and family together;Health care provider  ( and RN Doreen Meneses)   Routine Visit Introduction   Continue Visiting No   Patient's Supportive Strategies/Resources family and natali   Islam Encounters   Spiritual Requests During Visit / Hospitalization Worship Communion   Taxonomy   Intended Effects Build relationship of care and support   Methods Collaborate with care team member;Offer support;Encourage sharing of feelings   Interventions Acknowledge current situation; Active listening; Ask guided questions; Identify supportive relationship(s);Silent prayer;Ask questions to bring forth feelings;Assist someone with Advance Directives   Responded to consult for advance directives. RN stated that patient is able to make decisions at this time. Patient completed HCPOA and was given original document. A copy was placed on medical chart. Spiritual Care support can be requested via an Epic consult.

## 2022-11-07 ENCOUNTER — ANESTHESIA EVENT (OUTPATIENT)
Dept: INTERVENTIONAL RADIOLOGY/VASCULAR | Facility: HOSPITAL | Age: 72
End: 2022-11-07
Payer: MEDICARE

## 2022-11-07 LAB
ANION GAP SERPL CALC-SCNC: 7 MMOL/L (ref 0–18)
ATRIAL RATE: 75 BPM
BUN BLD-MCNC: 12 MG/DL (ref 7–18)
CALCIUM BLD-MCNC: 10.1 MG/DL (ref 8.5–10.1)
CHLORIDE SERPL-SCNC: 106 MMOL/L (ref 98–112)
CO2 SERPL-SCNC: 23 MMOL/L (ref 21–32)
CREAT BLD-MCNC: 0.95 MG/DL
GFR SERPLBLD BASED ON 1.73 SQ M-ARVRAT: 63 ML/MIN/1.73M2 (ref 60–?)
GLUCOSE BLD-MCNC: 133 MG/DL (ref 70–99)
OSMOLALITY SERPL CALC.SUM OF ELEC: 284 MOSM/KG (ref 275–295)
P AXIS: 20 DEGREES
P-R INTERVAL: 182 MS
POTASSIUM SERPL-SCNC: 4.4 MMOL/L (ref 3.5–5.1)
Q-T INTERVAL: 422 MS
QRS DURATION: 96 MS
QTC CALCULATION (BEZET): 471 MS
R AXIS: 10 DEGREES
SODIUM SERPL-SCNC: 136 MMOL/L (ref 136–145)
T AXIS: 12 DEGREES
VENTRICULAR RATE: 75 BPM

## 2022-11-07 PROCEDURE — 99232 SBSQ HOSP IP/OBS MODERATE 35: CPT | Performed by: INTERNAL MEDICINE

## 2022-11-07 RX ORDER — ONDANSETRON 2 MG/ML
4 INJECTION INTRAMUSCULAR; INTRAVENOUS EVERY 6 HOURS PRN
Status: DISCONTINUED | OUTPATIENT
Start: 2022-11-07 | End: 2022-11-10

## 2022-11-07 RX ORDER — ONDANSETRON 4 MG/1
4 TABLET, ORALLY DISINTEGRATING ORAL EVERY 6 HOURS PRN
Status: DISCONTINUED | OUTPATIENT
Start: 2022-11-07 | End: 2022-11-10

## 2022-11-07 RX ORDER — AMIODARONE HYDROCHLORIDE 200 MG/1
400 TABLET ORAL 2 TIMES DAILY WITH MEALS
Status: DISCONTINUED | OUTPATIENT
Start: 2022-11-07 | End: 2022-11-08

## 2022-11-07 RX ORDER — ACETAMINOPHEN 500 MG
1000 TABLET ORAL EVERY 6 HOURS PRN
Status: DISCONTINUED | OUTPATIENT
Start: 2022-11-07 | End: 2022-11-10

## 2022-11-07 RX ORDER — SODIUM CHLORIDE 9 MG/ML
INJECTION, SOLUTION INTRAVENOUS
Status: COMPLETED | OUTPATIENT
Start: 2022-11-08 | End: 2022-11-08

## 2022-11-07 RX ORDER — CHLORHEXIDINE GLUCONATE 4 G/100ML
30 SOLUTION TOPICAL
Status: COMPLETED | OUTPATIENT
Start: 2022-11-08 | End: 2022-11-08

## 2022-11-07 NOTE — PLAN OF CARE
Assumed care of pt at 0730. A/ox4, rm air, SR w/ occasional PVCs on tele. No reports of pain. Breath sounds clear upon auscultation. Reports congestion w/ nasal drip, causing pt to cough w/ moderate amount of thick, white sputum. Pt nauseated, w/ post-nasal drip causing spit up/small amount of bilious emesis. Per pt, also has had lack of appetite. Left hand edema present from previous IV infiltration. Impaired vision - wears glasses. Discussed POC w/ pt.     1854: called and left message w/ [facility] regarding pt's hypotension, see orders regarding holding metoprolol tonight and administering amiodarone    Also paged hospitalist regarding pt's code status. Pt states in her POA paperwork filled out yesterday w/ spiritual care that she would not like to be resucitated. .. DNR paperwork not filled out. Asked hospitalist to speak w/ pt tomorrow morning prior to procedure to talk about her code status and fill out appropriate paperwork. Problem: CARDIOVASCULAR - ADULT  Goal: Maintains optimal cardiac output and hemodynamic stability  Description: INTERVENTIONS:  - Monitor vital signs, rhythm, and trends  - Monitor for bleeding, hypotension and signs of decreased cardiac output  - Evaluate effectiveness of vasoactive medications to optimize hemodynamic stability  - Monitor arterial and/or venous puncture sites for bleeding and/or hematoma  - Assess quality of pulses, skin color and temperature  - Assess for signs of decreased coronary artery perfusion - ex.  Angina  - Evaluate fluid balance, assess for edema, trend weights  Outcome: Progressing  Goal: Absence of cardiac arrhythmias or at baseline  Description: INTERVENTIONS:  - Continuous cardiac monitoring, monitor vital signs, obtain 12 lead EKG if indicated  - Evaluate effectiveness of antiarrhythmic and heart rate control medications as ordered  - Initiate emergency measures for life threatening arrhythmias  - Monitor electrolytes and administer replacement therapy as ordered  Outcome: Progressing     Problem: GASTROINTESTINAL - ADULT  Goal: Minimal or absence of nausea and vomiting  Description: INTERVENTIONS:  - Maintain adequate hydration with IV or PO as ordered and tolerated  - Nasogastric tube to low intermittent suction as ordered  - Evaluate effectiveness of ordered antiemetic medications  - Provide nonpharmacologic comfort measures as appropriate  - Advance diet as tolerated, if ordered  - Obtain nutritional consult as needed  - Evaluate fluid balance  Outcome: Progressing     Problem: Patient/Family Goals  Goal: Patient/Family Long Term Goal  Description: Patient's Long Term Goal: to go home    Interventions:  - MD rounding, medication management, monitor labs, nausea control, monitor HR/rhythm  - See additional Care Plan goals for specific interventions  Outcome: Progressing  Goal: Patient/Family Short Term Goal  Description: Patient's Short Term Goal: remain pain free    Interventions:   - pain assessments q4, pain meds PRN  - See additional Care Plan goals for specific interventions  Outcome: Progressing

## 2022-11-08 ENCOUNTER — APPOINTMENT (OUTPATIENT)
Dept: INTERVENTIONAL RADIOLOGY/VASCULAR | Facility: HOSPITAL | Age: 72
End: 2022-11-08
Attending: INTERNAL MEDICINE
Payer: MEDICARE

## 2022-11-08 ENCOUNTER — ANESTHESIA (OUTPATIENT)
Dept: INTERVENTIONAL RADIOLOGY/VASCULAR | Facility: HOSPITAL | Age: 72
End: 2022-11-08
Payer: MEDICARE

## 2022-11-08 LAB
ISTAT ACTIVATED CLOTTING TIME: 277 SECONDS (ref 74–137)
ISTAT ACTIVATED CLOTTING TIME: 294 SECONDS (ref 74–137)
ISTAT ACTIVATED CLOTTING TIME: 347 SECONDS (ref 74–137)

## 2022-11-08 PROCEDURE — 02K83ZZ MAP CONDUCTION MECHANISM, PERCUTANEOUS APPROACH: ICD-10-PCS | Performed by: INTERNAL MEDICINE

## 2022-11-08 PROCEDURE — 02583ZZ DESTRUCTION OF CONDUCTION MECHANISM, PERCUTANEOUS APPROACH: ICD-10-PCS | Performed by: INTERNAL MEDICINE

## 2022-11-08 PROCEDURE — 4A0234Z MEASUREMENT OF CARDIAC ELECTRICAL ACTIVITY, PERCUTANEOUS APPROACH: ICD-10-PCS | Performed by: INTERNAL MEDICINE

## 2022-11-08 PROCEDURE — 99232 SBSQ HOSP IP/OBS MODERATE 35: CPT | Performed by: INTERNAL MEDICINE

## 2022-11-08 RX ORDER — SODIUM CHLORIDE 9 MG/ML
INJECTION, SOLUTION INTRAVENOUS CONTINUOUS PRN
Status: DISCONTINUED | OUTPATIENT
Start: 2022-11-08 | End: 2022-11-08 | Stop reason: SURG

## 2022-11-08 RX ORDER — DEXAMETHASONE SODIUM PHOSPHATE 4 MG/ML
VIAL (ML) INJECTION AS NEEDED
Status: DISCONTINUED | OUTPATIENT
Start: 2022-11-08 | End: 2022-11-08 | Stop reason: SURG

## 2022-11-08 RX ORDER — HYDROMORPHONE HYDROCHLORIDE 1 MG/ML
0.4 INJECTION, SOLUTION INTRAMUSCULAR; INTRAVENOUS; SUBCUTANEOUS EVERY 5 MIN PRN
Status: DISCONTINUED | OUTPATIENT
Start: 2022-11-08 | End: 2022-11-08 | Stop reason: HOSPADM

## 2022-11-08 RX ORDER — ROCURONIUM BROMIDE 10 MG/ML
INJECTION, SOLUTION INTRAVENOUS AS NEEDED
Status: DISCONTINUED | OUTPATIENT
Start: 2022-11-08 | End: 2022-11-08 | Stop reason: SURG

## 2022-11-08 RX ORDER — HYDROMORPHONE HYDROCHLORIDE 1 MG/ML
0.2 INJECTION, SOLUTION INTRAMUSCULAR; INTRAVENOUS; SUBCUTANEOUS EVERY 5 MIN PRN
Status: DISCONTINUED | OUTPATIENT
Start: 2022-11-08 | End: 2022-11-08 | Stop reason: HOSPADM

## 2022-11-08 RX ORDER — MEPERIDINE HYDROCHLORIDE 25 MG/ML
12.5 INJECTION INTRAMUSCULAR; INTRAVENOUS; SUBCUTANEOUS AS NEEDED
Status: DISCONTINUED | OUTPATIENT
Start: 2022-11-08 | End: 2022-11-08 | Stop reason: HOSPADM

## 2022-11-08 RX ORDER — HEPARIN SODIUM 1000 [USP'U]/ML
INJECTION, SOLUTION INTRAVENOUS; SUBCUTANEOUS
Status: COMPLETED
Start: 2022-11-08 | End: 2022-11-08

## 2022-11-08 RX ORDER — HEPARIN SODIUM 5000 [USP'U]/ML
INJECTION, SOLUTION INTRAVENOUS; SUBCUTANEOUS
Status: COMPLETED
Start: 2022-11-08 | End: 2022-11-08

## 2022-11-08 RX ORDER — PHENYLEPHRINE HCL 10 MG/ML
VIAL (ML) INJECTION AS NEEDED
Status: DISCONTINUED | OUTPATIENT
Start: 2022-11-08 | End: 2022-11-08 | Stop reason: SURG

## 2022-11-08 RX ORDER — ONDANSETRON 2 MG/ML
4 INJECTION INTRAMUSCULAR; INTRAVENOUS EVERY 6 HOURS PRN
Status: DISCONTINUED | OUTPATIENT
Start: 2022-11-08 | End: 2022-11-08 | Stop reason: HOSPADM

## 2022-11-08 RX ORDER — NEOSTIGMINE METHYLSULFATE 1 MG/ML
INJECTION, SOLUTION INTRAVENOUS AS NEEDED
Status: DISCONTINUED | OUTPATIENT
Start: 2022-11-08 | End: 2022-11-08 | Stop reason: SURG

## 2022-11-08 RX ORDER — LIDOCAINE HYDROCHLORIDE 10 MG/ML
INJECTION, SOLUTION EPIDURAL; INFILTRATION; INTRACAUDAL; PERINEURAL AS NEEDED
Status: DISCONTINUED | OUTPATIENT
Start: 2022-11-08 | End: 2022-11-08 | Stop reason: SURG

## 2022-11-08 RX ORDER — GLYCOPYRROLATE 0.2 MG/ML
INJECTION, SOLUTION INTRAMUSCULAR; INTRAVENOUS AS NEEDED
Status: DISCONTINUED | OUTPATIENT
Start: 2022-11-08 | End: 2022-11-08 | Stop reason: SURG

## 2022-11-08 RX ORDER — LIDOCAINE HYDROCHLORIDE 10 MG/ML
INJECTION, SOLUTION EPIDURAL; INFILTRATION; INTRACAUDAL; PERINEURAL
Status: COMPLETED
Start: 2022-11-08 | End: 2022-11-08

## 2022-11-08 RX ORDER — SODIUM CHLORIDE, SODIUM LACTATE, POTASSIUM CHLORIDE, CALCIUM CHLORIDE 600; 310; 30; 20 MG/100ML; MG/100ML; MG/100ML; MG/100ML
INJECTION, SOLUTION INTRAVENOUS CONTINUOUS
Status: DISCONTINUED | OUTPATIENT
Start: 2022-11-08 | End: 2022-11-08 | Stop reason: HOSPADM

## 2022-11-08 RX ORDER — ONDANSETRON 2 MG/ML
INJECTION INTRAMUSCULAR; INTRAVENOUS AS NEEDED
Status: DISCONTINUED | OUTPATIENT
Start: 2022-11-08 | End: 2022-11-08 | Stop reason: SURG

## 2022-11-08 RX ORDER — DIPHENHYDRAMINE HYDROCHLORIDE 50 MG/ML
12.5 INJECTION INTRAMUSCULAR; INTRAVENOUS AS NEEDED
Status: DISCONTINUED | OUTPATIENT
Start: 2022-11-08 | End: 2022-11-08 | Stop reason: HOSPADM

## 2022-11-08 RX ORDER — METOCLOPRAMIDE HYDROCHLORIDE 5 MG/ML
10 INJECTION INTRAMUSCULAR; INTRAVENOUS EVERY 8 HOURS PRN
Status: DISCONTINUED | OUTPATIENT
Start: 2022-11-08 | End: 2022-11-08 | Stop reason: HOSPADM

## 2022-11-08 RX ORDER — SODIUM CHLORIDE 9 MG/ML
INJECTION, SOLUTION INTRAVENOUS CONTINUOUS
Status: ACTIVE | OUTPATIENT
Start: 2022-11-08 | End: 2022-11-08

## 2022-11-08 RX ORDER — MIDAZOLAM HYDROCHLORIDE 1 MG/ML
INJECTION INTRAMUSCULAR; INTRAVENOUS AS NEEDED
Status: DISCONTINUED | OUTPATIENT
Start: 2022-11-08 | End: 2022-11-08 | Stop reason: SURG

## 2022-11-08 RX ORDER — HEPARIN SODIUM 5000 [USP'U]/ML
INJECTION, SOLUTION INTRAVENOUS; SUBCUTANEOUS AS NEEDED
Status: DISCONTINUED | OUTPATIENT
Start: 2022-11-08 | End: 2022-11-08 | Stop reason: SURG

## 2022-11-08 RX ORDER — EPHEDRINE SULFATE 50 MG/ML
INJECTION INTRAVENOUS AS NEEDED
Status: DISCONTINUED | OUTPATIENT
Start: 2022-11-08 | End: 2022-11-08 | Stop reason: SURG

## 2022-11-08 RX ORDER — NALOXONE HYDROCHLORIDE 0.4 MG/ML
80 INJECTION, SOLUTION INTRAMUSCULAR; INTRAVENOUS; SUBCUTANEOUS AS NEEDED
Status: DISCONTINUED | OUTPATIENT
Start: 2022-11-08 | End: 2022-11-08 | Stop reason: HOSPADM

## 2022-11-08 RX ORDER — MIDAZOLAM HYDROCHLORIDE 1 MG/ML
1 INJECTION INTRAMUSCULAR; INTRAVENOUS EVERY 5 MIN PRN
Status: DISCONTINUED | OUTPATIENT
Start: 2022-11-08 | End: 2022-11-08 | Stop reason: HOSPADM

## 2022-11-08 RX ORDER — HYDROMORPHONE HYDROCHLORIDE 1 MG/ML
0.6 INJECTION, SOLUTION INTRAMUSCULAR; INTRAVENOUS; SUBCUTANEOUS EVERY 5 MIN PRN
Status: DISCONTINUED | OUTPATIENT
Start: 2022-11-08 | End: 2022-11-08 | Stop reason: HOSPADM

## 2022-11-08 RX ADMIN — ROCURONIUM BROMIDE 10 MG: 10 INJECTION, SOLUTION INTRAVENOUS at 15:31:00

## 2022-11-08 RX ADMIN — EPHEDRINE SULFATE 5 MG: 50 INJECTION INTRAVENOUS at 14:40:00

## 2022-11-08 RX ADMIN — EPHEDRINE SULFATE 10 MG: 50 INJECTION INTRAVENOUS at 15:29:00

## 2022-11-08 RX ADMIN — HEPARIN SODIUM 5000 UNITS: 5000 INJECTION, SOLUTION INTRAVENOUS; SUBCUTANEOUS at 14:52:00

## 2022-11-08 RX ADMIN — PHENYLEPHRINE HCL 100 MCG: 10 MG/ML VIAL (ML) INJECTION at 14:54:00

## 2022-11-08 RX ADMIN — PHENYLEPHRINE HCL 100 MCG: 10 MG/ML VIAL (ML) INJECTION at 14:14:00

## 2022-11-08 RX ADMIN — ROCURONIUM BROMIDE 30 MG: 10 INJECTION, SOLUTION INTRAVENOUS at 13:53:00

## 2022-11-08 RX ADMIN — MIDAZOLAM HYDROCHLORIDE 2 MG: 1 INJECTION INTRAMUSCULAR; INTRAVENOUS at 13:50:00

## 2022-11-08 RX ADMIN — PHENYLEPHRINE HCL 100 MCG: 10 MG/ML VIAL (ML) INJECTION at 14:49:00

## 2022-11-08 RX ADMIN — EPHEDRINE SULFATE 5 MG: 50 INJECTION INTRAVENOUS at 14:10:00

## 2022-11-08 RX ADMIN — HEPARIN SODIUM 3000 UNITS: 5000 INJECTION, SOLUTION INTRAVENOUS; SUBCUTANEOUS at 15:37:00

## 2022-11-08 RX ADMIN — PHENYLEPHRINE HCL 100 MCG: 10 MG/ML VIAL (ML) INJECTION at 14:06:00

## 2022-11-08 RX ADMIN — DEXAMETHASONE SODIUM PHOSPHATE 4 MG: 4 MG/ML VIAL (ML) INJECTION at 14:25:00

## 2022-11-08 RX ADMIN — ONDANSETRON 4 MG: 2 INJECTION INTRAMUSCULAR; INTRAVENOUS at 15:29:00

## 2022-11-08 RX ADMIN — PHENYLEPHRINE HCL 100 MCG: 10 MG/ML VIAL (ML) INJECTION at 13:52:00

## 2022-11-08 RX ADMIN — PHENYLEPHRINE HCL 100 MCG: 10 MG/ML VIAL (ML) INJECTION at 14:00:00

## 2022-11-08 RX ADMIN — EPHEDRINE SULFATE 5 MG: 50 INJECTION INTRAVENOUS at 14:20:00

## 2022-11-08 RX ADMIN — EPHEDRINE SULFATE 5 MG: 50 INJECTION INTRAVENOUS at 14:24:00

## 2022-11-08 RX ADMIN — ROCURONIUM BROMIDE 10 MG: 10 INJECTION, SOLUTION INTRAVENOUS at 14:50:00

## 2022-11-08 RX ADMIN — EPHEDRINE SULFATE 5 MG: 50 INJECTION INTRAVENOUS at 14:50:00

## 2022-11-08 RX ADMIN — HEPARIN SODIUM 5000 UNITS: 5000 INJECTION, SOLUTION INTRAVENOUS; SUBCUTANEOUS at 15:21:00

## 2022-11-08 RX ADMIN — PHENYLEPHRINE HCL 100 MCG: 10 MG/ML VIAL (ML) INJECTION at 14:21:00

## 2022-11-08 RX ADMIN — PHENYLEPHRINE HCL 100 MCG: 10 MG/ML VIAL (ML) INJECTION at 14:59:00

## 2022-11-08 RX ADMIN — GLYCOPYRROLATE 0.8 MG: 0.2 INJECTION, SOLUTION INTRAMUSCULAR; INTRAVENOUS at 16:57:00

## 2022-11-08 RX ADMIN — PHENYLEPHRINE HCL 100 MCG: 10 MG/ML VIAL (ML) INJECTION at 14:30:00

## 2022-11-08 RX ADMIN — SODIUM CHLORIDE: 9 INJECTION, SOLUTION INTRAVENOUS at 16:55:00

## 2022-11-08 RX ADMIN — PHENYLEPHRINE HCL 100 MCG: 10 MG/ML VIAL (ML) INJECTION at 14:39:00

## 2022-11-08 RX ADMIN — ROCURONIUM BROMIDE 10 MG: 10 INJECTION, SOLUTION INTRAVENOUS at 16:09:00

## 2022-11-08 RX ADMIN — LIDOCAINE HYDROCHLORIDE 5 ML: 10 INJECTION, SOLUTION EPIDURAL; INFILTRATION; INTRACAUDAL; PERINEURAL at 13:53:00

## 2022-11-08 RX ADMIN — NEOSTIGMINE METHYLSULFATE 5 MG: 1 INJECTION, SOLUTION INTRAVENOUS at 16:57:00

## 2022-11-08 RX ADMIN — PHENYLEPHRINE HCL 100 MCG: 10 MG/ML VIAL (ML) INJECTION at 13:59:00

## 2022-11-08 RX ADMIN — PHENYLEPHRINE HCL 100 MCG: 10 MG/ML VIAL (ML) INJECTION at 14:08:00

## 2022-11-08 RX ADMIN — EPHEDRINE SULFATE 5 MG: 50 INJECTION INTRAVENOUS at 14:30:00

## 2022-11-08 RX ADMIN — SODIUM CHLORIDE: 9 INJECTION, SOLUTION INTRAVENOUS at 13:37:00

## 2022-11-08 RX ADMIN — PHENYLEPHRINE HCL 100 MCG: 10 MG/ML VIAL (ML) INJECTION at 14:24:00

## 2022-11-08 RX ADMIN — SODIUM CHLORIDE: 9 INJECTION, SOLUTION INTRAVENOUS at 14:20:00

## 2022-11-08 RX ADMIN — EPHEDRINE SULFATE 10 MG: 50 INJECTION INTRAVENOUS at 14:00:00

## 2022-11-08 NOTE — ANESTHESIA POSTPROCEDURE EVALUATION
[facility] Patient Status:  Inpatient   Age/Gender 67year old female MRN [id number]   Location [address] [facility] Attending Kandice Christian, [address] [facility] [place] Day # 3 PCP Barron Ambrose MD       Anesthesia Post-op Note        Procedure Summary     Date: 11/08/22 Room / Location: [address] [place] [address]    Anesthesia Start: 8675 Anesthesia Stop: 8803    Procedure: CATH EP-VT ablation Diagnosis: (VT)    Scheduled Providers: Le Quinn MD Anesthesiologist: Teresa Serra MD    Anesthesia Type: general ASA Status: 4          Anesthesia Type: general    Vitals Value Taken Time   BP 89/65 11/08/22 1714   Temp 99 11/08/22 1714   Pulse 85 11/08/22 1714   Resp 19 11/08/22 1714   SpO2 95 11/08/22 1714       Patient Location: PACU    Anesthesia Type: general    Airway Patency: patent    Postop Pain Control: adequate    Mental Status: mildly sedated but able to meaningfully participate in the post-anesthesia evaluation    Nausea/Vomiting: none    Cardiopulmonary/Hydration status: stable euvolemic    Complications: no apparent anesthesia related complications    Postop vital signs: stable    Dental Exam: Unchanged from Preop    Patient to be discharged from PACU when criteria met.

## 2022-11-08 NOTE — PLAN OF CARE
Assumed care of pt at 0730. A/ox4, rm air, SR w/ occasional PVCs on tele. No reports of pain. Breath sounds clear upon auscultation. Pt continues to report congestion w/ nasal drip. Pt had episode of nausea/dry heaving this AM per night shift, but nausea has since resolved after zofran. Pt NPO for procedure this afternoon, but continues to report lack of appetite. Left hand edema present from previous IV infiltration, improved from yesterday. Impaired vision - wears glasses. Discussed POC w/ pt.     5276: paged cardiology regarding pt's hypotension this AM and wanted medication clarification - see nursing communication order for hold on cardiac meds this AM. Also new order for 1x 500cc of fluids prior to procedure this afternoon  1845: pt received back from PACU - right groin site soft upon palpation w/ no s/s of hematoma. Doppler pulses auscultated (pedal and posterior tibial) and marked. Slight drainage noted on groin's gauze dressing, outlined. Problem: CARDIOVASCULAR - ADULT  Goal: Maintains optimal cardiac output and hemodynamic stability  Description: INTERVENTIONS:  - Monitor vital signs, rhythm, and trends  - Monitor for bleeding, hypotension and signs of decreased cardiac output  - Evaluate effectiveness of vasoactive medications to optimize hemodynamic stability  - Monitor arterial and/or venous puncture sites for bleeding and/or hematoma  - Assess quality of pulses, skin color and temperature  - Assess for signs of decreased coronary artery perfusion - ex.  Angina  - Evaluate fluid balance, assess for edema, trend weights  Outcome: Progressing  Goal: Absence of cardiac arrhythmias or at baseline  Description: INTERVENTIONS:  - Continuous cardiac monitoring, monitor vital signs, obtain 12 lead EKG if indicated  - Evaluate effectiveness of antiarrhythmic and heart rate control medications as ordered  - Initiate emergency measures for life threatening arrhythmias  - Monitor electrolytes and administer replacement therapy as ordered  Outcome: Progressing     Problem: GASTROINTESTINAL - ADULT  Goal: Minimal or absence of nausea and vomiting  Description: INTERVENTIONS:  - Maintain adequate hydration with IV or PO as ordered and tolerated  - Nasogastric tube to low intermittent suction as ordered  - Evaluate effectiveness of ordered antiemetic medications  - Provide nonpharmacologic comfort measures as appropriate  - Advance diet as tolerated, if ordered  - Obtain nutritional consult as needed  - Evaluate fluid balance  Outcome: Progressing     Problem: Patient/Family Goals  Goal: Patient/Family Long Term Goal  Description: Patient's Long Term Goal: to go home    Interventions:  - MD rounding, medication management, monitor labs, nausea control, monitor HR/rhythm  - See additional Care Plan goals for specific interventions  Outcome: Progressing  Goal: Patient/Family Short Term Goal  Description: Patient's Short Term Goal: remain pain free    Interventions:   - pain assessments q4, pain meds PRN  - See additional Care Plan goals for specific interventions  Outcome: Progressing

## 2022-11-08 NOTE — ANESTHESIA PROCEDURE NOTES
Airway  Date/Time: 11/8/2022 1:56 PM  Urgency: elective      General Information and Staff    Patient location during procedure: OR  Anesthesiologist: Kirk Henderson MD  Performed: anesthesiologist     Indications and Patient Condition  Indications for airway management: anesthesia  Spontaneous Ventilation: absent  Sedation level: deep  Preoxygenated: yes  Patient position: sniffing  Mask difficulty assessment: 1 - vent by mask    Final Airway Details  Final airway type: endotracheal airway      Successful airway: ETT  Cuffed: yes   Successful intubation technique: direct laryngoscopy  Endotracheal tube insertion site: oral  Blade: Mayelin  Blade size: #3  ETT size (mm): 7.0    Cormack-Lehane Classification: grade I - full view of glottis  Placement verified by: chest auscultation and capnometry   Measured from: lips  ETT to lips (cm): 22  Number of attempts at approach: 1

## 2022-11-08 NOTE — PLAN OF CARE
Assumed care for pt. At [address]. Pt. Awake,  at bedside. Denies pain or shortness of breath. C/O nausea, loss of appetite. A&Ox4. Lungs clear on room air. SR on tele with occasional PVC's. Voiding freely. Up with steady gait to the bathroom. Plan for ablation today, NPO since midnight and groins shaved in prep. Patient is aware of the plan of care, questions and concerns addressed. Will continue to monitor. Problem: CARDIOVASCULAR - ADULT  Goal: Maintains optimal cardiac output and hemodynamic stability  Description: INTERVENTIONS:  - Monitor vital signs, rhythm, and trends  - Monitor for bleeding, hypotension and signs of decreased cardiac output  - Evaluate effectiveness of vasoactive medications to optimize hemodynamic stability  - Monitor arterial and/or venous puncture sites for bleeding and/or hematoma  - Assess quality of pulses, skin color and temperature  - Assess for signs of decreased coronary artery perfusion - ex.  Angina  - Evaluate fluid balance, assess for edema, trend weights  Outcome: Progressing  Goal: Absence of cardiac arrhythmias or at baseline  Description: INTERVENTIONS:  - Continuous cardiac monitoring, monitor vital signs, obtain 12 lead EKG if indicated  - Evaluate effectiveness of antiarrhythmic and heart rate control medications as ordered  - Initiate emergency measures for life threatening arrhythmias  - Monitor electrolytes and administer replacement therapy as ordered  Outcome: Progressing     Problem: GASTROINTESTINAL - ADULT  Goal: Minimal or absence of nausea and vomiting  Description: INTERVENTIONS:  - Maintain adequate hydration with IV or PO as ordered and tolerated  - Nasogastric tube to low intermittent suction as ordered  - Evaluate effectiveness of ordered antiemetic medications  - Provide nonpharmacologic comfort measures as appropriate  - Advance diet as tolerated, if ordered  - Obtain nutritional consult as needed  - Evaluate fluid balance  Outcome: Progressing Problem: Patient/Family Goals  Goal: Patient/Family Long Term Goal  Description: Patient's Long Term Goal: to go home    Interventions:  - MD rounding, medication management, monitor labs, nausea control, monitor HR/rhythm  - See additional Care Plan goals for specific interventions  Outcome: Progressing  Goal: Patient/Family Short Term Goal  Description: Patient's Short Term Goal: remain pain free    Interventions:   - pain assessments q4, pain meds PRN  - See additional Care Plan goals for specific interventions  Outcome: Progressing

## 2022-11-09 LAB
ANION GAP SERPL CALC-SCNC: 6 MMOL/L (ref 0–18)
ATRIAL RATE: 67 BPM
ATRIAL RATE: 82 BPM
BASOPHILS # BLD AUTO: 0.04 X10(3) UL (ref 0–0.2)
BASOPHILS NFR BLD AUTO: 0.2 %
BUN BLD-MCNC: 17 MG/DL (ref 7–18)
CALCIUM BLD-MCNC: 9.5 MG/DL (ref 8.5–10.1)
CHLORIDE SERPL-SCNC: 108 MMOL/L (ref 98–112)
CO2 SERPL-SCNC: 24 MMOL/L (ref 21–32)
CREAT BLD-MCNC: 0.97 MG/DL
EOSINOPHIL # BLD AUTO: 0.01 X10(3) UL (ref 0–0.7)
EOSINOPHIL NFR BLD AUTO: 0.1 %
ERYTHROCYTE [DISTWIDTH] IN BLOOD BY AUTOMATED COUNT: 13.8 %
GFR SERPLBLD BASED ON 1.73 SQ M-ARVRAT: 62 ML/MIN/1.73M2 (ref 60–?)
GLUCOSE BLD-MCNC: 112 MG/DL (ref 70–99)
HCT VFR BLD AUTO: 39.6 %
HGB BLD-MCNC: 12.7 G/DL
IMM GRANULOCYTES # BLD AUTO: 0.07 X10(3) UL (ref 0–1)
IMM GRANULOCYTES NFR BLD: 0.4 %
LYMPHOCYTES # BLD AUTO: 4.03 X10(3) UL (ref 1–4)
LYMPHOCYTES NFR BLD AUTO: 25 %
MCH RBC QN AUTO: 30.7 PG (ref 26–34)
MCHC RBC AUTO-ENTMCNC: 32.1 G/DL (ref 31–37)
MCV RBC AUTO: 95.7 FL
MONOCYTES # BLD AUTO: 1.65 X10(3) UL (ref 0.1–1)
MONOCYTES NFR BLD AUTO: 10.2 %
NEUTROPHILS # BLD AUTO: 10.32 X10 (3) UL (ref 1.5–7.7)
NEUTROPHILS # BLD AUTO: 10.32 X10(3) UL (ref 1.5–7.7)
NEUTROPHILS NFR BLD AUTO: 64.1 %
OSMOLALITY SERPL CALC.SUM OF ELEC: 288 MOSM/KG (ref 275–295)
P AXIS: 31 DEGREES
P AXIS: 34 DEGREES
P-R INTERVAL: 166 MS
P-R INTERVAL: 186 MS
PLATELET # BLD AUTO: 188 10(3)UL (ref 150–450)
POTASSIUM SERPL-SCNC: 4.7 MMOL/L (ref 3.5–5.1)
Q-T INTERVAL: 386 MS
Q-T INTERVAL: 432 MS
QRS DURATION: 118 MS
QRS DURATION: 94 MS
QTC CALCULATION (BEZET): 407 MS
QTC CALCULATION (BEZET): 504 MS
R AXIS: 14 DEGREES
R AXIS: 39 DEGREES
RBC # BLD AUTO: 4.14 X10(6)UL
SODIUM SERPL-SCNC: 138 MMOL/L (ref 136–145)
T AXIS: 37 DEGREES
T AXIS: 49 DEGREES
VENTRICULAR RATE: 67 BPM
VENTRICULAR RATE: 82 BPM
WBC # BLD AUTO: 16.1 X10(3) UL (ref 4–11)

## 2022-11-09 PROCEDURE — 99232 SBSQ HOSP IP/OBS MODERATE 35: CPT | Performed by: INTERNAL MEDICINE

## 2022-11-09 RX ORDER — SOTALOL HYDROCHLORIDE 80 MG/1
80 TABLET ORAL EVERY 12 HOURS SCHEDULED
Status: DISCONTINUED | OUTPATIENT
Start: 2022-11-09 | End: 2022-11-10

## 2022-11-09 NOTE — PLAN OF CARE
Received patient at 0730. Alert and oriented x 4. Tele rhythm shows NSR. O2 saturation adequate on RA. Pt continent, up w/ standby assist. No C/O chest pain or SOB. Rt groin site dressing removed - CDI, soft, no hematoma. Bed is locked and in low position. Call light and personal items within reach. Reviewed plan of care and patient verbalizes understanding. 1645 - pt walked around [place] with PCT, tolerated well. Problem: CARDIOVASCULAR - ADULT  Goal: Maintains optimal cardiac output and hemodynamic stability  Description: INTERVENTIONS:  - Monitor vital signs, rhythm, and trends  - Monitor for bleeding, hypotension and signs of decreased cardiac output  - Evaluate effectiveness of vasoactive medications to optimize hemodynamic stability  - Monitor arterial and/or venous puncture sites for bleeding and/or hematoma  - Assess quality of pulses, skin color and temperature  - Assess for signs of decreased coronary artery perfusion - ex.  Angina  - Evaluate fluid balance, assess for edema, trend weights  Outcome: Progressing  Goal: Absence of cardiac arrhythmias or at baseline  Description: INTERVENTIONS:  - Continuous cardiac monitoring, monitor vital signs, obtain 12 lead EKG if indicated  - Evaluate effectiveness of antiarrhythmic and heart rate control medications as ordered  - Initiate emergency measures for life threatening arrhythmias  - Monitor electrolytes and administer replacement therapy as ordered  Outcome: Progressing     Problem: GASTROINTESTINAL - ADULT  Goal: Minimal or absence of nausea and vomiting  Description: INTERVENTIONS:  - Maintain adequate hydration with IV or PO as ordered and tolerated  - Nasogastric tube to low intermittent suction as ordered  - Evaluate effectiveness of ordered antiemetic medications  - Provide nonpharmacologic comfort measures as appropriate  - Advance diet as tolerated, if ordered  - Obtain nutritional consult as needed  - Evaluate fluid balance  Outcome: Progressing     Problem: Patient/Family Goals  Goal: Patient/Family Long Term Goal  Description: Patient's Long Term Goal: to go home    Interventions:  - MD rounding, medication management, monitor labs, nausea control, monitor HR/rhythm  - See additional Care Plan goals for specific interventions  Outcome: Progressing  Goal: Patient/Family Short Term Goal  Description: Patient's Short Term Goal: remain pain free    Interventions:   - pain assessments q4, pain meds PRN  - See additional Care Plan goals for specific interventions  Outcome: Progressing

## 2022-11-09 NOTE — PLAN OF CARE
Patient alert and oriented x 4. Up  with standby. On RA. NSR on tele. HR on the 70's. Continent of bowel and bladder. No complaints of pain, nausea, shortness of breath or chest pain/discomfort. R groin incision, soft/ no hematoma. Patient updated on plan of care. Fall precautions in place. Call light within reach. Problem: CARDIOVASCULAR - ADULT  Goal: Maintains optimal cardiac output and hemodynamic stability  Description: INTERVENTIONS:  - Monitor vital signs, rhythm, and trends  - Monitor for bleeding, hypotension and signs of decreased cardiac output  - Evaluate effectiveness of vasoactive medications to optimize hemodynamic stability  - Monitor arterial and/or venous puncture sites for bleeding and/or hematoma  - Assess quality of pulses, skin color and temperature  - Assess for signs of decreased coronary artery perfusion - ex.  Angina  - Evaluate fluid balance, assess for edema, trend weights  Outcome: Progressing  Goal: Absence of cardiac arrhythmias or at baseline  Description: INTERVENTIONS:  - Continuous cardiac monitoring, monitor vital signs, obtain 12 lead EKG if indicated  - Evaluate effectiveness of antiarrhythmic and heart rate control medications as ordered  - Initiate emergency measures for life threatening arrhythmias  - Monitor electrolytes and administer replacement therapy as ordered  Outcome: Progressing     Problem: GASTROINTESTINAL - ADULT  Goal: Minimal or absence of nausea and vomiting  Description: INTERVENTIONS:  - Maintain adequate hydration with IV or PO as ordered and tolerated  - Nasogastric tube to low intermittent suction as ordered  - Evaluate effectiveness of ordered antiemetic medications  - Provide nonpharmacologic comfort measures as appropriate  - Advance diet as tolerated, if ordered  - Obtain nutritional consult as needed  - Evaluate fluid balance  Outcome: Progressing     Problem: Patient/Family Goals  Goal: Patient/Family Long Term Goal  Description: Patient's Long Term Goal: to go home    Interventions:  - MD rounding, medication management, monitor labs, nausea control, monitor HR/rhythm  - See additional Care Plan goals for specific interventions  Outcome: Progressing  Goal: Patient/Family Short Term Goal  Description: Patient's Short Term Goal: remain pain free    Interventions:   - pain assessments q4, pain meds PRN  - See additional Care Plan goals for specific interventions  Outcome: Progressing

## 2022-11-10 VITALS
RESPIRATION RATE: 14 BRPM | BODY MASS INDEX: 30.4 KG/M2 | TEMPERATURE: 98 F | SYSTOLIC BLOOD PRESSURE: 76 MMHG | HEART RATE: 63 BPM | OXYGEN SATURATION: 100 % | HEIGHT: 71 IN | DIASTOLIC BLOOD PRESSURE: 46 MMHG | WEIGHT: 217.13 LBS

## 2022-11-10 LAB
ANION GAP SERPL CALC-SCNC: 2 MMOL/L (ref 0–18)
ATRIAL RATE: 59 BPM
ATRIAL RATE: 62 BPM
BASOPHILS # BLD AUTO: 0.06 X10(3) UL (ref 0–0.2)
BASOPHILS NFR BLD AUTO: 0.5 %
BILIRUB UR QL STRIP.AUTO: NEGATIVE
BUN BLD-MCNC: 17 MG/DL (ref 7–18)
CALCIUM BLD-MCNC: 9.1 MG/DL (ref 8.5–10.1)
CHLORIDE SERPL-SCNC: 111 MMOL/L (ref 98–112)
CO2 SERPL-SCNC: 26 MMOL/L (ref 21–32)
COLOR UR AUTO: YELLOW
CREAT BLD-MCNC: 1 MG/DL
EOSINOPHIL # BLD AUTO: 0.25 X10(3) UL (ref 0–0.7)
EOSINOPHIL NFR BLD AUTO: 2 %
ERYTHROCYTE [DISTWIDTH] IN BLOOD BY AUTOMATED COUNT: 14 %
GFR SERPLBLD BASED ON 1.73 SQ M-ARVRAT: 59 ML/MIN/1.73M2 (ref 60–?)
GLUCOSE BLD-MCNC: 85 MG/DL (ref 70–99)
GLUCOSE UR STRIP.AUTO-MCNC: NEGATIVE MG/DL
HCT VFR BLD AUTO: 42 %
HGB BLD-MCNC: 13.4 G/DL
HYALINE CASTS #/AREA URNS AUTO: PRESENT /LPF
IMM GRANULOCYTES # BLD AUTO: 0.06 X10(3) UL (ref 0–1)
IMM GRANULOCYTES NFR BLD: 0.5 %
KETONES UR STRIP.AUTO-MCNC: NEGATIVE MG/DL
LYMPHOCYTES # BLD AUTO: 5.04 X10(3) UL (ref 1–4)
LYMPHOCYTES NFR BLD AUTO: 40.3 %
MCH RBC QN AUTO: 30.7 PG (ref 26–34)
MCHC RBC AUTO-ENTMCNC: 31.9 G/DL (ref 31–37)
MCV RBC AUTO: 96.3 FL
MONOCYTES # BLD AUTO: 1.23 X10(3) UL (ref 0.1–1)
MONOCYTES NFR BLD AUTO: 9.8 %
NEUTROPHILS # BLD AUTO: 5.86 X10 (3) UL (ref 1.5–7.7)
NEUTROPHILS # BLD AUTO: 5.86 X10(3) UL (ref 1.5–7.7)
NEUTROPHILS NFR BLD AUTO: 46.9 %
NITRITE UR QL STRIP.AUTO: POSITIVE
OSMOLALITY SERPL CALC.SUM OF ELEC: 289 MOSM/KG (ref 275–295)
P AXIS: 10 DEGREES
P-R INTERVAL: 224 MS
PH UR STRIP.AUTO: 5 [PH] (ref 5–8)
PLATELET # BLD AUTO: 172 10(3)UL (ref 150–450)
POTASSIUM SERPL-SCNC: 3.9 MMOL/L (ref 3.5–5.1)
PROT UR STRIP.AUTO-MCNC: NEGATIVE MG/DL
Q-T INTERVAL: 492 MS
Q-T INTERVAL: 492 MS
QRS DURATION: 102 MS
QRS DURATION: 104 MS
QTC CALCULATION (BEZET): 492 MS
QTC CALCULATION (BEZET): 499 MS
R AXIS: 26 DEGREES
R AXIS: 7 DEGREES
RBC # BLD AUTO: 4.36 X10(6)UL
RBC UR QL AUTO: NEGATIVE
SODIUM SERPL-SCNC: 139 MMOL/L (ref 136–145)
SP GR UR STRIP.AUTO: 1.02 (ref 1–1.03)
T AXIS: 37 DEGREES
T AXIS: 57 DEGREES
UROBILINOGEN UR STRIP.AUTO-MCNC: <2 MG/DL
VENTRICULAR RATE: 60 BPM
VENTRICULAR RATE: 62 BPM
WBC # BLD AUTO: 12.5 X10(3) UL (ref 4–11)

## 2022-11-10 PROCEDURE — 99239 HOSP IP/OBS DSCHRG MGMT >30: CPT | Performed by: INTERNAL MEDICINE

## 2022-11-10 RX ORDER — SOTALOL HYDROCHLORIDE 80 MG/1
80 TABLET ORAL EVERY 12 HOURS SCHEDULED
Qty: 60 TABLET | Refills: 3 | Status: SHIPPED | OUTPATIENT
Start: 2022-11-10 | End: 2022-11-10

## 2022-11-10 RX ORDER — SOTALOL HYDROCHLORIDE 80 MG/1
80 TABLET ORAL EVERY 12 HOURS SCHEDULED
Qty: 60 TABLET | Refills: 3 | Status: SHIPPED | OUTPATIENT
Start: 2022-11-10

## 2022-11-10 RX ORDER — MIDODRINE HYDROCHLORIDE 2.5 MG/1
2.5 TABLET ORAL
Status: DISCONTINUED | OUTPATIENT
Start: 2022-11-10 | End: 2022-11-10

## 2022-11-10 NOTE — PLAN OF CARE
N: AOx4  HEENT: Reading glasses  R: CTA bilaterally. Room air. C: NSR on tele. No edema. GI: Staff reports last BM was 11/9/2022  : WNL  Skin: R groin is C/D/I    Discharge to home with no needs. Reviewed the AVS with the patient and her . Meds to beds to deliver Sotalol. Chevis Enriqueta to order future refills. Problem: CARDIOVASCULAR - ADULT  Goal: Maintains optimal cardiac output and hemodynamic stability  Description: INTERVENTIONS:  - Monitor vital signs, rhythm, and trends  - Monitor for bleeding, hypotension and signs of decreased cardiac output  - Evaluate effectiveness of vasoactive medications to optimize hemodynamic stability  - Monitor arterial and/or venous puncture sites for bleeding and/or hematoma  - Assess quality of pulses, skin color and temperature  - Assess for signs of decreased coronary artery perfusion - ex.  Angina  - Evaluate fluid balance, assess for edema, trend weights  Outcome: Adequate for Discharge  Goal: Absence of cardiac arrhythmias or at baseline  Description: INTERVENTIONS:  - Continuous cardiac monitoring, monitor vital signs, obtain 12 lead EKG if indicated  - Evaluate effectiveness of antiarrhythmic and heart rate control medications as ordered  - Initiate emergency measures for life threatening arrhythmias  - Monitor electrolytes and administer replacement therapy as ordered  Outcome: Adequate for Discharge     Problem: GASTROINTESTINAL - ADULT  Goal: Minimal or absence of nausea and vomiting  Description: INTERVENTIONS:  - Maintain adequate hydration with IV or PO as ordered and tolerated  - Nasogastric tube to low intermittent suction as ordered  - Evaluate effectiveness of ordered antiemetic medications  - Provide nonpharmacologic comfort measures as appropriate  - Advance diet as tolerated, if ordered  - Obtain nutritional consult as needed  - Evaluate fluid balance  Outcome: Adequate for Discharge     Problem: Patient/Family Goals  Goal: Patient/Family Long Term Goal  Description: Patient's Long Term Goal: to go home    Interventions:  - MD rounding, medication management, monitor labs, nausea control, monitor HR/rhythm  - See additional Care Plan goals for specific interventions  Outcome: Adequate for Discharge  Goal: Patient/Family Short Term Goal  Description: Patient's Short Term Goal: remain pain free    Interventions:   - pain assessments q4, pain meds PRN  - See additional Care Plan goals for specific interventions  Outcome: Adequate for Discharge

## 2022-11-10 NOTE — DISCHARGE INSTRUCTIONS
Going Home    In this section you will find the tools which will guide you through the first few days after you leave the hospital. Continued use of these tools will help you develop the skills necessary to keep your heart failure under control. Heart Failure Guidelines - place this worksheet on your refrigerator or somewhere you can refer to it daily to help you decide if your symptoms are under control, and what to do if they are not. Home Care Instructions Following Heart Failure - the most important things to do every day include:     Weigh yourself  Take your medicines as prescribed  Limit your sodium (salt) and fluid intake  Know when to call your cardiologist, primary doctor, or nurse  Know when to seek emergency care    There is also a handy weight chart on which to record your weight every day, information on measuring fluids and limiting fluid intake, and a section for recording your thoughts or questions. Things for You to Remember:   1. An appointment has been made for you to see your doctor or healthcare provider within 7 days of hospital discharge. It is important that you attend this appointment to make sure your symptoms are under control. 2. Your recommended sodium intake is 7120-6539 mg daily    3. Limit your fluid intake to no more than 2 liters or 64 ounces per day    4. Some exercise and activity is important to help keep your heart functioning and strong. Unless instructed not to exercise, you may walk at a slow to moderate pace for 10-15 minutes 2-3 days per week to start. Pace your activity to prevent shortness of breath or fatigue. Stop exercise if you develop chest pain, lightheadedness, or significant shortness of breath.        Call Your Cardiologist If:   You gain 2 pounds overnight or 3-4 pounds in 3-5 days  You have more difficulty breathing  You are getting more tired with normal activity  You are more short of breath lying down, or awaken at night short of breath  You have swelling of your feet or legs  You urinate less often during the day and more often at night  You have cramps in your legs  You have blurred vision or see yellowish-green halos around objects of lights    Go to the Emergency Room If:   You have pain or tightness in your chest  You are extremely short of breath  You are coughing up pink-frothy mucus  You are traveling and develop symptoms of worsening heart failure    Additional Resources:    If you have questions or concerns about heart failure management, call the [facility] Failure Unit at [phone]

## 2022-11-10 NOTE — PLAN OF CARE
RN called to report pt's SBP in the 76s. Per cardiology note from earlier today, pt's baseline SBP 70s-80s. Advised RN ok to hold evening dose of Entresto but pt should receive sotalol.

## 2022-11-10 NOTE — PLAN OF CARE
Problem: defib discharge  Data: Patient alert and oriented overnight, on room air, denies pain. Patient up in room with standby assist, voiding freely. Blood pressure low but within patient's normal range, patient asymptomatic. Action: Due medications given, EKG 2 hours after Betapace given as ordered. All patient's needs attended to. Education (patient/family): Patient updated on plan of care. Plan for DC back home with spouse when appropriate. Response: Patient verbalizes understanding of plan of care and appears to be resting comfortably at this time, will continue to monitor. Problem: CARDIOVASCULAR - ADULT  Goal: Maintains optimal cardiac output and hemodynamic stability  Description: INTERVENTIONS:  - Monitor vital signs, rhythm, and trends  - Monitor for bleeding, hypotension and signs of decreased cardiac output  - Evaluate effectiveness of vasoactive medications to optimize hemodynamic stability  - Monitor arterial and/or venous puncture sites for bleeding and/or hematoma  - Assess quality of pulses, skin color and temperature  - Assess for signs of decreased coronary artery perfusion - ex.  Angina  - Evaluate fluid balance, assess for edema, trend weights  Outcome: Progressing     Problem: Patient/Family Goals  Goal: Patient/Family Long Term Goal  Description: Patient's Long Term Goal: to go home    Interventions:  - MD rounding, medication management, monitor labs, nausea control, monitor HR/rhythm  - See additional Care Plan goals for specific interventions  Outcome: Progressing  Goal: Patient/Family Short Term Goal  Description: Patient's Short Term Goal: remain pain free    Interventions:   - pain assessments q4, pain meds PRN  - See additional Care Plan goals for specific interventions  Outcome: Progressing

## 2022-11-11 ENCOUNTER — PATIENT OUTREACH (OUTPATIENT)
Dept: CASE MANAGEMENT | Age: 72
End: 2022-11-11

## 2022-11-11 DIAGNOSIS — Z45.02 DEFIBRILLATOR DISCHARGE: ICD-10-CM

## 2022-11-11 DIAGNOSIS — Z02.9 ENCOUNTERS FOR UNSPECIFIED ADMINISTRATIVE PURPOSE: ICD-10-CM

## 2022-11-11 PROCEDURE — 1111F DSCHRG MED/CURRENT MED MERGE: CPT

## 2022-11-12 ENCOUNTER — TELEPHONE (OUTPATIENT)
Dept: FAMILY MEDICINE CLINIC | Facility: CLINIC | Age: 72
End: 2022-11-12

## 2022-11-12 NOTE — TELEPHONE ENCOUNTER
I Called the Pt to notify regarding her positive UTI   She did not answer . I left a voice message to call us back.

## 2022-11-14 DIAGNOSIS — N39.0 URINARY TRACT INFECTION WITHOUT HEMATURIA, SITE UNSPECIFIED: Primary | ICD-10-CM

## 2022-11-14 RX ORDER — CEPHALEXIN 500 MG/1
500 CAPSULE ORAL 3 TIMES DAILY
Qty: 21 CAPSULE | Refills: 0 | Status: SHIPPED | OUTPATIENT
Start: 2022-11-14 | End: 2022-11-21

## 2023-02-07 ENCOUNTER — TELEPHONE (OUTPATIENT)
Dept: FAMILY MEDICINE CLINIC | Facility: CLINIC | Age: 73
End: 2023-02-07

## 2023-02-25 ENCOUNTER — TELEPHONE (OUTPATIENT)
Dept: FAMILY MEDICINE CLINIC | Facility: CLINIC | Age: 73
End: 2023-02-25

## 2023-03-27 DIAGNOSIS — E03.9 HYPOTHYROIDISM, UNSPECIFIED TYPE: ICD-10-CM

## 2023-03-28 RX ORDER — LEVOTHYROXINE SODIUM 0.1 MG/1
TABLET ORAL
Qty: 30 TABLET | Refills: 0 | Status: SHIPPED | OUTPATIENT
Start: 2023-03-28

## 2023-04-08 DIAGNOSIS — M1A.0790 CHRONIC GOUT OF FOOT, UNSPECIFIED CAUSE, UNSPECIFIED LATERALITY: ICD-10-CM

## 2023-04-10 RX ORDER — ALLOPURINOL 100 MG/1
TABLET ORAL
Qty: 30 TABLET | Refills: 0 | Status: SHIPPED | OUTPATIENT
Start: 2023-04-10

## 2023-04-13 ENCOUNTER — LAB ENCOUNTER (OUTPATIENT)
Dept: LAB | Age: 73
End: 2023-04-13
Attending: FAMILY MEDICINE
Payer: MEDICARE

## 2023-04-13 DIAGNOSIS — E78.5 DYSLIPIDEMIA: ICD-10-CM

## 2023-04-13 DIAGNOSIS — I47.29 NSVT (NONSUSTAINED VENTRICULAR TACHYCARDIA) (HCC): ICD-10-CM

## 2023-04-13 DIAGNOSIS — M1A.0790 CHRONIC GOUT OF FOOT, UNSPECIFIED CAUSE, UNSPECIFIED LATERALITY: ICD-10-CM

## 2023-04-13 DIAGNOSIS — I73.9 PERIPHERAL VASCULAR DISEASE (HCC): ICD-10-CM

## 2023-04-13 DIAGNOSIS — Z95.1 S/P CABG X 3: ICD-10-CM

## 2023-04-13 DIAGNOSIS — I25.10 CORONARY ARTERY DISEASE INVOLVING NATIVE CORONARY ARTERY OF NATIVE HEART WITHOUT ANGINA PECTORIS: ICD-10-CM

## 2023-04-13 DIAGNOSIS — Z79.01 CHRONIC ANTICOAGULATION: ICD-10-CM

## 2023-04-13 DIAGNOSIS — I25.5 ISCHEMIC CARDIOMYOPATHY: ICD-10-CM

## 2023-04-13 DIAGNOSIS — G62.9 NEUROPATHY: ICD-10-CM

## 2023-04-13 DIAGNOSIS — E03.9 HYPOTHYROIDISM, UNSPECIFIED TYPE: ICD-10-CM

## 2023-04-13 DIAGNOSIS — I48.0 PAROXYSMAL ATRIAL FIBRILLATION (HCC): ICD-10-CM

## 2023-04-13 DIAGNOSIS — N39.0 URINARY TRACT INFECTION WITHOUT HEMATURIA, SITE UNSPECIFIED: ICD-10-CM

## 2023-04-13 LAB
ALBUMIN SERPL-MCNC: 3.7 G/DL (ref 3.4–5)
ALBUMIN/GLOB SERPL: 0.9 {RATIO} (ref 1–2)
ALP LIVER SERPL-CCNC: 61 U/L
ALT SERPL-CCNC: 23 U/L
ANION GAP SERPL CALC-SCNC: 5 MMOL/L (ref 0–18)
AST SERPL-CCNC: 21 U/L (ref 15–37)
BASOPHILS # BLD AUTO: 0.06 X10(3) UL (ref 0–0.2)
BASOPHILS NFR BLD AUTO: 0.5 %
BILIRUB SERPL-MCNC: 0.4 MG/DL (ref 0.1–2)
BILIRUB UR QL STRIP.AUTO: NEGATIVE
BUN BLD-MCNC: 17 MG/DL (ref 7–18)
CALCIUM BLD-MCNC: 9.6 MG/DL (ref 8.5–10.1)
CHLORIDE SERPL-SCNC: 107 MMOL/L (ref 98–112)
CHOLEST SERPL-MCNC: 164 MG/DL (ref ?–200)
CO2 SERPL-SCNC: 29 MMOL/L (ref 21–32)
COLOR UR AUTO: YELLOW
CREAT BLD-MCNC: 0.88 MG/DL
EOSINOPHIL # BLD AUTO: 0.77 X10(3) UL (ref 0–0.7)
EOSINOPHIL NFR BLD AUTO: 6.5 %
ERYTHROCYTE [DISTWIDTH] IN BLOOD BY AUTOMATED COUNT: 15.1 %
FASTING PATIENT LIPID ANSWER: YES
FASTING STATUS PATIENT QL REPORTED: YES
GFR SERPLBLD BASED ON 1.73 SQ M-ARVRAT: 69 ML/MIN/1.73M2 (ref 60–?)
GLOBULIN PLAS-MCNC: 4 G/DL (ref 2.8–4.4)
GLUCOSE BLD-MCNC: 103 MG/DL (ref 70–99)
GLUCOSE UR STRIP.AUTO-MCNC: NEGATIVE MG/DL
HCT VFR BLD AUTO: 41.7 %
HDLC SERPL-MCNC: 48 MG/DL (ref 40–59)
HGB BLD-MCNC: 13.6 G/DL
IMM GRANULOCYTES # BLD AUTO: 0.04 X10(3) UL (ref 0–1)
IMM GRANULOCYTES NFR BLD: 0.3 %
KETONES UR STRIP.AUTO-MCNC: NEGATIVE MG/DL
LDLC SERPL CALC-MCNC: 88 MG/DL (ref ?–100)
LYMPHOCYTES # BLD AUTO: 6.4 X10(3) UL (ref 1–4)
LYMPHOCYTES NFR BLD AUTO: 53.8 %
MCH RBC QN AUTO: 31.2 PG (ref 26–34)
MCHC RBC AUTO-ENTMCNC: 32.6 G/DL (ref 31–37)
MCV RBC AUTO: 95.6 FL
MONOCYTES # BLD AUTO: 0.98 X10(3) UL (ref 0.1–1)
MONOCYTES NFR BLD AUTO: 8.2 %
NEUTROPHILS # BLD AUTO: 3.64 X10 (3) UL (ref 1.5–7.7)
NEUTROPHILS # BLD AUTO: 3.64 X10(3) UL (ref 1.5–7.7)
NEUTROPHILS NFR BLD AUTO: 30.7 %
NITRITE UR QL STRIP.AUTO: NEGATIVE
NONHDLC SERPL-MCNC: 116 MG/DL (ref ?–130)
OSMOLALITY SERPL CALC.SUM OF ELEC: 294 MOSM/KG (ref 275–295)
PH UR STRIP.AUTO: 6 [PH] (ref 5–8)
PLATELET # BLD AUTO: 194 10(3)UL (ref 150–450)
POTASSIUM SERPL-SCNC: 3.7 MMOL/L (ref 3.5–5.1)
PROT SERPL-MCNC: 7.7 G/DL (ref 6.4–8.2)
PROT UR STRIP.AUTO-MCNC: NEGATIVE MG/DL
RBC # BLD AUTO: 4.36 X10(6)UL
RBC UR QL AUTO: NEGATIVE
SODIUM SERPL-SCNC: 141 MMOL/L (ref 136–145)
SP GR UR STRIP.AUTO: 1.02 (ref 1–1.03)
T4 FREE SERPL-MCNC: 0.9 NG/DL (ref 0.8–1.7)
TRIGL SERPL-MCNC: 165 MG/DL (ref 30–149)
TSI SER-ACNC: 2.52 MIU/ML (ref 0.36–3.74)
URATE SERPL-MCNC: 5.9 MG/DL
UROBILINOGEN UR STRIP.AUTO-MCNC: <2 MG/DL
VLDLC SERPL CALC-MCNC: 27 MG/DL (ref 0–30)
WBC # BLD AUTO: 11.9 X10(3) UL (ref 4–11)

## 2023-04-13 PROCEDURE — 87086 URINE CULTURE/COLONY COUNT: CPT

## 2023-04-13 PROCEDURE — 80061 LIPID PANEL: CPT

## 2023-04-13 PROCEDURE — 84550 ASSAY OF BLOOD/URIC ACID: CPT

## 2023-04-13 PROCEDURE — 87186 SC STD MICRODIL/AGAR DIL: CPT

## 2023-04-13 PROCEDURE — 87088 URINE BACTERIA CULTURE: CPT

## 2023-04-13 PROCEDURE — 80053 COMPREHEN METABOLIC PANEL: CPT

## 2023-04-13 PROCEDURE — 84439 ASSAY OF FREE THYROXINE: CPT

## 2023-04-13 PROCEDURE — 81001 URINALYSIS AUTO W/SCOPE: CPT

## 2023-04-13 PROCEDURE — 84443 ASSAY THYROID STIM HORMONE: CPT

## 2023-04-13 PROCEDURE — 85025 COMPLETE CBC W/AUTO DIFF WBC: CPT

## 2023-04-14 DIAGNOSIS — D72.829 LEUKOCYTOSIS, UNSPECIFIED TYPE: Primary | ICD-10-CM

## 2023-04-17 RX ORDER — CEPHALEXIN 500 MG/1
500 CAPSULE ORAL 2 TIMES DAILY
Qty: 14 CAPSULE | Refills: 0 | Status: SHIPPED | OUTPATIENT
Start: 2023-04-17 | End: 2023-04-24

## 2023-04-30 DIAGNOSIS — E03.9 HYPOTHYROIDISM, UNSPECIFIED TYPE: ICD-10-CM

## 2023-05-01 RX ORDER — LEVOTHYROXINE SODIUM 0.1 MG/1
TABLET ORAL
Qty: 30 TABLET | Refills: 0 | Status: SHIPPED | OUTPATIENT
Start: 2023-05-01

## 2023-05-03 DIAGNOSIS — M1A.0790 CHRONIC GOUT OF FOOT, UNSPECIFIED CAUSE, UNSPECIFIED LATERALITY: ICD-10-CM

## 2023-05-04 RX ORDER — ALLOPURINOL 100 MG/1
TABLET ORAL
Qty: 30 TABLET | Refills: 0 | Status: SHIPPED | OUTPATIENT
Start: 2023-05-04

## 2023-05-25 ENCOUNTER — MED REC SCAN ONLY (OUTPATIENT)
Dept: FAMILY MEDICINE CLINIC | Facility: CLINIC | Age: 73
End: 2023-05-25

## 2023-05-30 ENCOUNTER — OFFICE VISIT (OUTPATIENT)
Dept: FAMILY MEDICINE CLINIC | Facility: CLINIC | Age: 73
End: 2023-05-30
Payer: MEDICARE

## 2023-05-30 VITALS
TEMPERATURE: 97 F | HEART RATE: 66 BPM | SYSTOLIC BLOOD PRESSURE: 98 MMHG | DIASTOLIC BLOOD PRESSURE: 60 MMHG | WEIGHT: 218 LBS | RESPIRATION RATE: 16 BRPM | BODY MASS INDEX: 30.52 KG/M2 | HEIGHT: 71 IN

## 2023-05-30 DIAGNOSIS — Z95.828 HISTORY OF ENDOVASCULAR STENT GRAFT FOR ABDOMINAL AORTIC ANEURYSM (AAA): ICD-10-CM

## 2023-05-30 DIAGNOSIS — Z86.79 S/P ABLATION OF ATRIAL FIBRILLATION: ICD-10-CM

## 2023-05-30 DIAGNOSIS — Z23 NEED FOR VACCINATION: ICD-10-CM

## 2023-05-30 DIAGNOSIS — I48.0 PAROXYSMAL ATRIAL FIBRILLATION (HCC): ICD-10-CM

## 2023-05-30 DIAGNOSIS — Z00.00 ENCOUNTER FOR ANNUAL HEALTH EXAMINATION: Primary | ICD-10-CM

## 2023-05-30 DIAGNOSIS — I73.9 PERIPHERAL VASCULAR DISEASE (HCC): ICD-10-CM

## 2023-05-30 DIAGNOSIS — I25.5 ISCHEMIC CARDIOMYOPATHY: ICD-10-CM

## 2023-05-30 DIAGNOSIS — Z95.1 S/P CABG X 3: ICD-10-CM

## 2023-05-30 DIAGNOSIS — Z13.820 SCREENING FOR OSTEOPOROSIS: ICD-10-CM

## 2023-05-30 DIAGNOSIS — Z12.11 SCREENING FOR MALIGNANT NEOPLASM OF COLON: ICD-10-CM

## 2023-05-30 DIAGNOSIS — I50.22 CHRONIC SYSTOLIC (CONGESTIVE) HEART FAILURE (HCC): ICD-10-CM

## 2023-05-30 DIAGNOSIS — E03.9 HYPOTHYROIDISM, UNSPECIFIED TYPE: ICD-10-CM

## 2023-05-30 DIAGNOSIS — Z95.810 AICD (AUTOMATIC CARDIOVERTER/DEFIBRILLATOR) PRESENT: ICD-10-CM

## 2023-05-30 DIAGNOSIS — I47.29 NSVT (NONSUSTAINED VENTRICULAR TACHYCARDIA) (HCC): ICD-10-CM

## 2023-05-30 DIAGNOSIS — I25.10 CORONARY ARTERY DISEASE INVOLVING NATIVE CORONARY ARTERY OF NATIVE HEART WITHOUT ANGINA PECTORIS: ICD-10-CM

## 2023-05-30 DIAGNOSIS — Z79.01 CHRONIC ANTICOAGULATION: ICD-10-CM

## 2023-05-30 DIAGNOSIS — M1A.0790 CHRONIC GOUT OF FOOT, UNSPECIFIED CAUSE, UNSPECIFIED LATERALITY: ICD-10-CM

## 2023-05-30 DIAGNOSIS — Z98.890 S/P ABLATION OF ATRIAL FIBRILLATION: ICD-10-CM

## 2023-05-30 PROCEDURE — 1170F FXNL STATUS ASSESSED: CPT | Performed by: FAMILY MEDICINE

## 2023-05-30 PROCEDURE — 1126F AMNT PAIN NOTED NONE PRSNT: CPT | Performed by: FAMILY MEDICINE

## 2023-05-30 PROCEDURE — 3008F BODY MASS INDEX DOCD: CPT | Performed by: FAMILY MEDICINE

## 2023-05-30 PROCEDURE — 1159F MED LIST DOCD IN RCRD: CPT | Performed by: FAMILY MEDICINE

## 2023-05-30 PROCEDURE — 1160F RVW MEDS BY RX/DR IN RCRD: CPT | Performed by: FAMILY MEDICINE

## 2023-05-30 PROCEDURE — 3078F DIAST BP <80 MM HG: CPT | Performed by: FAMILY MEDICINE

## 2023-05-30 PROCEDURE — 96160 PT-FOCUSED HLTH RISK ASSMT: CPT | Performed by: FAMILY MEDICINE

## 2023-05-30 PROCEDURE — G0009 ADMIN PNEUMOCOCCAL VACCINE: HCPCS | Performed by: FAMILY MEDICINE

## 2023-05-30 PROCEDURE — G0439 PPPS, SUBSEQ VISIT: HCPCS | Performed by: FAMILY MEDICINE

## 2023-05-30 PROCEDURE — 99214 OFFICE O/P EST MOD 30 MIN: CPT | Performed by: FAMILY MEDICINE

## 2023-05-30 PROCEDURE — 90677 PCV20 VACCINE IM: CPT | Performed by: FAMILY MEDICINE

## 2023-05-30 PROCEDURE — 3074F SYST BP LT 130 MM HG: CPT | Performed by: FAMILY MEDICINE

## 2023-05-31 ENCOUNTER — TELEPHONE (OUTPATIENT)
Dept: FAMILY MEDICINE CLINIC | Facility: CLINIC | Age: 73
End: 2023-05-31

## 2023-06-01 NOTE — TELEPHONE ENCOUNTER
NEUROLOGY FOLLOW-UP  Patient Name:  Robert Chavis  :   1983  Clinic Visit Date: 2020        REVIEW OF SYSTEMS  Constitutional Weight changes: absent, Fevers : absent, Fatigue: absent; Any recent hospitalizations:  absent.    HEENT Ears: normal, Eyes: glasses, Visual disturbance: absent   Reespiratory Shortness of breath: absent, Cough: absent   Cardivascular Chest pain: absent, Leg swelling :absent   GI Constipation: present, Diarrhea: absent, Swallowing change: absent    Urinary frequency: absent, Urinary urgency: absent, Urinary incontinence: absent   Musculoskeletal Neck pain: present, Back pain: absent, Stiffness: present, Muscle pain: present, Joint pain: absent   Dermatological Hair loss: absent, Skin changes: absent   Neurological Memory loss: absent, Confusion: absent, Seizures: absent; Trouble walking or imbalance: absent, Dizziness: present, Weakness: absent, Numbness absent, Tremor: absent, Spasm: absent, Speech difficulty: absent, Headache: present, Light sensitivity: present   Psychiatric Anxiety: present, Depression  present, Suicidal ideations absent   Hematologic Abnormal bleeding: absent, Anemia: absent, Clotting disorder: absent, Lymph gland changes: absent Please contact the patient to see if she would consider Cologuard for colorectal cancer screening

## 2023-06-03 DIAGNOSIS — E03.9 HYPOTHYROIDISM, UNSPECIFIED TYPE: ICD-10-CM

## 2023-06-05 DIAGNOSIS — M1A.0790 CHRONIC GOUT OF FOOT, UNSPECIFIED CAUSE, UNSPECIFIED LATERALITY: ICD-10-CM

## 2023-06-05 RX ORDER — ALLOPURINOL 100 MG/1
TABLET ORAL
Qty: 30 TABLET | Refills: 0 | Status: SHIPPED | OUTPATIENT
Start: 2023-06-05

## 2023-06-05 RX ORDER — LEVOTHYROXINE SODIUM 0.1 MG/1
TABLET ORAL
Qty: 90 TABLET | Refills: 1 | Status: SHIPPED | OUTPATIENT
Start: 2023-06-05

## 2023-06-05 NOTE — TELEPHONE ENCOUNTER
LOV:  5/30/2023 for physical  LRF:  5/4/2023 #30 without refills. Please see pended RX and approve if appropriate. Thank you.

## 2023-07-05 DIAGNOSIS — M1A.0790 CHRONIC GOUT OF FOOT, UNSPECIFIED CAUSE, UNSPECIFIED LATERALITY: ICD-10-CM

## 2023-07-05 RX ORDER — ALLOPURINOL 100 MG/1
100 TABLET ORAL DAILY
Qty: 30 TABLET | Refills: 2 | Status: SHIPPED | OUTPATIENT
Start: 2023-07-05

## 2023-07-05 NOTE — TELEPHONE ENCOUNTER
LOV:  05/30/2023 for: MA-Supervisit   Patient advised to RTC on:  6 months (on 11/30/2023).    Nov:12/01/2023      Medication Quantity Refills Start End   ALLOPURINOL 100 MG Oral Tab 30 tablet 0 6/5/2023    Sig:   TAKE 1 TABLET BY MOUTH EVERY DAY

## 2023-07-27 ENCOUNTER — LAB ENCOUNTER (OUTPATIENT)
Dept: LAB | Age: 73
End: 2023-07-27
Attending: FAMILY MEDICINE
Payer: MEDICARE

## 2023-07-27 DIAGNOSIS — M1A.0790 CHRONIC GOUT OF FOOT, UNSPECIFIED CAUSE, UNSPECIFIED LATERALITY: ICD-10-CM

## 2023-07-27 DIAGNOSIS — D72.829 LEUKOCYTOSIS, UNSPECIFIED TYPE: ICD-10-CM

## 2023-07-27 LAB
BASOPHILS # BLD AUTO: 0.07 X10(3) UL (ref 0–0.2)
BASOPHILS NFR BLD AUTO: 0.5 %
EOSINOPHIL # BLD AUTO: 0.26 X10(3) UL (ref 0–0.7)
EOSINOPHIL NFR BLD AUTO: 2 %
ERYTHROCYTE [DISTWIDTH] IN BLOOD BY AUTOMATED COUNT: 14 %
HCT VFR BLD AUTO: 44 %
HGB BLD-MCNC: 13.8 G/DL
IMM GRANULOCYTES # BLD AUTO: 0.04 X10(3) UL (ref 0–1)
IMM GRANULOCYTES NFR BLD: 0.3 %
LYMPHOCYTES # BLD AUTO: 7.19 X10(3) UL (ref 1–4)
LYMPHOCYTES NFR BLD AUTO: 56.3 %
MCH RBC QN AUTO: 30.7 PG (ref 26–34)
MCHC RBC AUTO-ENTMCNC: 31.4 G/DL (ref 31–37)
MCV RBC AUTO: 98 FL
MONOCYTES # BLD AUTO: 1.05 X10(3) UL (ref 0.1–1)
MONOCYTES NFR BLD AUTO: 8.2 %
NEUTROPHILS # BLD AUTO: 4.15 X10 (3) UL (ref 1.5–7.7)
NEUTROPHILS # BLD AUTO: 4.15 X10(3) UL (ref 1.5–7.7)
NEUTROPHILS NFR BLD AUTO: 32.7 %
PLATELET # BLD AUTO: 187 10(3)UL (ref 150–450)
RBC # BLD AUTO: 4.49 X10(6)UL
WBC # BLD AUTO: 12.8 X10(3) UL (ref 4–11)

## 2023-07-27 PROCEDURE — 85025 COMPLETE CBC W/AUTO DIFF WBC: CPT

## 2023-08-01 ENCOUNTER — HOSPITAL ENCOUNTER (EMERGENCY)
Facility: HOSPITAL | Age: 73
Discharge: HOME | End: 2023-08-01
Attending: EMERGENCY MEDICINE
Payer: MEDICARE

## 2023-08-01 ENCOUNTER — APPOINTMENT (OUTPATIENT)
Dept: GENERAL RADIOLOGY | Facility: HOSPITAL | Age: 73
End: 2023-08-01
Attending: EMERGENCY MEDICINE
Payer: MEDICARE

## 2023-08-01 ENCOUNTER — TELEPHONE (OUTPATIENT)
Dept: FAMILY MEDICINE CLINIC | Facility: CLINIC | Age: 73
End: 2023-08-01

## 2023-08-01 ENCOUNTER — APPOINTMENT (OUTPATIENT)
Dept: ULTRASOUND IMAGING | Facility: HOSPITAL | Age: 73
End: 2023-08-01
Payer: MEDICARE

## 2023-08-01 VITALS
RESPIRATION RATE: 16 BRPM | OXYGEN SATURATION: 98 % | DIASTOLIC BLOOD PRESSURE: 62 MMHG | HEART RATE: 74 BPM | WEIGHT: 200 LBS | SYSTOLIC BLOOD PRESSURE: 99 MMHG | BODY MASS INDEX: 28.63 KG/M2 | HEIGHT: 70 IN | TEMPERATURE: 98 F

## 2023-08-01 DIAGNOSIS — D72.829 LEUKOCYTOSIS, UNSPECIFIED TYPE: Primary | ICD-10-CM

## 2023-08-01 DIAGNOSIS — M17.12 OSTEOARTHRITIS OF LEFT KNEE, UNSPECIFIED OSTEOARTHRITIS TYPE: ICD-10-CM

## 2023-08-01 DIAGNOSIS — S86.912A KNEE STRAIN, LEFT, INITIAL ENCOUNTER: Primary | ICD-10-CM

## 2023-08-01 LAB
ATRIAL RATE: 84 BPM
P AXIS: 84 DEGREES
P-R INTERVAL: 188 MS
Q-T INTERVAL: 380 MS
QRS DURATION: 84 MS
QTC CALCULATION (BEZET): 449 MS
R AXIS: 0 DEGREES
T AXIS: 21 DEGREES
VENTRICULAR RATE: 84 BPM

## 2023-08-01 PROCEDURE — 93010 ELECTROCARDIOGRAM REPORT: CPT

## 2023-08-01 PROCEDURE — 93005 ELECTROCARDIOGRAM TRACING: CPT

## 2023-08-01 PROCEDURE — 99284 EMERGENCY DEPT VISIT MOD MDM: CPT

## 2023-08-01 PROCEDURE — 93971 EXTREMITY STUDY: CPT

## 2023-08-01 PROCEDURE — 73560 X-RAY EXAM OF KNEE 1 OR 2: CPT | Performed by: EMERGENCY MEDICINE

## 2023-08-01 RX ORDER — HYDROCODONE BITARTRATE AND ACETAMINOPHEN 5; 325 MG/1; MG/1
2 TABLET ORAL ONCE
Status: COMPLETED | OUTPATIENT
Start: 2023-08-01 | End: 2023-08-01

## 2023-08-01 RX ORDER — HYDROCODONE BITARTRATE AND ACETAMINOPHEN 5; 325 MG/1; MG/1
1-2 TABLET ORAL EVERY 6 HOURS PRN
Qty: 20 TABLET | Refills: 0 | Status: SHIPPED | OUTPATIENT
Start: 2023-08-01 | End: 2023-08-08

## 2023-08-01 NOTE — TELEPHONE ENCOUNTER
Pt called back to discuss lab results. When speaking with her she mentioned she has had left posterior knee pain for the past 4 days. There is no redness, lump and it is not warm to the touch. She does have a history of blood clots. I spoke with Beverly Mccann and she instructed me to have pt go to the [facility] for evaluation.  Pt. Agreed to plan and verbalized understanding

## 2023-08-01 NOTE — TELEPHONE ENCOUNTER
Pt's  called and states, when pt went upstairs to change to go to IC, she a heard a pop on the left knee and now unable to walk due to severe pain. Still denied any redness, swelling, lump or any warmth. Per pt's  he will call for an ambulance and will just head out to ER since pt is now unable to walk.

## 2023-08-01 NOTE — ED INITIAL ASSESSMENT (HPI)
Pt arrives via EMS from home. Pt c/o pain behind her left knee and swelling x 4 days. Pt has hx of blood clots. Pt denies injury/trauma. Pt unable to bear weight.

## 2023-08-01 NOTE — DISCHARGE INSTRUCTIONS
Do not exceed a total of 8 plain acetaminophen and hydrocodone tablets collectively in a 24-hour time. Ice to the affected area, 20 minutes on and 20 minutes off. Weightbearing as tolerated with a walker. You have been provided with an order for outpatient physical therapy. Call tomorrow to schedule.

## 2023-08-02 ENCOUNTER — PATIENT OUTREACH (OUTPATIENT)
Dept: CASE MANAGEMENT | Age: 73
End: 2023-08-02

## 2023-08-04 NOTE — PROGRESS NOTES
ED follow up, second attempt. (Dc 8/1)    Bailey Del Rosario MD  SURGERY, ORTHOPEDIC, Surgery, Orthopaedic, Sports Medicine  [address] [place] [id number] [place] [address], MD  Family Medicine, IP Consult to Primary Care  [address] [place] [address]  [phone]    Patient would like to wait to follow up. Confirmed with patient. Closing encounter.

## 2023-08-28 DIAGNOSIS — E03.9 HYPOTHYROIDISM, UNSPECIFIED TYPE: ICD-10-CM

## 2023-08-28 RX ORDER — LEVOTHYROXINE SODIUM 0.1 MG/1
TABLET ORAL
Qty: 90 TABLET | Refills: 0 | Status: SHIPPED | OUTPATIENT
Start: 2023-08-28

## 2023-10-09 DIAGNOSIS — M1A.0790 CHRONIC GOUT OF FOOT, UNSPECIFIED CAUSE, UNSPECIFIED LATERALITY: ICD-10-CM

## 2023-10-09 RX ORDER — ALLOPURINOL 100 MG/1
100 TABLET ORAL DAILY
Qty: 90 TABLET | Refills: 0 | Status: SHIPPED | OUTPATIENT
Start: 2023-10-09

## 2023-10-09 NOTE — TELEPHONE ENCOUNTER
LOV: 05/30/2023    Last Refill:   Medication Quantity Refills Start End   allopurinol 100 MG Oral Tab 30 tablet 2 7/5/2023      RTC: has appt on 12/01/2023 w/ Dr. Won Rodriguez. Protocol: n/a    Refill pended. Please approve if okay. Thank you.

## 2023-11-01 ENCOUNTER — MED REC SCAN ONLY (OUTPATIENT)
Dept: FAMILY MEDICINE CLINIC | Facility: CLINIC | Age: 73
End: 2023-11-01

## 2023-12-01 ENCOUNTER — OFFICE VISIT (OUTPATIENT)
Dept: FAMILY MEDICINE CLINIC | Facility: CLINIC | Age: 73
End: 2023-12-01
Payer: MEDICARE

## 2023-12-01 VITALS
BODY MASS INDEX: 30.92 KG/M2 | HEART RATE: 52 BPM | SYSTOLIC BLOOD PRESSURE: 100 MMHG | TEMPERATURE: 98 F | WEIGHT: 216 LBS | OXYGEN SATURATION: 98 % | HEIGHT: 70 IN | RESPIRATION RATE: 14 BRPM | DIASTOLIC BLOOD PRESSURE: 58 MMHG

## 2023-12-01 DIAGNOSIS — Z98.890 S/P ABLATION OF ATRIAL FIBRILLATION: ICD-10-CM

## 2023-12-01 DIAGNOSIS — Z95.810 AICD (AUTOMATIC CARDIOVERTER/DEFIBRILLATOR) PRESENT: ICD-10-CM

## 2023-12-01 DIAGNOSIS — I25.5 ISCHEMIC CARDIOMYOPATHY: ICD-10-CM

## 2023-12-01 DIAGNOSIS — I48.0 PAROXYSMAL ATRIAL FIBRILLATION (HCC): ICD-10-CM

## 2023-12-01 DIAGNOSIS — I73.9 PERIPHERAL VASCULAR DISEASE (HCC): ICD-10-CM

## 2023-12-01 DIAGNOSIS — M1A.0790 CHRONIC GOUT OF FOOT, UNSPECIFIED CAUSE, UNSPECIFIED LATERALITY: ICD-10-CM

## 2023-12-01 DIAGNOSIS — D72.829 LEUKOCYTOSIS, UNSPECIFIED TYPE: ICD-10-CM

## 2023-12-01 DIAGNOSIS — I50.22 CHRONIC SYSTOLIC (CONGESTIVE) HEART FAILURE (HCC): ICD-10-CM

## 2023-12-01 DIAGNOSIS — Z95.1 S/P CABG X 3: ICD-10-CM

## 2023-12-01 DIAGNOSIS — Z86.79 S/P ABLATION OF ATRIAL FIBRILLATION: ICD-10-CM

## 2023-12-01 DIAGNOSIS — E03.9 HYPOTHYROIDISM, UNSPECIFIED TYPE: Primary | ICD-10-CM

## 2023-12-01 DIAGNOSIS — I25.10 CORONARY ARTERY DISEASE INVOLVING NATIVE CORONARY ARTERY OF NATIVE HEART WITHOUT ANGINA PECTORIS: ICD-10-CM

## 2023-12-01 PROCEDURE — 1159F MED LIST DOCD IN RCRD: CPT | Performed by: FAMILY MEDICINE

## 2023-12-01 PROCEDURE — 99214 OFFICE O/P EST MOD 30 MIN: CPT | Performed by: FAMILY MEDICINE

## 2023-12-01 PROCEDURE — 3078F DIAST BP <80 MM HG: CPT | Performed by: FAMILY MEDICINE

## 2023-12-01 PROCEDURE — 1160F RVW MEDS BY RX/DR IN RCRD: CPT | Performed by: FAMILY MEDICINE

## 2023-12-01 PROCEDURE — 3074F SYST BP LT 130 MM HG: CPT | Performed by: FAMILY MEDICINE

## 2023-12-01 PROCEDURE — 3008F BODY MASS INDEX DOCD: CPT | Performed by: FAMILY MEDICINE

## 2023-12-01 RX ORDER — ROSUVASTATIN CALCIUM 10 MG/1
10 TABLET, COATED ORAL EVERY EVENING
COMMUNITY
Start: 2023-11-22

## 2023-12-01 RX ORDER — ALLOPURINOL 100 MG/1
100 TABLET ORAL DAILY
Qty: 90 TABLET | Refills: 1 | Status: SHIPPED | OUTPATIENT
Start: 2023-12-01

## 2023-12-01 RX ORDER — LEVOTHYROXINE SODIUM 0.1 MG/1
100 TABLET ORAL
Qty: 90 TABLET | Refills: 1 | Status: SHIPPED | OUTPATIENT
Start: 2023-12-01

## 2023-12-01 NOTE — PATIENT INSTRUCTIONS
Continue current meds. Watch diet for fats and carbs. Stay active. Return for fasting labs. Further recommendations pending test results.

## 2024-01-22 ENCOUNTER — PATIENT MESSAGE (OUTPATIENT)
Dept: FAMILY MEDICINE CLINIC | Facility: CLINIC | Age: 74
End: 2024-01-22

## 2024-02-26 ENCOUNTER — TELEPHONE (OUTPATIENT)
Dept: FAMILY MEDICINE CLINIC | Facility: CLINIC | Age: 74
End: 2024-02-26

## 2024-02-26 DIAGNOSIS — Z98.890 S/P ABLATION OF ATRIAL FIBRILLATION: ICD-10-CM

## 2024-02-26 DIAGNOSIS — Z86.79 S/P ABLATION OF ATRIAL FIBRILLATION: ICD-10-CM

## 2024-02-26 DIAGNOSIS — Z95.810 AICD (AUTOMATIC CARDIOVERTER/DEFIBRILLATOR) PRESENT: ICD-10-CM

## 2024-02-26 DIAGNOSIS — Z95.1 S/P CABG X 3: ICD-10-CM

## 2024-02-26 DIAGNOSIS — I25.10 CORONARY ARTERY DISEASE INVOLVING NATIVE CORONARY ARTERY OF NATIVE HEART WITHOUT ANGINA PECTORIS: Primary | ICD-10-CM

## 2024-02-26 NOTE — TELEPHONE ENCOUNTER
Pended Cardiology referral. Please add diagnosis and sign if appropriate. Pt is being seen by Cardiology today. Thank you.

## 2024-02-26 NOTE — TELEPHONE ENCOUNTER
Pt calling from specialist office as was indicated upon check in that she needs referral for her appointment.    Pt is scheduled to see Layo Briseno, cardiologist.    Address: [address] [place] [address], [place] [address]  Ph: [phone]    Requesting ASAP referral faxed to #[phone]    I did advise pt that referrals can take several days to authorize through insurance. Pt states that  told her that they will still see her today but need referral ASAP (dated today).    Please place referral if appropriate, thank you.

## 2024-02-26 NOTE — TELEPHONE ENCOUNTER
Called and informed patient that referral was placed and fax to cardiologist's office. Pt vocie understanding and agreed with plan of care.

## 2024-04-10 ENCOUNTER — LAB ENCOUNTER (OUTPATIENT)
Dept: LAB | Age: 74
End: 2024-04-10
Attending: FAMILY MEDICINE
Payer: MEDICARE

## 2024-04-10 DIAGNOSIS — I25.10 CORONARY ARTERY DISEASE INVOLVING NATIVE CORONARY ARTERY OF NATIVE HEART WITHOUT ANGINA PECTORIS: ICD-10-CM

## 2024-04-10 DIAGNOSIS — E03.9 HYPOTHYROIDISM, UNSPECIFIED TYPE: ICD-10-CM

## 2024-04-10 DIAGNOSIS — D72.829 LEUKOCYTOSIS, UNSPECIFIED TYPE: ICD-10-CM

## 2024-04-10 DIAGNOSIS — I47.29 NSVT (NONSUSTAINED VENTRICULAR TACHYCARDIA) (HCC): ICD-10-CM

## 2024-04-10 DIAGNOSIS — I50.22 CHRONIC SYSTOLIC (CONGESTIVE) HEART FAILURE (HCC): ICD-10-CM

## 2024-04-10 DIAGNOSIS — D72.829 LEUKOCYTOSIS, UNSPECIFIED TYPE: Primary | ICD-10-CM

## 2024-04-10 DIAGNOSIS — I25.5 ISCHEMIC CARDIOMYOPATHY: ICD-10-CM

## 2024-04-10 LAB
ALBUMIN SERPL-MCNC: 3.8 G/DL (ref 3.4–5)
ALBUMIN/GLOB SERPL: 1 {RATIO} (ref 1–2)
ALP LIVER SERPL-CCNC: 71 U/L
ALT SERPL-CCNC: 19 U/L
ANION GAP SERPL CALC-SCNC: 5 MMOL/L (ref 0–18)
AST SERPL-CCNC: 16 U/L (ref 15–37)
BASOPHILS # BLD AUTO: 0.07 X10(3) UL (ref 0–0.2)
BASOPHILS NFR BLD AUTO: 0.4 %
BILIRUB SERPL-MCNC: 0.5 MG/DL (ref 0.1–2)
BILIRUB UR QL STRIP.AUTO: NEGATIVE
BUN BLD-MCNC: 15 MG/DL (ref 9–23)
CALCIUM BLD-MCNC: 10 MG/DL (ref 8.5–10.1)
CHLORIDE SERPL-SCNC: 107 MMOL/L (ref 98–112)
CHOLEST SERPL-MCNC: 156 MG/DL (ref ?–200)
CO2 SERPL-SCNC: 27 MMOL/L (ref 21–32)
COLOR UR AUTO: YELLOW
CREAT BLD-MCNC: 0.97 MG/DL
EGFRCR SERPLBLD CKD-EPI 2021: 61 ML/MIN/1.73M2 (ref 60–?)
EOSINOPHIL # BLD AUTO: 0.32 X10(3) UL (ref 0–0.7)
EOSINOPHIL NFR BLD AUTO: 2 %
ERYTHROCYTE [DISTWIDTH] IN BLOOD BY AUTOMATED COUNT: 14.1 %
FASTING PATIENT LIPID ANSWER: YES
FASTING STATUS PATIENT QL REPORTED: YES
GLOBULIN PLAS-MCNC: 3.7 G/DL (ref 2.8–4.4)
GLUCOSE BLD-MCNC: 98 MG/DL (ref 70–99)
GLUCOSE UR STRIP.AUTO-MCNC: NORMAL MG/DL
HCT VFR BLD AUTO: 43.1 %
HDLC SERPL-MCNC: 46 MG/DL (ref 40–59)
HGB BLD-MCNC: 13.8 G/DL
IMM GRANULOCYTES # BLD AUTO: 0.06 X10(3) UL (ref 0–1)
IMM GRANULOCYTES NFR BLD: 0.4 %
KETONES UR STRIP.AUTO-MCNC: NEGATIVE MG/DL
LDLC SERPL CALC-MCNC: 83 MG/DL (ref ?–100)
LEUKOCYTE ESTERASE UR QL STRIP.AUTO: 500
LYMPHOCYTES # BLD AUTO: 10.58 X10(3) UL (ref 1–4)
LYMPHOCYTES NFR BLD AUTO: 65 %
MAGNESIUM SERPL-MCNC: 2.3 MG/DL (ref 1.6–2.6)
MCH RBC QN AUTO: 31.4 PG (ref 26–34)
MCHC RBC AUTO-ENTMCNC: 32 G/DL (ref 31–37)
MCV RBC AUTO: 98 FL
MONOCYTES # BLD AUTO: 0.92 X10(3) UL (ref 0.1–1)
MONOCYTES NFR BLD AUTO: 5.7 %
NEUTROPHILS # BLD AUTO: 4.32 X10 (3) UL (ref 1.5–7.7)
NEUTROPHILS # BLD AUTO: 4.32 X10(3) UL (ref 1.5–7.7)
NEUTROPHILS NFR BLD AUTO: 26.5 %
NITRITE UR QL STRIP.AUTO: NEGATIVE
NONHDLC SERPL-MCNC: 110 MG/DL (ref ?–130)
OSMOLALITY SERPL CALC.SUM OF ELEC: 289 MOSM/KG (ref 275–295)
PH UR STRIP.AUTO: 6 [PH] (ref 5–8)
PLATELET # BLD AUTO: 172 10(3)UL (ref 150–450)
PLATELET MORPHOLOGY: NORMAL
POTASSIUM SERPL-SCNC: 4.2 MMOL/L (ref 3.5–5.1)
PROT SERPL-MCNC: 7.5 G/DL (ref 6.4–8.2)
PROT UR STRIP.AUTO-MCNC: 30 MG/DL
RBC # BLD AUTO: 4.4 X10(6)UL
RBC UR QL AUTO: NEGATIVE
SODIUM SERPL-SCNC: 139 MMOL/L (ref 136–145)
SP GR UR STRIP.AUTO: 1.02 (ref 1–1.03)
T4 FREE SERPL-MCNC: 1 NG/DL (ref 0.8–1.7)
TRIGL SERPL-MCNC: 154 MG/DL (ref 30–149)
TSI SER-ACNC: 3.48 MIU/ML (ref 0.36–3.74)
UROBILINOGEN UR STRIP.AUTO-MCNC: NORMAL MG/DL
VLDLC SERPL CALC-MCNC: 24 MG/DL (ref 0–30)
WBC # BLD AUTO: 16.3 X10(3) UL (ref 4–11)
WBC #/AREA URNS AUTO: >50 /HPF

## 2024-04-10 PROCEDURE — 87077 CULTURE AEROBIC IDENTIFY: CPT

## 2024-04-10 PROCEDURE — 87186 SC STD MICRODIL/AGAR DIL: CPT

## 2024-04-10 PROCEDURE — 81001 URINALYSIS AUTO W/SCOPE: CPT

## 2024-04-10 PROCEDURE — 84443 ASSAY THYROID STIM HORMONE: CPT

## 2024-04-10 PROCEDURE — 84439 ASSAY OF FREE THYROXINE: CPT

## 2024-04-10 PROCEDURE — 85025 COMPLETE CBC W/AUTO DIFF WBC: CPT

## 2024-04-10 PROCEDURE — 83735 ASSAY OF MAGNESIUM: CPT

## 2024-04-10 PROCEDURE — 87086 URINE CULTURE/COLONY COUNT: CPT

## 2024-04-10 PROCEDURE — 36415 COLL VENOUS BLD VENIPUNCTURE: CPT

## 2024-04-10 PROCEDURE — 80061 LIPID PANEL: CPT

## 2024-04-10 PROCEDURE — 80053 COMPREHEN METABOLIC PANEL: CPT

## 2024-04-12 RX ORDER — CEPHALEXIN 500 MG/1
500 CAPSULE ORAL 3 TIMES DAILY
Qty: 15 CAPSULE | Refills: 0 | Status: SHIPPED | OUTPATIENT
Start: 2024-04-12 | End: 2024-04-17

## 2024-04-19 ENCOUNTER — TELEPHONE (OUTPATIENT)
Dept: FAMILY MEDICINE CLINIC | Facility: CLINIC | Age: 74
End: 2024-04-19

## 2024-04-19 ENCOUNTER — OFFICE VISIT (OUTPATIENT)
Dept: FAMILY MEDICINE CLINIC | Facility: CLINIC | Age: 74
End: 2024-04-19
Payer: MEDICARE

## 2024-04-19 VITALS
SYSTOLIC BLOOD PRESSURE: 118 MMHG | BODY MASS INDEX: 30.78 KG/M2 | RESPIRATION RATE: 16 BRPM | WEIGHT: 215 LBS | DIASTOLIC BLOOD PRESSURE: 80 MMHG | TEMPERATURE: 97 F | HEART RATE: 72 BPM | HEIGHT: 70 IN

## 2024-04-19 DIAGNOSIS — N30.00 ACUTE CYSTITIS WITHOUT HEMATURIA: Primary | ICD-10-CM

## 2024-04-19 LAB
APPEARANCE: CLEAR
BILIRUBIN: NEGATIVE
GLUCOSE (URINE DIPSTICK): NEGATIVE MG/DL
KETONES (URINE DIPSTICK): NEGATIVE MG/DL
LEUKOCYTES: NEGATIVE
MULTISTIX LOT#: NORMAL NUMERIC
NITRITE, URINE: NEGATIVE
OCCULT BLOOD: NEGATIVE
PH, URINE: 6 (ref 4.5–8)
PROTEIN (URINE DIPSTICK): NEGATIVE MG/DL
SPECIFIC GRAVITY: 1.01 (ref 1–1.03)
URINE-COLOR: YELLOW
UROBILINOGEN,SEMI-QN: 0.2 MG/DL (ref 0–1.9)

## 2024-04-19 PROCEDURE — 87184 SC STD DISK METHOD PER PLATE: CPT | Performed by: FAMILY MEDICINE

## 2024-04-19 PROCEDURE — 87086 URINE CULTURE/COLONY COUNT: CPT | Performed by: FAMILY MEDICINE

## 2024-04-19 PROCEDURE — 87077 CULTURE AEROBIC IDENTIFY: CPT | Performed by: FAMILY MEDICINE

## 2024-04-19 PROCEDURE — 87186 SC STD MICRODIL/AGAR DIL: CPT | Performed by: FAMILY MEDICINE

## 2024-04-19 RX ORDER — CIPROFLOXACIN 500 MG/1
500 TABLET, FILM COATED ORAL 2 TIMES DAILY
Qty: 14 TABLET | Refills: 0 | Status: SHIPPED | OUTPATIENT
Start: 2024-04-19 | End: 2024-04-26

## 2024-04-19 NOTE — TELEPHONE ENCOUNTER
Called and informed patient of provider's recommendation. Scheduled today at 1:45PM. Pt voiced understanding and agreed with plan of care.

## 2024-04-19 NOTE — TELEPHONE ENCOUNTER
Please see Urine test result 4/10/24.  Pt was prescribed with antibiotic:  Medication Quantity Refills Start End   cephalexin 500 MG Oral Cap () 15 capsule 0 2024   Sig:   Take 1 capsule (500 mg total) by mouth 3 (three) times daily for 5 days.     Route:   Oral       Pt states she started having 2-3 out of 10 back pain 2 days after completing antibiotic. Patient wants to know if she can get another antibiotic because she feels it has gotten worse.  Please advise. UC?

## 2024-04-19 NOTE — TELEPHONE ENCOUNTER
Patient states she has finished taking antibiotic two days ago that was given for blood in urine. Patient states she was not having back pain before but now she is. Pain level at a 2 or 3. Patient wants to know if she can get another antibiotic because she feels it has gotten worse.

## 2024-04-20 NOTE — PROGRESS NOTES
Sonia Martell is a 73 year old female.  UTI  HPI:   Patient is coming to the office complaining of having pain in the lower back and pressure in the abdomen.  Patient had a blood work done on April 12 of this year.  Today urine cultures came back positive for Klebsiella pneumoniae.  Patient reported no symptoms of UTI.  Started on cephalexin 500 mg 1 tablet every 8 hours.  She started medication recently developed pain in the lower back.  She thinks that antibiotic is not working would like to have it switched.  She was on ciprofloxacin previously and did well with the medication.  She denies any fevers.  No chills no dysuria no urinary frequency and urgency.  History of UTI symptoms in the past.    Current Outpatient Medications   Medication Sig Dispense Refill   • ciprofloxacin 500 MG Oral Tab Take 1 tablet (500 mg total) by mouth 2 (two) times daily for 7 days. 14 tablet 0   • allopurinol 100 MG Oral Tab Take 1 tablet (100 mg total) by mouth daily. 90 tablet 1   • levothyroxine 100 MCG Oral Tab Take 1 tablet (100 mcg total) by mouth before breakfast. 90 tablet 1   • rosuvastatin 10 MG Oral Tab Take 1 tablet (10 mg total) by mouth every evening.     • sotalol 80 MG Oral Tab Take 1 tablet (80 mg total) by mouth every 12 (twelve) hours. 60 tablet 3   • docusate sodium 100 MG Oral Cap Take 1 capsule (100 mg total) by mouth every evening.     • Zinc 50 MG Oral Cap Take 50 mg by mouth daily.     • Cholecalciferol (VITAMIN D) 50 MCG (2000 UT) Oral Tab Take 2,000 Units by mouth daily. qd     • Ascorbic Acid (VITAMIN C) 1000 MG Oral Tab Take 1 tablet (1,000 mg total) by mouth daily.     • Sacubitril-Valsartan  MG Oral Tab Take 1 tablet by mouth 2 (two) times daily.     • rivaroxaban 2.5 MG Oral Tab Take 1 tablet (2.5 mg total) by mouth 2 (two) times daily with meals.     • aspirin 81 MG Oral Tab EC Take 1 tablet (81 mg total) by mouth daily.     • torsemide 20 MG Oral Tab Take 1 tablet (20 mg total) by mouth  daily.     • KRILL OIL OR Take 1 capsule by mouth 2 (two) times daily.       • spironolactone 25 MG Oral Tab Take 0.5 tablets (12.5 mg total) by mouth daily.     • Diphenhydramine-APAP, sleep, (TYLENOL PM EXTRA STRENGTH)  MG Oral Tab Take 1 tablet by mouth daily as needed.       • Multiple Vitamin (MULTI-VITAMIN DAILY OR) Take 1 tablet by mouth daily.       • Probiotic Product (PROBIOTIC OR) Take 1 tablet by mouth daily.          Past Medical History:   • AAA (abdominal aortic aneurysm) (HCC)   • Anemia, unspecified   • Arrhythmia    a fib, VT   • Atherosclerosis of coronary artery   • Calculus of kidney   • Cancer (HCC)    skin   • Chronic atrial fibrillation (HCC)   • Coronary atherosclerosis   • Disorder of thyroid   • History of blood transfusion   • IBS (irritable bowel syndrome)   • Ischemic cardiomyopathy   • Kidney stone   • Peripheral vascular disease (HCC)   • Skin cancer   • Small bowel obstruction (HCC)   • Tobacco abuse   • Visual impairment    reading glasses      Social History:  Social History     Socioeconomic History   • Marital status:    Tobacco Use   • Smoking status: Former     Current packs/day: 0.00     Average packs/day: 0.5 packs/day for 47.0 years (23.5 ttl pk-yrs)     Types: Cigarettes     Start date: 1970     Quit date: 2017     Years since quittin.9   • Smokeless tobacco: Never   Substance and Sexual Activity   • Alcohol use: Yes     Alcohol/week: 3.0 - 4.0 standard drinks of alcohol     Types: 3 - 4 Glasses of wine per week     Comment: socially   Other Topics Concern   • Caffeine Concern Yes     Comment: 2 cups qday coffee   • Exercise Yes     Comment: WALKING SOME 1900 steps/day   • Seat Belt Yes   Social History Narrative    , has adult children.  She retired in  from being a history and english lit teacher.      Social Determinants of Health     Physical Activity: Sufficiently Active (3/26/2021)    [facility]    Exercise Vital Sign    • Days of Exercise per Week: 7 days    • Minutes of Exercise per Session: 30 min        REVIEW OF SYSTEMS:   GENERAL HEALTH: feels well otherwise no fever  SKIN: denies any unusual skin lesions or rashes  HEENT no congestion no runny nose no sore throat  Neck no neck pain  RESPIRATORY: denies shortness of breath with exertion  CARDIOVASCULAR: denies chest pain on exertion  GI: denies abdominal pain and denies heartburn  NEURO: denies headaches   pain in the lower back and some supra pubic pressure  Psych normal mood    EXAM:   /80 (BP Location: Left arm, Patient Position: Sitting, Cuff Size: adult)   Pulse 72   Temp 96.8 °F (36 °C) (Temporal)   Resp 16   Ht 5' 10\" (1.778 m)   Wt 215 lb (97.5 kg)   BMI 30.85 kg/m²   GENERAL: well developed, well nourished,in no apparent distress  SKIN: no rashes,no suspicious lesions  HEENT: atraumatic, normocephalic,  NECK: supple,no adenopathy,  LUNGS: clear to auscultation  CARDIO: RRR without murmur  GI: no  tenderness  musculoskeletal mild tenderness in the right side of the lower back  Psychiatric - alert  and oriented x3, normal mood     Results for orders placed or performed in visit on 04/19/24   URINALYSIS, AUTO, W/O SCOPE   Result Value Ref Range    Glucose Urine Negative Negative mg/dL    Bilirubin Urine Negative Negative    Ketones, UA Negative Negative - Trace mg/dL    Spec Gravity 1.015 1.005 - 1.030    Blood Urine Negative Negative    PH Urine 6.0 5.0 - 8.0    Protein Urine Negative Negative - Trace mg/dL    Urobilinogen Urine 0.2 0.2 - 1.0 mg/dL    Nitrite Urine Negative Negative    Leukocyte Esterase Urine Negative Negative    APPEARANCE Clear Clear    Color Urine Yellow Yellow    Multistix Lot# [id number] Numeric    Multistix Expiration Date 12- Date     ASSESSMENT AND PLAN:     Encounter Diagnosis   Name Primary?   • Acute cystitis without hematuria Yes       Orders Placed This Encounter   Procedures   •  URINALYSIS, AUTO, W/O SCOPE   • Urine Culture, Routine [E]       Meds & Refills for this Visit:  Requested Prescriptions     Signed Prescriptions Disp Refills   • ciprofloxacin 500 MG Oral Tab 14 tablet 0     Sig: Take 1 tablet (500 mg total) by mouth 2 (two) times daily for 7 days.   Start antibiotic today per directions.  Take probiotic over-the-counter daily like organic yogurt while taking antibiotic.  Drink plenty of fluids.  Take Tylenol or ibuprofen as needed for fever or for pain.      Imaging & Consults:  None    The patient indicates understanding of these issues and agrees to the plan.  The patient is asked to return in prn.  The note was dictated using speech recognition software.  Accuracy and grammar in transcription may be subject to error.

## 2024-04-20 NOTE — PATIENT INSTRUCTIONS
Start antibiotic today per directions.  Take probiotic over-the-counter daily like organic yogurt while taking antibiotic.  Drink plenty of fluids.  Take Tylenol or ibuprofen as needed for fever or for pain.

## 2024-04-23 DIAGNOSIS — N30.00 ACUTE CYSTITIS WITHOUT HEMATURIA: Primary | ICD-10-CM

## 2024-04-23 NOTE — H&P
Detail Level: Zone JIMMY HOSPITALIST  History and Physical     [facility] Patient Status:  Emergency    1950 MRN [id number]   Location [facility] Attending WillYeimi MD   Hosp Day # 0 PCP Fer Chavez MD     Chief Complai • EXPLORATORY LAPAROTOMY N/A 8/6/2017    Performed by Radha Singh MD at [address] [facility] [address] GRAFT N/A 5/25/2017    Performed by Uriah Claros MD at [address] [id number] Right 05/10/2019   • [address] [place] [address] mouth 2x Daily(Beta Blocker). , Disp: 60 tablet, Rfl: 3    •  Sacubitril-Valsartan 24-26 MG Oral Tab, Take 1 tablet by mouth 2 (two) times daily. , Disp: , Rfl:     •  rivaroxaban (XARELTO) 2.5 MG Oral Tab, Take 2.5 mg by mouth 2 (two) times daily with meals intact. Musculoskeletal: Moves all extremities. Extremities: No edema or cyanosis. Integument: No rashes or lesions. Skin tears on feet from fall  Psychiatric: Appropriate mood and affect.       Diagnostic Data:      Labs:  Recent Labs   Lab 10/27/20  19

## 2024-04-30 ENCOUNTER — LAB ENCOUNTER (OUTPATIENT)
Dept: LAB | Age: 74
End: 2024-04-30
Attending: FAMILY MEDICINE
Payer: MEDICARE

## 2024-04-30 DIAGNOSIS — N30.00 ACUTE CYSTITIS WITHOUT HEMATURIA: ICD-10-CM

## 2024-04-30 DIAGNOSIS — D72.829 LEUKOCYTOSIS, UNSPECIFIED TYPE: ICD-10-CM

## 2024-04-30 PROCEDURE — 87086 URINE CULTURE/COLONY COUNT: CPT

## 2024-04-30 PROCEDURE — 87077 CULTURE AEROBIC IDENTIFY: CPT

## 2024-04-30 PROCEDURE — 36415 COLL VENOUS BLD VENIPUNCTURE: CPT

## 2024-04-30 PROCEDURE — 88185 FLOWCYTOMETRY/TC ADD-ON: CPT

## 2024-04-30 PROCEDURE — 88184 FLOWCYTOMETRY/ TC 1 MARKER: CPT

## 2024-05-02 LAB
CD10 CELLS NFR SPEC: <1 %
CD10/CD19: <1 %
CD19 CELLS NFR SPEC: 72 %
CD19+/CD200+: 72 %
CD2 CELLS NFR SPEC: 21 %
CD20 CELLS NFR SPEC: 73 %
CD200 CELLS: 74 %
CD3 CELLS NFR SPEC: 15 %
CD3+/TCRGD+: <1 %
CD3+CD4+ CELLS NFR SPEC: 10 %
CD3+CD4+ CELLS/CD3+CD8+ CLL SPEC: 2.5
CD3+CD8+ CELLS NFR SPEC: 4 %
CD3-/CD56+: 8 %
CD34 CELLS NFR SPEC: <1 %
CD38 CELLS NFR SPEC: 3 %
CD38+/CD19+: <1 %
CD45 CELLS NFR SPEC: 100 %
CD5 CELLS NFR SPEC: 88 %
CD5/CD19 CELLS: 71 %
CD7 CELLS NFR SPEC: 34 %
CELL SURF KAPPA/LAMBDA RATIO: <0.1
CELL SURF LAMBDA LIGHT CHAIN: 72 %
CELL SURFACE KAPPA LIGHT CHAIN: 1 %
TCR G-D CELLS NFR SPEC: <1 %

## 2024-05-02 RX ORDER — CEPHALEXIN 500 MG/1
500 CAPSULE ORAL 3 TIMES DAILY
Qty: 21 CAPSULE | Refills: 0 | Status: SHIPPED | OUTPATIENT
Start: 2024-05-02 | End: 2024-05-09

## 2024-06-03 ENCOUNTER — HOSPITAL ENCOUNTER (INPATIENT)
Facility: HOSPITAL | Age: 74
LOS: 4 days | Discharge: HOME | End: 2024-06-07
Attending: EMERGENCY MEDICINE | Admitting: INTERNAL MEDICINE
Payer: MEDICARE

## 2024-06-03 ENCOUNTER — APPOINTMENT (OUTPATIENT)
Dept: CT IMAGING | Facility: HOSPITAL | Age: 74
End: 2024-06-03
Attending: INTERNAL MEDICINE
Payer: MEDICARE

## 2024-06-03 ENCOUNTER — APPOINTMENT (OUTPATIENT)
Dept: CT IMAGING | Facility: HOSPITAL | Age: 74
DRG: 871 | End: 2024-06-03
Attending: INTERNAL MEDICINE
Payer: MEDICARE

## 2024-06-03 ENCOUNTER — APPOINTMENT (OUTPATIENT)
Dept: GENERAL RADIOLOGY | Facility: HOSPITAL | Age: 74
DRG: 871 | End: 2024-06-03
Attending: EMERGENCY MEDICINE
Payer: MEDICARE

## 2024-06-03 ENCOUNTER — HOSPITAL ENCOUNTER (INPATIENT)
Facility: HOSPITAL | Age: 74
LOS: 4 days | Discharge: HOME HEALTH | DRG: 871 | End: 2024-06-07
Attending: EMERGENCY MEDICINE | Admitting: INTERNAL MEDICINE
Payer: MEDICARE

## 2024-06-03 ENCOUNTER — APPOINTMENT (OUTPATIENT)
Dept: CT IMAGING | Facility: HOSPITAL | Age: 74
DRG: 871 | End: 2024-06-03
Attending: EMERGENCY MEDICINE
Payer: MEDICARE

## 2024-06-03 ENCOUNTER — APPOINTMENT (OUTPATIENT)
Dept: GENERAL RADIOLOGY | Facility: HOSPITAL | Age: 74
End: 2024-06-03
Attending: EMERGENCY MEDICINE
Payer: MEDICARE

## 2024-06-03 ENCOUNTER — APPOINTMENT (OUTPATIENT)
Dept: CT IMAGING | Facility: HOSPITAL | Age: 74
End: 2024-06-03
Attending: EMERGENCY MEDICINE
Payer: MEDICARE

## 2024-06-03 DIAGNOSIS — A41.89 SEPSIS DUE TO OTHER ETIOLOGY (HCC): Primary | ICD-10-CM

## 2024-06-03 PROBLEM — R50.9 FEBRILE ILLNESS: Status: ACTIVE | Noted: 2024-06-03

## 2024-06-03 LAB
ALBUMIN SERPL-MCNC: 2.8 G/DL (ref 3.4–5)
ALBUMIN/GLOB SERPL: 0.6 {RATIO} (ref 1–2)
ALP LIVER SERPL-CCNC: 114 U/L
ALT SERPL-CCNC: 28 U/L
ANION GAP SERPL CALC-SCNC: 8 MMOL/L (ref 0–18)
AST SERPL-CCNC: 32 U/L (ref 15–37)
BASOPHILS # BLD AUTO: 0.08 X10(3) UL (ref 0–0.2)
BASOPHILS NFR BLD AUTO: 0.3 %
BILIRUB SERPL-MCNC: 0.5 MG/DL (ref 0.1–2)
BILIRUB UR QL STRIP.AUTO: NEGATIVE
BUN BLD-MCNC: 18 MG/DL (ref 9–23)
CALCIUM BLD-MCNC: 9.8 MG/DL (ref 8.5–10.1)
CHLORIDE SERPL-SCNC: 103 MMOL/L (ref 98–112)
CLARITY UR REFRACT.AUTO: CLEAR
CO2 SERPL-SCNC: 26 MMOL/L (ref 21–32)
COLOR UR AUTO: YELLOW
CREAT BLD-MCNC: 0.98 MG/DL
EGFRCR SERPLBLD CKD-EPI 2021: 61 ML/MIN/1.73M2 (ref 60–?)
EOSINOPHIL # BLD AUTO: 0.11 X10(3) UL (ref 0–0.7)
EOSINOPHIL NFR BLD AUTO: 0.4 %
ERYTHROCYTE [DISTWIDTH] IN BLOOD BY AUTOMATED COUNT: 14.2 %
GLOBULIN PLAS-MCNC: 4.9 G/DL (ref 2.8–4.4)
GLUCOSE BLD-MCNC: 138 MG/DL (ref 70–99)
GLUCOSE UR STRIP.AUTO-MCNC: NORMAL MG/DL
HCT VFR BLD AUTO: 35.7 %
HGB BLD-MCNC: 12 G/DL
HYALINE CASTS #/AREA URNS AUTO: PRESENT /LPF
IMM GRANULOCYTES # BLD AUTO: 0.28 X10(3) UL (ref 0–1)
IMM GRANULOCYTES NFR BLD: 1.1 %
KETONES UR STRIP.AUTO-MCNC: NEGATIVE MG/DL
LACTATE SERPL-SCNC: 1.5 MMOL/L (ref 0.4–2)
LEUKOCYTE ESTERASE UR QL STRIP.AUTO: 75
LIPASE SERPL-CCNC: 43 U/L (ref 13–75)
LYMPHOCYTES # BLD AUTO: 8.72 X10(3) UL (ref 1–4)
LYMPHOCYTES NFR BLD AUTO: 33.9 %
MCH RBC QN AUTO: 31 PG (ref 26–34)
MCHC RBC AUTO-ENTMCNC: 33.6 G/DL (ref 31–37)
MCV RBC AUTO: 92.2 FL
MONOCYTES # BLD AUTO: 1.52 X10(3) UL (ref 0.1–1)
MONOCYTES NFR BLD AUTO: 5.9 %
NEUTROPHILS # BLD AUTO: 15.04 X10 (3) UL (ref 1.5–7.7)
NEUTROPHILS # BLD AUTO: 15.04 X10(3) UL (ref 1.5–7.7)
NEUTROPHILS NFR BLD AUTO: 58.4 %
NITRITE UR QL STRIP.AUTO: NEGATIVE
OSMOLALITY SERPL CALC.SUM OF ELEC: 288 MOSM/KG (ref 275–295)
PH UR STRIP.AUTO: 5.5 [PH] (ref 5–8)
PLATELET # BLD AUTO: 243 10(3)UL (ref 150–450)
POTASSIUM SERPL-SCNC: 3.4 MMOL/L (ref 3.5–5.1)
PROCALCITONIN SERPL-MCNC: 0.68 NG/ML (ref ?–0.16)
PROT SERPL-MCNC: 7.7 G/DL (ref 6.4–8.2)
PROT UR STRIP.AUTO-MCNC: 30 MG/DL
RBC # BLD AUTO: 3.87 X10(6)UL
RBC UR QL AUTO: NEGATIVE
SODIUM SERPL-SCNC: 137 MMOL/L (ref 136–145)
SP GR UR STRIP.AUTO: >1.03 (ref 1–1.03)
UROBILINOGEN UR STRIP.AUTO-MCNC: NORMAL MG/DL
WBC # BLD AUTO: 25.8 X10(3) UL (ref 4–11)

## 2024-06-03 PROCEDURE — 71045 X-RAY EXAM CHEST 1 VIEW: CPT | Performed by: EMERGENCY MEDICINE

## 2024-06-03 PROCEDURE — 71250 CT THORAX DX C-: CPT | Performed by: INTERNAL MEDICINE

## 2024-06-03 PROCEDURE — 74176 CT ABD & PELVIS W/O CONTRAST: CPT | Performed by: EMERGENCY MEDICINE

## 2024-06-03 PROCEDURE — 99223 1ST HOSP IP/OBS HIGH 75: CPT | Performed by: INTERNAL MEDICINE

## 2024-06-03 RX ORDER — ECHINACEA PURPUREA EXTRACT 125 MG
1 TABLET ORAL
Status: DISCONTINUED | OUTPATIENT
Start: 2024-06-03 | End: 2024-06-07

## 2024-06-03 RX ORDER — ASPIRIN 81 MG/1
81 TABLET ORAL DAILY
Status: DISCONTINUED | OUTPATIENT
Start: 2024-06-04 | End: 2024-06-07

## 2024-06-03 RX ORDER — ACETAMINOPHEN 325 MG/1
650 TABLET ORAL EVERY 4 HOURS PRN
Status: DISCONTINUED | OUTPATIENT
Start: 2024-06-03 | End: 2024-06-07

## 2024-06-03 RX ORDER — SALIVA STIMULANT COMB. NO.3
SPRAY, NON-AEROSOL (ML) MUCOUS MEMBRANE AS NEEDED
Status: DISCONTINUED | OUTPATIENT
Start: 2024-06-03 | End: 2024-06-07

## 2024-06-03 RX ORDER — MEXILETINE HYDROCHLORIDE 150 MG/1
150 CAPSULE ORAL 3 TIMES DAILY
Status: DISCONTINUED | OUTPATIENT
Start: 2024-06-03 | End: 2024-06-07

## 2024-06-03 RX ORDER — ONDANSETRON 2 MG/ML
4 INJECTION INTRAMUSCULAR; INTRAVENOUS EVERY 6 HOURS PRN
Status: DISCONTINUED | OUTPATIENT
Start: 2024-06-03 | End: 2024-06-07

## 2024-06-03 RX ORDER — MELATONIN
3 NIGHTLY PRN
Status: DISCONTINUED | OUTPATIENT
Start: 2024-06-03 | End: 2024-06-07

## 2024-06-03 RX ORDER — SOTALOL HYDROCHLORIDE 80 MG/1
80 TABLET ORAL EVERY 12 HOURS SCHEDULED
Status: DISCONTINUED | OUTPATIENT
Start: 2024-06-03 | End: 2024-06-07

## 2024-06-03 RX ORDER — LEVOTHYROXINE SODIUM 0.1 MG/1
100 TABLET ORAL
Status: DISCONTINUED | OUTPATIENT
Start: 2024-06-04 | End: 2024-06-07

## 2024-06-03 RX ORDER — HYDROCODONE BITARTRATE AND ACETAMINOPHEN 5; 325 MG/1; MG/1
2 TABLET ORAL EVERY 4 HOURS PRN
Status: DISCONTINUED | OUTPATIENT
Start: 2024-06-03 | End: 2024-06-07

## 2024-06-03 RX ORDER — ENOXAPARIN SODIUM 100 MG/ML
40 INJECTION SUBCUTANEOUS DAILY
Status: DISCONTINUED | OUTPATIENT
Start: 2024-06-03 | End: 2024-06-03

## 2024-06-03 RX ORDER — BENZONATATE 100 MG/1
100 CAPSULE ORAL 3 TIMES DAILY PRN
Status: DISCONTINUED | OUTPATIENT
Start: 2024-06-03 | End: 2024-06-07

## 2024-06-03 RX ORDER — IPRATROPIUM BROMIDE AND ALBUTEROL SULFATE 2.5; .5 MG/3ML; MG/3ML
3 SOLUTION RESPIRATORY (INHALATION) EVERY 4 HOURS PRN
Status: DISCONTINUED | OUTPATIENT
Start: 2024-06-03 | End: 2024-06-07

## 2024-06-03 RX ORDER — AZITHROMYCIN 250 MG/1
500 TABLET, FILM COATED ORAL ONCE
Status: COMPLETED | OUTPATIENT
Start: 2024-06-03 | End: 2024-06-03

## 2024-06-03 RX ORDER — DEXTROSE MONOHYDRATE, SODIUM CHLORIDE, AND POTASSIUM CHLORIDE 50; 1.49; 9 G/1000ML; G/1000ML; G/1000ML
INJECTION, SOLUTION INTRAVENOUS CONTINUOUS
Status: DISCONTINUED | OUTPATIENT
Start: 2024-06-03 | End: 2024-06-04

## 2024-06-03 RX ORDER — ROSUVASTATIN CALCIUM 10 MG/1
10 TABLET, COATED ORAL EVERY EVENING
Status: DISCONTINUED | OUTPATIENT
Start: 2024-06-03 | End: 2024-06-07

## 2024-06-03 RX ORDER — METOCLOPRAMIDE HYDROCHLORIDE 5 MG/ML
10 INJECTION INTRAMUSCULAR; INTRAVENOUS EVERY 8 HOURS PRN
Status: DISCONTINUED | OUTPATIENT
Start: 2024-06-03 | End: 2024-06-07

## 2024-06-03 RX ORDER — HYDROCODONE BITARTRATE AND ACETAMINOPHEN 5; 325 MG/1; MG/1
1 TABLET ORAL EVERY 4 HOURS PRN
Status: DISCONTINUED | OUTPATIENT
Start: 2024-06-03 | End: 2024-06-07

## 2024-06-03 RX ORDER — MEXILETINE HYDROCHLORIDE 150 MG/1
150 CAPSULE ORAL 2 TIMES DAILY
COMMUNITY

## 2024-06-03 RX ORDER — ALLOPURINOL 100 MG/1
100 TABLET ORAL DAILY
Status: DISCONTINUED | OUTPATIENT
Start: 2024-06-04 | End: 2024-06-07

## 2024-06-03 RX ORDER — POTASSIUM CHLORIDE 20 MEQ/1
40 TABLET, EXTENDED RELEASE ORAL EVERY 4 HOURS
Status: COMPLETED | OUTPATIENT
Start: 2024-06-03 | End: 2024-06-03

## 2024-06-03 RX ORDER — AZITHROMYCIN 250 MG/1
500 TABLET, FILM COATED ORAL DAILY
Qty: 4 TABLET | Refills: 0 | Status: DISCONTINUED | OUTPATIENT
Start: 2024-06-04 | End: 2024-06-04

## 2024-06-03 NOTE — ED INITIAL ASSESSMENT (HPI)
Pt c/o of flank pain, fevers, nausea, decreased appetite, increased thirst. Pt states that she recently completed 3 course of abx over the past month for UTI. Pt denies abdominal pain. Pt took tylenol 45 mins PTA.

## 2024-06-03 NOTE — ED QUICK NOTES
Rounding Completed    Plan of Care reviewed    Bed is locked and in lowest position. Call light within reach.

## 2024-06-03 NOTE — PROGRESS NOTES
NURSING ADMISSION NOTE      Patient admitted via Cart  Oriented to room.  Safety precautions initiated.  Bed in low position.  Call light in reach.    Pt admitted from ED with pna and sepsis. AxO x4. Febrile with rigors on admit, tylenol given. Generalized weakness, up with assist x1. Denies pain. RA, LLL diminished. Voids, urine needs to sent for urine studies. IVF. K replaced per protocol. Poor appetite, nausea, and some vomiting, prn zofran. IV/PO abx. Diarrhea x1 this AM, hat in toilet to collect stool if needed. POC reviewed, call light within reach.

## 2024-06-03 NOTE — ED QUICK NOTES
Orders for admission, patient is aware of plan and ready to go upstairs. Any questions, please call ED RN mat at extension [phone].     Patient Covid vaccination status: Fully vaccinated     COVID Test Ordered in ED: None    COVID Suspicion at Admission: N/A    Running Infusions:  None    Mental Status/LOC at time of transport: a.ox4    Other pertinent information:   CIWA score: N/A   NIH score:  N/A

## 2024-06-03 NOTE — ED PROVIDER NOTES
Patient Seen in: [facility] Emergency Department      History     Chief Complaint   Patient presents with    Abdomen/Flank Pain     Stated Complaint: Pt reports she has completed 3 courses of abx for UTI over the past month. Pt c*    Subjective:   HPI    Patient is here, essentially for generalized weakness.  She adds that she has been having intermittent fevers for the last few weeks.  No chest pain or shortness of breath no nausea no vomiting no abdominal pain.  States several weeks ago she is being treated for UTI, had been on 2 different antibiotics and was on one of them twice.  Has not taken any antibiotics in 3 weeks.  Has not had a fever in 2 days.      Objective:   Past Medical History:    AAA (abdominal aortic aneurysm) (HCC)    Anemia, unspecified    Arrhythmia    a fib, VT    Atherosclerosis of coronary artery    Calculus of kidney    Cancer (HCC)    skin    Chronic atrial fibrillation (HCC)    Coronary atherosclerosis    Disorder of thyroid    History of blood transfusion    IBS (irritable bowel syndrome)    Ischemic cardiomyopathy    Kidney stone    Peripheral vascular disease (HCC)    Skin cancer    Small bowel obstruction (HCC)    Tobacco abuse    Visual impairment    reading glasses              Past Surgical History:   Procedure Laterality Date    Adenoidectomy      Angiogram      Cabg  05/25/2017    3 vessel    Cardiac defibrillator placement      [employer]    Cardiac pacemaker placement  10/31/2017    [employer]    Colectomy      due to bowel obstruction    Cystoscopy,insert ureteral stent      Hip replacement surgery Right 05/10/2019    Ndsc ablation & rcnstj [employer] w/o bypass      Other surgical history  2011    facial skin cancer removal    Other surgical history      left atrial appendage amputation    Other surgical history      maze procedure    Symp aaa urgent repair  2017    Tonsillectomy                  Social History     Socioeconomic History    Marital  status:    Tobacco Use    Smoking status: Former     Current packs/day: 0.00     Average packs/day: 0.5 packs/day for 47.0 years (23.5 ttl pk-yrs)     Types: Cigarettes     Start date: 1970     Quit date: 2017     Years since quittin.0    Smokeless tobacco: Never   Vaping Use    Vaping status: Never Used   Substance and Sexual Activity    Alcohol use: Yes     Alcohol/week: 3.0 - 4.0 standard drinks of alcohol     Types: 3 - 4 Glasses of wine per week     Comment: socially    Drug use: Never   Other Topics Concern    Caffeine Concern Yes     Comment: 2 cups qday coffee    Exercise Yes     Comment: WALKING SOME 1900 steps/day    Seat Belt Yes   Social History Narrative    , has adult children.  She retired in  from being a history and english lit teacher.      Social Determinants of Health     Physical Activity: Sufficiently Active (3/26/2021)    Received from Meludia, Meludia    Exercise Vital Sign     Days of Exercise per Week: 7 days     Minutes of Exercise per Session: 30 min              Review of Systems    Positive for stated complaint: Pt reports she has completed 3 courses of abx for UTI over the past month. Pt c*  Other systems are as noted in HPI.  Constitutional and vital signs reviewed.      All other systems reviewed and negative except as noted above.    Physical Exam     ED Triage Vitals [24 0759]   BP 97/63   Pulse 80   Resp 18   Temp 98.4 °F (36.9 °C)   Temp src Temporal   SpO2 94 %   O2 Device None (Room air)       Current Vitals:   Vital Signs  BP: (!) 81/47 (MD PORRAS NOTIFIED)  Pulse: 59  Resp: 18  Temp: 98.4 °F (36.9 °C)  Temp src: Temporal  MAP (mmHg): (!) 59    Oxygen Therapy  SpO2: 97 %  O2 Device: None (Room air)            Physical Exam    Physical Exam   Constitutional: Awake, alert, well appearing  Head: Normocephalic and atraumatic.   Eyes: Conjunctivae are normal. Pupils are equal, round, and reactive to light.   Neck:  Normal range of motion. Neck supple.   Cardiovascular: Normal rate, regular rhythm  Pulmonary/Chest: Normal effort.  No accessory muscle use.  No clubbing, no cyanosis.  Abdominal: Soft. Bowel sounds are normal.   Neurological: Pt is alert and oriented to person, place, and time. no cranial nerve deficits  Skin: Skin is warm and dry.    ED Course     Labs Reviewed   COMP METABOLIC PANEL (14) - Abnormal; Notable for the following components:       Result Value    Glucose 138 (*)     Potassium 3.4 (*)     Albumin 2.8 (*)     Globulin  4.9 (*)     A/G Ratio 0.6 (*)     All other components within normal limits   URINALYSIS WITH CULTURE REFLEX - Abnormal; Notable for the following components:    Spec Gravity >1.030 (*)     Protein Urine 30 (*)     Leukocyte Esterase Urine 75 (*)     WBC Urine 21-50 (*)     Bacteria Urine 2+ (*)     Squamous Epi. Cells Few (*)     Hyaline Casts Present (*)     All other components within normal limits   CBC W/ DIFFERENTIAL - Abnormal; Notable for the following components:    WBC 25.8 (*)     Neutrophil Absolute Prelim 15.04 (*)     Neutrophil Absolute 15.04 (*)     Lymphocyte Absolute 8.72 (*)     Monocyte Absolute 1.52 (*)     All other components within normal limits   LIPASE - Normal   LACTIC ACID, PLASMA - Normal   CBC WITH DIFFERENTIAL WITH PLATELET    Narrative:     The following orders were created for panel order CBC With Differential With Platelet.  Procedure                               Abnormality         Status                     ---------                               -----------         ------                     CBC W/ DIFFERENTIAL[[id number]]          Abnormal            Final result                 Please view results for these tests on the individual orders.   SCAN SLIDE   RAINBOW DRAW LAVENDER   RAINBOW DRAW LIGHT GREEN   RAINBOW DRAW BLUE   RAINBOW DRAW GOLD   BLOOD CULTURE   BLOOD CULTURE   URINE CULTURE, ROUTINE             Lab work notable for elevated white  count, urine with 21-50 white cells.  Lactic acid normal    CT ABDOMEN+PELVIS KIDNEYSTONE 2D RNDR(NO IV,NO ORAL)(CPT=74176)    Result Date: 6/3/2024  PROCEDURE:  CT ABDOMEN+PELVIS KIDNEYSTONE 2D RNDR(NO IV,NO ORAL)(CPT=74176)  COMPARISON:  [facility] , CT, CTA ABD PEL PROMISE LEG RUNOFF DIAPH TO TOES IV CONTRAST(CPT=75635), 10/13/2019, 5:23 PM.  INDICATIONS:  Pt reports she has completed 3 courses of abx for UTI over the past month. Pt c/o of flank pain, nausea, fevers.  TECHNIQUE:  Unenhanced multislice CT scanning from above the kidneys to below the urinary bladder.  2D rendering are generated on the CT scanner workstation to localize potential stones in the cranio-caudal plane.  Dose reduction techniques were used. Dose information is transmitted to the ACR (American College of Radiology) NRDR (National Radiology Data Registry) which includes the Dose Index Registry.  PATIENT STATED HISTORY: (As transcribed by Technologist)  Patient is here for recurrent UTI's. She states back pain and nausea with fevers today.    FINDINGS:  Evaluation of the visceral organs is limited due to the lack of intravenous contrast.  LUNG BASE:  Partially visualized cardiac electrode.  Scattered atelectasis, most notable within the left lower lobe.  Tiny hiatal hernia.  Trace left pleural effusion. LIVER:  Unremarkable. BILIARY:  Cholelithiasis without CT evidence of acute cholecystitis. SPLEEN:  Unremarkable. PANCREAS:  Unremarkable. ADRENALS:  Left adrenal gland thickening. KIDNEYS:  Punctate probable vascular calcification at the left kidney.  No hydronephrosis or significant renal stones. AORTA/VASCULAR:  Infrarenal abdominal aortic aneurysm measuring 3.9 cm.  Aneurysmal dilatation of the iliac vasculature is also noted.  Extensive atherosclerosis of the abdominal aorta and its branch vessels.  Nonemergent outpatient vascular imaging is recommended for further assessment. RETROPERITONEUM:  Unremarkable. BOWEL/MESENTERY:  Postoperative  changes are seen within the right and left abdomen.  No evidence of a bowel obstruction.  Colonic diverticulosis.  The appendix is not identified, however, there are no secondary signs of acute appendicitis. ABDOMINAL WALL:  Postoperative changes are noted. PELVIC ORGANS:  Decompressed bladder. LYMPH NODES:  Unremarkable. BONES:  There is beam hardening artifact from right hip arthroplasty which obscures the surrounding structures.  Extensive degenerative changes in the spine.  Degenerative changes are seen in the pelvis.  There is a prominent calcified structure within the ventral aspect of the spinal canal at the approximate T10-T11 level.  This is chronic. OTHER:  None.            CONCLUSION:   1. No large renal stones or hydronephrosis.  Punctate possible vascular calcification at the mid medial left kidney.  2. Infrarenal abdominal aortic aneurysm measuring 3.9 cm.  Aneurysmal dilatation of the iliac vasculature is also noted.  Extensive atherosclerosis of the abdominal aorta and its branch vessels.  Outpatient follow-up imaging should be considered to ensure stability.  3. Please see above for further details.    LOCATION:  [facility]   Dictated by (CST): Stromberg, LeRoy, MD on 6/03/2024 at 12:05 PM     Finalized by (CST): Stromberg, LeRoy, MD on 6/03/2024 at 12:18 PM       XR CHEST AP PORTABLE  (CPT=71045)    Result Date: 6/3/2024  PROCEDURE:  XR CHEST AP PORTABLE  (CPT=71045)  TECHNIQUE:  AP chest radiograph was obtained.  COMPARISON:  None.  INDICATIONS:  Pt reports she has completed 3 courses of abx for UTI over the past month. Pt c/o of flank pain, nausea, fevers.  PATIENT STATED HISTORY: (As transcribed by Technologist)  Ptient complaints of overall weakness.              CONCLUSION:    Patchy infiltrates at the left base concerning for pneumonia given the history and the x-ray appearance.  The right chest appears clear.  Mild cardiac enlargement without CHF.  Borderline vascular congestion without signs of  edema.  Pacemaker leads right atrium right ventricle.  Sternotomy wires are present.  LOCATION:  [place]      Dictated by (CST): Chaparro Caraballo MD on 6/03/2024 at 11:16 AM     Finalized by (CST): Chaparro Caraballo MD on 6/03/2024 at 11:17 AM               MDM          Differential diagnoses considered: UTI, pneumonia, sepsis    -UA suggest infection and chest x-ray does have an opacity in the left lower lung.    -I reviewed the outpatient urine cultures, based on them we will give her meropenem and I will add azithromycin given lung finding.    Patient will be admitted primarily to the [facility] hospitalist.    Care has been discussed with the admitting physician.      I visualized the radiology studies, my independent interpretation: Patchy opacities left lower lung    *Discussion of ongoing management of this patient's care included: Admitting physician  *Comorbidities contributing to the complexity of decision making: n/a  *External charts reviewed: n/a  *Additional sources of history: n/a    Shared decision making was done by: patient, myself.    Admission disposition: 6/3/2024 12:28 PM                                        Medical Decision Making      Disposition and Plan     Clinical Impression:  1. Sepsis due to other etiology (HCC)         Disposition:  Admit  6/3/2024 12:28 pm    Follow-up:  No follow-up provider specified.        Medications Prescribed:  Current Discharge Medication List                            Hospital Problems       Present on Admission  Date Reviewed: 4/19/2024            ICD-10-CM Noted POA    * (Principal) Sepsis due to other etiology (HCC) A41.89 6/3/2024 Unknown

## 2024-06-04 ENCOUNTER — APPOINTMENT (OUTPATIENT)
Dept: CV DIAGNOSTICS | Facility: HOSPITAL | Age: 74
DRG: 871 | End: 2024-06-04
Attending: INTERNAL MEDICINE
Payer: MEDICARE

## 2024-06-04 ENCOUNTER — APPOINTMENT (OUTPATIENT)
Dept: CV DIAGNOSTICS | Facility: HOSPITAL | Age: 74
End: 2024-06-04
Attending: INTERNAL MEDICINE
Payer: MEDICARE

## 2024-06-04 PROBLEM — E87.6 HYPOKALEMIA: Status: ACTIVE | Noted: 2024-06-04

## 2024-06-04 LAB
ANION GAP SERPL CALC-SCNC: 6 MMOL/L (ref 0–18)
BASOPHILS # BLD AUTO: 0.04 X10(3) UL (ref 0–0.2)
BASOPHILS NFR BLD AUTO: 0.2 %
BUN BLD-MCNC: 13 MG/DL (ref 9–23)
CALCIUM BLD-MCNC: 8.5 MG/DL (ref 8.5–10.1)
CHLORIDE SERPL-SCNC: 113 MMOL/L (ref 98–112)
CO2 SERPL-SCNC: 19 MMOL/L (ref 21–32)
CREAT BLD-MCNC: 0.65 MG/DL
EGFRCR SERPLBLD CKD-EPI 2021: 92 ML/MIN/1.73M2 (ref 60–?)
EOSINOPHIL # BLD AUTO: 0.04 X10(3) UL (ref 0–0.7)
EOSINOPHIL NFR BLD AUTO: 0.2 %
ERYTHROCYTE [DISTWIDTH] IN BLOOD BY AUTOMATED COUNT: 14.6 %
GLUCOSE BLD-MCNC: 106 MG/DL (ref 70–99)
HCT VFR BLD AUTO: 32.1 %
HGB BLD-MCNC: 10.3 G/DL
IMM GRANULOCYTES # BLD AUTO: 0.21 X10(3) UL (ref 0–1)
IMM GRANULOCYTES NFR BLD: 0.9 %
LYMPHOCYTES # BLD AUTO: 7.35 X10(3) UL (ref 1–4)
LYMPHOCYTES NFR BLD AUTO: 33.2 %
MAGNESIUM SERPL-MCNC: 2 MG/DL (ref 1.6–2.6)
MCH RBC QN AUTO: 31.6 PG (ref 26–34)
MCHC RBC AUTO-ENTMCNC: 32.1 G/DL (ref 31–37)
MCV RBC AUTO: 98.5 FL
MONOCYTES # BLD AUTO: 1.61 X10(3) UL (ref 0.1–1)
MONOCYTES NFR BLD AUTO: 7.3 %
NEUTROPHILS # BLD AUTO: 12.88 X10 (3) UL (ref 1.5–7.7)
NEUTROPHILS # BLD AUTO: 12.88 X10(3) UL (ref 1.5–7.7)
NEUTROPHILS NFR BLD AUTO: 58.2 %
OSMOLALITY SERPL CALC.SUM OF ELEC: 287 MOSM/KG (ref 275–295)
PLATELET # BLD AUTO: 213 10(3)UL (ref 150–450)
POTASSIUM SERPL-SCNC: 4.2 MMOL/L (ref 3.5–5.1)
POTASSIUM SERPL-SCNC: 4.4 MMOL/L (ref 3.5–5.1)
RBC # BLD AUTO: 3.26 X10(6)UL
SODIUM SERPL-SCNC: 138 MMOL/L (ref 136–145)
WBC # BLD AUTO: 22.1 X10(3) UL (ref 4–11)

## 2024-06-04 PROCEDURE — 93306 TTE W/DOPPLER COMPLETE: CPT | Performed by: INTERNAL MEDICINE

## 2024-06-04 PROCEDURE — 99232 SBSQ HOSP IP/OBS MODERATE 35: CPT | Performed by: INTERNAL MEDICINE

## 2024-06-04 RX ORDER — SODIUM CHLORIDE 9 MG/ML
INJECTION, SOLUTION INTRAVENOUS CONTINUOUS
Status: DISCONTINUED | OUTPATIENT
Start: 2024-06-04 | End: 2024-06-07

## 2024-06-04 NOTE — PROGRESS NOTES
06/03/24 1900   Vital Signs   BP (!) 80/45   MAP (mmHg) (!) 47     Paged Dr. Casarez regarding BP following 500mL bolus. Per MD, pt is asymptomatic and chronically in the 80s, ok to hold off on more bolus fluids, continue with IVF, and she will re-eval BP meds tomorrow with patient and possibly cardiology if needed.

## 2024-06-04 NOTE — CONSULTS
INFECTIOUS DISEASE CONSULTATION    Sonia Martell Patient Status:  Inpatient    1950 MRN [id number]   Formerly [facility] 5NW-A Attending Prabha Casarez MD   Hosp Day # 1 PCP Jana Buck MD       Requested by Dr. Casarez    Reason for Consultation:  Enterococcus faecalis bacteremia    History of Present Illness:  Sonia Martell is a a(n) 73 year old female came to ED on 6/3 with fever for the last 3 days.  She had previously been treated for Klebsiella UTI (positive culture on 4/10) with symptoms of urinary frequency.   She was given 2 more courses of antibiotics for positive urine cultures, though she reports did not have symptoms after the first episode, cipro on , and keflex on  for 7 days.        She has underlying ischemic cardiomyopathy, sp CABG and ICD placed in .     History:  Past Medical History:    AAA (abdominal aortic aneurysm) (HCC)    Anemia, unspecified    Arrhythmia    a fib, VT    Atherosclerosis of coronary artery    Calculus of kidney    Cancer (HCC)    skin    Chronic atrial fibrillation (HCC)    Coronary atherosclerosis    Disorder of thyroid    History of blood transfusion    IBS (irritable bowel syndrome)    Ischemic cardiomyopathy    Kidney stone    Peripheral vascular disease (HCC)    Skin cancer    Small bowel obstruction (HCC)    Tobacco abuse    Visual impairment    reading glasses     Past Surgical History:   Procedure Laterality Date    Adenoidectomy      Angiogram      Cabg  2017    3 vessel    Cardiac defibrillator placement      [employer]    Cardiac pacemaker placement  10/31/2017    [employer]    Colectomy      due to bowel obstruction    Cystoscopy,insert ureteral stent      Hip replacement surgery Right 05/10/2019    Ndsc ablation & rcnstj [facility] [facility] w/o bypass      Other surgical history      facial skin cancer removal    Other surgical history      left atrial appendage  amputation    Other surgical history      maze procedure    Symp aaa urgent repair  2017    Tonsillectomy       Family History   Problem Relation Age of Onset    Heart Disorder Father     Cancer Mother         colon cancer    Diabetes Neg     Hypertension Neg       reports that she quit smoking about 7 years ago. Her smoking use included cigarettes. She started smoking about 54 years ago. She has a 23.5 pack-year smoking history. She has never used smokeless tobacco. She reports current alcohol use of about 3.0 - 4.0 standard drinks of alcohol per week. She reports that she does not use drugs.      Allergies:  Allergies   Allergen Reactions    Amiodarone NAUSEA AND VOMITING     On high dose oral load    Midodrine OTHER (SEE COMMENTS)    Pcn [Penicillins] NAUSEA ONLY    Protamine UNKNOWN    Seafood NAUSEA AND VOMITING    Sulfa Antibiotics NAUSEA ONLY       Medications:    Current Facility-Administered Medications:     ampicillin (Omnipen) 2 g in sodium chloride 0.9% 100 mL IVPB-MBP, 2 g, Intravenous, Q4H    aspirin DR tab 81 mg, 81 mg, Oral, Daily    levothyroxine (Synthroid) tab 100 mcg, 100 mcg, Oral, Daily @ 0700    rosuvastatin (Crestor) tab 10 mg, 10 mg, Oral, QPM    potassium chloride 20 mEq in dextrose 5%-sodium chloride 0.9% 1000mL infusion premix, , Intravenous, Continuous    acetaminophen (Tylenol) tab 650 mg, 650 mg, Oral, Q4H PRN **OR** HYDROcodone-acetaminophen (Norco) 5-325 MG per tab 1 tablet, 1 tablet, Oral, Q4H PRN **OR** HYDROcodone-acetaminophen (Norco) 5-325 MG per tab 2 tablet, 2 tablet, Oral, Q4H PRN    melatonin tab 3 mg, 3 mg, Oral, Nightly PRN    ondansetron (Zofran) 4 MG/2ML injection 4 mg, 4 mg, Intravenous, Q6H PRN    metoclopramide (Reglan) 5 mg/mL injection 10 mg, 10 mg, Intravenous, Q8H PRN    sodium chloride (Saline Mist) 0.65 % nasal solution 1 spray, 1 spray, Each Nare, Q3H PRN    benzonatate (Tessalon) cap 100 mg, 100 mg, Oral, TID PRN    ipratropium-albuterol (Duoneb) 0.5-2.5 (3)  MG/3ML inhalation solution 3 mL, 3 mL, Nebulization, Q4H PRN    allopurinol (Zyloprim) tab 100 mg, 100 mg, Oral, Daily    rivaroxaban (Xarelto) tab 2.5 mg, 2.5 mg, Oral, BID with meals    sotalol (Betapace) tab 80 mg, 80 mg, Oral, 2 times per day    azithromycin (Zithromax) tab 500 mg, 500 mg, Oral, Daily    mexiletine (Mexitil) cap 150 mg, 150 mg, Oral, TID    artificial saliva substitute (Biotene) oral solution, , Oral, PRN  No current facility-administered medications on file prior to encounter.     Current Outpatient Medications on File Prior to Encounter   Medication Sig Dispense Refill    rivaroxaban (XARELTO) 2.5 MG Oral Tab Take 1 tablet (2.5 mg total) by mouth 2 (two) times daily with meals.      mexiletine 150 MG Oral Cap Take 1 capsule (150 mg total) by mouth 3 (three) times daily.      allopurinol 100 MG Oral Tab Take 1 tablet (100 mg total) by mouth daily. 90 tablet 1    levothyroxine 100 MCG Oral Tab Take 1 tablet (100 mcg total) by mouth before breakfast. 90 tablet 1    rosuvastatin 10 MG Oral Tab Take 1 tablet (10 mg total) by mouth every evening.      sotalol 80 MG Oral Tab Take 1 tablet (80 mg total) by mouth every 12 (twelve) hours. 60 tablet 3    docusate sodium 100 MG Oral Cap Take 2 capsules (200 mg total) by mouth 2 (two) times daily.      Zinc 50 MG Oral Cap Take 50 mg by mouth daily.      Cholecalciferol (VITAMIN D) 50 MCG (2000 UT) Oral Tab Take 2,000 Units by mouth daily. qd      Ascorbic Acid (VITAMIN C) 1000 MG Oral Tab Take 1 tablet (1,000 mg total) by mouth daily.      Sacubitril-Valsartan  MG Oral Tab Take 1 tablet by mouth 2 (two) times daily.      aspirin 81 MG Oral Tab EC Take 1 tablet (81 mg total) by mouth daily.      torsemide 20 MG Oral Tab Take 1 tablet (20 mg total) by mouth daily.      KRILL OIL OR Take 1 capsule by mouth 2 (two) times daily.      spironolactone 25 MG Oral Tab Take 0.5 tablets (12.5 mg total) by mouth daily.      Multiple Vitamin (MULTI-VITAMIN DAILY  OR) Take 1 tablet by mouth daily.      Probiotic Product (PROBIOTIC OR) Take 1 tablet by mouth daily.         Review of Systems:    A comprehensive 10 point review of systems was completed.  Pertinent positives and negatives noted in the the HPI.      Physical Exam:    General: No acute distress. Alert and oriented x 3.  Vital signs: Temp:  [98.3 °F (36.8 °C)-100.9 °F (38.3 °C)] 98.8 °F (37.1 °C)  Pulse:  [] 102  Resp:  [10-24] 16  BP: ()/() 134/100  SpO2:  [92 %-99 %] 95 %  Body mass index is 28.7 kg/m².  HEENT: Moist mucous membranes. Extraocular muscles are intact.  Neck: No swelling, no masses  Respiratory: Non labored, symmetric exursion  Cardiovascular: systolic murmur  Abdomen: Soft, nontender, nondistended.    Musculoskeletal: Full range of motion of all extremities.  No swelling noted.  Joints: no effusions  Skin: No lesions. No erythema, no open wounds    Laboratory Data:  Laboratory data reviewed      Recent Labs   Lab 06/03/24  0902   RBC 3.87   HGB 12.0   HCT 35.7   MCV 92.2   MCH 31.0   MCHC 33.6   RDW 14.2   NEPRELIM 15.04*   WBC 25.8*   .0       Recent Labs   Lab 06/03/24  0902 06/04/24  0255   *  --    BUN 18  --    CREATSERUM 0.98  --    CA 9.8  --    ALB 2.8*  --      --    K 3.4* 4.2     --    CO2 26.0  --    ALKPHO 114  --    AST 32  --    ALT 28  --    BILT 0.5  --    TP 7.7  --          Lab Results   Component Value Date    LIP 43 06/03/2024         Microbiologic Data:   Hospital Encounter on 06/03/24   1. Blood Culture     Status: Abnormal (Preliminary result)    Collection Time: 06/03/24  9:19 AM    Specimen: Blood,peripheral   Result Value Ref Range    Blood Culture Result Positive N/A    Blood Smear Positive Blood Culture (A) N/A    Blood Smear Gram positive cocci in pairs and chains (A) N/A       Established Problem list:  Patient Active Problem List   Diagnosis    Troponin level elevated    V-tach (HCC)    Paroxysmal atrial fibrillation (HCC)     Coronary artery disease involving native heart without angina pectoris    History of endovascular stent graft for abdominal aortic aneurysm (AAA)    Ischemic cardiomyopathy    S/P CABG x 3    S/P ablation of atrial fibrillation    Dyslipidemia    Bilateral iliac artery aneurysm (HCC)    Iliac artery aneurysm, bilateral (HCC)    S/P AAA repair    Superficial femoral artery occlusion (HCC)    Chronic anticoagulation    Nephrolithiasis    Peripheral vascular disease ([facility])    Other specified hypothyroidism    Primary osteoarthritis of right hip  Global 08/08/2019    Orthopedic aftercare    Status post right hip replacement    Vascular insufficiency    Allodynia    Neuropathy    ICD (implantable cardioverter-defibrillator) discharge    Ventricular tachycardia ([facility])    Troponin I above reference range    Closed nondisplaced fracture of acromial end of left clavicle, initial encounter    Fall    Bilateral carotid artery stenosis    Pure hypercholesterolemia    Aneurysm of iliac artery (HCC)    CAD (coronary artery disease), native coronary artery    PAD (peripheral artery disease) (HCC)    Cardiomyopathy, ischemic    V tach (HCC)    Hypotension    Chronic systolic heart failure (HCC)    VTE (venous thromboembolism)    Defibrillator discharge    COVID-19    Leukocytosis    Hyperglycemia    Sepsis due to other etiology ([facility])    Febrile illness       ASSESSMENT/PLAN:  1. Enterococcus faecalis bacteremia  -admitted with symptoms of fever for 3 days  Underlying ischemic cardiomyopathy with ICD in place    -at risk for ICD lead infection and endocarditis    UA positive, but culture growing Klebsiella and has no urinary symptoms, CT also negative for acute urologic process    Repeat blood cultures sent  IV ampicillin + gent for synergy  ECHO, cardiology consult, EP to evaluate for possible lead infection, consider extraction if needed  Follow daily blood cultures  PICC line when repeat blood cultures no growth for 2-3  days    2. Klebsiella bacteruria, appears to be incidental finding    3. Ischemic cardiomyopathy sp CABG, and ICD   Last EF 25-30%, cardiology following (see above)      David Dang MD, MD [facility]  [phone]

## 2024-06-04 NOTE — PROGRESS NOTES
[facility]   part of formerly [facility]     Hospitalist Progress Note     Sonia Martell Patient Status:  Inpatient    1950 MRN [id number]   Location [facility] 5NW-A Attending Prabha Casarez MD   Hosp Day # 1 PCP Jana Buck MD     Chief Complaint:  generally weak     Subjective:     Patient overall better but still feels weak, hoping to get dc soon to see her daugter who is moving to [facility].   No N/V/CP/SOB/cough.    Tm 100.9  Low BP-. Chronic low per pt     Objective:    Review of Systems:   A comprehensive review of systems was completed; pertinent positive and negatives stated in subjective.    Vital signs:  Temp:  [98.3 °F (36.8 °C)-100.9 °F (38.3 °C)] 98.8 °F (37.1 °C)  Pulse:  [] 102  Resp:  [10-24] 16  BP: ()/() 134/100  SpO2:  [92 %-99 %] 94 %    Physical Exam:    General: No acute distress  Respiratory: No wheezes, no rhonchi  Cardiovascular: S1, S2, regular rate and rhythm  Abdomen: Soft, Non-tender, non-distended, positive bowel sounds  Neuro: No new focal deficits.   Extremities: No edema      Diagnostic Data:    Labs:  Recent Labs   Lab 24  0902   WBC 25.8*   HGB 12.0   MCV 92.2   .0       Recent Labs   Lab 24  0902 24  0255   *  --    BUN 18  --    CREATSERUM 0.98  --    CA 9.8  --    ALB 2.8*  --      --    K 3.4* 4.2     --    CO2 26.0  --    ALKPHO 114  --    AST 32  --    ALT 28  --    BILT 0.5  --    TP 7.7  --        Estimated Creatinine Clearance: 54.5 mL/min (based on SCr of 0.98 mg/dL).    No results for input(s): \"TROP\", \"TROPHS\", \"CK\" in the last 168 hours.    No results for input(s): \"PTP\", \"INR\" in the last 168 hours.               Microbiology    Hospital Encounter on 24   1. Blood Culture     Status: Abnormal (Preliminary result)    Collection Time: 24  9:19 AM    Specimen: Blood,peripheral   Result Value Ref Range    Blood Culture Result Positive N/A    Blood Smear Positive Blood Culture  (A) N/A    Blood Smear Gram positive cocci in pairs and chains (A) N/A         Imaging: Reviewed in Epic.    Medications:    aspirin  81 mg Oral Daily    levothyroxine  100 mcg Oral Daily @ 0700    rosuvastatin  10 mg Oral QPM    meropenem  500 mg Intravenous Q8H    allopurinol  100 mg Oral Daily    rivaroxaban  2.5 mg Oral BID with meals    sotalol  80 mg Oral 2 times per day    azithromycin  500 mg Oral Daily    mexiletine  150 mg Oral TID       Assessment & Plan:      # Sepsis   - blood cx with enterococcus faecalis   - repeat blood cx  - ID consult in light ICD and recent abx treatment   - abx ongoing     # Febrile illness   - as above      # generalized weakness with hypotension due to above   - as above      # possible recurrent UTI  - abx   - Ucx pending      # Left PNA   - cont ABx   -CT chest reviewed      # chronic hypotension   - montor   - entresto, aldactone and torsemide on hold   - allergic to midodrine   - consider cards eval pre course    # hx of sustained VT sp ICD. VT ablation 2022  # CAD sp CABG  # chronic systolic HF  - euvolemic  - diuretic on hold for now   - reduce IVF rate    #PAD  # Gout   # hypothyroidism   # Afib s hx of MAZE JOHNATHAN amputation   # hx of VTE    - xarelto     Prabha Casarez MD    Supplementary Documentation:     Quality:  DVT Mechanical Prophylaxis:   SCDs,    DVT Pharmacologic Prophylaxis   Medication    rivaroxaban (Xarelto) tab 2.5 mg         DVT Pharmacologic prophylaxis: Aspirin 81 mg      Code Status: Full Code  Lam: No urinary catheter in place  Lam Duration (in days):   Central line:    CIRILO:     Discharge is dependent on: course  At this point Ms. Martell is expected to be discharge to: home     The 21st Century Cures Act makes medical notes like these available to patients in the interest of transparency. Please be advised this is a medical document. Medical documents are intended to carry relevant information, facts as evident, and the clinical opinion of the  practitioner. The medical note is intended as peer to peer communication and may appear blunt or direct. It is written in medical language and may contain abbreviations or verbiage that are unfamiliar.

## 2024-06-04 NOTE — PAYOR COMM NOTE
--------------  ADMISSION REVIEW   6/3-6/4  [place] [employer] [place] [facility] [place]:  [id number]  Authorization Number: [id number]    Admit date: 6/3/24  Admit time:  2:16 PM       REVIEW DOCUMENTATION:  ED Provider Notes signed by Indu Manrique MD at 6/3/2024 12:49 PM    Patient Seen in: [facility] Emergency Department  History     Chief Complaint   Patient presents with    Abdomen/Flank Pain     Stated Complaint: Pt reports she has completed 3 courses of abx for UTI over the past month. Pt c*    Subjective:   HPI    Patient is here, essentially for generalized weakness.  She adds that she has been having intermittent fevers for the last few weeks.  No chest pain or shortness of breath no nausea no vomiting no abdominal pain.  States several weeks ago she is being treated for UTI, had been on 2 different antibiotics and was on one of them twice.  Has not taken any antibiotics in 3 weeks.  Has not had a fever in 2 days.      Objective:   Past Medical History:    AAA (abdominal aortic aneurysm) (HCC)    Anemia, unspecified    Arrhythmia    a fib, VT    Atherosclerosis of coronary artery    Calculus of kidney    Cancer (HCC)    skin    Chronic atrial fibrillation (HCC)    Coronary atherosclerosis    Disorder of thyroid    History of blood transfusion    IBS (irritable bowel syndrome)    Ischemic cardiomyopathy    Kidney stone    Peripheral vascular disease (HCC)    Skin cancer    Small bowel obstruction (HCC)    Tobacco abuse    Visual impairment    reading glasses     Positive for stated complaint: Pt reports she has completed 3 courses of abx for UTI over the past month. Pt c*  Other systems are as noted in HPI.  Constitutional and vital signs reviewed.      All other systems reviewed and negative except as noted above.    Physical Exam     ED Triage Vitals [06/03/24 0759]   BP 97/63   Pulse 80   Resp 18   Temp 98.4 °F (36.9 °C)   Temp src Temporal   SpO2 94 %   O2 Device None (Room air)       Current Vitals:   Vital  Signs  BP: (!) 81/47 (MD PORRAS NOTIFIED)  Pulse: 59  Resp: 18  Temp: 98.4 °F (36.9 °C)  Temp src: Temporal  MAP (mmHg): (!) 59    Oxygen Therapy  SpO2: 97 %  O2 Device: None (Room air)    Physical Exam   Constitutional: Awake, alert, well appearing  Head: Normocephalic and atraumatic.   Eyes: Conjunctivae are normal. Pupils are equal, round, and reactive to light.   Neck: Normal range of motion. Neck supple.   Cardiovascular: Normal rate, regular rhythm  Pulmonary/Chest: Normal effort.  No accessory muscle use.  No clubbing, no cyanosis.  Abdominal: Soft. Bowel sounds are normal.   Neurological: Pt is alert and oriented to person, place, and time. no cranial nerve deficits  Skin: Skin is warm and dry.    ED Course     Labs Reviewed   COMP METABOLIC PANEL (14) - Abnormal; Notable for the following components:       Result Value    Glucose 138 (*)     Potassium 3.4 (*)     Albumin 2.8 (*)     Globulin  4.9 (*)     A/G Ratio 0.6 (*)     All other components within normal limits   URINALYSIS WITH CULTURE REFLEX - Abnormal; Notable for the following components:    Spec Gravity >1.030 (*)     Protein Urine 30 (*)     Leukocyte Esterase Urine 75 (*)     WBC Urine 21-50 (*)     Bacteria Urine 2+ (*)     Squamous Epi. Cells Few (*)     Hyaline Casts Present (*)     All other components within normal limits   CBC W/ DIFFERENTIAL - Abnormal; Notable for the following components:    WBC 25.8 (*)     Neutrophil Absolute Prelim 15.04 (*)     Neutrophil Absolute 15.04 (*)     Lymphocyte Absolute 8.72 (*)     Monocyte Absolute 1.52 (*)     All other components within normal limits   LIPASE - Normal   LACTIC ACID, PLASMA - Normal   CBC WITH DIFFERENTIAL WITH PLATELET    Narrative:     The following orders were created for panel order CBC With Differential With Platelet.  Procedure                               Abnormality         Status                     ---------                               -----------         ------                      CBC W/ DIFFERENTIAL[[id number]]          Abnormal            Final result                 Please view results for these tests on the individual orders.   SCAN SLIDE   RAINBOW DRAW LAVENDER   RAINBOW DRAW LIGHT GREEN   RAINBOW DRAW BLUE   RAINBOW DRAW GOLD   BLOOD CULTURE   BLOOD CULTURE   URINE CULTURE, ROUTINE     Lab work notable for elevated white count, urine with 21-50 white cells.  Lactic acid normal    CT ABDOMEN+PELVIS KIDNEYSTONE 2D RNDR(NO IV,NO ORAL)(CPT=74176)    Result Date: 6/3/2024  CONCLUSION:   1. No large renal stones or hydronephrosis.  Punctate possible vascular calcification at the mid medial left kidney.  2. Infrarenal abdominal aortic aneurysm measuring 3.9 cm.  Aneurysmal dilatation of the iliac vasculature is also noted.  Extensive atherosclerosis of the abdominal aorta and its branch vessels.  Outpatient follow-up imaging should be considered to ensure stability.  3. Please see above for further details.    LOCATION:  [facility]   Dictated by ([facility]): Stromberg, LeRoy, MD on 6/03/2024 at 12:05 PM     Finalized by ([facility]): Stromberg, LeRoy, MD on 6/03/2024 at 12:18 PM       XR CHEST AP PORTABLE  (CPT=71045)    Result Date: 6/3/2024  CONCLUSION:    Patchy infiltrates at the left base concerning for pneumonia given the history and the x-ray appearance.  The right chest appears clear.  Mild cardiac enlargement without CHF.  Borderline vascular congestion without signs of edema.  Pacemaker leads right atrium right ventricle.  Sternotomy wires are present.  LOCATION:  [facility]      Dictated by ([facility]): Chaparro Caraballo MD on 6/03/2024 at 11:16 AM     Finalized by ([facility]): Chaparro Caraballo MD on 6/03/2024 at 11:17 AM        Differential diagnoses considered: UTI, pneumonia, sepsis    -UA suggest infection and chest x-ray does have an opacity in the left lower lung.    -I reviewed the outpatient urine cultures, based on them we will give her meropenem and I will add azithromycin given lung  finding.    Patient will be admitted primarily to the [facility] hospitalist.    Care has been discussed with the admitting physician.      I visualized the radiology studies, my independent interpretation: Patchy opacities left lower lung    *Discussion of ongoing management of this patient's care included: Admitting physician  *Comorbidities contributing to the complexity of decision making: n/a  *External charts reviewed: n/a  *Additional sources of history: n/a    Shared decision making was done by: patient, myself.    Admission disposition: 6/3/2024 12:28 PM    Disposition and Plan     Clinical Impression:  1. Sepsis due to other etiology (HCC)         Disposition:  Admit  6/3/2024 12:28 pm  Hospital Problems       Present on Admission  Date Reviewed: 4/19/2024            ICD-10-CM Noted POA    * (Principal) Sepsis due to other etiology (HCC) A41.89 6/3/2024 Unknown                 6/3 H&P    Chief Complaint: generalized weakness         Subjective:  History of Present Illness:      Sonia Martell is a 73 year old female with AAA. Afib, anemia, CAD, presented to ER with generalized weakness, N/V/D over the last day of so. She has been treated as outpatient with multiple rouonds of antibiotic for UTI per PCP and her last course completed 3 weeks ago. No abd pain, cough, CP/SOB/F/C reported.       Objective:  Physical Exam:    /70   Pulse 70   Temp (!) 100.9 °F (38.3 °C) (Oral)   Resp 24   Ht 5' 10\" (1.778 m)   Wt 200 lb (90.7 kg)   SpO2 95%   BMI 28.70 kg/m²   General: No acute distress, Alert  Respiratory: No rhonchi, no wheezes  Cardiovascular: S1, S2. Regular rate and rhythm  Abdomen: Soft, Non-tender, non-distended, positive bowel sounds  Neuro: No new focal deficits  Extremities: No edema              Results:  Labs:        Labs Last 24 Hours:         Recent Labs   Lab 06/03/24  0902   RBC 3.87   HGB 12.0   HCT 35.7   MCV 92.2   MCH 31.0   MCHC 33.6   RDW 14.2   NEPRELIM 15.04*   WBC 25.8*   PLT  243.0             Recent Labs   Lab 06/03/24  0902   *   BUN 18   CREATSERUM 0.98   EGFRCR 61   CA 9.8   ALB 2.8*      K 3.4*      CO2 26.0   ALKPHO 114   AST 32   ALT 28   BILT 0.5   TP 7.7               Assessment & Plan:  # Febrile illness   - blood an urine culture   - cont empiric abx -> merrem and azithromycin started in ER  - monitor temp and WBC  - consider ID eval   - stool studies if any diarrhea   - IVF      # generalized weakness with hypotension due to above   - as above      # possible recurrent UTI  - abx   - Ucx pending      # Possible PNA   - no symptoms   - cont IVF, abx for now   - can order CT chest      # hx of sustained VT sp ICD. VT ablation 2022  # CAD sp CABG  # chronic systolic HF  #PAD  # Gout   # hypothyroidism   # Afib s hx of MAZE JOHNATHAN amputation   # hx of VTE      - moitor volume status closely  - resume home meds once verified.            6/4 IM    Chief Complaint:  generally weak         Subjective:  Patient overall better but still feels weak, hoping to get dc soon to see her daugter who is moving to [facility].   No N/V/CP/SOB/cough.     Tm 100.9  Low BP-. Chronic low per pt       Vital signs:  Temp:  [98.3 °F (36.8 °C)-100.9 °F (38.3 °C)] 98.8 °F (37.1 °C)  Pulse:  [] 102  Resp:  [10-24] 16  BP: ()/() 134/100  SpO2:  [92 %-99 %] 94 %     Physical Exam:    General: No acute distress  Respiratory: No wheezes, no rhonchi  Cardiovascular: S1, S2, regular rate and rhythm  Abdomen: Soft, Non-tender, non-distended, positive bowel sounds  Neuro: No new focal deficits.   Extremities: No edema             Recent Labs   Lab 06/03/24  0902 06/04/24  0255   *  --    BUN 18  --    CREATSERUM 0.98  --    CA 9.8  --    ALB 2.8*  --      --    K 3.4* 4.2     --    CO2 26.0  --    ALKPHO 114  --    AST 32  --    ALT 28  --    BILT 0.5  --    TP 7.7  --              Hospital Encounter on 06/03/24   1. Blood Culture     Status: Abnormal  (Preliminary result)     Collection Time: 06/03/24  9:19 AM     Specimen: Blood,peripheral   Result Value Ref Range     Blood Culture Result Positive N/A     Blood Smear Positive Blood Culture (A) N/A     Blood Smear Gram positive cocci in pairs and chains (A) N/A       meropenem  500 mg Intravenous Q8H         Assessment & Plan:  # Sepsis   - blood cx with GPC  - repeat blood cx  - ID consult   - abx ongoing      # Febrile illness   - as above      # generalized weakness with hypotension due to above   - as above      # possible recurrent UTI  - abx   - Ucx pending      # Left PNA   - cont ABx   -CT chest reviewed      # chronic hypotension   - montor   - entresto, aldactone and torsemide on hold   - allergic to midodrine   - consider cards eval pre course     # hx of sustained VT sp ICD. VT ablation 2022  # CAD sp CABG  # chronic systolic HF  - euvolemic  - diuretic on hold for now   - reduce IVF rate     #PAD  # Gout   # hypothyroidism   # Afib s hx of MAZE JOHNATHAN amputation   # hx of VTE    - xarelto           6/4 ID    Reason for Consultation:  Enterococcus faecalis bacteremia     History of Present Illness:  Sonia Martell is a a(n) 73 year old female came to ED on 6/3 with fever for the last 3 days.  She had previously been treated for Klebsiella UTI (positive culture on 4/10) with symptoms of urinary frequency.   She was given 2 more courses of antibiotics for positive urine cultures, though she reports did not have symptoms after the first episode, cipro on 4/19, and keflex on 5/2 for 7 days.          She has underlying ischemic cardiomyopathy, sp CABG and ICD placed in 2017.          ASSESSMENT/PLAN:  1. Enterococcus faecalis bacteremia  -admitted with symptoms of fever for 3 days  Underlying ischemic cardiomyopathy with ICD in place     -at risk for ICD lead infection and endocarditis     UA positive, but culture growing Klebsiella and has no urinary symptoms, CT also negative for acute urologic process      Repeat blood cultures sent  IV ampicillin + gent for synergy  ECHO, cardiology consult, EP to evaluate for possible lead infection, consider extraction if needed  Follow daily blood cultures  PICC line when repeat blood cultures no growth for 2-3 days     2. Klebsiella bacteruria, appears to be incidental finding     3. Ischemic cardiomyopathy sp CABG, and ICD   Last EF 25-30%, cardiology following (see above)            MEDICATIONS ADMINISTERED IN LAST 1 DAY:  acetaminophen (Tylenol) tab 650 mg       Date Action Dose Route User    6/3/2024 1507 Given 650 mg Oral Prehn, Sarah, RN          allopurinol (Zyloprim) tab 100 mg       Date Action Dose Route User    6/4/2024 0816 Given 100 mg Oral Faye Peters RN          ampicillin (Omnipen) 2 g in sodium chloride 0.9% 100 mL IVPB-MBP       Date Action Dose Route User    6/4/2024 0831 New Bag 2 g Intravenous Faye Peters RN          aspirin DR tab 81 mg       Date Action Dose Route User    6/4/2024 0815 Given 81 mg Oral Faye Peters RN          azithromycin (Zithromax) tab 500 mg       Date Action Dose Route User    6/3/2024 1255 Given 500 mg Oral Azalia Willoughby RN          azithromycin (Zithromax) tab 500 mg       Date Action Dose Route User    6/4/2024 0815 Given 500 mg Oral Faye Peters RN          artificial saliva substitute (Biotene) oral solution       Date Action Dose Route User    6/4/2024 0106 Given (none) Oral Sonali Lehman RN          potassium chloride 20 mEq in dextrose 5%-sodium chloride 0.9% 1000mL infusion premix       Date Action Dose Route User    6/3/2024 1555 New Bag (none) Intravenous Prehn, Sarah, RN          levothyroxine (Synthroid) tab 100 mcg       Date Action Dose Route User    6/4/2024 0511 Given 100 mcg Oral Sonali Lehman RN          meropenem (Merrem) 1 g in sodium chloride 0.9% 100 mL IVPB-MBP       Date Action Dose Route User    6/3/2024 1129 New Bag 1 g Intravenous CaseAzalia del toro RN          meropenem (Merrem)  500 mg in sodium chloride 0.9% 100 mL IVPB-MBP       Date Action Dose Route User    6/4/2024 0454 New Bag 500 mg Intravenous Sonali Lehman RN    6/3/2024 2205 New Bag 500 mg Intravenous Sonali Lehman RN          mexiletine (Mexitil) cap 150 mg       Date Action Dose Route User    6/4/2024 0820 Given 150 mg Oral Faye Peters RN          ondansetron (Zofran) 4 MG/2ML injection 4 mg       Date Action Dose Route User    6/3/2024 1531 Given 4 mg Intravenous Prehn, Sarah, RN          potassium chloride (Klor-Con M20) tab 40 mEq       Date Action Dose Route User    6/3/2024 2208 Given 40 mEq Oral Sonali Lehman RN    6/3/2024 1800 Given 40 mEq Oral Prehn, Sarah, RN          rivaroxaban (Xarelto) tab 2.5 mg       Date Action Dose Route User    6/4/2024 0815 Given 2.5 mg Oral Faye Peters RN    6/3/2024 1818 Given 2.5 mg Oral Prehn, Sarah, RN          rosuvastatin (Crestor) tab 10 mg       Date Action Dose Route User    6/3/2024 1818 Given 10 mg Oral Prehn, Sarah, RN          sodium chloride 0.9 % IV bolus 1,000 mL       Date Action Dose Route User    6/3/2024 0907 New Bag 1,000 mL Intravenous HallettAzalia RN          sodium chloride 0.9 % IV bolus 1,000 mL       Date Action Dose Route User    6/3/2024 1246 New Bag 1,000 mL Intravenous HallettAzalia RN          sodium chloride 0.9 % IV bolus 500 mL       Date Action Dose Route User    6/3/2024 1745 New Bag 500 mL Intravenous Prehn, Sarah, RN            Vitals (last day)       Date/Time Temp Pulse Resp BP SpO2 Weight O2 Device O2 Flow Rate (L/min) [facility]    06/04/24 0714 -- -- -- -- 95 % -- -- 1 L/min     06/04/24 0714 99.6 °F (37.6 °C) -- 16 -- -- -- None (Room air) --     06/04/24 0417 98.8 °F (37.1 °C) -- 16 134/100 94 % -- Nasal cannula 1 L/min MI    06/03/24 2343 98.6 °F (37 °C) -- 18 102/53 -- -- Nasal cannula 1 L/min PR 06/03/24 1920 98.3 °F (36.8 °C) -- 18 80/40 -- -- Nasal cannula 1 L/min MI    06/03/24 1900 -- 102 -- 80/45 -- -- -- --  SP    06/03/24 1726 99.3 °F (37.4 °C) 91 -- 85/46 94 % -- Nasal cannula 1 L/min SP    06/03/24 1512 -- 61 -- -- 92 % -- None (Room air) -- SP    06/03/24 1459 100.9 °F (38.3 °C) -- -- -- -- -- -- -- SP    06/03/24 1443 98.6 °F (37 °C) 70 24 105/70 95 % 200 lb None (Room air) -- SP    06/03/24 1443 -- -- -- -- -- -- -- 0 L/min LC    06/03/24 1345 -- 61 16 108/65 99 % -- -- -- MG    06/03/24 1330 -- 59 10 109/70 94 % -- -- -- MG    06/03/24 1315 -- 71 17 114/72 98 % -- -- -- MG    06/03/24 1300 -- 63 15 103/65 99 % -- -- -- MG    06/03/24 1254 -- 63 16 98/55 96 % -- -- -- MG    06/03/24 1242 -- 59 18 81/47 97 % -- -- -- MG    06/03/24 1240 -- 65 12 81/59 98 % -- -- -- MG    06/03/24 1239 -- 64 11 100/54 97 % -- -- -- MG    06/03/24 1238 -- -- -- -- 97 % -- -- -- MG    06/03/24 1215 -- 72 14 -- -- -- -- -- MG    06/03/24 1130 -- 61 21 90/53 -- -- -- -- MG    06/03/24 1030 -- 75 20 90/57 94 % -- None (Room air) -- MG    06/03/24 1013 -- 63 17 -- 97 % -- -- -- MG    06/03/24 0958 -- 59 20 84/50 93 % -- -- -- MG    06/03/24 0924 -- 63 20 84/56 94 % -- -- -- MG    06/03/24 0913 -- -- -- -- -- -- None (Room air) -- MG    06/03/24 0909 -- 60 15 80/52 95 % -- -- -- MG    06/03/24 0904 -- 65 18 84/51 95 % -- None (Room air) -- MG    06/03/24 0759 98.4 °F (36.9 °C) 80 18 97/63 94 % 200 lb None (Room air) -- AM

## 2024-06-04 NOTE — HISTORICAL OFFICE NOTE
Facility [facility]  [address] [facility], 4th [place] [address]  [phone]      Sonia Martell  Progress Note  Demographics:  Name: Sonia Martell YOB: 1950  Age: 73, Female Medical Record No: [id number]  Visited Date/Time: 05/20/2024 10:20 AM    Chief Complaints  3 month follow up   History of Present Illness  Sonia Martell is a 73 year old woman w PMH a complex history of abdominal aortic aneurysm status post emergent aortobiiliac bypass with background graft  placement in May, subsequently underwent bypass surgery with a MAZE procedure and left atrial  appendage amputation.    LVEF is 25-30%.    Recently hospitalized for sustained VT requiring ICD therapy and subsequently presenting with VT below rate cut off of device, was externally shocked by ER staff. VT morphology consistent with inferior wall scar. Underwent VT ablation.     She did not tolerate amiodarone, discharged on sotalol after VT ablation. She had one recurrent episode treated with ATP in January, no recurrent episodes.   Cardiac risk factors Hypertension, Dyslipidemia and Former smoker  Past Medical History  1.Cardiomyopathy, ischemic  2.Abdominal aortic aneurysm (AAA) without rupture  3.CAD (coronary artery disease), native coronary artery  4.Atrial fibrillation, currently in sinus rhythm  5.Hx of CABG  6.Pure hypercholesterolemia  7.ICD (implantable cardioverter-defibrillator) in place  8.Ventricular tachycardia  9.Aneurysm of iliac artery  10.Superficial femoral artery occlusion  11.Carotid artery stenosis, bilateral  12.PAD (peripheral artery disease)  13.History of repair of aneurysm of abdominal aorta  Past Surgical History  1.Hx of CABG  Family History  1. Father - Family history of early CAD - FHx (Reason for death)  Social History  Smoking status Former smoker  Tobacco usage - No (Ex-smoker (finding))  Review of systems  Cardiovascular No history of Chest pain, LANDRUM, Palpitations,  Syncope, PND, Orthopnea, Edema and Claudication  Physical Examination  Vitals Right Arm Sitting  / 60 mmHg, Pulse rate 85 bpm, Regular, Height in 5' 10\", BMI: 30.8, Weight in 215 lbs (or) 97.52 kgs and BSA : 2.22 cc/m²  General Appearance No Acute Distress  Head/Eyes/Ears/Nose/Mouth/Throat Conjunctiva pink, Sclera Clear and Mucous membranes Moist  Neck Normal carotid pulsations, No carotid bruits and No JVD  Respiratory Unlabored, Lungs clear with normal breath sounds and Equal bilaterally  Cardiovascular Intact distal pulses and Regular rhythm. Normal rate present. Normal and normal S1 and S2    Allergies  1.Midodrine(Reaction:, Severity:Mild)  2.penicillin - Ingredient(Reaction:rash, Severity:Severe)  3.Protamine(Reaction:, Severity:Mild)  4.Sulfa (Sulfonamide Antibiotics) - Allergen(Reaction:rash, Severity:Severe)  Medications (Info obtained by: Verbal)  1.allopurinoL 100 mg tablet, Take 1 tablet orally once a day.  2.aspirin 81 MG tablet, 1 TABLET DAILY  3.Entresto 97 mg-103 mg tablet, Take 1 tablet orally 2 times a day.  4.levothyroxine 100 mcg tablet, Take 1 tablet orally once a day.  5.mexiletine 150 mg capsule, Take 1 capsule orally 2 times a day.  6.multivitamin tablet, Take 1 tablet orally once a day.  7.Rosuvastatin Calcium Oral Tablet 10 MG, TAKE 1 TABLET BY MOUTH EVERY DAY IN THE EVENING  8.Sotalol HCl Oral Tablet 80 MG, TAKE 1 TABLET BY MOUTH 2 TIMES A DAY  9.spironolactone 25 mg tablet, Take one-half tablet orally once a day.  10.torsemide 20 mg tablet, Take 1 tablet orally once a day.  11.Vitamin C 1,000 mg tablet, Take 1 tablet orally once a day.  12.Vitamin D3 50 mcg (2,000 unit) capsule, Take 1 capsule orally once a day.  13.Xarelto Oral Tablet 2.5 MG, TAKE 1 TABLET BY MOUTH TWO TIMES A DAY  Impression  1.Cardiomyopathy, ischemic  2.Abdominal aortic aneurysm (AAA) without rupture  3.CAD (coronary artery disease), native coronary artery  4.Atrial fibrillation, currently in sinus rhythm  5.Hx  of CABG  6.Pure hypercholesterolemia  7.PAD (peripheral artery disease)  Assessment & Plan  74 y/o female with history of ischemic CM, dual chamber ICD, presented with sustained VT requiring ICD therapy, also presented with VT in 160s below rate cut off of ICD. Was externally cardioverted. She did not tolerate high dose amiodarone.     Pt underwent ablation of VT originating from inferior wall scar via transseptal approach due to PVD, AAA repair. Had one recurrent episode of VT treated with ATP, mexilitine was added, no recurrent events.     Prior history of ruptured AAA, PV bypass, CABG, LVEF 25-30%.     - One recurrent episode of VT, treated with ATP, added mexilitine, doing well.    -Continue antiarrhythmic therapy, maintaining SR, ECG is stable. Some fatigue with sotalol but she is tolerating treatment. Felt nauseated on high dose amiodarone.   - Follow up in 6 months.  Future appointments  1.Follow up visit - Layo Mix MD (6 Months)  Nurses documentation  Refill: none   Upcoming surgeries: none   Use of assistive devices(s): none   Triage & medication list reviewed by:   JAMIE  Patient instructions   Follow up in 6 months.  Diagnostics Details  Carotid Ultrasound 10/16/2023  1.The study quality is good.    2.1-39% stenosis in the proximal right internal carotid artery based on Bluth Criteria.    3.1-39% stenosis in the proximal left internal carotid artery based on Bluth Criteria.    4.Antegrade right vertebral artery flow.    5.Antegrade left vertebral artery flow.    Ankle Brachial Index 04/14/2023  1.The study quality is average.    2.The resting right ankle brachial index is 0.69 with a monophasic waveform.    3.The resting left ankle brachial index is 0.77 with a monophasic waveform.    4.Bilateral toe brachial indices suggest moderate/severe small vessel disease.    Lower Extremity Arterial Ultrasound 04/14/2023  1.The study quality is average.    2.Occluded bilateral superfiical femoral arteries with  reconstitution in bilateral popliteal arteries    3.3 vessel runoff to both feet with no stenosis in the runoff vessels    Abdominal Aorta 04/14/2023  1.The study quality is below average. Technically challenging due to overlying bowel gas and scar tissue.    2.Patent aorta-bi-iliac Dacron graft with significant limb tortuousity. No evidence of significant stenosis at visualized levels within the graft body, limbs, and distal anastomotic sites.    3.Right distal common iliac artery appears dilated 2.1 x 2.1 cm, Left distal commn iliac artery appears slightly dilated 1.5 x 1.5 cm.    4.Bilateral internal iliac arteries not visualized.    Trans Thoracic Echocardiogram 03/03/2022  1.The study quality is average.    2.The left ventricle is mildly enlarged. Left ventricular systolic function is moderate-severely reduced with estimated LVEF 25-30%. Left ventricular diastolic function is indeterminate.    3.The right ventricle is normal in size. Right ventricular systolic function is borderline normal. A linear echo density is noted in the right ventricle, suggestive of a ICD lead.    4.No significant valvular regurgitation or stenosis.    5.The pulmonary artery systolic pressure is 24 mmHg.    6.A trivial anterior and posterior pericardial effusion is noted.    Care Providers: Layo Mix MD and Noelle Du  Electronically Authenticated by  Layo Mix MD  05/25/2024 08:26:03 PM  Disclaimer: Components of this note were documented using voice recognition system and are subject to errors not corrected at proofreading. Contact the author of this note for any clarifications.

## 2024-06-04 NOTE — CONSULTS
[facility]athryn/[facility]good Report of Consultation      Sonia Martell Patient Status:  Inpatient    1950 MRN [id number]   Location [facility] 5NW-A Attending Prabha Casarez MD   Hosp Day # 1 PCP Jana Buck MD     Reason for Consultation     ICD in situ with bacteremia    Assessment/Plan   Dual chamber ICD- [employer], implanted . Not pacemaker dependent.   History of VT, status post ablation, on medical therapy. Intolerant to amiodarone.   Enterococcus bacteremia  CAD, CABG. LVEF 25%.   AAA repair.     Will review with ID service responsiveness of entrococcus to antibiotic treatment and need for extraction.   Discussed consideration for possible extraction with patient.   Continue antibiotics as per ID        LEVEL 5  History of Present Illness:   Sonia Martell is a a(n) 73 year old female presents with fevers. Has been treated for UTI, now with enterococcus bacteremia. No active cardiac symptoms.     History:  Past Medical History:    AAA (abdominal aortic aneurysm) (HCC)    Anemia, unspecified    Arrhythmia    a fib, VT    Atherosclerosis of coronary artery    Calculus of kidney    Cancer (HCC)    skin    Chronic atrial fibrillation (HCC)    Coronary atherosclerosis    Disorder of thyroid    History of blood transfusion    IBS (irritable bowel syndrome)    Ischemic cardiomyopathy    Kidney stone    Peripheral vascular disease (HCC)    Skin cancer    Small bowel obstruction (HCC)    Tobacco abuse    Visual impairment    reading glasses     Past Surgical History:   Procedure Laterality Date    Adenoidectomy      Angiogram      Cabg  2017    3 vessel    Cardiac defibrillator placement      [employer]    Cardiac pacemaker placement  10/31/2017    [employer]    Colectomy      due to bowel obstruction    Cystoscopy,insert ureteral stent      Hip replacement surgery Right 05/10/2019    Ndsc ablation & rcnstj atria lmtd w/o bypass      Other surgical history      facial  skin cancer removal    Other surgical history      left atrial appendage amputation    Other surgical history      maze procedure    Symp aaa urgent repair  2017    Tonsillectomy       Family History   Problem Relation Age of Onset    Heart Disorder Father     Cancer Mother         colon cancer    Diabetes Neg     Hypertension Neg       reports that she quit smoking about 7 years ago. Her smoking use included cigarettes. She started smoking about 54 years ago. She has a 23.5 pack-year smoking history. She has never used smokeless tobacco. She reports current alcohol use of about 3.0 - 4.0 standard drinks of alcohol per week. She reports that she does not use drugs.    Allergies:  Allergies   Allergen Reactions    Amiodarone NAUSEA AND VOMITING     On high dose oral load    Midodrine OTHER (SEE COMMENTS)    Pcn [Penicillins] NAUSEA ONLY    Protamine UNKNOWN    Seafood NAUSEA AND VOMITING    Sulfa Antibiotics NAUSEA ONLY       Medications:    Current Facility-Administered Medications:     ampicillin (Omnipen) 2 g in sodium chloride 0.9% 100 mL IVPB-MBP, 2 g, Intravenous, Q4H    gentamicin (Garamycin) 80 mg in sodium chloride 0.9% 100 mL IVPB, 1 mg/kg (Adjusted), Intravenous, Q12H    sodium chloride 0.9% infusion, , Intravenous, Continuous    aspirin DR tab 81 mg, 81 mg, Oral, Daily    levothyroxine (Synthroid) tab 100 mcg, 100 mcg, Oral, Daily @ 0700    rosuvastatin (Crestor) tab 10 mg, 10 mg, Oral, QPM    acetaminophen (Tylenol) tab 650 mg, 650 mg, Oral, Q4H PRN **OR** HYDROcodone-acetaminophen (Norco) 5-325 MG per tab 1 tablet, 1 tablet, Oral, Q4H PRN **OR** HYDROcodone-acetaminophen (Norco) 5-325 MG per tab 2 tablet, 2 tablet, Oral, Q4H PRN    melatonin tab 3 mg, 3 mg, Oral, Nightly PRN    ondansetron (Zofran) 4 MG/2ML injection 4 mg, 4 mg, Intravenous, Q6H PRN    metoclopramide (Reglan) 5 mg/mL injection 10 mg, 10 mg, Intravenous, Q8H PRN    sodium chloride (Saline Mist) 0.65 % nasal solution 1 spray, 1 spray,  Each Nare, Q3H PRN    benzonatate (Tessalon) cap 100 mg, 100 mg, Oral, TID PRN    ipratropium-albuterol (Duoneb) 0.5-2.5 (3) MG/3ML inhalation solution 3 mL, 3 mL, Nebulization, Q4H PRN    allopurinol (Zyloprim) tab 100 mg, 100 mg, Oral, Daily    rivaroxaban (Xarelto) tab 2.5 mg, 2.5 mg, Oral, BID with meals    sotalol (Betapace) tab 80 mg, 80 mg, Oral, 2 times per day    mexiletine (Mexitil) cap 150 mg, 150 mg, Oral, TID    artificial saliva substitute (Biotene) oral solution, , Oral, PRN    Review of Systems:  Denies abdominal pain, N/V. No neurologic sx such as focal weakness or paresthesias. No cough. No visual disturbance. No fevers, chills.    Physical Exam:  Blood pressure 95/67, pulse 87, temperature 98.1 °F (36.7 °C), temperature source Oral, resp. rate 18, height 70\", weight 200 lb (90.7 kg), SpO2 95%, not currently breastfeeding.  Temp (24hrs), Av.8 °F (37.1 °C), Min:98.1 °F (36.7 °C), Max:99.6 °F (37.6 °C)    Wt Readings from Last 3 Encounters:   24 200 lb (90.7 kg)   24 215 lb (97.5 kg)   23 216 lb (98 kg)       Telemetry: SR  General: Alert and oriented in no apparent distress.  HEENT: No focal deficits.  Neck: No JVD, carotids 2+ no bruits.  Cardiac: Regular rate and rhythm, S1, S2 normal, no murmur, rub or gallop.  Lungs: Clear without wheezes, rales, rhonchi or dullness.  Normal excursions and effort.  Abdomen: Soft, non-tender.   Extremities: Without clubbing, cyanosis or edema.  Peripheral pulses are 2+.  Neurologic: Alert and oriented, normal affect.  Skin: Warm and dry.     Laboratories and Data:  Lab Results   Component Value Date    WBC 22.1 2024    RBC 3.26 2024    HGB 10.3 2024    HCT 32.1 2024    MCV 98.5 2024    MCH 31.6 2024    MCHC 32.1 2024    RDW 14.6 2024    .0 2024     BUN (mg/dL)   Date Value   2024 13   2024 18   04/10/2024 15   2014 14     Blood Urea Nitrogen (mg/dL)   Date Value    07/16/2018 12   04/27/2018 17     CREATININE (mg/dL)   Date Value   04/23/2014 0.59     Creatinine (mg/dL)   Date Value   06/04/2024 0.65   06/03/2024 0.98   04/10/2024 0.97   07/16/2018 0.90   04/27/2018 1.09 (H)     Lab Results   Component Value Date    INR 1.18 (H) 11/04/2022    INR 1.17 (H) 10/13/2019    INR 1.05 04/26/2019       Patient Active Problem List   Diagnosis    Troponin level elevated    V-tach (HCC)    Paroxysmal atrial fibrillation ([facility])    Coronary artery disease involving native heart without angina pectoris    History of endovascular stent graft for abdominal aortic aneurysm (AAA)    Ischemic cardiomyopathy    S/P CABG x 3    S/P ablation of atrial fibrillation    Dyslipidemia    Bilateral iliac artery aneurysm (HCC)    Iliac artery aneurysm, bilateral ([facility])    S/P AAA repair    Superficial femoral artery occlusion ([facility])    Chronic anticoagulation    Nephrolithiasis    Peripheral vascular disease ([facility])    Other specified hypothyroidism    Primary osteoarthritis of right hip  Global 08/08/2019    Orthopedic aftercare    Status post right hip replacement    Vascular insufficiency    Allodynia    Neuropathy    ICD (implantable cardioverter-defibrillator) discharge    Ventricular tachycardia ([facility])    Troponin I above reference range    Closed nondisplaced fracture of acromial end of left clavicle, initial encounter    Fall    Bilateral carotid artery stenosis    Pure hypercholesterolemia    Aneurysm of iliac artery (HCC)    CAD (coronary artery disease), native coronary artery    PAD (peripheral artery disease) (HCC)    Cardiomyopathy, ischemic    V tach ([facility])    Hypotension    Chronic systolic heart failure ([facility])    VTE (venous thromboembolism)    Defibrillator discharge    COVID-19    Leukocytosis    Hyperglycemia    Sepsis due to other etiology ([facility])    Febrile illness    Hypokalemia         Layo Mix MD  6/4/2024  6:36 PM

## 2024-06-04 NOTE — CM/SW NOTE
06/04/24 1400   CM/SW Referral Data   Referral Source Social Work (self-referral)   Reason for Referral Discharge planning   Informant Patient;EMR   Medical Hx   Does patient have an established PCP? Yes   Patient Info   Patient's Current Mental Status at Time of Assessment Alert;Oriented   Patient's Home Environment House   Patient lives with Spouse/Significant other;Son   Discharge Needs   Anticipated D/C needs Home health care;Infusion care     Patient is a 72 y/o female who admitted with Sepsis due to other etiology.   SW acknowledged order to arrange outpatient IV abx.    Met with patient, who was alert and oriented, to discuss discharge planning. She lives in a house in [place] with her  and adult son who has Down Syndrome. She has two other adult children, a son and a daughter who is in the process of moving to [place] with her . Discussed IV abx options. Her  recently had home IV abx so they are familiar with what it entails. She would like to do home IV abx.    Sent infusion and HH referral on aidin. Await accepting providers.     Await final IV abx order and line.     SW/IRAJ to remain available for support and/or discharge planning.    PANKAJ Posey  Discharge Planner  [phone]

## 2024-06-05 LAB
BACTERIA BLD CULT: POSITIVE
BACTERIA BLD CULT: POSITIVE
E FAECALIS DNA BLD POS QL NAA+NON-PROBE: DETECTED
VANA+VANB BLD POS QL NAA+NON-PROBE: NOT DETECTED

## 2024-06-05 PROCEDURE — 99232 SBSQ HOSP IP/OBS MODERATE 35: CPT | Performed by: HOSPITALIST

## 2024-06-05 RX ORDER — TORSEMIDE 20 MG/1
20 TABLET ORAL DAILY
Status: DISCONTINUED | OUTPATIENT
Start: 2024-06-05 | End: 2024-06-07

## 2024-06-05 NOTE — PLAN OF CARE
Received patient on room air, saturating well. VSS. Medications administered per the MAR and patient needs addressed. IVF and abx infusing per MD order. Patient is resting in bed, at lowest position with bed rails up and call light within reach.

## 2024-06-05 NOTE — CM/SW NOTE
Met with patient to follow up on discharge planning. Provided [facility] information and potential costs, awaiting final IV abx order. Provided HH list, she was agreeable with [facility]. Reserved in aidin.    /CM to remain available for support and/or discharge planning.    PANKAJ Posey  Discharge Planner  [phone]

## 2024-06-05 NOTE — HOME CARE LIAISON
Received referral via [facility] for Home Health services. Spoke w/ patient at the bedside and is agreeable for all home health care needs. Demographics updated at the bedside. Will arrange SOC with [facility] once final abx orders are in place and coordinate care. Will remain available for all and any home zaria care needs that arise

## 2024-06-05 NOTE — DISCHARGE INSTRUCTIONS
Sometimes managing your health at home requires assistance.  The [facility] team has recognized your preference to use [facility].  They can be reached by phone at [phone].  The fax number for your reference is [phone].. A representative from the home health agency will contact you or your family to schedule your first visit.      [facility] - for IV antibiotics and supplies   [phone]

## 2024-06-05 NOTE — PROGRESS NOTES
[facility]   part of [facility]     Hospitalist Progress Note     Sonia Martell Patient Status:  Inpatient    1950 MRN [id number]   Location [facility] 5NW-A Attending Prabha Casarez MD   Hosp Day # 2 PCP Jana Buck MD     Subjective:   Feels better and more rested    Objective:    Review of Systems:   A comprehensive review of systems was completed; pertinent positive and negatives stated in subjective.    Vital signs:  Temp:  [97.8 °F (36.6 °C)-99.6 °F (37.6 °C)] 98.5 °F (36.9 °C)  Pulse:  [71-87] 71  Resp:  [16-20] 18  BP: ()/(54-67) 124/61  SpO2:  [93 %-99 %] 94 %    Physical Exam:    General: No acute distress  Respiratory: No wheezes, no rhonchi  Cardiovascular: S1, S2, regular rate and rhythm  Abdomen: Soft, Non-tender, non-distended, positive bowel sounds  Neuro: No new focal deficits.   Extremities: No edema      Diagnostic Data:    Labs:  Recent Labs   Lab 24  0902 24  0712   WBC 25.8* 22.1*   HGB 12.0 10.3*   MCV 92.2 98.5   .0 213.0       Recent Labs   Lab 24  0902 24  0255 24  0712   *  --  106*   BUN 18  --  13   CREATSERUM 0.98  --  0.65   CA 9.8  --  8.5   ALB 2.8*  --   --      --  138   K 3.4* 4.2 4.4     --  113*   CO2 26.0  --  19.0*   ALKPHO 114  --   --    AST 32  --   --    ALT 28  --   --    BILT 0.5  --   --    TP 7.7  --   --        Estimated Creatinine Clearance: 82.1 mL/min (based on SCr of 0.65 mg/dL).    No results for input(s): \"TROP\", \"TROPHS\", \"CK\" in the last 168 hours.    No results for input(s): \"PTP\", \"INR\" in the last 168 hours.               Microbiology    Hospital Encounter on 24   1. Urine Culture, Routine     Status: Abnormal    Collection Time: 24 10:46 AM    Specimen: Urine, clean catch   Result Value Ref Range    Urine Culture >100,000 CFU/ML Klebsiella pneumoniae (A) N/A       Susceptibility    Klebsiella pneumoniae -  (no method available)     Ampicillin >=32  Resistant      Ampicillin + Sulbactam 4 Sensitive      Cefazolin <=4 Sensitive      Ciprofloxacin <=0.25 Sensitive      Gentamicin <=1 Sensitive      Meropenem <=0.25 Sensitive      Levofloxacin <=0.12 Sensitive      Nitrofurantoin 32 Sensitive      Piperacillin + Tazobactam <=4 Sensitive      Trimethoprim/Sulfa <=20 Sensitive    2. Blood Culture     Status: Abnormal (Preliminary result)    Collection Time: 06/03/24  9:19 AM    Specimen: Blood,peripheral   Result Value Ref Range    Blood Culture Result Positive N/A    Blood Culture Result Enterococcus faecalis (A) N/A    Blood Smear Positive Blood Culture (A) N/A    Blood Smear Gram positive cocci in pairs and chains (A) N/A         Imaging: Reviewed in Epic.    Medications:    ampicillin  2 g Intravenous Q4H    gentamicin  1 mg/kg (Adjusted) Intravenous Q12H    aspirin  81 mg Oral Daily    levothyroxine  100 mcg Oral Daily @ 0700    rosuvastatin  10 mg Oral QPM    allopurinol  100 mg Oral Daily    rivaroxaban  2.5 mg Oral BID with meals    sotalol  80 mg Oral 2 times per day    mexiletine  150 mg Oral TID       Assessment & Plan:      # Sepsis   - blood cx with enterococcus faecalis   - repeat blood cx  - ID consult in light ICD and recent abx treatment   - abx ongoing   - PICC line and outpt IV abx upon discharge     # Febrile illness   - as above      # generalized weakness with hypotension due to above   - as above      # possible recurrent UTI- Klebsiella - per ID       # Left PNA   - cont ABx   -CT chest reviewed      # chronic hypotension   - montor   - entresto, aldactone and torsemide on hold   - allergic to midodrine   - consider cards eval pre course    # hx of sustained VT sp ICD. VT ablation 2022  # CAD sp CABG  # chronic systolic HF  - euvolemic  - diuretic on hold for now   - reduce IVF rate    #PAD  # Gout   # hypothyroidism   # Afib s hx of MAZE JOHNATHAN amputation   # hx of VTE    - xarelto     Repeat Cx pending     Supplementary Documentation:      Quality:  DVT Mechanical Prophylaxis:   SCDs,    DVT Pharmacologic Prophylaxis   Medication    rivaroxaban (Xarelto) tab 2.5 mg         DVT Pharmacologic prophylaxis: Aspirin 81 mg      Code Status: Full Code  Lam: No urinary catheter in place  Lam Duration (in days):   Central line:    CIRILO:     Discharge is dependent on: course  At this point Ms. Martell is expected to be discharge to: home     The 21st Century Cures Act makes medical notes like these available to patients in the interest of transparency. Please be advised this is a medical document. Medical documents are intended to carry relevant information, facts as evident, and the clinical opinion of the practitioner. The medical note is intended as peer to peer communication and may appear blunt or direct. It is written in medical language and may contain abbreviations or verbiage that are unfamiliar.

## 2024-06-05 NOTE — PLAN OF CARE
Pt from home w/ spouse, plan to dc home  Aox4  VSS on RA  afebrile  NSR/afib on tele, receiving Xarelto  Electrolyte cardiac replacement  continent  Up SBA, bed alarm on and call light within reach  Regular diet  Receiving IV abx and IVF  Pt updated on current POC, no questions at this time    Problem: Patient/Family Goals  Goal: Patient/Family Long Term Goal  Description: Patient's Long Term Goal: dc home    Interventions:  - IV abx  - ID consult  - Echo  - See additional Care Plan goals for specific interventions  Outcome: Progressing  Goal: Patient/Family Short Term Goal  Description: Patient's Short Term Goal: Decreased fatigue    Interventions:   - IV abx  - IVF  - See additional Care Plan goals for specific interventions  Outcome: Progressing

## 2024-06-05 NOTE — PROGRESS NOTES
INFECTIOUS DISEASE  PROGRESS NOTE            [facility]    Sonia Martell Patient Status:  Inpatient    1950 MRN [id number]   [facility] 5NW-A Attending Don Paul MD   Hosp Day # 2 PCP Jana Buck MD     Antibiotics: #2  Amp/gent#1    Subjective:  : resting comfortable    Objective:  Temp:  [97.8 °F (36.6 °C)-99.6 °F (37.6 °C)] 98.5 °F (36.9 °C)  Pulse:  [71-87] 71  Resp:  [16-20] 18  BP: ()/(54-67) 124/61  SpO2:  [93 %-99 %] 94 %    General: awake alert  Vital signs:Temp:  [97.8 °F (36.6 °C)-99.6 °F (37.6 °C)] 98.5 °F (36.9 °C)  Pulse:  [71-87] 71  Resp:  [16-20] 18  BP: ()/(54-67) 124/61  SpO2:  [93 %-99 %] 94 %  HEENT: Moist mucous membranes. Extraocular muscles are intact.  Neck: supple no masses  Respiratory: Non labored, symmetric excursion, normal respirations  Cardiovascular: no irregularities in rhythm  Abdomen: Soft, nontender, nondistended.   Musculoskeletal: joints: no swelling   Integument: No lesions. No erythema. No open wounds.  Labs:     COVID-19 Lab Results    COVID-19  Lab Results   Component Value Date    COVID19 Detected (A) 2022    COVID19 Not Detected 10/27/2020       Pro-Calcitonin  Recent Labs   Lab 24  0902   PCT 0.68*       Cardiac  No results for input(s): \"TROP\", \"PBNP\" in the last 168 hours.    Creatinine Kinase  No results for input(s): \"CK\" in the last 168 hours.    Inflammatory Markers  No results for input(s): \"CRP\", \"MARCELLE\", \"LDH\", \"DDIMER\" in the last 168 hours.    Recent Labs   Lab 24  0712   RBC 3.26*   HGB 10.3*   HCT 32.1*   MCV 98.5   MCH 31.6   MCHC 32.1   RDW 14.6   NEPRELIM 12.88*   WBC 22.1*   .0         Recent Labs   Lab 24  0902 24  0255 24  0712   *  --  106*   BUN 18  --  13   CREATSERUM 0.98  --  0.65   CA 9.8  --  8.5   ALB 2.8*  --   --      --  138   K 3.4* 4.2 4.4     --  113*   CO2 26.0  --  19.0*   ALKPHO 114  --   --    AST 32  --   --    ALT  28  --   --    BILT 0.5  --   --    TP 7.7  --   --        Vancomycin Trough (ug/mL)   Date Value   05/17/2017 10.6     Microbiology    Hospital Encounter on 06/03/24   1. Urine Culture, Routine     Status: Abnormal    Collection Time: 06/03/24 10:46 AM    Specimen: Urine, clean catch   Result Value Ref Range    Urine Culture >100,000 CFU/ML Klebsiella pneumoniae (A) N/A       Susceptibility    Klebsiella pneumoniae -  (no method available)     Ampicillin >=32 Resistant      Ampicillin + Sulbactam 4 Sensitive      Cefazolin <=4 Sensitive      Ciprofloxacin <=0.25 Sensitive      Gentamicin <=1 Sensitive      Meropenem <=0.25 Sensitive      Levofloxacin <=0.12 Sensitive      Nitrofurantoin 32 Sensitive      Piperacillin + Tazobactam <=4 Sensitive      Trimethoprim/Sulfa <=20 Sensitive    2. Blood Culture     Status: Abnormal (Preliminary result)    Collection Time: 06/03/24  9:19 AM    Specimen: Blood,peripheral   Result Value Ref Range    Blood Culture Result Positive N/A    Blood Culture Result Enterococcus faecalis (A) N/A    Blood Smear Positive Blood Culture (A) N/A    Blood Smear Gram positive cocci in pairs and chains (A) N/A       Problem list reviewed:  Patient Active Problem List   Diagnosis    Troponin level elevated    V-tach (HCC)    Paroxysmal atrial fibrillation (HCC)    Coronary artery disease involving native heart without angina pectoris    History of endovascular stent graft for abdominal aortic aneurysm (AAA)    Ischemic cardiomyopathy    S/P CABG x 3    S/P ablation of atrial fibrillation    Dyslipidemia    Bilateral iliac artery aneurysm (HCC)    Iliac artery aneurysm, bilateral ([facility])    S/P AAA repair    Superficial femoral artery occlusion (HCC)    Chronic anticoagulation    Nephrolithiasis    Peripheral vascular disease ([facility])    Other specified hypothyroidism    Primary osteoarthritis of right hip  Global 08/08/2019    Orthopedic aftercare    Status post right hip replacement    Vascular  insufficiency    Allodynia    Neuropathy    ICD (implantable cardioverter-defibrillator) discharge    Ventricular tachycardia ([facility])    Troponin I above reference range    Closed nondisplaced fracture of acromial end of left clavicle, initial encounter    Fall    Bilateral carotid artery stenosis    Pure hypercholesterolemia    Aneurysm of iliac artery (HCC)    CAD (coronary artery disease), native coronary artery    PAD (peripheral artery disease) (HCC)    Cardiomyopathy, ischemic    V tach (HCC)    Hypotension    Chronic systolic heart failure ([facility])    VTE (venous thromboembolism)    Defibrillator discharge    COVID-19    Leukocytosis    Hyperglycemia    Sepsis due to other etiology ([facility])    Febrile illness    Hypokalemia             ASSESSMENT/PLAN:  1. Enterococcus faecalis bacteremia  -admitted with symptoms of fever for 3 days  Underlying ischemic cardiomyopathy with ICD in place     -at risk for ICD lead infection and endocarditis  -ECHO TTE no obvious changes     UA positive, but culture growing Klebsiella and has no urinary symptoms, CT also negative for acute urologic process     Repeat blood cultures sent  IV ampicillin + gent for synergy  Follow daily blood cultures  PICC line 6/6 if no new positive blood cultures  STAN will dw cardiology  Possible ICD extraction planned in the future per EP  DC planning IV PCN on a pump + gent for synergy  SW following      2. Klebsiella bacteruria, appears to be incidental finding     3. Ischemic cardiomyopathy sp CABG, and ICD   Last EF 25-30%, cardiology following (see above)      David Dang MD, MD  [facility], [place], operated by [facility]  [phone]

## 2024-06-05 NOTE — PROGRESS NOTES
Progress Note  Sonia Martell Patient Status:  Inpatient    1950 MRN [id number]   Location [facility] 5NW-A Attending Don Paul MD   Hosp Day # 2 PCP Jana Buck MD     Subjective:  In bed on exam, no specific complaints.     Objective:  /66 (BP Location: Left arm)   Pulse 71   Temp 98.5 °F (36.9 °C) (Oral)   Resp 18   Ht 5' 10\" (1.778 m)   Wt 200 lb (90.7 kg)   SpO2 94%   BMI 28.70 kg/m²     Telemetry: nsr pvcs      Intake/Output:    Intake/Output Summary (Last 24 hours) at 2024 0952  Last data filed at 2024 1100  Gross per 24 hour   Intake --   Output 200 ml   Net -200 ml       Last 3 Weights   24 1443 200 lb (90.7 kg)   24 0759 200 lb (90.7 kg)   24 1329 215 lb (97.5 kg)   23 1025 216 lb (98 kg)       Labs:  Recent Labs   Lab 24  0902 24  0255 24  0712   *  --  106*   BUN 18  --  13   CREATSERUM 0.98  --  0.65   EGFRCR 61  --  92   CA 9.8  --  8.5     --  138   K 3.4* 4.2 4.4     --  113*   CO2 26.0  --  19.0*     Recent Labs   Lab 24  0902 24  0712   RBC 3.87 3.26*   HGB 12.0 10.3*   HCT 35.7 32.1*   MCV 92.2 98.5   MCH 31.0 31.6   MCHC 33.6 32.1   RDW 14.2 14.6   NEPRELIM 15.04* 12.88*   WBC 25.8* 22.1*   .0 213.0         No results for input(s): \"TROP\", \"TROPHS\", \"CK\" in the last 168 hours.    Review of Systems   Cardiovascular:  Positive for leg swelling.   Respiratory: Negative.         Physical Exam:    Gen: alert, oriented x 3, NAD  Heent: pupils equal, reactive. Mucous membranes moist.   Neck: no jvd  Cardiac: regular rate and rhythm, normal S1,S2  Lungs: CTA  Abd: soft, NT/ND +bs  Ext:trace ble edema  Skin: Warm, dry  Neuro: No focal deficits        Medications:     ampicillin  2 g Intravenous Q4H    gentamicin  1 mg/kg (Adjusted) Intravenous Q12H    aspirin  81 mg Oral Daily    levothyroxine  100 mcg Oral Daily @ 0700    rosuvastatin  10 mg Oral QPM    allopurinol  100 mg Oral  Daily    rivaroxaban  2.5 mg Oral BID with meals    sotalol  80 mg Oral 2 times per day    mexiletine  150 mg Oral TID      sodium chloride 75 mL/hr at 06/05/24 0525           Assessment:  Bacteremia admitted with fever x3 days  Cultures + enterococcus faecalis, repeat sent  Abx per ID, leukocytosis, left shift  Dual chamber BS ICD 2017  Not pacer dependent.   Hx VT s/p ablation 11/2022  Intolerant to amio  On sotalol, mexiletine  CAD s/p remote CABG, ICM, LVEF 25%  Echo 6/4/24-LVEF 20-25%, no rwmas.   Historically on entresto, torsemide, rashawn  AAA s/p emergent aortoiliac bypass and graft placement   Low dose xarelto  Moderate MR, moderate TR    Plan:  Abx per ID. Repeat cultures pending.   Home entresto, rashawn, torsemide on hold given hypotension/sepsis on admit. Resume torsemide today. Mild vol OL on exam.   Reviewed with Dr. Mix. IV abx per ID. If recurrent bacteremia despite appropriate abx therapy, then will consider future lead extraction. No need for STAN at this time as will not .    Plan of care discussed with patient, , RN.    No Castaneda, CARLTON  6/5/2024  9:52 AM  [phone]  [facility] [phone]    ____________________________  Pt s data reviewed and discussed with the APN.        I have personally performed the medical decision making in its entirety. My additions include:  Plan  - If Infx breaks through ABX, consider future Lead removal.    R3    ____________________________  Beltran Curtis MD, FACC, RS  Cardiac Electrophysiololgy  [facility]  6/5/2024

## 2024-06-06 ENCOUNTER — APPOINTMENT (OUTPATIENT)
Facility: HOSPITAL | Age: 74
DRG: 871 | End: 2024-06-06
Attending: INTERNAL MEDICINE
Payer: MEDICARE

## 2024-06-06 ENCOUNTER — APPOINTMENT (OUTPATIENT)
Facility: HOSPITAL | Age: 74
End: 2024-06-06
Attending: INTERNAL MEDICINE
Payer: MEDICARE

## 2024-06-06 ENCOUNTER — APPOINTMENT (OUTPATIENT)
Dept: GENERAL RADIOLOGY | Facility: HOSPITAL | Age: 74
DRG: 871 | End: 2024-06-06
Attending: INTERNAL MEDICINE
Payer: MEDICARE

## 2024-06-06 ENCOUNTER — APPOINTMENT (OUTPATIENT)
Dept: GENERAL RADIOLOGY | Facility: HOSPITAL | Age: 74
End: 2024-06-06
Attending: INTERNAL MEDICINE
Payer: MEDICARE

## 2024-06-06 LAB — GENTAMICIN TROUGH SERPL-MCNC: 1.2 UG/ML (ref 1–2)

## 2024-06-06 PROCEDURE — B5181ZA FLUOROSCOPY OF SUPERIOR VENA CAVA USING LOW OSMOLAR CONTRAST, GUIDANCE: ICD-10-PCS | Performed by: HOSPITALIST

## 2024-06-06 PROCEDURE — 99232 SBSQ HOSP IP/OBS MODERATE 35: CPT | Performed by: HOSPITALIST

## 2024-06-06 PROCEDURE — 02HV33Z INSERTION OF INFUSION DEVICE INTO SUPERIOR VENA CAVA, PERCUTANEOUS APPROACH: ICD-10-PCS | Performed by: HOSPITALIST

## 2024-06-06 PROCEDURE — 71045 X-RAY EXAM CHEST 1 VIEW: CPT | Performed by: INTERNAL MEDICINE

## 2024-06-06 RX ORDER — LIDOCAINE HYDROCHLORIDE 10 MG/ML
5 INJECTION, SOLUTION EPIDURAL; INFILTRATION; INTRACAUDAL; PERINEURAL
Status: ACTIVE | OUTPATIENT
Start: 2024-06-06 | End: 2024-06-07

## 2024-06-06 RX ORDER — LIDOCAINE HYDROCHLORIDE 10 MG/ML
5 INJECTION, SOLUTION EPIDURAL; INFILTRATION; INTRACAUDAL; PERINEURAL
Status: COMPLETED | OUTPATIENT
Start: 2024-06-06 | End: 2024-06-06

## 2024-06-06 NOTE — PROGRESS NOTES
Progress Note  Sonia Martell Patient Status:  Inpatient    1950 MRN [id number]   Location [facility] 5NW-A Attending Don Paul MD   Hosp Day # 3 PCP Jana Buck MD     Subjective:  In bed, feeling ok. Denies cp, sob. Good UO with resumption of diuretic.    Objective:  /70 (BP Location: Right arm)   Pulse 85   Temp 98.1 °F (36.7 °C) (Oral)   Resp 17   Ht 5' 10\" (1.778 m)   Wt 200 lb (90.7 kg)   SpO2 100%   BMI 28.70 kg/m²     Telemetry: nsr pvcs      Intake/Output:    Intake/Output Summary (Last 24 hours) at 2024 0748  Last data filed at 2024 0900  Gross per 24 hour   Intake 240 ml   Output 2 ml   Net 238 ml       Last 3 Weights   24 1443 200 lb (90.7 kg)   24 0759 200 lb (90.7 kg)   24 1329 215 lb (97.5 kg)   23 1025 216 lb (98 kg)       Labs:  Recent Labs   Lab 24  0902 24  0255 24  0712   *  --  106*   BUN 18  --  13   CREATSERUM 0.98  --  0.65   EGFRCR 61  --  92   CA 9.8  --  8.5     --  138   K 3.4* 4.2 4.4     --  113*   CO2 26.0  --  19.0*     Recent Labs   Lab 24  0902 24  0712   RBC 3.87 3.26*   HGB 12.0 10.3*   HCT 35.7 32.1*   MCV 92.2 98.5   MCH 31.0 31.6   MCHC 33.6 32.1   RDW 14.2 14.6   NEPRELIM 15.04* 12.88*   WBC 25.8* 22.1*   .0 213.0         No results for input(s): \"TROP\", \"TROPHS\", \"CK\" in the last 168 hours.    Review of Systems   Cardiovascular:  Positive for leg swelling.   Respiratory: Negative.         Physical Exam:    Gen: alert, oriented x 3, NAD  Heent: pupils equal, reactive. Mucous membranes moist.   Neck: no jvd  Cardiac: regular rate and rhythm, normal S1,S2  Lungs: CTA  Abd: soft, NT/ND +bs  Ext:trace ble edema-improved.  Skin: Warm, dry  Neuro: No focal deficits        Medications:     torsemide  20 mg Oral Daily    ampicillin  2 g Intravenous Q4H    gentamicin  1 mg/kg (Adjusted) Intravenous Q12H    aspirin  81 mg Oral Daily    levothyroxine  100 mcg  Oral Daily @ 0700    rosuvastatin  10 mg Oral QPM    allopurinol  100 mg Oral Daily    rivaroxaban  2.5 mg Oral BID with meals    sotalol  80 mg Oral 2 times per day    mexiletine  150 mg Oral TID      sodium chloride 75 mL/hr at 06/05/24 0525           Assessment:  Bacteremia admitted with fever x3 days  Cultures + enterococcus faecalis, repeat ngtd.   Abx per ID, leukocytosis, left shift  Dual chamber BS ICD 2017  Not pacer dependent.   Hx VT s/p ablation 11/2022  Intolerant to amio  On sotalol, mexiletine  CAD s/p remote CABG, ICM, LVEF 25%  Echo 6/4/24-LVEF 20-25%, no rwmas.   Historically on entresto, torsemide, rashawn  AAA s/p emergent aortoiliac bypass and graft placement   Low dose xarelto  Moderate MR, moderate TR    Plan:  Abx per ID. Repeat cultures ngtd.   Torsemide resumed yesterday.   Home rashawn and entresto remain on hold. Resume entresto this evening (home sbp 90-110mmhg)  Reviewed with Dr. Mix. IV abx per ID. If recurrent bacteremia despite appropriate abx therapy, then will consider future lead extraction. No need for STAN at this time as will not .  Possible dc tomorrow pending course.     Plan of care discussed with patient, , RN.    No Castaneda, CARLTON  6/6/2024  7:48 AM  [phone]  [facility] [phone]       Chart reveiwed and decision making performed in entirety with discussion with staff. Agree with above note and assessment with the following additions made below.     Continue antibiotics as per ID  If pt has recurrent bacteremia after antibiotics, will plan on extraction.  Can see me post hospital discharge.     Layo Mix MD  [facility]  Cardiac Electrophysiolgy

## 2024-06-06 NOTE — PROGRESS NOTES
INFECTIOUS DISEASE  PROGRESS NOTE            [facility]    Sonia Martell Patient Status:  Inpatient    1950 MRN [id number]   [facility] 5NW-A Attending Don Paul MD   Hosp Day # 3 PCP Jana Buck MD     Antibiotics: #3  Amp/gent#2  Subjective:  Picc was placed    Objective:  Temp:  [97.7 °F (36.5 °C)-99.8 °F (37.7 °C)] 99.8 °F (37.7 °C)  Pulse:  [77-98] 85  Resp:  [16-18] 18  BP: ()/(57-71) 99/71  SpO2:  [96 %-100 %] 100 %    General: awake alert  Vital signs:Temp:  [97.7 °F (36.5 °C)-99.8 °F (37.7 °C)] 99.8 °F (37.7 °C)  Pulse:  [77-98] 85  Resp:  [16-18] 18  BP: ()/(57-71) 99/71  SpO2:  [96 %-100 %] 100 %  HEENT: Moist mucous membranes. Extraocular muscles are intact.  Neck: supple no masses  Respiratory: Non labored, symmetric excursion, normal respirations  Cardiovascular: no irregularities in rhythm  Abdomen: Soft, nontender, nondistended.   Musculoskeletal: joints: no swelling   Integument: No lesions. No erythema. No open wounds.  Labs:     COVID-19 Lab Results    COVID-19  Lab Results   Component Value Date    COVID19 Detected (A) 2022    COVID19 Not Detected 10/27/2020       Pro-Calcitonin  Recent Labs   Lab 24  0902   PCT 0.68*       Cardiac  No results for input(s): \"TROP\", \"PBNP\" in the last 168 hours.    Creatinine Kinase  No results for input(s): \"CK\" in the last 168 hours.    Inflammatory Markers  No results for input(s): \"CRP\", \"MARCELLE\", \"LDH\", \"DDIMER\" in the last 168 hours.    Recent Labs   Lab 24  0712   RBC 3.26*   HGB 10.3*   HCT 32.1*   MCV 98.5   MCH 31.6   MCHC 32.1   RDW 14.6   NEPRELIM 12.88*   WBC 22.1*   .0         Recent Labs   Lab 24  0902 24  0255 24  0712   *  --  106*   BUN 18  --  13   CREATSERUM 0.98  --  0.65   CA 9.8  --  8.5   ALB 2.8*  --   --      --  138   K 3.4* 4.2 4.4     --  113*   CO2 26.0  --  19.0*   ALKPHO 114  --   --    AST 32  --   --    ALT 28  --    --    BILT 0.5  --   --    TP 7.7  --   --        Vancomycin Trough (ug/mL)   Date Value   05/17/2017 10.6     Microbiology    Hospital Encounter on 06/03/24   1. Blood Culture     Status: None (Preliminary result)    Collection Time: 06/04/24  7:51 AM    Specimen: Blood,peripheral   Result Value Ref Range    Blood Culture Result No Growth 1 Day N/A   2. Urine Culture, Routine     Status: Abnormal    Collection Time: 06/03/24 10:46 AM    Specimen: Urine, clean catch   Result Value Ref Range    Urine Culture >100,000 CFU/ML Klebsiella pneumoniae (A) N/A       Susceptibility    Klebsiella pneumoniae -  (no method available)     Ampicillin >=32 Resistant      Ampicillin + Sulbactam 4 Sensitive      Cefazolin <=4 Sensitive      Ciprofloxacin <=0.25 Sensitive      Gentamicin <=1 Sensitive      Meropenem <=0.25 Sensitive      Levofloxacin <=0.12 Sensitive      Nitrofurantoin 32 Sensitive      Piperacillin + Tazobactam <=4 Sensitive      Trimethoprim/Sulfa <=20 Sensitive        Problem list reviewed:  Patient Active Problem List   Diagnosis    Troponin level elevated    V-tach (HCC)    Paroxysmal atrial fibrillation (HCC)    Coronary artery disease involving native heart without angina pectoris    History of endovascular stent graft for abdominal aortic aneurysm (AAA)    Ischemic cardiomyopathy    S/P CABG x 3    S/P ablation of atrial fibrillation    Dyslipidemia    Bilateral iliac artery aneurysm (HCC)    Iliac artery aneurysm, bilateral (HCC)    S/P AAA repair    Superficial femoral artery occlusion (HCC)    Chronic anticoagulation    Nephrolithiasis    Peripheral vascular disease (HCC)    Other specified hypothyroidism    Primary osteoarthritis of right hip  Global 08/08/2019    Orthopedic aftercare    Status post right hip replacement    Vascular insufficiency    Allodynia    Neuropathy    ICD (implantable cardioverter-defibrillator) discharge    Ventricular tachycardia (HCC)    Troponin I above reference range     Closed nondisplaced fracture of acromial end of left clavicle, initial encounter    Fall    Bilateral carotid artery stenosis    Pure hypercholesterolemia    Aneurysm of iliac artery (HCC)    CAD (coronary artery disease), native coronary artery    PAD (peripheral artery disease) (HCC)    Cardiomyopathy, ischemic    V tach (HCC)    Hypotension    Chronic systolic heart failure (HCC)    VTE (venous thromboembolism)    Defibrillator discharge    COVID-19    Leukocytosis    Hyperglycemia    Sepsis due to other etiology (HCC)    Febrile illness    Hypokalemia             ASSESSMENT/PLAN:  1. Enterococcus faecalis bacteremia  -admitted with symptoms of fever for 3 days  Underlying ischemic cardiomyopathy with ICD in place     -at risk for ICD lead infection and endocarditis  -ECHO TTE no obvious changes  Repeat blood cultures no growth to date  PICC Placed  STAN deferred per cardiology     IV ampicillin + gent for synergy  Adjust gent to once daily  IV PCN on a pump to replace ampicillin for discharge  Possible ICD extraction planned in the future per EP  Repeat blood cultures 10-14 days after completes ivabx, extraction if fails to clear       2. Klebsiella bacteruria, appears to be incidental finding     3. Ischemic cardiomyopathy sp CABG, and ICD   Last EF 25-30%, cardiology following (see above)      David Dang MD, MD [facility]  [phone]

## 2024-06-06 NOTE — PROGRESS NOTES
[facility]   part of [facility]     Hospitalist Progress Note     Sonia Martell Patient Status:  Inpatient    1950 MRN [id number]   Location [facility] 5NW-A Attending Prabha Casarez MD   Hosp Day # 3 PCP Jana Buck MD     Subjective:   No issues     Objective:    Review of Systems:   A comprehensive review of systems was completed; pertinent positive and negatives stated in subjective.    Vital signs:  Temp:  [97.7 °F (36.5 °C)-98.5 °F (36.9 °C)] 98.1 °F (36.7 °C)  Pulse:  [77-98] 85  Resp:  [16-18] 17  BP: ()/(57-70) 110/70  SpO2:  [96 %-100 %] 100 %    Physical Exam:    General: No acute distress  Respiratory: No wheezes, no rhonchi  Cardiovascular: S1, S2, regular rate and rhythm  Abdomen: Soft, Non-tender, non-distended, positive bowel sounds  Neuro: No new focal deficits.   Extremities: No edema      Diagnostic Data:    Labs:  Recent Labs   Lab 24  0902 24  0712   WBC 25.8* 22.1*   HGB 12.0 10.3*   MCV 92.2 98.5   .0 213.0       Recent Labs   Lab 24  0902 24  0255 24  0712   *  --  106*   BUN 18  --  13   CREATSERUM 0.98  --  0.65   CA 9.8  --  8.5   ALB 2.8*  --   --      --  138   K 3.4* 4.2 4.4     --  113*   CO2 26.0  --  19.0*   ALKPHO 114  --   --    AST 32  --   --    ALT 28  --   --    BILT 0.5  --   --    TP 7.7  --   --        Estimated Creatinine Clearance: 82.1 mL/min (based on SCr of 0.65 mg/dL).    No results for input(s): \"TROP\", \"TROPHS\", \"CK\" in the last 168 hours.    No results for input(s): \"PTP\", \"INR\" in the last 168 hours.               Microbiology    Hospital Encounter on 24   1. Blood Culture     Status: None (Preliminary result)    Collection Time: 24  7:51 AM    Specimen: Blood,peripheral   Result Value Ref Range    Blood Culture Result No Growth 1 Day N/A   2. Urine Culture, Routine     Status: Abnormal    Collection Time: 24 10:46 AM    Specimen: Urine, clean catch    Result Value Ref Range    Urine Culture >100,000 CFU/ML Klebsiella pneumoniae (A) N/A       Susceptibility    Klebsiella pneumoniae -  (no method available)     Ampicillin >=32 Resistant      Ampicillin + Sulbactam 4 Sensitive      Cefazolin <=4 Sensitive      Ciprofloxacin <=0.25 Sensitive      Gentamicin <=1 Sensitive      Meropenem <=0.25 Sensitive      Levofloxacin <=0.12 Sensitive      Nitrofurantoin 32 Sensitive      Piperacillin + Tazobactam <=4 Sensitive      Trimethoprim/Sulfa <=20 Sensitive          Imaging: Reviewed in Epic.    Medications:    torsemide  20 mg Oral Daily    ampicillin  2 g Intravenous Q4H    gentamicin  1 mg/kg (Adjusted) Intravenous Q12H    aspirin  81 mg Oral Daily    levothyroxine  100 mcg Oral Daily @ 0700    rosuvastatin  10 mg Oral QPM    allopurinol  100 mg Oral Daily    rivaroxaban  2.5 mg Oral BID with meals    sotalol  80 mg Oral 2 times per day    mexiletine  150 mg Oral TID       Assessment & Plan:      # Sepsis   - blood cx with enterococcus faecalis   - repeat blood cx NGTD   - ID consult in light ICD and recent abx treatment   - Abx ongoing   - PICC line and outpt IV abx upon discharge     # hx of sustained VT sp ICD. VT ablation 2022- EP following and if further +Blood cx would perform lead extraction     # generalized weakness with hypotension due to above - as above      # possible recurrent UTI- Klebsiella - per ID       # Left PNA   - cont ABx   -CT chest reviewed      # chronic hypotension   - montor   - entresto, aldactone and torsemide on hold   - allergic to midodrine   - consider cards eval pre course    # CAD sp CABG  # chronic systolic HF  #PAD  # Gout   # hypothyroidism   # Afib s hx of MAZE JOHNATHAN amputation   # hx of VTE  - xarelto     Supplementary Documentation:     Quality:  DVT Mechanical Prophylaxis:   SCDs,    DVT Pharmacologic Prophylaxis   Medication    rivaroxaban (Xarelto) tab 2.5 mg         DVT Pharmacologic prophylaxis: Aspirin 81 mg      Code  Status: Full Code  Lam: No urinary catheter in place  Lam Duration (in days):   Central line:    CIRILO:   Discharge is dependent on: course  At this point Ms. Martell is expected to be discharge to: home     The 21st Century Cures Act makes medical notes like these available to patients in the interest of transparency. Please be advised this is a medical document. Medical documents are intended to carry relevant information, facts as evident, and the clinical opinion of the practitioner. The medical note is intended as peer to peer communication and may appear blunt or direct. It is written in medical language and may contain abbreviations or verbiage that are unfamiliar.

## 2024-06-06 NOTE — PLAN OF CARE
Pt from home w/ spouse, plan to dc home  Aox4  VSS on RA  afebrile  NSR on tele, receiving Xarelto  Electrolyte cardiac replacement  continent  Up self, call light within reach  Regular diet  Receiving IV abx and IVF  PICC placement if no growth on blood cx  Pt updated on current POC, no questions at this time    Problem: Patient/Family Goals  Goal: Patient/Family Long Term Goal  Description: Patient's Long Term Goal: dc home    Interventions:  - IV abx  - ID consult  - Echo  - See additional Care Plan goals for specific interventions  Outcome: Progressing  Goal: Patient/Family Short Term Goal  Description: Patient's Short Term Goal: Decreased fatigue    Interventions:   - IV abx  - IVF  - See additional Care Plan goals for specific interventions  Outcome: Progressing

## 2024-06-06 NOTE — CM/SW NOTE
Informed by RN that we are anticipating discharge Friday. Spoke with Aspen from [facility]. Discussed with ID, plan for IV Penicillin to run over 23 hours in order to also give the IV Gentamicin. Patient will need Penicillin prior to discharge as she has not received it yet here. Will need to hold Gentamicin tomorrow until she goes home in order for  RN to do teach and train for Gentamicin and then start the Penicillin for 23 hours. Aspen confirmed [facility] is able to do SOC/T&T tomorrow 6/7 around 3pm.       SW/CM to remain available for support and/or discharge planning.    PANKAJ Posey  Discharge Planner  [phone]

## 2024-06-07 VITALS
HEART RATE: 64 BPM | DIASTOLIC BLOOD PRESSURE: 63 MMHG | HEIGHT: 70 IN | RESPIRATION RATE: 19 BRPM | WEIGHT: 200 LBS | OXYGEN SATURATION: 96 % | BODY MASS INDEX: 28.63 KG/M2 | TEMPERATURE: 98 F | SYSTOLIC BLOOD PRESSURE: 96 MMHG

## 2024-06-07 LAB
ANION GAP SERPL CALC-SCNC: 6 MMOL/L (ref 0–18)
BUN BLD-MCNC: 5 MG/DL (ref 9–23)
CALCIUM BLD-MCNC: 8.2 MG/DL (ref 8.5–10.1)
CHLORIDE SERPL-SCNC: 104 MMOL/L (ref 98–112)
CO2 SERPL-SCNC: 28 MMOL/L (ref 21–32)
CREAT BLD-MCNC: 0.74 MG/DL
EGFRCR SERPLBLD CKD-EPI 2021: 85 ML/MIN/1.73M2 (ref 60–?)
GLUCOSE BLD-MCNC: 119 MG/DL (ref 70–99)
OSMOLALITY SERPL CALC.SUM OF ELEC: 284 MOSM/KG (ref 275–295)
POTASSIUM SERPL-SCNC: 3.4 MMOL/L (ref 3.5–5.1)
SODIUM SERPL-SCNC: 138 MMOL/L (ref 136–145)

## 2024-06-07 PROCEDURE — 99239 HOSP IP/OBS DSCHRG MGMT >30: CPT | Performed by: HOSPITALIST

## 2024-06-07 RX ORDER — TORSEMIDE 20 MG/1
10 TABLET ORAL DAILY
Status: SHIPPED | COMMUNITY
Start: 2024-06-07

## 2024-06-07 RX ORDER — POTASSIUM CHLORIDE 20 MEQ/1
40 TABLET, EXTENDED RELEASE ORAL EVERY 4 HOURS
Status: DISCONTINUED | OUTPATIENT
Start: 2024-06-07 | End: 2024-06-07

## 2024-06-07 NOTE — PLAN OF CARE
Pt from home w/ spouse, plan to dc home  Aox4  VSS on RA/2L NOC  afebrile  NSR on tele, receiving Xarelto  Electrolyte cardiac replacement  continent  Up self, call light within reach  Regular diet  Receiving IV abx and IVF  R arm precautions d/t PICC  Pt updated on current POC, no questions at this time    Problem: Patient/Family Goals  Goal: Patient/Family Long Term Goal  Description: Patient's Long Term Goal: dc home    Interventions:  - IV abx  - ID consult  - Echo  - See additional Care Plan goals for specific interventions  Outcome: Progressing  Goal: Patient/Family Short Term Goal  Description: Patient's Short Term Goal: Decreased fatigue    Interventions:   - IV abx  - IVF  - See additional Care Plan goals for specific interventions  Outcome: Progressing

## 2024-06-07 NOTE — PROGRESS NOTES
NURSING DISCHARGE NOTE    Discharged Home via Wheelchair.  Accompanied by Spouse and Support staff  Belongings Taken by patient/family.    Pt discharged home. Reviewed discharge paperwork with pt and pt's . Explained at home antibiotics-Gentamicin and Penicillin. Educated pt on PICC line and importance of keeping site clean. Reviewed medications. Tele removed, PIV left arm removed. All pt belongings sent with pt.  All questions answered no further needs at this time.

## 2024-06-07 NOTE — SPIRITUAL CARE NOTE
Spiritual Care Visit Note    Patient Name: Sonia Martell Date of Spiritual Care Visit: 24   : 1950 Primary Dx: Sepsis due to other etiology (HCC)       Referred By: Referral From: Other (Comment)    Spiritual Care Taxonomy:    Intended Effects: Demonstrate caring and concern    Methods: Offer support    Interventions: Acknowledge current situation;Explain  role;Share words of hope and inspiration    Visit Type/Summary:     - Spiritual Care: Consulted with RN prior to visit. Patient and family expressed appreciation for  visit.  remains available as needed for follow up.    Spiritual Care support can be requested via an Epic consult. For urgent/immediate needs, please contact the On Call  at: Edward: ext [phone]

## 2024-06-07 NOTE — PLAN OF CARE
Problem: Patient/Family Goals  Goal: Patient/Family Long Term Goal  Description: Patient's Long Term Goal: dc home    Interventions:  - IV abx  - ID consult  - Echo  - See additional Care Plan goals for specific interventions  6/7/2024 1424 by Vania Delgadillo RN  Outcome: Adequate for Discharge  6/7/2024 1419 by Vania Delgadillo RN  Outcome: Progressing  Goal: Patient/Family Short Term Goal  Description: Patient's Short Term Goal: Decreased fatigue  6/7 AM: discharge       Interventions:   - IV abx  - IVF  - See additional Care Plan goals for specific interventions  6/7/2024 1424 by Vania Delgadillo RN  Outcome: Adequate for Discharge  6/7/2024 1419 by Vania Delgadillo RN  Outcome: Progressing

## 2024-06-07 NOTE — CM/SW NOTE
Spoke with Aspen from [facility] who is arranging delivery today. Patient will need a dose of penicillin before discharge. Spoke with Martha from [facility] and informed her of plan for discharge today. They have HH RN visit scheduled at 3pm today.    CHIKA/IRAJ to remain available for support and/or discharge planning.    PANKAJ Posey  Discharge Planner  [phone]

## 2024-06-07 NOTE — PROGRESS NOTES
Progress Note  Sonia Martell Patient Status:  Inpatient    1950 MRN [id number]   Location [facility] 5NW-A Attending Don Paul MD   Hosp Day # 4 PCP Jana Buck MD     Subjective:  In bed, eager to go home. Feels well.     Objective:  BP 91/50 (BP Location: Left arm)   Pulse 66   Temp 98.2 °F (36.8 °C) (Oral)   Resp 18   Ht 5' 10\" (1.778 m)   Wt 200 lb (90.7 kg)   SpO2 95%   BMI 28.70 kg/m²     Telemetry: nsr pvcs      Intake/Output:  No intake or output data in the 24 hours ending 24 1229      Last 3 Weights   24 1443 200 lb (90.7 kg)   24 0759 200 lb (90.7 kg)   24 1329 215 lb (97.5 kg)   23 1025 216 lb (98 kg)       Labs:  Recent Labs   Lab 24  0902 24  0255 24  0712 24  0856   *  --  106* 119*   BUN 18  --  13 5*   CREATSERUM 0.98  --  0.65 0.74   EGFRCR 61  --  92 85   CA 9.8  --  8.5 8.2*     --  138 138   K 3.4* 4.2 4.4 3.4*     --  113* 104   CO2 26.0  --  19.0* 28.0     Recent Labs   Lab 24  0902 24  0712   RBC 3.87 3.26*   HGB 12.0 10.3*   HCT 35.7 32.1*   MCV 92.2 98.5   MCH 31.0 31.6   MCHC 33.6 32.1   RDW 14.2 14.6   NEPRELIM 15.04* 12.88*   WBC 25.8* 22.1*   .0 213.0         No results for input(s): \"TROP\", \"TROPHS\", \"CK\" in the last 168 hours.    Review of Systems   Cardiovascular:  Positive for leg swelling.   Respiratory: Negative.         Physical Exam:    Gen: alert, oriented x 3, NAD  Heent: pupils equal, reactive. Mucous membranes moist.   Neck: no jvd  Cardiac: regular rate and rhythm, normal S1,S2  Lungs: CTA  Abd: soft, NT/ND +bs  Ext:trace ble edema-improved.  Skin: Warm, dry  Neuro: No focal deficits        Medications:     potassium chloride  40 mEq Oral Q4H    sacubitril-valsartan  1 tablet Oral BID    gentamicin  2 mg/kg (Adjusted) Intravenous Q24H    torsemide  20 mg Oral Daily    ampicillin  2 g Intravenous Q4H    aspirin  81 mg Oral Daily    levothyroxine   100 mcg Oral Daily @ 0700    rosuvastatin  10 mg Oral QPM    allopurinol  100 mg Oral Daily    rivaroxaban  2.5 mg Oral BID with meals    sotalol  80 mg Oral 2 times per day    mexiletine  150 mg Oral TID               Assessment:  Bacteremia admitted with fever x3 days  Cultures + enterococcus faecalis, repeat ngtd.   Abx per ID, leukocytosis, left shift  Dual chamber BS ICD 2017  Not pacer dependent.   Hx VT s/p ablation 11/2022  Intolerant to amio  On sotalol, mexiletine  CAD s/p remote CABG, ICM, LVEF 25%  Echo 6/4/24-LVEF 20-25%, no rwmas.   Historically on entresto, torsemide, rashawn  AAA s/p emergent aortoiliac bypass and graft placement   Low dose xarelto  Moderate MR, moderate TR    Plan:  Abx per ID. Plans to repeat cultures 10-14 days.  Entresto resumed last night and held this am with hypotension. Torsemide also held. Discussed in detail with patient. BP at baseline. Will continue. Asked if she could reduce torsemide dosing. Will reduce to half and follow vol status closely.   Consider future extraction if cultures do not clear  F/u with APN 1 week. Ok to dc from cardiology standpoint.      Plan of care discussed with patient, , RN.  No Castaneda, APRN  6/7/2024  12:29 PM  [phone]  [facility] [phone]      Chart reveiwed and decision making performed in entirety with discussion with staff. Agree with above note and assessment with the following additions made below.      Continue antibiotics as per ID  If pt has recurrent bacteremia after antibiotics, will plan on extraction.  Can see me post hospital discharge.      Layo Mix MD  [facility]  Cardiac Electrophysiolgy

## 2024-06-07 NOTE — PROGRESS NOTES
INFECTIOUS DISEASE  PROGRESS NOTE            [facility]    Sonia Martell Patient Status:  Inpatient    1950 MRN [id number]   [facility] 5NW-A Attending Don Paul MD   Hosp Day # 4 PCP Jana Buck MD     Antibiotics: #4  Amp/gent#3  Subjective:  comfortable    Objective:  Temp:  [98 °F (36.7 °C)-99.8 °F (37.7 °C)] 98.2 °F (36.8 °C)  Pulse:  [83-99] 83  Resp:  [16-19] 18  BP: ()/(46-71) 91/55  SpO2:  [90 %-95 %] 95 %    General: awake alert  Vital signs:Temp:  [98 °F (36.7 °C)-99.8 °F (37.7 °C)] 98.2 °F (36.8 °C)  Pulse:  [83-99] 83  Resp:  [16-19] 18  BP: ()/(46-71) 91/55  SpO2:  [90 %-95 %] 95 %  HEENT: Moist mucous membranes. Extraocular muscles are intact.  Neck: supple no masses  Respiratory: Non labored, symmetric excursion, normal respirations  Cardiovascular: no irregularities in rhythm  Abdomen: Soft, nontender, nondistended.   Musculoskeletal: joints: no swelling   Integument: No lesions. No erythema. No open wounds.  Labs:     COVID-19 Lab Results    COVID-19  Lab Results   Component Value Date    COVID19 Detected (A) 2022    COVID19 Not Detected 10/27/2020       Pro-Calcitonin  Recent Labs   Lab 24  0902   PCT 0.68*       Cardiac  No results for input(s): \"TROP\", \"PBNP\" in the last 168 hours.    Creatinine Kinase  No results for input(s): \"CK\" in the last 168 hours.    Inflammatory Markers  No results for input(s): \"CRP\", \"MARCELLE\", \"LDH\", \"DDIMER\" in the last 168 hours.    Recent Labs   Lab 24  0712   RBC 3.26*   HGB 10.3*   HCT 32.1*   MCV 98.5   MCH 31.6   MCHC 32.1   RDW 14.6   NEPRELIM 12.88*   WBC 22.1*   .0         Recent Labs   Lab 24  0902 24  0255 24  0712   *  --  106*   BUN 18  --  13   CREATSERUM 0.98  --  0.65   CA 9.8  --  8.5   ALB 2.8*  --   --      --  138   K 3.4* 4.2 4.4     --  113*   CO2 26.0  --  19.0*   ALKPHO 114  --   --    AST 32  --   --    ALT 28  --   --    BILT  0.5  --   --    TP 7.7  --   --        Vancomycin Trough (ug/mL)   Date Value   05/17/2017 10.6     Microbiology    Hospital Encounter on 06/03/24   1. Blood Culture     Status: None (Preliminary result)    Collection Time: 06/05/24  7:08 AM    Specimen: Blood,peripheral   Result Value Ref Range    Blood Culture Result No Growth 1 Day N/A   2. Urine Culture, Routine     Status: Abnormal    Collection Time: 06/03/24 10:46 AM    Specimen: Urine, clean catch   Result Value Ref Range    Urine Culture >100,000 CFU/ML Klebsiella pneumoniae (A) N/A       Susceptibility    Klebsiella pneumoniae -  (no method available)     Ampicillin >=32 Resistant      Ampicillin + Sulbactam 4 Sensitive      Cefazolin <=4 Sensitive      Ciprofloxacin <=0.25 Sensitive      Gentamicin <=1 Sensitive      Meropenem <=0.25 Sensitive      Levofloxacin <=0.12 Sensitive      Nitrofurantoin 32 Sensitive      Piperacillin + Tazobactam <=4 Sensitive      Trimethoprim/Sulfa <=20 Sensitive        Problem list reviewed:  Patient Active Problem List   Diagnosis    Troponin level elevated    V-tach (HCC)    Paroxysmal atrial fibrillation (HCC)    Coronary artery disease involving native heart without angina pectoris    History of endovascular stent graft for abdominal aortic aneurysm (AAA)    Ischemic cardiomyopathy    S/P CABG x 3    S/P ablation of atrial fibrillation    Dyslipidemia    Bilateral iliac artery aneurysm (HCC)    Iliac artery aneurysm, bilateral ([facility])    S/P AAA repair    Superficial femoral artery occlusion ([facility])    Chronic anticoagulation    Nephrolithiasis    Peripheral vascular disease ([facility])    Other specified hypothyroidism    Primary osteoarthritis of right hip  Global 08/08/2019    Orthopedic aftercare    Status post right hip replacement    Vascular insufficiency    Allodynia    Neuropathy    ICD (implantable cardioverter-defibrillator) discharge    Ventricular tachycardia (HCC)    Troponin I above reference range    Closed  nondisplaced fracture of acromial end of left clavicle, initial encounter    Fall    Bilateral carotid artery stenosis    Pure hypercholesterolemia    Aneurysm of iliac artery (HCC)    CAD (coronary artery disease), native coronary artery    PAD (peripheral artery disease) (HCC)    Cardiomyopathy, ischemic    V tach (HCC)    Hypotension    Chronic systolic heart failure (HCC)    VTE (venous thromboembolism)    Defibrillator discharge    COVID-19    Leukocytosis    Hyperglycemia    Sepsis due to other etiology (HCC)    Febrile illness    Hypokalemia             ASSESSMENT/PLAN:  1. Enterococcus faecalis bacteremia  -admitted with symptoms of fever for 3 days  Underlying ischemic cardiomyopathy with ICD in place     -at risk for ICD lead infection and endocarditis  -ECHO TTE no obvious changes  Repeat blood cultures no growth to date  PICC Placed  STAN deferred per cardiology     IV ampicillin + gent for synergy  Gent adjusted to once daily  IV PCN on a pump to replace ampicillin for discharge  PCN \"allergy\" listed \"nausea only\" not a true allergy  Possible ICD extraction planned in the future per EP  Repeat blood cultures 10-14 days after completes ivabx, extraction if fails to clear       2. Klebsiella bacteruria, appears to be incidental finding     3. Ischemic cardiomyopathy sp CABG, and ICD   Last EF 25-30%, cardiology following (see above)    Home today      David Dang MD, MD [facility]  [phone]

## 2024-06-07 NOTE — PROGRESS NOTES
[facility]   part of [facility]     Hospitalist Progress Note     Sonia Martell Patient Status:  Inpatient    1950 MRN [id number]   Location [facility] 5NW-A Attending Prabha Casarez MD   Hosp Day # 4 PCP Jana Buck MD     Subjective:   No issues   Low BP this but asymptomatic and patient states BP runs 90/50    Objective:    Review of Systems:   A comprehensive review of systems was completed; pertinent positive and negatives stated in subjective.    Vital signs:  Temp:  [98 °F (36.7 °C)-99.8 °F (37.7 °C)] 98.2 °F (36.8 °C)  Pulse:  [83-99] 83  Resp:  [16-19] 18  BP: ()/(46-71) 87/55  SpO2:  [90 %-95 %] 95 %    Physical Exam:    General: No acute distress  Respiratory: No wheezes, no rhonchi  Cardiovascular: S1, S2, regular rate and rhythm  Abdomen: Soft, Non-tender, non-distended, positive bowel sounds  Neuro: No new focal deficits.   Extremities: No edema      Diagnostic Data:    Labs:  Recent Labs   Lab 24  0902 24  0712   WBC 25.8* 22.1*   HGB 12.0 10.3*   MCV 92.2 98.5   .0 213.0       Recent Labs   Lab 24  0902 24  0255 24  0712   *  --  106*   BUN 18  --  13   CREATSERUM 0.98  --  0.65   CA 9.8  --  8.5   ALB 2.8*  --   --      --  138   K 3.4* 4.2 4.4     --  113*   CO2 26.0  --  19.0*   ALKPHO 114  --   --    AST 32  --   --    ALT 28  --   --    BILT 0.5  --   --    TP 7.7  --   --        Estimated Creatinine Clearance: 82.1 mL/min (based on SCr of 0.65 mg/dL).    No results for input(s): \"TROP\", \"TROPHS\", \"CK\" in the last 168 hours.    No results for input(s): \"PTP\", \"INR\" in the last 168 hours.               Microbiology    Hospital Encounter on 24   1. Blood Culture     Status: None (Preliminary result)    Collection Time: 24  7:08 AM    Specimen: Blood,peripheral   Result Value Ref Range    Blood Culture Result No Growth 1 Day N/A   2. Urine Culture, Routine     Status: Abnormal    Collection  Time: 06/03/24 10:46 AM    Specimen: Urine, clean catch   Result Value Ref Range    Urine Culture >100,000 CFU/ML Klebsiella pneumoniae (A) N/A       Susceptibility    Klebsiella pneumoniae -  (no method available)     Ampicillin >=32 Resistant      Ampicillin + Sulbactam 4 Sensitive      Cefazolin <=4 Sensitive      Ciprofloxacin <=0.25 Sensitive      Gentamicin <=1 Sensitive      Meropenem <=0.25 Sensitive      Levofloxacin <=0.12 Sensitive      Nitrofurantoin 32 Sensitive      Piperacillin + Tazobactam <=4 Sensitive      Trimethoprim/Sulfa <=20 Sensitive          Imaging: Reviewed in Epic.    Medications:    sacubitril-valsartan  1 tablet Oral BID    gentamicin  2 mg/kg (Adjusted) Intravenous Q24H    lidocaine PF  5 mL Intradermal Once    torsemide  20 mg Oral Daily    ampicillin  2 g Intravenous Q4H    aspirin  81 mg Oral Daily    levothyroxine  100 mcg Oral Daily @ 0700    rosuvastatin  10 mg Oral QPM    allopurinol  100 mg Oral Daily    rivaroxaban  2.5 mg Oral BID with meals    sotalol  80 mg Oral 2 times per day    mexiletine  150 mg Oral TID       Assessment & Plan:      # Sepsis   - blood cx with enterococcus faecalis   - repeat blood cx NGTD   - ID consult in light ICD and recent abx treatment   - Abx ongoing with Amp/Gent   - PICC line and outpt IV abx upon discharge   - plan to repeat Blood Cx 10-14 days after completion of abx- if recurs then consider ICD extraction     # hx of sustained VT sp ICD. VT ablation 2022- EP following and plan as above      # generalized weakness with hypotension due to above - as above      # possible recurrent UTI- Klebsiella - per ID       # Left PNA   - cont ABx   -CT chest reviewed      # chronic hypotension   - monitor   -s/p IVF- stop for now and monitor this afternoon   - entresto, aldactone and torsemide on hold - f/u cardio input   - allergic to midodrine     # CAD sp CABG  # chronic systolic HF  #PAD  # Gout   # hypothyroidism   # Afib s hx of MAZE JOHNATHAN amputation    # hx of VTE  - xarelto       DC planning pending cardio and ID input     Supplementary Documentation:     Quality:  DVT Mechanical Prophylaxis:   SCDs,    DVT Pharmacologic Prophylaxis   Medication    rivaroxaban (Xarelto) tab 2.5 mg         DVT Pharmacologic prophylaxis: Aspirin 81 mg      Code Status: Full Code  Lam: No urinary catheter in place  Lam Duration (in days):   Central line:    CIRILO: 6/7/2024  Discharge is dependent on: course  At this point Ms. Martell is expected to be discharge to: home     The 21st Century Cures Act makes medical notes like these available to patients in the interest of transparency. Please be advised this is a medical document. Medical documents are intended to carry relevant information, facts as evident, and the clinical opinion of the practitioner. The medical note is intended as peer to peer communication and may appear blunt or direct. It is written in medical language and may contain abbreviations or verbiage that are unfamiliar.

## 2024-06-08 NOTE — DISCHARGE SUMMARY
[facility]     Sonia Martell Patient Status:  Inpatient    1950 MRN [id number]   Location [facility] 5NW-A Attending No att. providers found   Hosp Day # 4 PCP Jana Buck MD     Date of Admission: 6/3/2024  Date of Discharge: 2024    Discharge Disposition: Home or Self Care    Admitting Diagnosis:   Sepsis due to other etiology (HCC) [A41.89]    Hospital Discharge Diagnoses:  # Sepsis   - blood cx with enterococcus faecalis   - repeat blood cx NGTD   - ID consult in light ICD and recent abx treatment   - Abx ongoing with Amp/Gent   - PICC line and outpt IV abx upon discharge   - plan to repeat Blood Cx 10-14 days after completion of abx- if recurs then consider ICD extraction      # hx of sustained VT sp ICD. VT ablation - EP following and plan as above       # generalized weakness with hypotension due to above - as above      # possible recurrent UTI- Klebsiella - per ID       # Left PNA   - cont ABx   -CT chest reviewed      # chronic hypotension   - monitor   -s/p IVF- stop for now and monitor this afternoon   - entresto, aldactone and torsemide on hold - f/u cardio input   - allergic to midodrine      # CAD sp CABG  # chronic systolic HF  #PAD  # Gout   # hypothyroidism   # Afib s hx of MAZE JOHNATHAN amputation   # hx of VTE  - xarelto     Lace+ Score: 60  59-90 High Risk  29-58 Medium Risk  0-28   Low Risk.     History of Present Illness: Sonia Martell is a 73 year old female with AAA. Afib, anemia, CAD, presented to ER with generalized weakness, N/V/D over the last day of so. She has been treated as outpatient with multiple rouonds of antibiotic for UTI per PCP and her last course completed 3 weeks ago. No abd pain, cough, CP/SOB/F/C reported.     Brief Synopsis: Patient seen by cardiology and infectious disease.  Patient continued on IV antibiotics and following blood cultures.  Patient had Enterococcus in the blood which repeat cultures cleared.  Patient  had a PICC line placed with continuous IV antibiotics for outpatient.  Patient will warrant close outpatient follow-up after clearing of the infection with repeat blood cultures to determine if the ICD needs to be extracted.  Patient cleared by cardiology and ID will be following up as outpatient.  Patient discharged in good condition.    Procedures during hospitalization:   None     Lab/Test results pending at Discharge:   None     Consultants:  ID, Cardio/EP     Discharge Medication List:     Discharge Medications        START taking these medications        Instructions Prescription details   sodium chloride 0.9% SOLN 100 mL with gentamicin 40 MG/ML SOLN 160 mg      Inject 160 mg into the vein daily for 28 days. Gent trough, bmp, crp , cbc weekly   Stop taking on: July 5, 2024  Quantity: 7 Bag  Refills: 0     sodium chloride 0.9% SOLN 144 mL with penicillin G potassium [id number] units SOLR 48 Million Units      Inject 24 Million Units into the vein daily. Can run over 23 hours  CBC, BMP, CRP weekly   Stop taking on: July 17, 2024  Quantity: 7 Bag  Refills: 5            CHANGE how you take these medications        Instructions Prescription details   torsemide 20 MG Tabs  Commonly known as: Demadex  What changed: how much to take      Take 0.5 tablets (10 mg total) by mouth daily.   Refills: 0            CONTINUE taking these medications        Instructions Prescription details   allopurinol 100 MG Tabs  Commonly known as: Zyloprim      Take 1 tablet (100 mg total) by mouth daily.   Quantity: 90 tablet  Refills: 1     aspirin 81 MG Tbec      Take 1 tablet (81 mg total) by mouth daily.   Refills: 0     docusate sodium 100 MG Caps  Commonly known as: Colace      Take 2 capsules (200 mg total) by mouth 2 (two) times daily.   Refills: 0     KRILL OIL OR      Take 1 capsule by mouth 2 (two) times daily.   Refills: 0     levothyroxine 100 MCG Tabs  Commonly known as: Synthroid      Take 1 tablet (100 mcg total) by mouth  before breakfast.   Quantity: 90 tablet  Refills: 1     mexiletine 150 MG Caps  Commonly known as: Mexitil      Take 1 capsule (150 mg total) by mouth 3 (three) times daily.   Refills: 0     MULTI-VITAMIN DAILY OR      Take 1 tablet by mouth daily.   Refills: 0     PROBIOTIC OR      Take 1 tablet by mouth daily.   Refills: 0     rosuvastatin 10 MG Tabs  Commonly known as: Crestor      Take 1 tablet (10 mg total) by mouth every evening.   Refills: 0     sacubitril-valsartan  MG Tabs  Commonly known as: Entresto      Take 1 tablet by mouth 2 (two) times daily.   Refills: 0     sotalol 80 MG Tabs  Commonly known as: Betapace      Take 1 tablet (80 mg total) by mouth every 12 (twelve) hours.   Quantity: 60 tablet  Refills: 3     vitamin C 1000 MG Tabs      Take 1 tablet (1,000 mg total) by mouth daily.   Refills: 0     Vitamin D 50 MCG (2000 UT) Tabs      Take 2,000 Units by mouth daily. qd   Refills: 0     Xarelto 2.5 MG Tabs  Generic drug: rivaroxaban      Take 1 tablet (2.5 mg total) by mouth 2 (two) times daily with meals.   Refills: 0     Zinc 50 MG Caps      Take 50 mg by mouth daily.   Refills: 0            STOP taking these medications      spironolactone 25 MG Tabs  Commonly known as: Aldactone                  Where to Get Your Medications        Please  your prescriptions at the location directed by your doctor or nurse    Bring a paper prescription for each of these medications  sodium chloride 0.9% SOLN 100 mL with gentamicin 40 MG/ML SOLN 160 mg  sodium chloride 0.9% SOLN 144 mL with penicillin G potassium [id number] units SOLR 48 Million Units         Follow-up appointment:   David Dang MD  [address]  [facility] [id number] [phone]    Schedule an appointment as soon as possible for a visit on 6/26/2024      Jana Buck MD  [address] [place] [id number] [phone]    Schedule an appointment as soon as possible for a visit      Transitional Care  [facility]  [address]  [facility] [address] [phone]  Schedule an appointment as soon as possible for a visit      Layo Mix MD  [address] [place] [address]  [phone]    Follow up in 1 week(s)  Office will call you for follow up appt.      -----------------------------------------------------------------------------------------------  PATIENT DISCHARGE INSTRUCTIONS: See electronic chart    Don Paul MD 6/8/2024    Time spent: 33 minutes

## 2024-06-10 ENCOUNTER — APPOINTMENT (OUTPATIENT)
Dept: HEMATOLOGY/ONCOLOGY | Facility: HOSPITAL | Age: 74
End: 2024-06-10
Attending: INTERNAL MEDICINE
Payer: MEDICARE

## 2024-06-10 ENCOUNTER — PATIENT OUTREACH (OUTPATIENT)
Dept: CASE MANAGEMENT | Age: 74
End: 2024-06-10

## 2024-06-10 ENCOUNTER — LAB REQUISITION (OUTPATIENT)
Dept: LAB | Facility: HOSPITAL | Age: 74
End: 2024-06-10
Payer: MEDICARE

## 2024-06-10 DIAGNOSIS — Z02.9 ENCOUNTERS FOR UNSPECIFIED ADMINISTRATIVE PURPOSE: ICD-10-CM

## 2024-06-10 DIAGNOSIS — I50.22 CHRONIC SYSTOLIC HEART FAILURE (HCC): Primary | ICD-10-CM

## 2024-06-10 DIAGNOSIS — Z79.2 LONG TERM (CURRENT) USE OF ANTIBIOTICS: ICD-10-CM

## 2024-06-10 DIAGNOSIS — I48.0 PAROXYSMAL ATRIAL FIBRILLATION (HCC): ICD-10-CM

## 2024-06-10 LAB
ANION GAP SERPL CALC-SCNC: 9 MMOL/L (ref 0–18)
BASOPHILS # BLD AUTO: 0.05 X10(3) UL (ref 0–0.2)
BASOPHILS NFR BLD AUTO: 0.3 %
BUN BLD-MCNC: 7 MG/DL (ref 9–23)
CALCIUM BLD-MCNC: 8.9 MG/DL (ref 8.5–10.1)
CHLORIDE SERPL-SCNC: 104 MMOL/L (ref 98–112)
CO2 SERPL-SCNC: 23 MMOL/L (ref 21–32)
CREAT BLD-MCNC: 0.75 MG/DL
CRP SERPL-MCNC: 8.46 MG/DL (ref ?–0.3)
EGFRCR SERPLBLD CKD-EPI 2021: 83 ML/MIN/1.73M2 (ref 60–?)
EOSINOPHIL # BLD AUTO: 0.18 X10(3) UL (ref 0–0.7)
EOSINOPHIL NFR BLD AUTO: 0.9 %
ERYTHROCYTE [DISTWIDTH] IN BLOOD BY AUTOMATED COUNT: 14.1 %
GLUCOSE BLD-MCNC: 104 MG/DL (ref 70–99)
HCT VFR BLD AUTO: 31.2 %
HGB BLD-MCNC: 10.5 G/DL
IMM GRANULOCYTES # BLD AUTO: 0.22 X10(3) UL (ref 0–1)
IMM GRANULOCYTES NFR BLD: 1.1 %
LYMPHOCYTES # BLD AUTO: 8.6 X10(3) UL (ref 1–4)
LYMPHOCYTES NFR BLD AUTO: 44.4 %
MCH RBC QN AUTO: 31.2 PG (ref 26–34)
MCHC RBC AUTO-ENTMCNC: 33.7 G/DL (ref 31–37)
MCV RBC AUTO: 92.6 FL
MONOCYTES # BLD AUTO: 1.3 X10(3) UL (ref 0.1–1)
MONOCYTES NFR BLD AUTO: 6.7 %
NEUTROPHILS # BLD AUTO: 9.02 X10 (3) UL (ref 1.5–7.7)
NEUTROPHILS # BLD AUTO: 9.02 X10(3) UL (ref 1.5–7.7)
NEUTROPHILS NFR BLD AUTO: 46.6 %
OSMOLALITY SERPL CALC.SUM OF ELEC: 280 MOSM/KG (ref 275–295)
PLATELET # BLD AUTO: 423 10(3)UL (ref 150–450)
POTASSIUM SERPL-SCNC: 4.4 MMOL/L (ref 3.5–5.1)
RBC # BLD AUTO: 3.37 X10(6)UL
SODIUM SERPL-SCNC: 136 MMOL/L (ref 136–145)
WBC # BLD AUTO: 19.4 X10(3) UL (ref 4–11)

## 2024-06-10 PROCEDURE — 80048 BASIC METABOLIC PNL TOTAL CA: CPT | Performed by: INTERNAL MEDICINE

## 2024-06-10 PROCEDURE — 85025 COMPLETE CBC W/AUTO DIFF WBC: CPT | Performed by: INTERNAL MEDICINE

## 2024-06-10 PROCEDURE — 1159F MED LIST DOCD IN RCRD: CPT

## 2024-06-10 PROCEDURE — 1111F DSCHRG MED/CURRENT MED MERGE: CPT

## 2024-06-10 PROCEDURE — 86140 C-REACTIVE PROTEIN: CPT | Performed by: INTERNAL MEDICINE

## 2024-06-10 NOTE — CDS QUERY
DOCUMENTATION CLARIFICATION QUERY   Dear Dr. Paul,    Please clinically validate the diagnosis of Sepsis     [   ]  Enterococcus faecalis Bacteremia with Sepsis ruled out  [ X  ]  Enterococcus faecalis Bacteremia with Sepsis is a clinically valid diagnosis treated this admission (please include supporting clinical indicators for sepsis) fever, + blood Cultures, profound hypotension with MAP <65. WBC 25, 000 renal insufficiency GFR 61 on admission and improved to 92   [   ] Other: (please specify)_________________________   ________________________________________________________________________________     CLINICAL INDICATORS:   6/3/24:   Temp 98.6, 100.9*  Pulse 80, 63, 72  RR 18, 20, 21  B/P 97/63, 84/51, 84/50    WBC 25.8*, 22.1*   MAP 62, 61  Lactic Acid 1.5  Platelets 243, 213  Total Bilirubin 0.5   Creatinine 0.98, 0.65  -6/8 D/C Summary: Hospital Discharge Diagnoses:   # Sepsis - blood cx with enterococcus faecalis  - repeat blood cx NGTD  - ID consult in light ICD and recent abx treatment  -# possible recurrent UTI- Klebsiella - per ID  -# Left PNA - cont Abx    -6/7 ID note: 1.Enterococcus faecalis bacteremia  -admitted with symptoms of fever for 3 days      RISK FACTORS: Underlying ischemic cardiomyopathy with ICD in place    TREATMENT :  ID consult - Abx ongoing with Amp/Gent  - PICC line and outpt IV abx upon discharge  - plan to repeat Blood Cx 10-14 days after completion of abx- if recurs then consider ICD extraction    Use of terms such as suspected, possible, or probable (associated with a specific diagnosis that is being evaluated, monitored, or treated as if it exists) are acceptable and can be coded in the inpatient setting, when documented at the time of discharge.   Elif Brothers RN, BSN, CWOCN, [facility] Clinical   [phone]                                                               THIS FORM IS A PERMANENT PART OF THE MEDICAL RECORD

## 2024-06-10 NOTE — PAYOR COMM NOTE
--------------  DISCHARGE REVIEW    Payor: [employer] [facility]  Subscriber #:  [id number]  Authorization Number: [id number]    Admit date: 6/3/24  Admit time:   2:16 PM  Discharge Date: 2024  3:15 PM     Admitting Physician: Arsh Talley DO  Attending Physician:  No att. providers found  Primary Care Physician: Jana Buck MD          Discharge Summary Notes        Discharge Summary signed by Don Paul MD at 2024 11:35 AM       Author: Don Paul MD Specialty: HOSPITALIST, Internal Medicine Author Type: Physician    Filed: 2024 11:35 AM Date of Service: 2024 11:34 AM Status: Signed    : Don Paul MD (Physician)           [facility]  DISCHARGE SUMMARY     Sonia Martell Patient Status:  Inpatient    1950 MRN [id number]   Location [facility] 5N-A Attending No att. providers found   Hosp Day # 4 PCP Jana Buck MD     Date of Admission: 6/3/2024  Date of Discharge: 2024    Discharge Disposition: Home or Self Care    Admitting Diagnosis:   Sepsis due to other etiology ([facility]) [A41.89]    Hospital Discharge Diagnoses:  # Sepsis   - blood cx with enterococcus faecalis   - repeat blood cx NGTD   - ID consult in light ICD and recent abx treatment   - Abx ongoing with Amp/Gent   - PICC line and outpt IV abx upon discharge   - plan to repeat Blood Cx 10-14 days after completion of abx- if recurs then consider ICD extraction      # hx of sustained VT sp ICD. VT ablation - EP following and plan as above       # generalized weakness with hypotension due to above - as above      # possible recurrent UTI- Klebsiella - per ID       # Left PNA   - cont ABx   -CT chest reviewed      # chronic hypotension   - monitor   -s/p IVF- stop for now and monitor this afternoon   - entresto, aldactone and torsemide on hold - f/u cardio input   - allergic to midodrine      # CAD sp CABG  # chronic systolic HF  #PAD  # Gout   # hypothyroidism   # Afib s hx of MAZE JOHNATHAN  amputation   # hx of VTE  - xarelto     Lace+ Score: 60  59-90 High Risk  29-58 Medium Risk  0-28   Low Risk.     History of Present Illness: Sonia Martell is a 73 year old female with AAA. Afib, anemia, CAD, presented to ER with generalized weakness, N/V/D over the last day of so. She has been treated as outpatient with multiple rouonds of antibiotic for UTI per PCP and her last course completed 3 weeks ago. No abd pain, cough, CP/SOB/F/C reported.     Brief Synopsis: Patient seen by cardiology and infectious disease.  Patient continued on IV antibiotics and following blood cultures.  Patient had Enterococcus in the blood which repeat cultures cleared.  Patient had a PICC line placed with continuous IV antibiotics for outpatient.  Patient will warrant close outpatient follow-up after clearing of the infection with repeat blood cultures to determine if the ICD needs to be extracted.  Patient cleared by cardiology and ID will be following up as outpatient.  Patient discharged in good condition.    Procedures during hospitalization:   None     Lab/Test results pending at Discharge:   None     Consultants:  ID, Cardio/EP     Discharge Medication List:     Discharge Medications        START taking these medications        Instructions Prescription details   sodium chloride 0.9% SOLN 100 mL with gentamicin 40 MG/ML SOLN 160 mg      Inject 160 mg into the vein daily for 28 days. Gent trough, bmp, crp , cbc weekly   Stop taking on: July 5, 2024  Quantity: 7 Bag  Refills: 0     sodium chloride 0.9% SOLN 144 mL with penicillin G potassium [id number] units SOLR 48 Million Units      Inject 24 Million Units into the vein daily. Can run over 23 hours  CBC, BMP, CRP weekly   Stop taking on: July 17, 2024  Quantity: 7 Bag  Refills: 5            CHANGE how you take these medications        Instructions Prescription details   torsemide 20 MG Tabs  Commonly known as: Demadex  What changed: how much to take      Take 0.5 tablets (10  mg total) by mouth daily.   Refills: 0            CONTINUE taking these medications        Instructions Prescription details   allopurinol 100 MG Tabs  Commonly known as: Zyloprim      Take 1 tablet (100 mg total) by mouth daily.   Quantity: 90 tablet  Refills: 1     aspirin 81 MG Tbec      Take 1 tablet (81 mg total) by mouth daily.   Refills: 0     docusate sodium 100 MG Caps  Commonly known as: Colace      Take 2 capsules (200 mg total) by mouth 2 (two) times daily.   Refills: 0     KRILL OIL OR      Take 1 capsule by mouth 2 (two) times daily.   Refills: 0     levothyroxine 100 MCG Tabs  Commonly known as: Synthroid      Take 1 tablet (100 mcg total) by mouth before breakfast.   Quantity: 90 tablet  Refills: 1     mexiletine 150 MG Caps  Commonly known as: Mexitil      Take 1 capsule (150 mg total) by mouth 3 (three) times daily.   Refills: 0     MULTI-VITAMIN DAILY OR      Take 1 tablet by mouth daily.   Refills: 0     PROBIOTIC OR      Take 1 tablet by mouth daily.   Refills: 0     rosuvastatin 10 MG Tabs  Commonly known as: Crestor      Take 1 tablet (10 mg total) by mouth every evening.   Refills: 0     sacubitril-valsartan  MG Tabs  Commonly known as: Entresto      Take 1 tablet by mouth 2 (two) times daily.   Refills: 0     sotalol 80 MG Tabs  Commonly known as: Betapace      Take 1 tablet (80 mg total) by mouth every 12 (twelve) hours.   Quantity: 60 tablet  Refills: 3     vitamin C 1000 MG Tabs      Take 1 tablet (1,000 mg total) by mouth daily.   Refills: 0     Vitamin D 50 MCG (2000 UT) Tabs      Take 2,000 Units by mouth daily. qd   Refills: 0     Xarelto 2.5 MG Tabs  Generic drug: rivaroxaban      Take 1 tablet (2.5 mg total) by mouth 2 (two) times daily with meals.   Refills: 0     Zinc 50 MG Caps      Take 50 mg by mouth daily.   Refills: 0            STOP taking these medications      spironolactone 25 MG Tabs  Commonly known as: Aldactone                  Where to Get Your Medications         Please  your prescriptions at the location directed by your doctor or nurse    Bring a paper prescription for each of these medications  sodium chloride 0.9% SOLN 100 mL with gentamicin 40 MG/ML SOLN 160 mg  sodium chloride 0.9% SOLN 144 mL with penicillin G potassium [id number] units SOLR 48 Million Units         Follow-up appointment:   David Dang MD  [address] [place] [address] 3  [facility] [address]  [phone]    Schedule an appointment as soon as possible for a visit on 6/26/2024      Jana Buck MD  [address] [place] [address]  [facility] [id number] [phone]    Schedule an appointment as soon as possible for a visit      [facility]  [address] [place] [address]  [facility] [address] [phone]  Schedule an appointment as soon as possible for a visit      Layo Mix MD  [address] [id number] [phone]    Follow up in 1 week(s)  Office will call you for follow up appt.      -----------------------------------------------------------------------------------------------  PATIENT DISCHARGE INSTRUCTIONS: See electronic chart    Don Paul MD 6/8/2024    Time spent: 33 minutes      Electronically signed by Don Paul MD on 6/8/2024 11:35 AM         REVIEWER COMMENTS

## 2024-06-11 ENCOUNTER — PATIENT OUTREACH (OUTPATIENT)
Dept: CASE MANAGEMENT | Age: 74
End: 2024-06-11

## 2024-06-11 ENCOUNTER — TELEPHONE (OUTPATIENT)
Dept: FAMILY MEDICINE CLINIC | Facility: CLINIC | Age: 74
End: 2024-06-11

## 2024-06-11 ENCOUNTER — PATIENT MESSAGE (OUTPATIENT)
Dept: FAMILY MEDICINE CLINIC | Facility: CLINIC | Age: 74
End: 2024-06-11

## 2024-06-11 NOTE — TELEPHONE ENCOUNTER
Please see other telephone encounter 6/10/2024.    Cristina, Nurse  from [facility] called and states patient has been Omnicef through a pic line since Saturday and in the last 24 hours, sign of thrush (few white spots) and requesting a prescription for Nystatin Swish and swallow to prevent from worsening.        Please advise. Thank you.

## 2024-06-11 NOTE — TELEPHONE ENCOUNTER
Please refer to other telephone encounter for nystatin.  Patient will need so needs hospital follow-up visit so we can sign home health orders.  Thank you

## 2024-06-11 NOTE — TELEPHONE ENCOUNTER
Cristina, Nurse  from [facility] called and states patient has been Omnicef through a pic line since Saturday and in the last 24 hours, sign of thrush (few white spots) and requesting a prescription for Nystatin Swish and swallow to prevent from worsening.

## 2024-06-11 NOTE — TELEPHONE ENCOUNTER
Please see telephone encounter 6/11/2024.    Prescription was already sent to pharmacy. Patient is scheduled for hospital follow up 6/12/2024.

## 2024-06-11 NOTE — PROGRESS NOTES
Initial Post Discharge Follow Up   Discharge Date: 6/7/24  Contact Date: 6/10/2024    Consent Verification:  Assessment Completed With: Spouse: Abel Permission received per patient?  written  HIPAA Verified?  Yes    Discharge Dx:    Sepsis    hx of sustained VT sp ICD. VT ablation 2022   generalized weakness with hypotension due to above    possible recurrent UTI    Left PNA   chronic hypotension     CAD sp CABG  chronic systolic HF  PAD   Gout    hypothyroidism    Afib s hx of MAZE JOHNATHAN amputation    hx of VTE    General:   How have you been since your discharge from the hospital? The spouse reported the patient has been doing well at home. The spouse confirmed home health visits began yesterday. The patient and family has been using the IV antibiotics as directed. The spouse denies any questions with the use of the PICC line. The spouse did admit to patient has been experiencing fatigue since discharge but denies any lethargy. The patient denies any chest pain, trouble breathing/shortness of breath, wheezing, confusion, nausea/vomiting, or fever. The spouse reported the patient has been experiencing a productive cough. The spouse denies any hemoptysis. The spouse denies any urinary urgency, hematuria, dysuria, or malodorous/cloudy urine. The spouse reported the patient has continued to experience urinary frequency. The spouse denies any infection at the catheter site (pain, redness, drainage), racing/irregular heartbeat/palpitations, dizziness, or muscle pain/stiffness. The patient denies any catheter damage and confirms ongoing proper placement. The patient also denies any melena, hematochezia, or bleeding from the gums, nose or cuts. The spouse also denies any weight gain or new/increased edema. The patient has been drinking Ensure daily. The spouse reported the patient's blood pressure yesterday was 110/60. The patient has been experiencing mouth irritation with the newly developed thrush symptoms. The spouse  is waiting for the new Nystatin prescription from the pharmacy.   Do you have any pain since discharge?  No; The patient has complained of irritation in the mouth from thrush.   Is the pain manageable at home? Yes; The spouse will  a new prescription for Nystatin later today.   How well was your pain managed while in the hospital?   On a scale of 1-5   1- Very Poor and 5- Very well   Very Well  When you were leaving the hospital were your discharge instructions reviewed with you? Yes  How well were your discharge instructions explained to you?   On a scale of 1-5   1- Very Poor and 5- Very well   Very Well  Do you have any questions about your discharge instructions?  No  Before leaving the hospital was your diagnoses explained to you? Yes  Do you have any questions about your diagnoses? No  Are you able to perform normal daily activities of living as you have prior to your hospital stay (dressing, bathing, ambulating to the bathroom, etc)? yes  (NCM) Was patient given a different diet per AVS? no; The spouse did report the patient's appetite has decreased.       Medications: NCM did confirm the patient has discontinued the following as directed:  STOP taking these medications       spironolactone 25 MG Tabs  Commonly known as: Aldactone     Current Outpatient Medications   Medication Sig Dispense Refill    nystatin 577244 UNIT/ML Mouth/Throat Suspension Take 5 mL (500,000 Units total) by mouth 4 (four) times daily. 250 mL 0    torsemide 20 MG Oral Tab Take 0.5 tablets (10 mg total) by mouth daily.      sodium chloride 0.9% SOLN 144 mL with penicillin G potassium [id number] units SOLR 48 Million Units Inject 24 Million Units into the vein daily. Can run over 23 hours  CBC, BMP, CRP weekly 7 Bag 5    sodium chloride 0.9% SOLN 100 mL with gentamicin 40 MG/ML SOLN 160 mg Inject 160 mg into the vein daily for 28 days. Gent trough, bmp, crp , cbc weekly 7 Bag 0    rivaroxaban (XARELTO) 2.5 MG Oral Tab Take 1 tablet  (2.5 mg total) by mouth 2 (two) times daily with meals.      mexiletine 150 MG Oral Cap Take 1 capsule (150 mg total) by mouth 3 (three) times daily.      allopurinol 100 MG Oral Tab Take 1 tablet (100 mg total) by mouth daily. 90 tablet 1    levothyroxine 100 MCG Oral Tab Take 1 tablet (100 mcg total) by mouth before breakfast. 90 tablet 1    rosuvastatin 10 MG Oral Tab Take 1 tablet (10 mg total) by mouth every evening.      sotalol 80 MG Oral Tab Take 1 tablet (80 mg total) by mouth every 12 (twelve) hours. 60 tablet 3    docusate sodium 100 MG Oral Cap Take 2 capsules (200 mg total) by mouth 2 (two) times daily.      Zinc 50 MG Oral Cap Take 50 mg by mouth daily.      Cholecalciferol (VITAMIN D) 50 MCG (2000 UT) Oral Tab Take 2,000 Units by mouth daily. qd      Ascorbic Acid (VITAMIN C) 1000 MG Oral Tab Take 1 tablet (1,000 mg total) by mouth daily.      Sacubitril-Valsartan  MG Oral Tab Take 1 tablet by mouth 2 (two) times daily.      aspirin 81 MG Oral Tab EC Take 1 tablet (81 mg total) by mouth daily.      KRILL OIL OR Take 1 capsule by mouth 2 (two) times daily.      Multiple Vitamin (MULTI-VITAMIN DAILY OR) Take 1 tablet by mouth daily.      Probiotic Product (PROBIOTIC OR) Take 1 tablet by mouth daily.       Were there any changes to your current medication(s) noted on the AVS? Yes  If so, were these medication changes discussed with you prior to leaving the hospital? Yes  If a new medication was prescribed:    Was the new medication's purpose & side effects reviewed? Yes  Do you have any questions about your new medication? No  Did you  your discharge medications when you left the hospital? Yes  Let's go over your medications together to make sure we are not missing anything. Medications Reviewed  The spouse reported the patient's directions for mexiletine 150mg are 1 tablet twice daily. Nurse care manager did confirm this in cardiology notes form 5/20/2024. Nurse care manager updated the  patient's medication list.   Are there any reasons that keep you from taking your medication as prescribed? Yes  The spouse will be picking up the new prescription for Nystatin later today.   Are you having any concerns with constipation? No    Discharge medications reviewed/discussed/and reconciled against outpatient medications with patient.  Any changes or updates to medications sent to PCP.  Patient Acknowledged     Referrals/orders at D/C:  Referrals/orders placed at D/C? yes  What services:   Home health   (If HH was ordered) Has HH been set up?  Yes    If Yes: [facility] Whom[facility]  Except for Home Health Services mentioned above, have you scheduled these other services? Yes The patient will be completing weekly labs (Monday) CBC, BMP, and CRP through home health.     If yes, have you started these services? yes    DME ordered at D/C? Yes  What? IV antibiotics  From where? [facility]   Have you received your (DME)? yes    The patient has a PICC line placed.     Discharge orders, AVS reviewed and discussed with patient. Any changes or updates to orders sent to PCP.  Patient Acknowledged      SDOH:   Transportation:    Transportation Needs: No Transportation Needs (6/11/2024)    Transportation Needs     Lack of Transportation: No     Car Seat: Not on file     Financial Strain:   Financial Resource Strain: Low Risk  (6/11/2024)    Financial Resource Strain     Difficulty of Paying Living Expenses: Not hard at all     Med Affordability: No       Diagnosis specifics:     Pneumonia:   Tell me what you understand about the signs and symptoms of pneumonia reoccurrence?   (guide to discuss: fever, chills, shaking, chest pain, productive cough, difficulty breathing)    Comments: [facility] did review this with the patient's spouse in detail. The patient will continue to monitor symptoms closely.       CHF:   With your CHF diagnosis weighing yourself is very important  How often are you weighing yourself? The  spouse reported the patient checks a daily weight.   Is there any reason you are unable to weigh yourself daily?  No  Were you told about any fluid restrictions? No; The patient has continued a low sodium diet at home.   Have you noticed any shortness of breath or waking up short of breath? no  Since discharge do you feel you are urinating more or less?  More      Do you notice any pain or swelling in your abdomen?   no      Ankles or Legs?   no        Follow up appointments:      Your appointments       Date & Time Appointment Department (Center)    Jun 12, 2024 4:00 PM CDT Hospital Follow Up with Jana Buck MD [address] ([place] [address])        Jul 22, 2024 2:30 PM CDT Consultation with Nallely Sheikh MD [facility] in [place] ([facility])              [facility] [address]  [address] [address]  [phone] [place] [address]  [address] [place] [id number]  [phone]            TCC  Was TCC ordered: Yes; The patient is scheduled with the primary care physician.       PCP (If no TCC appointment)  Does patient already have a PCP appointment scheduled? Yes; The patient is scheduled with the primary care physician office on 6/12/2024.   NCM Confirmed PCP office TCM appointment with patient      Specialist    Does the patient have any other follow up appointment(s) needing to be scheduled? Yes  If yes: NCM reviewed upcoming specialist appointment with patient: Yes  The spouse reported the patient has an appointment scheduled with cardiology next week. The spouse was unable to recall the specific date.   Does the patient need assistance scheduling appointment(s): No    Is there any reason as to why you cannot make your  appointment(s)?  No     Needs post D/C:   Now that you are home, are there any needs or concerns you need addressed before your next visit with your PCP?  (DME, meds, questions, etc.): No    Interventions by NCM:    Reviewed all discharge instructions with the patient's spouse with a focus on IV antibiotic therapy/PICC line care, and signs/symptoms of pneumonia. All medications were reviewed and educated on the importance of taking the medications as directed.  Patient symptoms were assessed, education was completed for signs/symptoms to monitor, and reviewed when to contact providers versus go to the emergency department/call 911. Appointments were reviewed and stressed the importance of a close follow up with providers. Home Health orders were confirmed and scheduled. Confirmed patient has DME (medical equipment) and understands proper use. The spouse verbalized understanding and will contact the office with any further questions or concerns.     Overall Rating:   How would you rate the care you received while in the hospital? good    CCM referral placed:    Yes    BOOK BY DATE: 6/21/2024

## 2024-06-11 NOTE — TELEPHONE ENCOUNTER
From: Sonia Martell  To: Jana Buck  Sent: 6/11/2024 3:19 PM CDT  Subject: Prescription Request     As of 11:00am today your office had received a request from my home care nurse for a prescription for a mouthwash to treat thrush that apparently developed after my hospital stay last week. As of 3:00pm no prescription had been sent to my pharmacy.     I would appreciate your sending the prescription to the pharmacy as soon as possible since the condition has gotten worse since I saw the nurse yesterday.     Thank you for your attention to this matter.     Sonia Martell

## 2024-06-11 NOTE — TELEPHONE ENCOUNTER
Patient spouse would to know if Nystatin swish mouth wash can be sent over to pharmacy today? Patient spouse states patient is uncomfortable. He would like a call once it has been sent to pharmacy.

## 2024-06-11 NOTE — TELEPHONE ENCOUNTER
Called patient's spouse and informed of Dr. Buck's recommendation. Notified that Medication was sent to pharmacy. Patient scheduled tomorrow for TCM/HFU. He voiced understanding and agreed with plan of care.

## 2024-06-11 NOTE — TELEPHONE ENCOUNTER
We can call in nystatin suspension 5 cc 4 times per day number 250 cc   Please have the patient to set up hospital follow-up so we can sign patient home health orders.  Thank you

## 2024-06-12 ENCOUNTER — OFFICE VISIT (OUTPATIENT)
Dept: FAMILY MEDICINE CLINIC | Facility: CLINIC | Age: 74
End: 2024-06-12
Payer: MEDICARE

## 2024-06-12 VITALS
DIASTOLIC BLOOD PRESSURE: 77 MMHG | HEART RATE: 90 BPM | RESPIRATION RATE: 18 BRPM | HEIGHT: 70 IN | SYSTOLIC BLOOD PRESSURE: 132 MMHG | BODY MASS INDEX: 29.78 KG/M2 | TEMPERATURE: 97 F | OXYGEN SATURATION: 96 % | WEIGHT: 208 LBS

## 2024-06-12 DIAGNOSIS — Z95.1 S/P CABG X 3: ICD-10-CM

## 2024-06-12 DIAGNOSIS — A41.81 SEPSIS DUE TO ENTEROCOCCUS (HCC): Primary | ICD-10-CM

## 2024-06-12 DIAGNOSIS — Z95.810 AICD (AUTOMATIC CARDIOVERTER/DEFIBRILLATOR) PRESENT: ICD-10-CM

## 2024-06-12 DIAGNOSIS — I50.22 CHRONIC SYSTOLIC (CONGESTIVE) HEART FAILURE (HCC): ICD-10-CM

## 2024-06-12 DIAGNOSIS — Z95.828 HISTORY OF ENDOVASCULAR STENT GRAFT FOR ABDOMINAL AORTIC ANEURYSM (AAA): ICD-10-CM

## 2024-06-12 DIAGNOSIS — I25.10 CORONARY ARTERY DISEASE INVOLVING NATIVE CORONARY ARTERY OF NATIVE HEART WITHOUT ANGINA PECTORIS: ICD-10-CM

## 2024-06-12 DIAGNOSIS — I25.5 ISCHEMIC CARDIOMYOPATHY: ICD-10-CM

## 2024-06-12 DIAGNOSIS — I73.9 PERIPHERAL VASCULAR DISEASE (HCC): ICD-10-CM

## 2024-06-12 DIAGNOSIS — E03.9 HYPOTHYROIDISM, UNSPECIFIED TYPE: ICD-10-CM

## 2024-06-12 DIAGNOSIS — R53.1 GENERALIZED WEAKNESS: ICD-10-CM

## 2024-06-12 DIAGNOSIS — I48.0 PAROXYSMAL ATRIAL FIBRILLATION (HCC): ICD-10-CM

## 2024-06-12 DIAGNOSIS — D72.829 LEUKOCYTOSIS, UNSPECIFIED TYPE: ICD-10-CM

## 2024-06-12 DIAGNOSIS — B37.0 ORAL THRUSH: ICD-10-CM

## 2024-06-12 DIAGNOSIS — N30.00 ACUTE CYSTITIS WITHOUT HEMATURIA: ICD-10-CM

## 2024-06-12 NOTE — PROGRESS NOTES
Hospital follow up.    TCC request.  Wednesday 6/12 @4  Existing appointment with PCP.    Closing encounter.

## 2024-06-12 NOTE — PROGRESS NOTES
Subjective:   Sonia Martell is a 73 year old female who presents for hospital follow up.   She was discharged from [facility] to [facility]  Admission Date: 6/3/24   Discharge Date: 6/7/24  Hospital Discharge Diagnosis: Sepsis    Interactive contact within 2 business days post discharge first initiated on Date: 6/10/2024    During the visit, the following was completed  Obtained and reviewed hospitalization documents hospitalist notes ER physician notes cardiology notes and infectious disease notes.  Reviewed Labs (CBC, CMP), CT radiology results, X-Ray radiology results, and EKG    History of Present Illness: Sonia Martell is a 73 year old female with AAA. Afib, anemia, CAD, presented to ER with generalized weakness, N/V/D over the last day of so. She has been treated as outpatient with multiple rouonds of antibiotic for UTI  and her last course completed in April. . No abd pain, cough, no chest pain no shortness of breath normal bowel movements.  Patient was admitted with the diagnosis of sepsis.     Brief Synopsis: Patient seen by cardiology and infectious disease.  Patient continued on IV antibiotics and following blood cultures.  Patient had Enterococcus in the blood which repeat cultures cleared.  Patient had a PICC line placed with continuous IV antibiotics for outpatient.  Patient will warrant close outpatient follow-up after clearing of the infection with repeat blood cultures to determine if the ICD needs to be extracted.  Patient cleared by cardiology and ID will be following up as outpatient     She is coming to the office complaining of having generalized weakness.  Tired fatigue low energy.  Slowly getting better.  She is getting home health services with RN coming to her house.  She does not feel that she needs occupational therapy to physical therapy.  She is getting dual antibiotics penicillin and gentamicin.  Medications managed by infectious disease Dr. Dang.    # Sepsis   -  blood cx with enterococcus faecalis   - repeat blood cx NGTD   - ID consult in light ICD and recent abx treatment   - Abx ongoing with Amp/Gent   - PICC line and outpt IV abx upon discharge   - plan to repeat Blood Cx 10-14 days after completion of abx- if recurs then consider ICD extraction      # hx of sustained VT sp ICD. VT ablation 2022- EP following and plan as above       # generalized weakness with hypotension due to above - as above      # possible recurrent UTI- Klebsiella - per ID       # Left PNA   - cont ABx   -CT chest reviewed      # chronic hypotension   - monitor   -s/p IVF- stop for now and monitor this afternoon   - entresto, aldactone and torsemide on hold - f/u cardio input   - allergic to midodrine      # CAD sp CABG  # chronic systolic HF  #PAD  # Gout   # hypothyroidism   # Afib s hx of MAZE JOHNATHAN amputation   # hx of VTE  - xarelto      Patient has an appointment coming up with infectious disease.  She is following up with cardiologist.  Has excessive cardiac history with CABG also having the repair of the aneurysm.  On current medications by specialist.  Blood pressure is better today.    Developed irritation of the tongue which looks like oral thrush.  Nystatin was called in yesterday patient will continue medication throughout the course of antibiotic she has antibiotic prescribed for 5 weeks.    History/Other:   Current Medications:  Medication Reconciliation:  I am aware of an inpatient discharge within the last 30 days.  The discharge medication list has been reconciled with the patient's current medication list and reviewed by me.  See medication list for additions of new medication, and changes to current doses of medications and discontinued medications.  Outpatient Medications Marked as Taking for the 6/12/24 encounter (Office Visit) with Jana Buck MD   Medication Sig    nystatin 403920 UNIT/ML Mouth/Throat Suspension Take 5 mL (500,000 Units total) by mouth 4 (four) times  daily.    nystatin [id number] UNIT/ML Mouth/Throat Suspension Take 5 mL (500,000 Units total) by mouth 4 (four) times daily.    torsemide 20 MG Oral Tab Take 0.5 tablets (10 mg total) by mouth daily.    sodium chloride 0.9% SOLN 144 mL with penicillin G potassium [id number] units SOLR 48 Million Units Inject 24 Million Units into the vein daily. Can run over 23 hours  CBC, BMP, CRP weekly    sodium chloride 0.9% SOLN 100 mL with gentamicin 40 MG/ML SOLN 160 mg Inject 160 mg into the vein daily for 28 days. Gent trough, bmp, crp , cbc weekly    rivaroxaban (XARELTO) 2.5 MG Oral Tab Take 1 tablet (2.5 mg total) by mouth 2 (two) times daily with meals.    mexiletine 150 MG Oral Cap Take 1 capsule (150 mg total) by mouth in the morning and 1 capsule (150 mg total) before bedtime.    allopurinol 100 MG Oral Tab Take 1 tablet (100 mg total) by mouth daily.    levothyroxine 100 MCG Oral Tab Take 1 tablet (100 mcg total) by mouth before breakfast.    rosuvastatin 10 MG Oral Tab Take 1 tablet (10 mg total) by mouth every evening.    sotalol 80 MG Oral Tab Take 1 tablet (80 mg total) by mouth every 12 (twelve) hours.    docusate sodium 100 MG Oral Cap Take 2 capsules (200 mg total) by mouth 2 (two) times daily.    Zinc 50 MG Oral Cap Take 50 mg by mouth daily.    Cholecalciferol (VITAMIN D) 50 MCG (2000 UT) Oral Tab Take 2,000 Units by mouth daily. qd    Ascorbic Acid (VITAMIN C) 1000 MG Oral Tab Take 1 tablet (1,000 mg total) by mouth daily.    Sacubitril-Valsartan  MG Oral Tab Take 1 tablet by mouth 2 (two) times daily.    aspirin 81 MG Oral Tab EC Take 1 tablet (81 mg total) by mouth daily.    KRILL OIL OR Take 1 capsule by mouth 2 (two) times daily.    Multiple Vitamin (MULTI-VITAMIN DAILY OR) Take 1 tablet by mouth daily.    Probiotic Product (PROBIOTIC OR) Take 1 tablet by mouth daily.       Review of Systems:  GENERAL: weight stable, energy stable, no sweating no fever no chills  Patient is here with her   SKIN:  denies any unusual skin lesions  EYES: denies blurred vision or double vision  HEENT: denies nasal congestion, sinus pain or ST  LUNGS: denies shortness of breath with exertion  CARDIOVASCULAR: denies chest pain on exertion or palpitations  GI: denies abdominal pain, denies heartburn, denies diarrhea  MUSCULOSKELETAL: denies pain, normal range of motion of extremities  NEURO: denies headaches, denies dizziness, denies weakness  PSYCHE: denies depression or anxiety  HEMATOLOGIC: denies hx of anemia, or bruising, denies bleeding  ENDOCRINE: denies thyroid history  ALL/ASTHMA: denies hx of allergy or asthma    Objective:   No LMP recorded. Patient is postmenopausal.  Estimated body mass index is 29.84 kg/m² as calculated from the following:    Height as of this encounter: 5' 10\" (1.778 m).    Weight as of this encounter: 208 lb (94.3 kg).   /77 (BP Location: Left arm, Patient Position: Sitting, Cuff Size: adult)   Pulse 90   Temp 96.8 °F (36 °C) (Temporal)   Resp 18   Ht 5' 10\" (1.778 m)   Wt 208 lb (94.3 kg)   SpO2 96%   BMI 29.84 kg/m²    GENERAL: well developed, well nourished, in no apparent distress  SKIN: no rashes, no suspicious lesions  HEENT: atraumatic, normocephalic, ears and throat are clear  EYES: PERRLA, EOMI, conjunctiva are clear  NECK: supple, no adenopathy,  CHEST: no chest tenderness  LUNGS: clear to auscultation  CARDIO: RRR without murmur  GI: good BS's, no masses, HSM or tenderness  MUSCULOSKELETAL: back is not tender, FROM of the extremities  EXTREMITIES: no cyanosis, clubbing or edema  NEURO: Oriented times three, cranial nerves are intact, motor and sensory are grossly intact    Results for orders placed or performed in visit on 06/10/24   Basic Metabolic Panel (8)   Result Value Ref Range    Glucose 104 (H) 70 - 99 mg/dL    Sodium 136 136 - 145 mmol/L    Potassium 4.4 3.5 - 5.1 mmol/L    Chloride 104 98 - 112 mmol/L    CO2 23.0 21.0 - 32.0 mmol/L    Anion Gap 9 0 - 18 mmol/L     BUN 7 (L) 9 - 23 mg/dL    Creatinine 0.75 0.55 - 1.02 mg/dL    Calcium, Total 8.9 8.5 - 10.1 mg/dL    Calculated Osmolality 280 275 - 295 mOsm/kg    eGFR-Cr 83 >=60 mL/min/1.73m2   C-Reactive Protein   Result Value Ref Range    C-Reactive Protein 8.46 (H) <0.30 mg/dL   Scan slide   Result Value Ref Range    Slide Review       Slide reviewed, normal WBC, RBC, and platelet morphology.   CBC W/ DIFFERENTIAL   Result Value Ref Range    WBC 19.4 (H) 4.0 - 11.0 x10(3) uL    RBC 3.37 (L) 3.80 - 5.30 x10(6)uL    HGB 10.5 (L) 12.0 - 16.0 g/dL    HCT 31.2 (L) 35.0 - 48.0 %    .0 150.0 - 450.0 10(3)uL    MCV 92.6 80.0 - 100.0 fL    MCH 31.2 26.0 - 34.0 pg    MCHC 33.7 31.0 - 37.0 g/dL    RDW 14.1 %    Neutrophil Absolute Prelim 9.02 (H) 1.50 - 7.70 x10 (3) uL    Neutrophil Absolute 9.02 (H) 1.50 - 7.70 x10(3) uL    Lymphocyte Absolute 8.60 (H) 1.00 - 4.00 x10(3) uL    Monocyte Absolute 1.30 (H) 0.10 - 1.00 x10(3) uL    Eosinophil Absolute 0.18 0.00 - 0.70 x10(3) uL    Basophil Absolute 0.05 0.00 - 0.20 x10(3) uL    Immature Granulocyte Absolute 0.22 0.00 - 1.00 x10(3) uL    Neutrophil % 46.6 %    Lymphocyte % 44.4 %    Monocyte % 6.7 %    Eosinophil % 0.9 %    Basophil % 0.3 %    Immature Granulocyte % 1.1 %      PROCEDURE:  CT CHEST (CPT=71250)     COMPARISON:  [facility], CT CHEST (CPT=71250), 5/16/2017, 5:07 PM.     INDICATIONS:  febrile illness, ? PNA     TECHNIQUE:  Unenhanced multislice CT scanning is performed through the chest.  Dose reduction techniques were used. Dose information is transmitted to the ACR (American College of Radiology) NRDR (National Radiology Data Registry) which includes the Dose   Index Registry.     PATIENT STATED HISTORY: (As transcribed by Technologist)  febrile illness, sob.         FINDINGS:    LUNGS:  Focal opacity in the left upper lobe is noted measuring up to 2.3 x 1.8 cm (image 88) small left greater than right pleural effusions are noted.  Bibasilar atelectatic changes noted.   Superimposed infiltrate in the left lower lobe cannot be  completely excluded.  VASCULATURE:  Pulmonary vessels are unremarkable within the limits of a noncontrast CT.    HUMBERTO:  No mass or adenopathy.    MEDIASTINUM:  No mass or adenopathy.    CARDIAC:  Severe coronary artery atherosclerosis is noted.  The patient is status post median sternotomy and CABG.  Left pacer is noted.  PLEURA:  No mass or effusion.    THORACIC AORTA:  Unremarkable as seen on non-contrast imaging.  CHEST WALL:  No mass or axillary adenopathy.  Status post median sternotomy.  LIMITED ABDOMEN:  Limited images of the upper abdomen demonstrates cholelithiasis  BONES:  No bony lesion or fracture.                     Impression   CONCLUSION:    1. Focal opacity in the left upper lobe may represent infiltrate.  Follow-up after treatment to document resolution is recommended.  2. Small bilateral pleural effusions with bibasilar atelectatic change.  Superimposed infiltrate in the left lower lobe cannot be excluded.     LOCATION:  [facility]        Dictated by (CST): Mayank Lockwood MD on 6/03/2024 at 9:26 PM      Finalized by (CST): Mayank Lockwood MD on 6/03/2024 at 9:38 PM       Narrative   PROCEDURE:  CT ABDOMEN+PELVIS KIDNEYSTONE 2D RNDR(NO IV,NO ORAL)(CPT=74176)     COMPARISON:  [facility] , CT, CTA ABD PEL PROMISE LEG RUNOFF DIAPH TO TOES IV CONTRAST(CPT=75635), 10/13/2019, 5:23 PM.     INDICATIONS:  Pt reports she has completed 3 courses of abx for UTI over the past month. Pt c/o of flank pain, nausea, fevers.     TECHNIQUE:  Unenhanced multislice CT scanning from above the kidneys to below the urinary bladder.  2D rendering are generated on the CT scanner workstation to localize potential stones in the cranio-caudal plane.  Dose reduction techniques were used.  Dose information is transmitted to the ACR (American College of Radiology) NRDR (National Radiology Data Registry) which includes the Dose Index Registry.     PATIENT STATED HISTORY: (As transcribed  by Technologist)  Patient is here for recurrent UTI's. She states back pain and nausea with fevers today.         FINDINGS:  Evaluation of the visceral organs is limited due to the lack of intravenous contrast.     LUNG BASE:  Partially visualized cardiac electrode.  Scattered atelectasis, most notable within the left lower lobe.  Tiny hiatal hernia.  Trace left pleural effusion.  LIVER:  Unremarkable.  BILIARY:  Cholelithiasis without CT evidence of acute cholecystitis.  SPLEEN:  Unremarkable.  PANCREAS:  Unremarkable.  ADRENALS:  Left adrenal gland thickening.  KIDNEYS:  Punctate probable vascular calcification at the left kidney.  No hydronephrosis or significant renal stones.  AORTA/VASCULAR:  Infrarenal abdominal aortic aneurysm measuring 3.9 cm.  Aneurysmal dilatation of the iliac vasculature is also noted.  Extensive atherosclerosis of the abdominal aorta and its branch vessels.  Nonemergent outpatient vascular imaging is  recommended for further assessment.  RETROPERITONEUM:  Unremarkable.  BOWEL/MESENTERY:  Postoperative changes are seen within the right and left abdomen.  No evidence of a bowel obstruction.  Colonic diverticulosis.  The appendix is not identified, however, there are no secondary signs of acute appendicitis.  ABDOMINAL WALL:  Postoperative changes are noted.  PELVIC ORGANS:  Decompressed bladder.  LYMPH NODES:  Unremarkable.  BONES:  There is beam hardening artifact from right hip arthroplasty which obscures the surrounding structures.  Extensive degenerative changes in the spine.  Degenerative changes are seen in the pelvis.  There is a prominent calcified structure within  the ventral aspect of the spinal canal at the approximate T10-T11 level.  This is chronic.  OTHER:  None.                   Impression   CONCLUSION:       1. No large renal stones or hydronephrosis.  Punctate possible vascular calcification at the mid medial left kidney.     2. Infrarenal abdominal aortic aneurysm  measuring 3.9 cm.  Aneurysmal dilatation of the iliac vasculature is also noted.  Extensive atherosclerosis of the abdominal aorta and its branch vessels.  Outpatient follow-up imaging should be considered to  ensure stability.     3. Please see above for further details.           LOCATION:  [id number]        Dictated by (CST): Stromberg, LeRoy, MD on 6/03/2024 at 12:05 PM      Finalized by (CST): Stromberg, LeRoy, MD on 6/03/2024 at 12:18 PM       Assessment & Plan:   1. Sepsis due to Enterococcus (HCC) (Primary)  2. Acute cystitis without hematuria  3. Paroxysmal atrial fibrillation (HCC)  4. Peripheral vascular disease (HCC)  5. Generalized weakness  6. Coronary artery disease involving native coronary artery of native heart without angina pectoris  7. S/P CABG x 3  8. AICD (automatic cardioverter/defibrillator) present  9. Hypothyroidism, unspecified type  10. Chronic systolic (congestive) heart failure (HCC)  11. Ischemic cardiomyopathy  12. Leukocytosis, unspecified type  13. History of endovascular stent graft for abdominal aortic aneurysm (AAA)  14. Oral thrush  -     Nystatin; Take 5 mL (500,000 Units total) by mouth 4 (four) times daily.  Dispense: 473 mL; Refill: 1  Sepsis due to Enterococcus patient is on course of antibiotics penicillin and gentamicin.  Infectious disease continue current medication.    Acute cystitis Klebsiella on the cultures monitor.    Paroxysmal atrial fibrillation stable continue present management    Peripheral vascular disease stable continue present management    Generalized weakness patient does not want to do physical therapy she has had but slowly she is working on increasing activity and getting stronger monitor    Coronary artery disease status post CABG patient is doing well follow-up with cardiologist    FAIZAN decision will be made About keeping AICD in place or removing based on the cultures after antibiotic course is completed.    Hypothyroidism on medication stable  continue present management    CHF continue current medications monitor    Ischemic cardiomyopathy asymptomatic right now stable continue present management    [place] ptosis patient had testing flow cytometry positive for CLL.  She has an appointment coming up with Dr. Sheikh awaiting final diagnosis.  She said that her  has similar condition in this monitor    History of AAA repair stable continue present management    Oral thrush start nystatin use 4 times per day always at bedtime monitor.    Continue nystatin 4 times per day always at bedtime.  Continue antibiotics.  Eat organic yogurt.  Increase activity as tolerated.  Follow-up with infectious disease doctor as recommended by them.  Follow-up with cardiologist as recommended by them.  Healthy diet.  Keep good hydration.  Schedule super visit whenever you are done with antibiotic.      Return in about 6 weeks (around 7/24/2024).

## 2024-06-13 NOTE — PATIENT INSTRUCTIONS
Continue nystatin 4 times per day always at bedtime.  Continue antibiotics.  Eat organic yogurt.  Increase activity as tolerated.  Follow-up with infectious disease doctor as recommended by them.  Follow-up with cardiologist as recommended by them.  Healthy diet.  Keep good hydration.  Schedule super visit whenever you are done with antibiotic.

## 2024-06-17 ENCOUNTER — LAB REQUISITION (OUTPATIENT)
Dept: LAB | Facility: HOSPITAL | Age: 74
End: 2024-06-17

## 2024-06-17 DIAGNOSIS — Z79.2 LONG TERM (CURRENT) USE OF ANTIBIOTICS: ICD-10-CM

## 2024-06-17 LAB
ANION GAP SERPL CALC-SCNC: 10 MMOL/L (ref 0–18)
BASOPHILS # BLD AUTO: 0.08 X10(3) UL (ref 0–0.2)
BASOPHILS NFR BLD AUTO: 0.4 %
BUN BLD-MCNC: 19 MG/DL (ref 9–23)
CALCIUM BLD-MCNC: 8.8 MG/DL (ref 8.5–10.1)
CHLORIDE SERPL-SCNC: 104 MMOL/L (ref 98–112)
CO2 SERPL-SCNC: 21 MMOL/L (ref 21–32)
CREAT BLD-MCNC: 1.45 MG/DL
CRP SERPL-MCNC: 3.63 MG/DL (ref ?–0.3)
EGFRCR SERPLBLD CKD-EPI 2021: 38 ML/MIN/1.73M2 (ref 60–?)
EOSINOPHIL # BLD AUTO: 0.29 X10(3) UL (ref 0–0.7)
EOSINOPHIL NFR BLD AUTO: 1.6 %
ERYTHROCYTE [DISTWIDTH] IN BLOOD BY AUTOMATED COUNT: 14 %
GLUCOSE BLD-MCNC: 164 MG/DL (ref 70–99)
HCT VFR BLD AUTO: 34.1 %
HGB BLD-MCNC: 11.1 G/DL
IMM GRANULOCYTES # BLD AUTO: 0.16 X10(3) UL (ref 0–1)
IMM GRANULOCYTES NFR BLD: 0.9 %
LYMPHOCYTES # BLD AUTO: 9.7 X10(3) UL (ref 1–4)
LYMPHOCYTES NFR BLD AUTO: 52.1 %
MCH RBC QN AUTO: 31.1 PG (ref 26–34)
MCHC RBC AUTO-ENTMCNC: 32.6 G/DL (ref 31–37)
MCV RBC AUTO: 95.5 FL
MONOCYTES # BLD AUTO: 1.02 X10(3) UL (ref 0.1–1)
MONOCYTES NFR BLD AUTO: 5.5 %
NEUTROPHILS # BLD AUTO: 7.36 X10 (3) UL (ref 1.5–7.7)
NEUTROPHILS # BLD AUTO: 7.36 X10(3) UL (ref 1.5–7.7)
NEUTROPHILS NFR BLD AUTO: 39.5 %
OSMOLALITY SERPL CALC.SUM OF ELEC: 286 MOSM/KG (ref 275–295)
PLATELET # BLD AUTO: 426 10(3)UL (ref 150–450)
POTASSIUM SERPL-SCNC: 4.7 MMOL/L (ref 3.5–5.1)
RBC # BLD AUTO: 3.57 X10(6)UL
SODIUM SERPL-SCNC: 135 MMOL/L (ref 136–145)
WBC # BLD AUTO: 18.6 X10(3) UL (ref 4–11)

## 2024-06-17 PROCEDURE — 80048 BASIC METABOLIC PNL TOTAL CA: CPT

## 2024-06-17 PROCEDURE — 85025 COMPLETE CBC W/AUTO DIFF WBC: CPT

## 2024-06-17 PROCEDURE — 86140 C-REACTIVE PROTEIN: CPT

## 2024-06-20 ENCOUNTER — TELEPHONE (OUTPATIENT)
Dept: FAMILY MEDICINE CLINIC | Facility: CLINIC | Age: 74
End: 2024-06-20

## 2024-06-20 NOTE — TELEPHONE ENCOUNTER
The past day and a half patient is experiencing more nausea and she also has a yeast infection which is being treated.  is asking to speak with the nurse regarding the nausea. She is now having issues keeping her medications down. He is wanting to know if she can get anything prescribed for the nausea?

## 2024-06-20 NOTE — TELEPHONE ENCOUNTER
There was no mention of nausea at the hospital follow-up appointment with Dr. Buck.  Given her hospitalization for sepsis and persistent nausea, she should be evaluated.  Okay for her to see any open provider tomorrow in the office or she can go to immediate care this afternoon.

## 2024-06-20 NOTE — TELEPHONE ENCOUNTER
Spoke with patient's  and he states yesterday patient vomited 2 x and today had vomited once.    Per  states since patient has had nausea on and off x 3 weeks now.    Was hospitalized about 3 weeks ago due to blood infection.  Currently has PICC line for IV antibiotic.    Patient's  denies any CP, SOB, fever, diarrhea, dizziness.    Patient's  is requesting an RX for nausea.    Per  she is not vomiting all the time.  She had some [employer] drink and was able to keep it in.

## 2024-06-20 NOTE — TELEPHONE ENCOUNTER
Called and informed patient's spouse of Dr. Walter's recommendations below. He voiced understanding and agreed with turner. He scheduled appointment for evaluation tomorrow 6/21/024 with Kamini VINCENT.

## 2024-06-21 ENCOUNTER — APPOINTMENT (OUTPATIENT)
Dept: CT IMAGING | Facility: HOSPITAL | Age: 74
End: 2024-06-21
Attending: STUDENT IN AN ORGANIZED HEALTH CARE EDUCATION/TRAINING PROGRAM
Payer: MEDICARE

## 2024-06-21 ENCOUNTER — APPOINTMENT (OUTPATIENT)
Dept: GENERAL RADIOLOGY | Facility: HOSPITAL | Age: 74
DRG: 314 | End: 2024-06-21
Attending: EMERGENCY MEDICINE
Payer: MEDICARE

## 2024-06-21 ENCOUNTER — HOSPITAL ENCOUNTER (INPATIENT)
Facility: HOSPITAL | Age: 74
LOS: 3 days | Discharge: HOME HEALTH | DRG: 314 | End: 2024-06-24
Attending: EMERGENCY MEDICINE | Admitting: STUDENT IN AN ORGANIZED HEALTH CARE EDUCATION/TRAINING PROGRAM
Payer: MEDICARE

## 2024-06-21 ENCOUNTER — APPOINTMENT (OUTPATIENT)
Dept: GENERAL RADIOLOGY | Facility: HOSPITAL | Age: 74
End: 2024-06-21
Attending: EMERGENCY MEDICINE
Payer: MEDICARE

## 2024-06-21 ENCOUNTER — APPOINTMENT (OUTPATIENT)
Dept: CT IMAGING | Facility: HOSPITAL | Age: 74
DRG: 314 | End: 2024-06-21
Attending: STUDENT IN AN ORGANIZED HEALTH CARE EDUCATION/TRAINING PROGRAM
Payer: MEDICARE

## 2024-06-21 ENCOUNTER — HOSPITAL ENCOUNTER (INPATIENT)
Facility: HOSPITAL | Age: 74
LOS: 3 days | Discharge: HOME HEALTH | End: 2024-06-24
Attending: EMERGENCY MEDICINE | Admitting: STUDENT IN AN ORGANIZED HEALTH CARE EDUCATION/TRAINING PROGRAM
Payer: MEDICARE

## 2024-06-21 DIAGNOSIS — E86.0 DEHYDRATION: ICD-10-CM

## 2024-06-21 DIAGNOSIS — R11.2 NAUSEA AND VOMITING IN ADULT: ICD-10-CM

## 2024-06-21 DIAGNOSIS — N28.9 RENAL INSUFFICIENCY: Primary | ICD-10-CM

## 2024-06-21 LAB
ALBUMIN SERPL-MCNC: 2.4 G/DL (ref 3.4–5)
ALBUMIN/GLOB SERPL: 0.4 {RATIO} (ref 1–2)
ALP LIVER SERPL-CCNC: 115 U/L
ALT SERPL-CCNC: 12 U/L
ANION GAP SERPL CALC-SCNC: 11 MMOL/L (ref 0–18)
AST SERPL-CCNC: 11 U/L (ref 15–37)
ATRIAL RATE: 85 BPM
BASOPHILS # BLD AUTO: 0.11 X10(3) UL (ref 0–0.2)
BASOPHILS NFR BLD AUTO: 0.4 %
BILIRUB SERPL-MCNC: 0.4 MG/DL (ref 0.1–2)
BILIRUB UR QL STRIP.AUTO: NEGATIVE
BUN BLD-MCNC: 37 MG/DL (ref 9–23)
CALCIUM BLD-MCNC: 10.5 MG/DL (ref 8.5–10.1)
CHLORIDE SERPL-SCNC: 107 MMOL/L (ref 98–112)
CO2 SERPL-SCNC: 18 MMOL/L (ref 21–32)
CREAT BLD-MCNC: 3.71 MG/DL
CREAT UR-SCNC: 78.2 MG/DL
EGFRCR SERPLBLD CKD-EPI 2021: 12 ML/MIN/1.73M2 (ref 60–?)
EOSINOPHIL # BLD AUTO: 0.25 X10(3) UL (ref 0–0.7)
EOSINOPHIL NFR BLD AUTO: 1 %
ERYTHROCYTE [DISTWIDTH] IN BLOOD BY AUTOMATED COUNT: 14.2 %
GENTAMICIN SERPL-MCNC: 3.8 UG/ML
GLOBULIN PLAS-MCNC: 5.8 G/DL (ref 2.8–4.4)
GLUCOSE BLD-MCNC: 136 MG/DL (ref 70–99)
GLUCOSE UR STRIP.AUTO-MCNC: NORMAL MG/DL
GRAN CASTS #/AREA URNS LPF: PRESENT /LPF
HCT VFR BLD AUTO: 36.7 %
HGB BLD-MCNC: 12.2 G/DL
IMM GRANULOCYTES # BLD AUTO: 0.14 X10(3) UL (ref 0–1)
IMM GRANULOCYTES NFR BLD: 0.6 %
KETONES UR STRIP.AUTO-MCNC: NEGATIVE MG/DL
LACTATE SERPL-SCNC: 1.2 MMOL/L (ref 0.4–2)
LEUKOCYTE ESTERASE UR QL STRIP.AUTO: 75
LYMPHOCYTES # BLD AUTO: 12.2 X10(3) UL (ref 1–4)
LYMPHOCYTES NFR BLD AUTO: 49.6 %
MCH RBC QN AUTO: 30.9 PG (ref 26–34)
MCHC RBC AUTO-ENTMCNC: 33.2 G/DL (ref 31–37)
MCV RBC AUTO: 92.9 FL
MONOCYTES # BLD AUTO: 1.33 X10(3) UL (ref 0.1–1)
MONOCYTES NFR BLD AUTO: 5.4 %
NEUTROPHILS # BLD AUTO: 10.57 X10 (3) UL (ref 1.5–7.7)
NEUTROPHILS # BLD AUTO: 10.57 X10(3) UL (ref 1.5–7.7)
NEUTROPHILS NFR BLD AUTO: 43 %
NITRITE UR QL STRIP.AUTO: NEGATIVE
OSMOLALITY SERPL CALC.SUM OF ELEC: 293 MOSM/KG (ref 275–295)
P AXIS: 40 DEGREES
P-R INTERVAL: 178 MS
PH UR STRIP.AUTO: 6.5 [PH] (ref 5–8)
PLATELET # BLD AUTO: 413 10(3)UL (ref 150–450)
POTASSIUM SERPL-SCNC: 4.7 MMOL/L (ref 3.5–5.1)
PROCALCITONIN SERPL-MCNC: 0.12 NG/ML (ref ?–0.16)
PROT SERPL-MCNC: 8.2 G/DL (ref 6.4–8.2)
PROT UR STRIP.AUTO-MCNC: 70 MG/DL
Q-T INTERVAL: 390 MS
QRS DURATION: 88 MS
QTC CALCULATION (BEZET): 464 MS
R AXIS: 1 DEGREES
RBC # BLD AUTO: 3.95 X10(6)UL
RBC UR QL AUTO: NEGATIVE
SODIUM SERPL-SCNC: 136 MMOL/L (ref 136–145)
SODIUM SERPL-SCNC: 77 MMOL/L
SP GR UR STRIP.AUTO: 1.02 (ref 1–1.03)
T AXIS: -11 DEGREES
TROPONIN I SERPL HS-MCNC: 7 NG/L
UROBILINOGEN UR STRIP.AUTO-MCNC: NORMAL MG/DL
VENTRICULAR RATE: 85 BPM
WBC # BLD AUTO: 24.6 X10(3) UL (ref 4–11)
YEAST UR QL: PRESENT /HPF

## 2024-06-21 PROCEDURE — 99223 1ST HOSP IP/OBS HIGH 75: CPT | Performed by: INTERNAL MEDICINE

## 2024-06-21 PROCEDURE — 99223 1ST HOSP IP/OBS HIGH 75: CPT | Performed by: STUDENT IN AN ORGANIZED HEALTH CARE EDUCATION/TRAINING PROGRAM

## 2024-06-21 PROCEDURE — 71250 CT THORAX DX C-: CPT | Performed by: STUDENT IN AN ORGANIZED HEALTH CARE EDUCATION/TRAINING PROGRAM

## 2024-06-21 PROCEDURE — 71045 X-RAY EXAM CHEST 1 VIEW: CPT | Performed by: EMERGENCY MEDICINE

## 2024-06-21 RX ORDER — ASPIRIN 81 MG/1
81 TABLET ORAL DAILY
Status: DISCONTINUED | OUTPATIENT
Start: 2024-06-21 | End: 2024-06-24

## 2024-06-21 RX ORDER — MEXILETINE HYDROCHLORIDE 150 MG/1
150 CAPSULE ORAL 2 TIMES DAILY
Status: DISCONTINUED | OUTPATIENT
Start: 2024-06-21 | End: 2024-06-24

## 2024-06-21 RX ORDER — SODIUM CHLORIDE 9 MG/ML
INJECTION, SOLUTION INTRAVENOUS CONTINUOUS
Status: ACTIVE | OUTPATIENT
Start: 2024-06-21 | End: 2024-06-21

## 2024-06-21 RX ORDER — NYSTATIN 100000 [USP'U]/ML
5 SUSPENSION ORAL 4 TIMES DAILY
Status: DISCONTINUED | OUTPATIENT
Start: 2024-06-21 | End: 2024-06-24

## 2024-06-21 RX ORDER — ONDANSETRON 2 MG/ML
4 INJECTION INTRAMUSCULAR; INTRAVENOUS ONCE
Status: COMPLETED | OUTPATIENT
Start: 2024-06-21 | End: 2024-06-21

## 2024-06-21 RX ORDER — CHOLECALCIFEROL (VITAMIN D3) 50 MCG
2000 TABLET ORAL DAILY
Status: DISCONTINUED | OUTPATIENT
Start: 2024-06-22 | End: 2024-06-24

## 2024-06-21 RX ORDER — LEVOTHYROXINE SODIUM 100 UG/1
100 TABLET ORAL
Status: DISCONTINUED | OUTPATIENT
Start: 2024-06-22 | End: 2024-06-24

## 2024-06-21 RX ORDER — ONDANSETRON 2 MG/ML
4 INJECTION INTRAMUSCULAR; INTRAVENOUS EVERY 4 HOURS PRN
Status: DISCONTINUED | OUTPATIENT
Start: 2024-06-21 | End: 2024-06-21 | Stop reason: SDUPTHER

## 2024-06-21 RX ORDER — ROSUVASTATIN CALCIUM 10 MG/1
10 TABLET, COATED ORAL EVERY EVENING
Status: DISCONTINUED | OUTPATIENT
Start: 2024-06-21 | End: 2024-06-24

## 2024-06-21 RX ORDER — RIVAROXABAN 2.5 MG/1
2.5 TABLET, FILM COATED ORAL 2 TIMES DAILY WITH MEALS
Status: DISCONTINUED | OUTPATIENT
Start: 2024-06-21 | End: 2024-06-24

## 2024-06-21 RX ORDER — ACETAMINOPHEN 500 MG
500 TABLET ORAL EVERY 4 HOURS PRN
Status: DISCONTINUED | OUTPATIENT
Start: 2024-06-21 | End: 2024-06-24

## 2024-06-21 RX ORDER — ONDANSETRON 2 MG/ML
4 INJECTION INTRAMUSCULAR; INTRAVENOUS EVERY 6 HOURS PRN
Status: DISCONTINUED | OUTPATIENT
Start: 2024-06-21 | End: 2024-06-24

## 2024-06-21 RX ORDER — ONDANSETRON 2 MG/ML
4 INJECTION INTRAMUSCULAR; INTRAVENOUS
Status: DISCONTINUED | OUTPATIENT
Start: 2024-06-21 | End: 2024-06-21

## 2024-06-21 RX ORDER — SODIUM CHLORIDE, SODIUM LACTATE, POTASSIUM CHLORIDE, CALCIUM CHLORIDE 600; 310; 30; 20 MG/100ML; MG/100ML; MG/100ML; MG/100ML
INJECTION, SOLUTION INTRAVENOUS CONTINUOUS
Status: DISCONTINUED | OUTPATIENT
Start: 2024-06-21 | End: 2024-06-23

## 2024-06-21 RX ORDER — DOCUSATE SODIUM 100 MG/1
200 CAPSULE, LIQUID FILLED ORAL 2 TIMES DAILY
Status: DISCONTINUED | OUTPATIENT
Start: 2024-06-21 | End: 2024-06-24

## 2024-06-21 NOTE — ED PROVIDER NOTES
Patient Seen in: [facility] Emergency Department      History     Chief Complaint   Patient presents with    Fatigue    Nausea     Stated Complaint: mult complaints, fatigue    Subjective:   HPI    Patient with a history of AAA, A-fib, anemia, coronary artery disease among others who presented earlier in the month with generalized weakness with nausea and vomiting.  Patient was having recurrent urinary tract infections and had several rounds of antibiotic.  Blood cultures grew Enterococcus with repeat cultures clearing.  Patient was eventually discharged with PICC line and IV antibiotic ampicillin and gentamicin as an outpatient.  Plan was to repeat blood cultures in 10 to 14 days after completion of antibiotic.  If recurrence \"then consider ICD extraction \"  Family reports patient not doing well.  Patient was nauseous and having dry heaves and sleeping most of the day.  Has been very poor p.o. intake.  There has been loose stools only today.  No black or bloody stool.  Patient has developed sore mouth/thrush.  Patient denies any abdominal pain.  No significant cough.  No chest pain or pressure.  No shortness of breath.  No calf pain or swelling.  No edema  Patient denying any urinary symptoms    Objective:   Past Medical History:    AAA (abdominal aortic aneurysm) (HCC)    Anemia, unspecified    Arrhythmia    a fib, VT    Atherosclerosis of coronary artery    Calculus of kidney    Cancer (HCC)    skin    Chronic atrial fibrillation (HCC)    Coronary atherosclerosis    Disorder of thyroid    History of blood transfusion    IBS (irritable bowel syndrome)    Ischemic cardiomyopathy    Kidney stone    Peripheral vascular disease (HCC)    Skin cancer    Small bowel obstruction (HCC)    Tobacco abuse    Visual impairment    reading glasses              Past Surgical History:   Procedure Laterality Date    Adenoidectomy      Angiogram      Cabg  05/25/2017    3 vessel    Cardiac defibrillator placement      [place]  [employer]    Cardiac pacemaker placement  10/31/2017    [place] [employer]    Colectomy      due to bowel obstruction    Cystoscopy,insert ureteral stent      Hip replacement surgery Right 05/10/2019    Ndsc ablation & rcnstj atria lmtd w/o bypass      Other surgical history  2011    facial skin cancer removal    Other surgical history      left atrial appendage amputation    Other surgical history      maze procedure    Symp aaa urgent repair  2017    Tonsillectomy                  Social History     Socioeconomic History    Marital status:    Tobacco Use    Smoking status: Former     Current packs/day: 0.00     Average packs/day: 0.5 packs/day for 47.0 years (23.5 ttl pk-yrs)     Types: Cigarettes     Start date: 1970     Quit date: 2017     Years since quittin.1    Smokeless tobacco: Never   Vaping Use    Vaping status: Never Used   Substance and Sexual Activity    Alcohol use: Yes     Alcohol/week: 3.0 - 4.0 standard drinks of alcohol     Types: 3 - 4 Glasses of wine per week     Comment: socially    Drug use: Never   Other Topics Concern    Caffeine Concern Yes     Comment: 2 cups qday coffee    Exercise Yes     Comment: WALKING SOME 1900 steps/day    Seat Belt Yes   Social History Narrative    , has adult children.  She retired in  from being a history and english lit teacher.      Social Determinants of Health     Financial Resource Strain: Low Risk  (2024)    Financial Resource Strain     Difficulty of Paying Living Expenses: Not hard at all     Med Affordability: No   Food Insecurity: No Food Insecurity (6/3/2024)    Food Insecurity     Food Insecurity: Never true   Transportation Needs: No Transportation Needs (2024)    Transportation Needs     Lack of Transportation: No   Physical Activity: Sufficiently Active (3/26/2021)    Received from [facility]    Exercise Vital Sign     Days of Exercise per Week: 7 days     Minutes of  Exercise per Session: 30 min   Housing Stability: Low Risk  (6/3/2024)    Housing Stability     Housing Instability: No              Review of Systems    Positive for stated complaint: mult complaints, fatigue  Other systems are as noted in HPI.  Constitutional and vital signs reviewed.      All other systems reviewed and negative except as noted above.    Physical Exam     ED Triage Vitals [06/21/24 1133]   /65   Pulse 78   Resp 16   Temp 97.6 °F (36.4 °C)   Temp src Oral   SpO2 97 %   O2 Device None (Room air)       Current Vitals:   Vital Signs  BP: 107/65  Pulse: 70  Resp: 19  Temp: 97.6 °F (36.4 °C)  Temp src: Oral  MAP (mmHg): 79    Oxygen Therapy  SpO2: 100 %  O2 Device: None (Room air)            Physical Exam  General: The patient is awake, alert, conversant.   Eyes: sclera white, conjunctiva pink and moist.  Lids and lashes are normal.  Oromucosa and lips are dry.  Tongue is red  Neck: With no JVD  Lungs: Clear to auscultation bilaterally.  No rhonchi or rales.  Heart: Normal S1 and S2, without murmur.  Distal pulses are strong and symmetric.  Abdomen: Soft, nondistended.  Completely nontender even deep palpation  Extremities: Unremarkable.  Calves nonswollen, symmetric, nontender.  No pedal edema.  Skin: Somewhat pale  Neurologic:  Mental status as above.  Patient moves all extremities but seems mildly generally weak         ED Course     Labs Reviewed   COMP METABOLIC PANEL (14) - Abnormal; Notable for the following components:       Result Value    Glucose 136 (*)     CO2 18.0 (*)     BUN 37 (*)     Creatinine 3.71 (*)     Calcium, Total 10.5 (*)     eGFR-Cr 12 (*)     AST 11 (*)     ALT 12 (*)     Albumin 2.4 (*)     Globulin  5.8 (*)     A/G Ratio 0.4 (*)     All other components within normal limits   CBC W/ DIFFERENTIAL - Abnormal; Notable for the following components:    WBC 24.6 (*)     Neutrophil Absolute Prelim 10.57 (*)     Neutrophil Absolute 10.57 (*)     Lymphocyte Absolute 12.20 (*)      Monocyte Absolute 1.33 (*)     All other components within normal limits   LACTIC ACID, PLASMA - Normal   PROCALCITONIN - Normal    Narrative:     Resulted by: batch: [facility],    TROPONIN I HIGH SENSITIVITY - Normal   CBC WITH DIFFERENTIAL WITH PLATELET    Narrative:     The following orders were created for panel order CBC With Differential With Platelet.  Procedure                               Abnormality         Status                     ---------                               -----------         ------                     CBC W/ DIFFERENTIAL[[id number]]          Abnormal            Final result                 Please view results for these tests on the individual orders.   SCAN SLIDE   URINALYSIS, ROUTINE   BLOOD CULTURE   BLOOD CULTURE             An EKG was performed. I agree with computerized EKG interval interpretations. EKG shows sinus rhythm with nonspecific T wave change. There are no acute ST changes to suggest acute ischemia or infarct  Ventricular rate 85 bpm. NJ interval 178 ms. QRS duration 88 ms.           MDM      Patient presents with nausea and vomiting with very poor p.o. intake.  She is hypotensive and appears dehydrated.  Patient treated with IV fluid by bolus  Differential diagnosis includes recurrent sepsis.  Urinary tract infection include the differential.  Abdominal examination is benign without evidence for bowel obstruction, diverticulitis, or appendicitis.        Admission disposition: 6/21/2024  2:09 PM         CBC shows elevated white count of nearly 25 with normal hemoglobin and platelets.  There is a strong predominance of neutrophils on differential  Metabolic panel shows normal electrolytes.  Bicarb is 18.  Creatinine is trended up to 3.7  Troponin negative  urinalysis pending  Lactic acid negative  Procalcitonin negative    Chest x-ray  I personally reviewed the actual radiographs themselves and my individual interpretation possible right-sided infiltrate  radiologist's formal  interpretation which I have reviewed  CONCLUSION:  Nodular airspace disease in the right midlung new since study of 6/6/2024 likely representing focal pneumonia.         Follow-up blood pressure after IV fluid bolus was improved to 94/55.  Patient notes that her usual blood pressure varies between about 90 and 100 systolic.    I discussed case with Dr. Victoria.  She will admit    Patient did not receive 30ml/kg of fluids despite having: hypotension. The reason for doing so is: patient's blood pressure responded to a lesser volume. 500mL of fluids were given instead (1000ml is the amount of fluids given).       [facility]   [facility] of [facility]      Reassessment Note    /65   Pulse 70   Temp 97.6 °F (36.4 °C) (Oral)   Resp 19   Wt 81 kg   SpO2 100%   BMI 25.62 kg/m²          Cardiac:  Regularity: Regular  Rate: Normal  Heart Sounds: S1,S2    Lungs:   Right: Clear  Left: Clear    Peripheral Pulses:  Radial: Right 1+ or Left 1+      Capillary Refill:  <3 Secs    Skin:  Temp/Moisture: Warm and Dry  Color: Normal      Jose Coker MD  6/21/2024  2:08 PM                                  Medical Decision Making      Disposition and Plan     Clinical Impression:  1. Renal insufficiency    2. Dehydration    3. Nausea and vomiting in adult         Disposition:  Admit  6/21/2024  2:09 pm    Follow-up:  No follow-up provider specified.        Medications Prescribed:  Current Discharge Medication List                 A total of 35 minutes of critical care time (exclusive of billable procedures) was administered to manage the patient's significant hypotension.   This involved direct patient intervention, complex decision making, and/or extensive discussions with the patient, family, and clinical staff.            Hospital Problems       Present on Admission  Date Reviewed: 6/12/2024            ICD-10-CM Noted POA    * (Principal) Renal insufficiency N28.9 6/21/2024 Unknown

## 2024-06-21 NOTE — CONSULTS
INFECTIOUS DISEASE CONSULTATION    Sonia Martell Patient Status:  Inpatient    1950 MRN [id number]   Location [facility] 5NW-A Attending Saumya Bravo, DO   Hosp Day # 0 PCP Jana Buck MD       Requested by Dr. Mullins    Reason for Consultation:  Enterococcus bacteremia with ICD  Gentamicin nephrotoxicity    History of Present Illness:  Sonia Martell is a a(n) 73 year old female previously admitted 6/3 with fever for 3 days and found to have positive blood cultures for enterococcus faecalis.  She has an underlying ischemic cardiomyopathy with ICD in place.  ECHO showed no vegetations.   EP was consulted to consider lead extraction, and was decided to discharge to remove only if fails to clear or has recurrent bacteremia.  She was discharged on PCN pump with gentamicin for synergy.  She reports that she had episodes of vomiting starting a few days ago, and was sent to ED.   Labs show JACEK.  Creat on   was 0.74, on  1.45, and on  3.71.      History:  Past Medical History:    AAA (abdominal aortic aneurysm) (HCC)    Anemia, unspecified    Arrhythmia    a fib, VT    Atherosclerosis of coronary artery    Calculus of kidney    Cancer (HCC)    skin    Chronic atrial fibrillation (HCC)    Coronary atherosclerosis    Disorder of thyroid    History of blood transfusion    IBS (irritable bowel syndrome)    Ischemic cardiomyopathy    Kidney stone    Peripheral vascular disease (HCC)    Skin cancer    Small bowel obstruction (HCC)    Tobacco abuse    Visual impairment    reading glasses     Past Surgical History:   Procedure Laterality Date    Adenoidectomy      Angiogram      Cabg  2017    3 vessel    Cardiac defibrillator placement      [place] [employer]    Cardiac pacemaker placement  10/31/2017    [place] [employer]    Colectomy      due to bowel obstruction    Cystoscopy,insert ureteral stent      Hip replacement surgery Right  05/10/2019    Ndsc ablation & rcnstj atria lmtd w/o bypass      Other surgical history  2011    facial skin cancer removal    Other surgical history      left atrial appendage amputation    Other surgical history      maze procedure    Symp aaa urgent repair  2017    Tonsillectomy       Family History   Problem Relation Age of Onset    Heart Disorder Father     Cancer Mother         colon cancer    Diabetes Neg     Hypertension Neg       reports that she quit smoking about 7 years ago. Her smoking use included cigarettes. She started smoking about 54 years ago. She has a 23.5 pack-year smoking history. She has never used smokeless tobacco. She reports current alcohol use of about 3.0 - 4.0 standard drinks of alcohol per week. She reports that she does not use drugs.      Allergies:  Allergies   Allergen Reactions    Amiodarone NAUSEA AND VOMITING     On high dose oral load    Midodrine OTHER (SEE COMMENTS)    Protamine UNKNOWN    Seafood NAUSEA AND VOMITING    Sulfa Antibiotics NAUSEA ONLY       Medications:    Current Facility-Administered Medications:     sodium chloride 0.9% infusion, , Intravenous, Continuous    ondansetron (Zofran) 4 MG/2ML injection 4 mg, 4 mg, Intravenous, Q1H PRN    aspirin DR tab 81 mg, 81 mg, Oral, Daily    Vitamin D 2,000 Units, 2,000 Units, Oral, Daily    docusate sodium (Colace) cap 200 mg, 200 mg, Oral, BID    [START ON 6/22/2024] levothyroxine (Synthroid) tab 100 mcg, 100 mcg, Oral, Before breakfast    mexiletine (Mexitil) cap 150 mg, 150 mg, Oral, BID    nystatin (Mycostatin) 402616 UNIT/ML oral suspension 500,000 Units, 5 mL, Oral, QID    rivaroxaban (Xarelto) tab 2.5 mg, 2.5 mg, Oral, BID with meals    rosuvastatin (Crestor) tab 10 mg, 10 mg, Oral, QPM    lactated ringers infusion, , Intravenous, Continuous    acetaminophen (Tylenol Extra Strength) tab 500 mg, 500 mg, Oral, Q4H PRN    penicillin G potassium 48 Million Units in sodium chloride 0.9% 240 mL IV via CADD pump, 24  Million Units, Intravenous, Q24H  Current Facility-Administered Medications on File Prior to Encounter   Medication Dose Route Frequency Provider Last Rate Last Admin    [COMPLETED] sodium chloride 0.9 % IV bolus 250 mL  250 mL Intravenous Once Nawaf Terrell  mL/hr at 24 0613 250 mL at 24 0613    [COMPLETED] lidocaine PF (Xylocaine-MPF) 1% injection  5 mL Intradermal Once David Dang MD   5 mL at 24 0852    [COMPLETED] lidocaine PF (Xylocaine-MPF) 1% injection  5 mL Intradermal Once David Dang MD   2 mL at 24 1100    [] lidocaine PF (Xylocaine-MPF) 1% injection  5 mL Intradermal Once David Dang MD        [COMPLETED] penicillin G potassium 4 Million Units in sodium chloride 0.9% 100 mL IVPB  4 Million Units Intravenous Prior to discharge David Dang  mL/hr at 24 1403 4 Million Units at 24 1403    [COMPLETED] Perflutren Lipid Microsphere (DEFINITY) 6.52 MG/ML injection 1.5 mL  1.5 mL Intravenous ONCE PRN Prabha Casarez MD   1.5 mL at 24 1507    [COMPLETED] sodium chloride 0.9 % IV bolus 1,000 mL  1,000 mL Intravenous Once Indu Manrique MD   Stopped at 24 1007    [COMPLETED] meropenem (Merrem) 1 g in sodium chloride 0.9% 100 mL IVPB-MBP  1 g Intravenous Once Indu Manrique MD   Stopped at 24 1225    [COMPLETED] sodium chloride 0.9 % IV bolus 1,000 mL  1,000 mL Intravenous Once Indu Manrique MD 1,000 mL/hr at 24 1246 1,000 mL at 24 1246    [COMPLETED] azithromycin (Zithromax) tab 500 mg  500 mg Oral Once Indu Manrique MD   500 mg at 24 1255    [COMPLETED] potassium chloride (Klor-Con M20) tab 40 mEq  40 mEq Oral Q4H Prabha Casarez MD   40 mEq at 24 2208    [COMPLETED] sodium chloride 0.9 % IV bolus 500 mL  500 mL Intravenous Once Prabha Casarez  mL/hr at 24 1745 500 mL at 24 1745     Current Outpatient Medications on File Prior to Encounter   Medication Sig Dispense Refill     nystatin 583831 UNIT/ML Mouth/Throat Suspension Take 5 mL (500,000 Units total) by mouth 4 (four) times daily. 473 mL 1    nystatin [id number] UNIT/ML Mouth/Throat Suspension Take 5 mL (500,000 Units total) by mouth 4 (four) times daily. 250 mL 0    torsemide 20 MG Oral Tab Take 0.5 tablets (10 mg total) by mouth daily.      sodium chloride 0.9% SOLN 144 mL with penicillin G potassium [id number] units SOLR 48 Million Units Inject 24 Million Units into the vein daily. Can run over 23 hours  CBC, BMP, CRP weekly 7 Bag 5    sodium chloride 0.9% SOLN 100 mL with gentamicin 40 MG/ML SOLN 160 mg Inject 160 mg into the vein daily for 28 days. Gent trough, bmp, crp , cbc weekly 7 Bag 0    rivaroxaban (XARELTO) 2.5 MG Oral Tab Take 1 tablet (2.5 mg total) by mouth 2 (two) times daily with meals.      mexiletine 150 MG Oral Cap Take 1 capsule (150 mg total) by mouth in the morning and 1 capsule (150 mg total) before bedtime.      allopurinol 100 MG Oral Tab Take 1 tablet (100 mg total) by mouth daily. 90 tablet 1    levothyroxine 100 MCG Oral Tab Take 1 tablet (100 mcg total) by mouth before breakfast. 90 tablet 1    rosuvastatin 10 MG Oral Tab Take 1 tablet (10 mg total) by mouth every evening.      sotalol 80 MG Oral Tab Take 1 tablet (80 mg total) by mouth every 12 (twelve) hours. 60 tablet 3    docusate sodium 100 MG Oral Cap Take 2 capsules (200 mg total) by mouth 2 (two) times daily.      Zinc 50 MG Oral Cap Take 50 mg by mouth daily.      Cholecalciferol (VITAMIN D) 50 MCG (2000 UT) Oral Tab Take 2,000 Units by mouth daily. qd      Ascorbic Acid (VITAMIN C) 1000 MG Oral Tab Take 1 tablet (1,000 mg total) by mouth daily.      Sacubitril-Valsartan  MG Oral Tab Take 1 tablet by mouth 2 (two) times daily.      aspirin 81 MG Oral Tab EC Take 1 tablet (81 mg total) by mouth daily.      KRILL OIL OR Take 1 capsule by mouth 2 (two) times daily.      Multiple Vitamin (MULTI-VITAMIN DAILY OR) Take 1 tablet by mouth daily.       Probiotic Product (PROBIOTIC OR) Take 1 tablet by mouth daily.         Review of Systems:    A comprehensive 10 point review of systems was completed.  Pertinent positives and negatives noted in the the HPI.      Physical Exam:    General: No acute distress. Alert and oriented x 3.  Vital signs: Temp:  [97.6 °F (36.4 °C)] 97.6 °F (36.4 °C)  Pulse:  [70-88] 88  Resp:  [11-20] 14  BP: ()/(37-81) 93/57  SpO2:  [95 %-100 %] 98 %  Body mass index is 25.54 kg/m².  HEENT: Moist mucous membranes. Extraocular muscles are intact.  Neck: No swelling, no masses  Respiratory: Non labored, symmetric exursion  Abdomen: Soft, nontender, nondistended.    Musculoskeletal: Full range of motion of all extremities.  No swelling noted.  Joints: no effusions  Skin: No lesions. No erythema, no open wounds    Laboratory Data:  Laboratory data reviewed      Recent Labs   Lab 06/21/24  1155   RBC 3.95   HGB 12.2   HCT 36.7   MCV 92.9   MCH 30.9   MCHC 33.2   RDW 14.2   NEPRELIM 10.57*   WBC 24.6*   .0       Recent Labs   Lab 06/17/24  1415 06/21/24  1154   * 136*   BUN 19 37*   CREATSERUM 1.45* 3.71*   CA 8.8 10.5*   ALB  --  2.4*   * 136   K 4.7 4.7    107   CO2 21.0 18.0*   ALKPHO  --  115   AST  --  11*   ALT  --  12*   BILT  --  0.4   TP  --  8.2              Established Problem list:  Patient Active Problem List   Diagnosis    Troponin level elevated    V-tach (HCC)    Paroxysmal atrial fibrillation (HCC)    Coronary artery disease involving native heart without angina pectoris    History of endovascular stent graft for abdominal aortic aneurysm (AAA)    Ischemic cardiomyopathy    S/P CABG x 3    S/P ablation of atrial fibrillation    Dyslipidemia    Bilateral iliac artery aneurysm (HCC)    Iliac artery aneurysm, bilateral (HCC)    S/P AAA repair    Superficial femoral artery occlusion (HCC)    Chronic anticoagulation    Nephrolithiasis    Peripheral vascular disease (HCC)    Other specified hypothyroidism     Primary osteoarthritis of right hip  Global 08/08/2019    Orthopedic aftercare    Status post right hip replacement    Vascular insufficiency    Allodynia    Neuropathy    ICD (implantable cardioverter-defibrillator) discharge    Ventricular tachycardia ([facility])    Troponin I above reference range    Closed nondisplaced fracture of acromial end of left clavicle, initial encounter    Fall    Bilateral carotid artery stenosis    Pure hypercholesterolemia    Aneurysm of iliac artery (HCC)    CAD (coronary artery disease), native coronary artery    PAD (peripheral artery disease) (HCC)    Cardiomyopathy, ischemic    V tach (HCC)    Hypotension    Chronic systolic heart failure (HCC)    VTE (venous thromboembolism)    Defibrillator discharge    COVID-19    Leukocytosis    Hyperglycemia    Sepsis due to other etiology (HCC)    Febrile illness    Hypokalemia    Renal insufficiency    Dehydration    Nausea and vomiting in adult       ASSESSMENT/PLAN:  1. JACEK/gentamicin nephrotoxicity  -developed vomiting 3 days ago, poor po intake, and worsening creat over the last 4 days    IVF   Nephrology on consult  No further gent planned    2. Enterococcus faecalis bacteremia with ICD  Has been on IV PCN, gent stopped  Ampicillin while hospitalized, renal adjsuted    3. Ischemic cardiomyopathy  Will need IVF, and hold dieuretics, cardiology and nephrology following          David Dang MD, MD [facility]  [phone]

## 2024-06-21 NOTE — ED QUICK NOTES
Family at bedside.  Rounding Completed    Plan of Care reviewed. Waiting for inpatient room.  Elimination needs assessed.    Bed is locked and in lowest position.

## 2024-06-21 NOTE — ED INITIAL ASSESSMENT (HPI)
Pt admitted on 6/3-6/7 for sepsis.  Pt on IV antibiotics at home now from PICC line.  Family reports fatigue, sleeping all day, nausea, dry heaves for several days.  Decreased PO intake.  Taking ensure for robby intake.  Cold sores, thrush recently dx.  Pt c/o yeast infection also. Pt aox4.

## 2024-06-21 NOTE — ED QUICK NOTES
Patient required minimal assist to bedside commode. Cleaned with baby wipes and cleansing wipe prior to urinating into cup for UA. Patient doesn't want straight cath.

## 2024-06-21 NOTE — ED QUICK NOTES
Patient admitted 6/3-6/7 for sepsis. Patient has a PICC line to [facility] for continuous antibiotics. Patient is on Gentamicin for 1 hour at 1800 every night and Penicillin for 23 hours. Patient has had nausea for last 4 days. No fever. Vomited upon arrival today. Diarrhea from antibiotics. Thrush from antibiotics, using Nystatin. Yeast infection from antibiotics. Using Nystatin. States she was told the Gentamicin is causing the nausea. Poor PO intake due to nausea.

## 2024-06-21 NOTE — PROGRESS NOTES
NURSING ADMISSION NOTE      Patient admitted via Ambulatory  Oriented to room.  Safety precautions initiated.  Bed in low position.  Call light in reach.    6/21 AM: Pt is A/O x 4, no nausea/vomiting when patient reached the floor. BP and HR are WNL, NSR on tele. BM today, voids in bathroom. Up SBA. PICC Line for Abx. LR going at 100 mL/hr. ID consulted.

## 2024-06-21 NOTE — ED QUICK NOTES
Orders for admission, patient is aware of plan and ready to go upstairs. Any questions, please call ED RN Carol at extension [phone].     Patient Covid vaccination status: Fully vaccinated     COVID Test Ordered in ED: None    COVID Suspicion at Admission: N/A    Running Infusions:  None    Mental Status/LOC at time of transport: Alert, oriented X4. PICC in RUE with continuous antibiotics. Thrush. Yeast infection. Nausea.    Other pertinent information:   CIWA score: N/A   NIH score:  N/A

## 2024-06-21 NOTE — H&P
[facility]     Sonia Martell Patient Status:  Emergency    1950 MRN [id number]   Location [facility] EMER[facility]ENCY DEPARTMENT Attending Jose Coker MD   Hosp Day # 0 PCP Jana Buck MD     Chief Complaint: weakness, n/v    Subjective:    History of Present Illness:     Sonia Martell is a 73 year old female with PMHx A-fib/ IBS/ ischemic cardiomyopathy/ enterococcus bacteremia who presented to the hospital for weakness and emesis. She was hospitalized from 6/3- for sepsis with enterococcus faecalis bacteremia for which he was sent home with gentamicin and penicillin G. She was hypotensive that admission and her torsemide was decreased and spironolactone was stopped. Since going home she has been weak and had decreased appetite. She is having issues with nausea and emesis with one episode of diarrhea today. She then develop thrush and was started on nystatin. She called Dr. Kitchen today as she has been losing weight from not being able to eat and was informed to go to the ED. She denied any fever, chest pain, palpitations, dizziness, dyspnea, cough.    History/Other:    Past Medical History:  Past Medical History:    AAA (abdominal aortic aneurysm) (HCC)    Anemia, unspecified    Arrhythmia    a fib, VT    Atherosclerosis of coronary artery    Calculus of kidney    Cancer (HCC)    skin    Chronic atrial fibrillation (HCC)    Coronary atherosclerosis    Disorder of thyroid    History of blood transfusion    IBS (irritable bowel syndrome)    Ischemic cardiomyopathy    Kidney stone    Peripheral vascular disease (HCC)    Skin cancer    Small bowel obstruction (HCC)    Tobacco abuse    Visual impairment    reading glasses     Past Surgical History:   Past Surgical History:   Procedure Laterality Date    Adenoidectomy      Angiogram      Cabg  2017    3 vessel    Cardiac defibrillator placement      [employer]    Cardiac pacemaker placement  10/31/2017     [employer]    Colectomy      due to bowel obstruction    Cystoscopy,insert ureteral stent      Hip replacement surgery Right 05/10/2019    Ndsc ablation & rcnstj [facility] w/o bypass      Other surgical history  2011    facial skin cancer removal    Other surgical history      left atrial appendage amputation    Other surgical history      maze procedure    Symp aaa urgent repair  2017    Tonsillectomy        Family History:   Family History   Problem Relation Age of Onset    Heart Disorder Father     Cancer Mother         colon cancer    Diabetes Neg     Hypertension Neg      Social History:    reports that she quit smoking about 7 years ago. Her smoking use included cigarettes. She started smoking about 54 years ago. She has a 23.5 pack-year smoking history. She has never used smokeless tobacco. She reports current alcohol use of about 3.0 - 4.0 standard drinks of alcohol per week. She reports that she does not use drugs.     Allergies:   Allergies   Allergen Reactions    Amiodarone NAUSEA AND VOMITING     On high dose oral load    Midodrine OTHER (SEE COMMENTS)    Protamine UNKNOWN    Seafood NAUSEA AND VOMITING    Sulfa Antibiotics NAUSEA ONLY       Medications:    Current Facility-Administered Medications on File Prior to Encounter   Medication Dose Route Frequency Provider Last Rate Last Admin    [COMPLETED] sodium chloride 0.9 % IV bolus 250 mL  250 mL Intravenous Once Nawaf Terrell  mL/hr at 24 0613 250 mL at 24 0613    [COMPLETED] lidocaine PF (Xylocaine-MPF) 1% injection  5 mL Intradermal Once David Dang MD   5 mL at 24 0852    [COMPLETED] lidocaine PF (Xylocaine-MPF) 1% injection  5 mL Intradermal Once David Dang MD   2 mL at 24 1100    [] lidocaine PF (Xylocaine-MPF) 1% injection  5 mL Intradermal Once David Dang MD        [COMPLETED] penicillin G potassium 4 Million Units in sodium chloride 0.9% 100 mL IVPB  4 Million Units  Intravenous Prior to discharge David Dang  mL/hr at 06/07/24 1403 4 Million Units at 06/07/24 1403    [COMPLETED] Perflutren Lipid Microsphere (DEFINITY) 6.52 MG/ML injection 1.5 mL  1.5 mL Intravenous ONCE PRN Prabha Casarez MD   1.5 mL at 06/04/24 1507    [COMPLETED] sodium chloride 0.9 % IV bolus 1,000 mL  1,000 mL Intravenous Once Indu Manrique MD   Stopped at 06/03/24 1007    [COMPLETED] meropenem (Merrem) 1 g in sodium chloride 0.9% 100 mL IVPB-MBP  1 g Intravenous Once Indu Manrique MD   Stopped at 06/03/24 1225    [COMPLETED] sodium chloride 0.9 % IV bolus 1,000 mL  1,000 mL Intravenous Once Indu Manrique MD 1,000 mL/hr at 06/03/24 1246 1,000 mL at 06/03/24 1246    [COMPLETED] azithromycin (Zithromax) tab 500 mg  500 mg Oral Once Indu Manrique MD   500 mg at 06/03/24 1255    [COMPLETED] potassium chloride (Klor-Con M20) tab 40 mEq  40 mEq Oral Q4H Prabha Casarez MD   40 mEq at 06/03/24 2208    [COMPLETED] sodium chloride 0.9 % IV bolus 500 mL  500 mL Intravenous Once Prabha Casarez  mL/hr at 06/03/24 1745 500 mL at 06/03/24 1745     Current Outpatient Medications on File Prior to Encounter   Medication Sig Dispense Refill    nystatin 604890 UNIT/ML Mouth/Throat Suspension Take 5 mL (500,000 Units total) by mouth 4 (four) times daily. 473 mL 1    nystatin 790092 UNIT/ML Mouth/Throat Suspension Take 5 mL (500,000 Units total) by mouth 4 (four) times daily. 250 mL 0    torsemide 20 MG Oral Tab Take 0.5 tablets (10 mg total) by mouth daily.      sodium chloride 0.9% SOLN 144 mL with penicillin G potassium [id number] units SOLR 48 Million Units Inject 24 Million Units into the vein daily. Can run over 23 hours  CBC, BMP, CRP weekly 7 Bag 5    sodium chloride 0.9% SOLN 100 mL with gentamicin 40 MG/ML SOLN 160 mg Inject 160 mg into the vein daily for 28 days. Gent trough, bmp, crp , cbc weekly 7 Bag 0    rivaroxaban (XARELTO) 2.5 MG Oral Tab Take 1 tablet (2.5 mg total) by mouth 2 (two) times daily  with meals.      mexiletine 150 MG Oral Cap Take 1 capsule (150 mg total) by mouth in the morning and 1 capsule (150 mg total) before bedtime.      allopurinol 100 MG Oral Tab Take 1 tablet (100 mg total) by mouth daily. 90 tablet 1    levothyroxine 100 MCG Oral Tab Take 1 tablet (100 mcg total) by mouth before breakfast. 90 tablet 1    rosuvastatin 10 MG Oral Tab Take 1 tablet (10 mg total) by mouth every evening.      sotalol 80 MG Oral Tab Take 1 tablet (80 mg total) by mouth every 12 (twelve) hours. 60 tablet 3    docusate sodium 100 MG Oral Cap Take 2 capsules (200 mg total) by mouth 2 (two) times daily.      Zinc 50 MG Oral Cap Take 50 mg by mouth daily.      Cholecalciferol (VITAMIN D) 50 MCG (2000 UT) Oral Tab Take 2,000 Units by mouth daily. qd      Ascorbic Acid (VITAMIN C) 1000 MG Oral Tab Take 1 tablet (1,000 mg total) by mouth daily.      Sacubitril-Valsartan  MG Oral Tab Take 1 tablet by mouth 2 (two) times daily.      aspirin 81 MG Oral Tab EC Take 1 tablet (81 mg total) by mouth daily.      KRILL OIL OR Take 1 capsule by mouth 2 (two) times daily.      Multiple Vitamin (MULTI-VITAMIN DAILY OR) Take 1 tablet by mouth daily.      Probiotic Product (PROBIOTIC OR) Take 1 tablet by mouth daily.         Review of Systems:   A comprehensive review of systems was completed.    Pertinent positives and negatives noted in the HPI.    Objective:   Physical Exam:    /65   Pulse 70   Temp 97.6 °F (36.4 °C) (Oral)   Resp 19   Wt 178 lb 9.2 oz (81 kg)   SpO2 100%   BMI 25.62 kg/m²   General: No acute distress, Alert  Respiratory: No rhonchi, no wheezes, on room air, normal work of breathing  Cardiovascular: S1, S2. Regular rate and rhythm  Abdomen: Soft, Non-tender, non-distended, positive bowel sounds  Neuro: No new focal deficits  Extremities: No edema    Results:    Labs:      Labs Last 24 Hours:    Recent Labs   Lab 06/17/24  1415 06/21/24  1155   RBC 3.57* 3.95   HGB 11.1* 12.2   HCT 34.1*  36.7   MCV 95.5 92.9   MCH 31.1 30.9   MCHC 32.6 33.2   RDW 14.0 14.2   NEPRELIM 7.36 10.57*   WBC 18.6* 24.6*   .0 413.0       Recent Labs   Lab 06/17/24  1415 06/21/24  1154   * 136*   BUN 19 37*   CREATSERUM 1.45* 3.71*   EGFRCR 38* 12*   CA 8.8 10.5*   ALB  --  2.4*   * 136   K 4.7 4.7    107   CO2 21.0 18.0*   ALKPHO  --  115   AST  --  11*   ALT  --  12*   BILT  --  0.4   TP  --  8.2       Lab Results   Component Value Date    INR 1.18 (H) 11/04/2022    INR 1.17 (H) 10/13/2019    INR 1.05 04/26/2019       Recent Labs   Lab 06/21/24  1154   TROPHS 7       No results for input(s): \"TROP\", \"PBNP\" in the last 168 hours.    Recent Labs   Lab 06/21/24  1154   PCT 0.12       Imaging: Imaging data reviewed in Epic.    Assessment & Plan:      #Weakness 2/2 hypotension  -MAP as low as 57  -hx recent enterococcus bacteremia  on gentamicin and PCN G  -chest xray reviewed and showing nodular opacity of R mid lung new since 6/6 and CT on 6/3  -met 1 SIRS criteria: WBC 24.6  -suspect hypotension 2/2 hypovolemia from emesis, some component may be 2/2 PNA  -obtain CT chest to further define opacity given nodularity and normal procalcitonin  -given current antibiotic use, cover for resistant organisms  -recheck procalcitonin in AM  -ID c/s given hx eneterococcs bacteremia in setting of new opacity  -LR @100 mls/h with goal MAP >65, IVF bolus prn  -blood cx pending  -PT/OT  #Leukocytosis  #Nodular opacity of lung    #JACEK  -Cr 3.71 with BUN 37: baseline 0.75 with recent value of 1.45  -likely multifactorial from gentamicin use, volume depletion, possible ATN  -IVF as above  -obtain UA  -cont to monitor BMP  -strict I/O, avoid nephrotoxins  -if no improvement obtain renal US and urine studies  -hold home entresto, torsemide    #NAGMA  -CO2 of 18 with AG of 11  -suspect 2/2 renal dysfunction  -cont to monitor BMP  -consider ABG if worsening    #Hypoalbuminemia  -albumin 2.4  -dietary c/s    #CLL  -newly  diagnosed as out-patient on flow cytometry, pt waiting to see hematologist until after her infection clears  -baseline WBC 18-19.5    #A-fib  -cont home mexiletine, Xarelto  -holding sotalol pending renal improvement    #HLD  -cont home rosuvastatin    #Ischemic cardiomyopathy    Plan of care discussed with ED physician    Saumya Bravo DO    Supplementary Documentation:     The 21st Century Cures Act makes medical notes like these available to patients in the interest of transparency. Please be advised this is a medical document. Medical documents are intended to carry relevant information, facts as evident, and the clinical opinion of the practitioner. The medical note is intended as peer to peer communication and may appear blunt or direct. It is written in medical language and may contain abbreviations or verbiage that are unfamiliar.

## 2024-06-22 PROBLEM — N17.0 ATN (ACUTE TUBULAR NECROSIS) (HCC): Status: ACTIVE | Noted: 2024-06-22

## 2024-06-22 PROBLEM — N17.0 ATN (ACUTE TUBULAR NECROSIS): Status: ACTIVE | Noted: 2024-06-22

## 2024-06-22 PROBLEM — N17.9 AKI (ACUTE KIDNEY INJURY) (HCC): Status: ACTIVE | Noted: 2024-06-22

## 2024-06-22 PROBLEM — N17.9 AKI (ACUTE KIDNEY INJURY): Status: ACTIVE | Noted: 2024-06-22

## 2024-06-22 LAB
ANION GAP SERPL CALC-SCNC: 8 MMOL/L (ref 0–18)
BASOPHILS # BLD AUTO: 0.09 X10(3) UL (ref 0–0.2)
BASOPHILS NFR BLD AUTO: 0.5 %
BUN BLD-MCNC: 32 MG/DL (ref 9–23)
C DIFF TOX B STL QL: NEGATIVE
CALCIUM BLD-MCNC: 9 MG/DL (ref 8.5–10.1)
CHLORIDE SERPL-SCNC: 112 MMOL/L (ref 98–112)
CO2 SERPL-SCNC: 19 MMOL/L (ref 21–32)
CREAT BLD-MCNC: 3.24 MG/DL
EGFRCR SERPLBLD CKD-EPI 2021: 14 ML/MIN/1.73M2 (ref 60–?)
EOSINOPHIL # BLD AUTO: 0.29 X10(3) UL (ref 0–0.7)
EOSINOPHIL NFR BLD AUTO: 1.6 %
ERYTHROCYTE [DISTWIDTH] IN BLOOD BY AUTOMATED COUNT: 14.6 %
GLUCOSE BLD-MCNC: 101 MG/DL (ref 70–99)
HCT VFR BLD AUTO: 30 %
HGB BLD-MCNC: 9.7 G/DL
IMM GRANULOCYTES # BLD AUTO: 0.1 X10(3) UL (ref 0–1)
IMM GRANULOCYTES NFR BLD: 0.6 %
LYMPHOCYTES # BLD AUTO: 8.22 X10(3) UL (ref 1–4)
LYMPHOCYTES NFR BLD AUTO: 45.8 %
MAGNESIUM SERPL-MCNC: 2.2 MG/DL (ref 1.6–2.6)
MCH RBC QN AUTO: 30.9 PG (ref 26–34)
MCHC RBC AUTO-ENTMCNC: 32.3 G/DL (ref 31–37)
MCV RBC AUTO: 95.5 FL
MONOCYTES # BLD AUTO: 1.15 X10(3) UL (ref 0.1–1)
MONOCYTES NFR BLD AUTO: 6.4 %
NEUTROPHILS # BLD AUTO: 8.08 X10 (3) UL (ref 1.5–7.7)
NEUTROPHILS # BLD AUTO: 8.08 X10(3) UL (ref 1.5–7.7)
NEUTROPHILS NFR BLD AUTO: 45.1 %
OSMOLALITY SERPL CALC.SUM OF ELEC: 295 MOSM/KG (ref 275–295)
PLATELET # BLD AUTO: 324 10(3)UL (ref 150–450)
POTASSIUM SERPL-SCNC: 4.6 MMOL/L (ref 3.5–5.1)
RBC # BLD AUTO: 3.14 X10(6)UL
SODIUM SERPL-SCNC: 139 MMOL/L (ref 136–145)
WBC # BLD AUTO: 17.9 X10(3) UL (ref 4–11)

## 2024-06-22 PROCEDURE — 99233 SBSQ HOSP IP/OBS HIGH 50: CPT | Performed by: HOSPITALIST

## 2024-06-22 PROCEDURE — 99233 SBSQ HOSP IP/OBS HIGH 50: CPT | Performed by: INTERNAL MEDICINE

## 2024-06-22 RX ORDER — LOPERAMIDE HYDROCHLORIDE 2 MG/1
2 CAPSULE ORAL 4 TIMES DAILY PRN
Status: DISCONTINUED | OUTPATIENT
Start: 2024-06-22 | End: 2024-06-24

## 2024-06-22 RX ORDER — ECHINACEA PURPUREA EXTRACT 125 MG
1 TABLET ORAL
Status: DISCONTINUED | OUTPATIENT
Start: 2024-06-22 | End: 2024-06-24

## 2024-06-22 RX ORDER — METOCLOPRAMIDE HYDROCHLORIDE 5 MG/ML
5 INJECTION INTRAMUSCULAR; INTRAVENOUS EVERY 6 HOURS PRN
Status: DISCONTINUED | OUTPATIENT
Start: 2024-06-22 | End: 2024-06-24

## 2024-06-22 NOTE — PLAN OF CARE
Problem: Patient/Family Goals  Goal: Patient/Family Long Term Goal  Description: Patient's Long Term Goal: go home    Interventions:  - follow poc  - See additional Care Plan goals for specific interventions  Outcome: Progressing  Goal: Patient/Family Short Term Goal  Description: Patient's Short Term Goal: 06/21 nocs: sleep    Interventions:   - cluster care  - See additional Care Plan goals for specific interventions  Outcome: Progressing     Problem: RESPIRATORY - ADULT  Goal: Achieves optimal ventilation and oxygenation  Description: INTERVENTIONS:  - Assess for changes in respiratory status  - Assess for changes in mentation and behavior  - Position to facilitate oxygenation and minimize respiratory effort  - Oxygen supplementation based on oxygen saturation or ABGs  - Provide Smoking Cessation handout, if applicable  - Encourage broncho-pulmonary hygiene including cough, deep breathe, Incentive Spirometry  - Assess the need for suctioning and perform as needed  - Assess and instruct to report SOB or any respiratory difficulty  - Respiratory Therapy support as indicated  - Manage/alleviate anxiety  - Monitor for signs/symptoms of CO2 retention  Outcome: Progressing     Problem: GASTROINTESTINAL - ADULT  Goal: Minimal or absence of nausea and vomiting  Description: INTERVENTIONS:  - Maintain adequate hydration with IV or PO as ordered and tolerated  - Nasogastric tube to low intermittent suction as ordered  - Evaluate effectiveness of ordered antiemetic medications  - Provide nonpharmacologic comfort measures as appropriate  - Advance diet as tolerated, if ordered  - Obtain nutritional consult as needed  - Evaluate fluid balance  Outcome: Progressing     Problem: METABOLIC/FLUID AND ELECTROLYTES - ADULT  Goal: Electrolytes maintained within normal limits  Description: INTERVENTIONS:  - Monitor labs and rhythm and assess patient for signs and symptoms of electrolyte imbalances  - Administer electrolyte  replacement as ordered  - Monitor response to electrolyte replacements, including rhythm and repeat lab results as appropriate  - Fluid restriction as ordered  - Instruct patient on fluid and nutrition restrictions as appropriate  Outcome: Progressing  Goal: Hemodynamic stability and optimal renal function maintained  Description: INTERVENTIONS:  - Monitor labs and assess for signs and symptoms of volume excess or deficit  - Monitor intake, output and patient weight  - Monitor urine specific gravity, serum osmolarity and serum sodium as indicated or ordered  - Monitor response to interventions for patient's volume status, including labs, urine output, blood pressure (other measures as available)  - Encourage oral intake as appropriate  - Instruct patient on fluid and nutrition restrictions as appropriate  Outcome: Progressing

## 2024-06-22 NOTE — PROGRESS NOTES
[facility]   part of [facility]     Hospitalist Progress Note     Sonia Martell Patient Status:  Inpatient    1950 MRN [id number]   [facility] 5NW-A Attending Hilario Cortes MD   Hosp Day # 1 PCP Jana Buck MD     Chief Complaint:   Chief Complaint   Patient presents with    Fatigue    Nausea       Subjective:     Patient denies any complaints.  Feeling better today    Objective:    Review of Systems:   6  point ROS completed and was negative, except for pertinent positive and negatives stated in subjective.    Vital signs:  Temp:  [97.3 °F (36.3 °C)-97.7 °F (36.5 °C)] 97.7 °F (36.5 °C)  Pulse:  [70-91] 85  Resp:  [11-20] 18  BP: ()/(37-81) 97/54  SpO2:  [95 %-100 %] 100 %    Physical Exam:    General: No acute distress.   Respiratory: Clear to auscultation bilaterally. No wheezes. No rhonchi.  Cardiovascular: S1, S2. Regular rate and rhythm. No murmurs.  Abdomen: Soft, nontender, nondistended.    Extremities: No edema.    Diagnostic Data:    Labs:  Recent Labs   Lab 24  1415 24  1155 24  0530   WBC 18.6* 24.6* 17.9*   HGB 11.1* 12.2 9.7*   MCV 95.5 92.9 95.5   .0 413.0 324.0       Recent Labs   Lab 24  1415 24  1154 24  0530   * 136* 101*   BUN 19 37* 32*   CREATSERUM 1.45* 3.71* 3.24*   CA 8.8 10.5* 9.0   ALB  --  2.4*  --    * 136 139   K 4.7 4.7 4.6    107 112   CO2 21.0 18.0* 19.0*   ALKPHO  --  115  --    AST  --  11*  --    ALT  --  12*  --    BILT  --  0.4  --    TP  --  8.2  --        Estimated Creatinine Clearance: 16.5 mL/min (A) (based on SCr of 3.24 mg/dL (H)).    No results for input(s): \"PTP\", \"INR\" in the last 168 hours.         COVID-19 Lab Results    COVID-19  Lab Results   Component Value Date    COVID19 Detected (A) 2022    COVID19 Not Detected 10/27/2020       Pro-Calcitonin  Recent Labs   Lab 24  1154   PCT 0.12       Cardiac  No results for input(s): \"TROP\", \"PBNP\" in the  last 168 hours.    Creatinine Kinase  No results for input(s): \"CK\" in the last 168 hours.    Inflammatory Markers  Recent Labs   Lab 06/17/24  1415   CRP 3.63*       Recent Labs   Lab 06/21/24  1154   TROPHS 7       Imaging: Imaging data reviewed in Epic.    Medications:    aspirin  81 mg Oral Daily    cholecalciferol  2,000 Units Oral Daily    docusate sodium  200 mg Oral BID    levothyroxine  100 mcg Oral Before breakfast    mexiletine  150 mg Oral BID    nystatin  5 mL Oral QID    rivaroxaban  2.5 mg Oral BID with meals    rosuvastatin  10 mg Oral QPM    ampicillin  2 g Intravenous Q8H       Assessment & Plan:      #Weakness 2/2 hypotension  -likely due to bp meds in context of reduced GFR; hold bp meds    #right lower lobe pleural based 3.6 cm mass like density and lingular density; will need OP follow up for these.  Clinically doubt pneumonia but defer to ID for any possible abx changes     #JACEK 2/2 dehydration and amioglycoside toxicity; iv fluids; hold diuretics; renal consulting  -hold home entresto, torsemide    #enterococcus bacteremia (with ICD); ampicillin per ID    #HFrEF; holding GDMT given hypotension and JACEK; cards to consult for GDMT management as Cr improves     #NAGMA-mild; monitor    #CLL  -newly diagnosed as out-patient on flow cytometry, pt waiting to see hematologist until after her infection clears  -baseline WBC 18-19.5     #A-fib  -cont home mexiletine, Xarelto  -holding sotalol pending renal improvement     #HLD  -cont home rosuvastatin     Hold allopurinol today    Plan of care discussed with patient and RN    Hilario King MD    Supplementary Documentation:     Quality:  DVT Prophylaxis: xarelto  CODE status: see chart  Lam: no  Central line: no      Estimated date of discharge: tbd   At this point Ms. Martell is expected to be discharge to: tbd            **Certification      PHYSICIAN Certification of Need for Inpatient Hospitalization - Initial Certification    Patient will require  inpatient services that will reasonably be expected to span two midnight's based on the clinical documentation in H+P.   Based on patients current state of illness, I anticipate that, after discharge, patient will require TBD.

## 2024-06-22 NOTE — DIETARY NOTE
[facility]   part of [facility]    NUTRITION ASSESSMENT    Unable to diagnose malnutrition criteria at this time.      NUTRITION INTERVENTION:    Meal and Snacks - Monitor and encourage adequate PO intake. Liberalize diet to maximize intake  Medical Food Supplements - Ensure Plus High Protein TID. Rationale/use for oral supplements discussed.      PATIENT STATUS: 06/22/24 73/F admitted with renal insufficiency. PMH includes CAD, SBO w/ resection, Skin Ca, IBS, PVD, HF, CLL.  RD received consult for hypoalbuminemia.  Pt noted to have been struggling with bacteremia over the past month. Albumin is a negative acute phase responder, and can be low due to critical illness, surgery, etc. Serum albumin is not necessarily a good indicator of nutritional status and is not an accurate diagnostic tool for malnutrition. However, it is noted pt has had decreased oral intake as well.  Today c/o nausea, emesis, and diarrhea. Minimal oral intake this date, and actively vomiting.  + wt loss per EMR - 30# x 1 mo? Recommend re-weigh as able.  If accurate, 14% wt loss x 1mo (significant per ASPEN Guidelines) Has been drinking Ensure PTA, which is ordered for pt presently. Visit deferred as pt actively vomiting. RN confirms minimal oral intake, RN and Family offering food/drinks frequently. RN paging MD for further meds to control N/V. RD will revisit as able for support.       ANTHROPOMETRICS:  Ht:  5'10\"  Wt: 80.7 kg (178 lb).   BMI: Body mass index is 25.54 kg/m².  IBW: 68.2 kg      WEIGHT HISTORY:   Weight loss: Yes, Severe Wt loss of 30 lbs, 14%, over 1 months per EMR.  Recommend Reweigh as able    Wt Readings from Last 10 Encounters:   06/21/24 80.7 kg (178 lb)   06/12/24 94.3 kg (208 lb)   06/03/24 90.7 kg (200 lb)   04/19/24 97.5 kg (215 lb)   12/01/23 98 kg (216 lb)   08/01/23 90.7 kg (200 lb)   05/30/23 98.9 kg (218 lb)   11/05/22 98.5 kg (217 lb 2.5 oz)   11/05/22 98.9 kg (218 lb 1.6 oz)   09/27/22 98.4 kg (217 lb)         NUTRITION:  Diet:       Procedures    Cardiac diet Cardiac; Is Patient on Accuchecks? No      Food Allergies:  seafood  Cultural/Ethnic/Sikhism Preferences Addressed: Yes    Percent Meals Eaten (last 3 days)       None            GI system review: diarrhea, emesis, and nausea Last BM: 6/22 (Soft)  Skin and wounds: WNL    NUTRITION RELATED PHYSICAL FINDINGS:     1. Body Fat/Muscle Mass:  LOIDA     2. Fluid Accumulation: none per RN documentation     NUTRITION PRESCRIPTION: 80.7 kg  Calories: 6526-7640 calories/day (25-30 kcal/kg)  Protein:  grams protein/day (1.2-1.5 grams protein per kg)  Fluid: ~1 ml/kcal or per MD discretion    NUTRITION DIAGNOSIS/PROBLEM:  Unintentional weight loss related to physiological causes as evidenced by documented/reported unintentional weight loss      MONITOR AND EVALUATE/NUTRITION GOALS:  PO intake of 75% of meals TID - New  PO intake of 75% of oral nutrition supplement/s - New  Weight stable within 1 to 2 lbs during admission - New      MEDICATIONS:  D3, Colace, Synthroid, Lrs @ 50    LABS:  BUN 32; Cr 3.24    Pt is at High nutrition risk      Breanna Benítez RD, LDN, CNSC  Clinical Dietitian  Phone [phone]

## 2024-06-22 NOTE — PHYSICAL THERAPY NOTE
Received PT eval order, chart reviewed, RN cleared for therapy. Pt denied due to pain and nausea. Will follow up tomorrow. RN aware of session

## 2024-06-22 NOTE — PROGRESS NOTES
[facility]  Nephrology Progress Note    Sonia Martell Attending:  Hilario Cortes MD       Assessment and Plan:    1) JACEK- ATN due to volume depletion + aminoglycoside nephrotoxicity in setting of severe HFrEF. UA relatively bland. PLAN- adjust IVF; holding HF meds    2) Enterococcus bacteremia (with ICD)- on PCN / gent (dc'ed); ampicillin in hosp per ID    3) Ischemic CM EF 20-25% with mod MR- normally on entresto / BB (sotalol) / torsemide (all held with JACEK)    4) New dx CLL    5) AF on mexilitine + xarelto; hold sotalol with JACEK    6) Pulm- CT noted; asymptomatic      Subjective:  Awake alert feeling better overall still weak     Physical Exam:   /56 (BP Location: Left arm)   Pulse 85   Temp 97.6 °F (36.4 °C) (Oral)   Resp 19   Wt 178 lb (80.7 kg)   SpO2 100%   BMI 25.54 kg/m²   Temp (24hrs), Av.5 °F (36.4 °C), Min:97.3 °F (36.3 °C), Max:97.6 °F (36.4 °C)       Intake/Output Summary (Last 24 hours) at 2024 0756  Last data filed at 2024 0534  Gross per 24 hour   Intake 6109 ml   Output --   Net 6109 ml     Wt Readings from Last 3 Encounters:   24 178 lb (80.7 kg)   24 208 lb (94.3 kg)   24 200 lb (90.7 kg)     General: awake alert  HEENT: No scleral icterus, MMM  Neck: Supple, no KISHA or thyromegaly  Cardiac: Regular rate and rhythm, S1, S2 normal, no murmur or tub  Lungs: Decreased BS at bases bilaterally   Abdomen: Soft, non-tender. + bowel sounds, no palpable organomegaly  Extremities: Without clubbing, cyanosis; no edema  Neurologic: Cranial nerves grossly intact, moving all extremities  Skin: Warm and dry, no rashes       Labs:   Lab Results   Component Value Date    WBC 17.9 2024    HGB 9.7 2024    HCT 30.0 2024    .0 2024    CREATSERUM 3.24 2024    BUN 32 2024     2024    K 4.6 2024     2024    CO2 19.0 2024     2024    CA 9.0 2024    ALB 2.4 2024     ALKPHO 115 06/21/2024    BILT 0.4 06/21/2024    TP 8.2 06/21/2024    AST 11 06/21/2024    ALT 12 06/21/2024    MG 2.2 06/22/2024       Imaging:  All imaging studies reviewed.    Meds:   Current Facility-Administered Medications   Medication Dose Route Frequency    aspirin DR tab 81 mg  81 mg Oral Daily    cholecalciferol (Vitamin D3) tab 2,000 Units  2,000 Units Oral Daily    docusate sodium (Colace) cap 200 mg  200 mg Oral BID    levothyroxine (Synthroid) tab 100 mcg  100 mcg Oral Before breakfast    mexiletine (Mexitil) cap 150 mg  150 mg Oral BID    nystatin (Mycostatin) 195169 UNIT/ML oral suspension 500,000 Units  5 mL Oral QID    rivaroxaban (Xarelto) tab 2.5 mg  2.5 mg Oral BID with meals    rosuvastatin (Crestor) tab 10 mg  10 mg Oral QPM    lactated ringers infusion   Intravenous Continuous    acetaminophen (Tylenol Extra Strength) tab 500 mg  500 mg Oral Q4H PRN    ampicillin (Omnipen) 2 g in sodium chloride 0.9% 100 mL IVPB-MBP  2 g Intravenous Q8H    ondansetron (Zofran) 4 MG/2ML injection 4 mg  4 mg Intravenous Q6H PRN         Questions/concerns were discussed with patient and/or family by bedside.          Isrrael Ly MD  6/22/2024  7:56 AM

## 2024-06-22 NOTE — CONSULTS
[facility]  Cardiology Consultation    Sonia Martell Patient Status:  Inpatient    1950 MRN [id number]   Location [facility] 5NW-A Attending Hilario Cortes MD   Hosp Day # 1 PCP Jana Buck MD     Reason for Consultation:  Chronic systolic heart failure    History of Present Illness:  Sonia Martell is a a(n) 73 year old female who presents with poor oral intake with weakness and nausea.  She has followed with myself and recent office note has been copied into current EMR chart for reference.   She denies chest pain, dyspnea, orthopnea, PND or LE swelling.  She denies palpitations or syncope.  She denies fever, chills, diarrhea or urinary complaints.      History:  Past Medical History:    AAA (abdominal aortic aneurysm) (HCC)    Anemia, unspecified    Arrhythmia    a fib, VT    Atherosclerosis of coronary artery    Calculus of kidney    Cancer (HCC)    skin    Chronic atrial fibrillation (HCC)    Coronary atherosclerosis    Disorder of thyroid    History of blood transfusion    IBS (irritable bowel syndrome)    Ischemic cardiomyopathy    Kidney stone    Peripheral vascular disease (HCC)    Skin cancer    Small bowel obstruction (HCC)    Tobacco abuse    Visual impairment    reading glasses     Past Surgical History:   Procedure Laterality Date    Adenoidectomy      Angiogram      Cabg  2017    3 vessel    Cardiac defibrillator placement      [employer]    Cardiac pacemaker placement  10/31/2017    [employer]    Colectomy      due to bowel obstruction    Cystoscopy,insert ureteral stent      Hip replacement surgery Right 05/10/2019    Ndsc ablation & rcnstj atria lmtd w/o bypass      Other surgical history      facial skin cancer removal    Other surgical history      left atrial appendage amputation    Other surgical history      maze procedure    Symp aaa urgent repair  2017    Tonsillectomy       Family History   Problem Relation Age of Onset     Heart Disorder Father     Cancer Mother         colon cancer    Diabetes Neg     Hypertension Neg       reports that she quit smoking about 7 years ago. Her smoking use included cigarettes. She started smoking about 54 years ago. She has a 23.5 pack-year smoking history. She has never used smokeless tobacco. She reports current alcohol use of about 3.0 - 4.0 standard drinks of alcohol per week. She reports that she does not use drugs.    Allergies:  Allergies   Allergen Reactions    Amiodarone NAUSEA AND VOMITING     On high dose oral load    Midodrine OTHER (SEE COMMENTS)    Protamine UNKNOWN    Seafood NAUSEA AND VOMITING    Sulfa Antibiotics NAUSEA ONLY       Medications:    Current Facility-Administered Medications:     sodium chloride (Saline Mist) 0.65 % nasal solution 1 spray, 1 spray, Each Nare, Q3H PRN    loperamide (Imodium) cap 2 mg, 2 mg, Oral, QID PRN    aspirin DR tab 81 mg, 81 mg, Oral, Daily    cholecalciferol (Vitamin D3) tab 2,000 Units, 2,000 Units, Oral, Daily    docusate sodium (Colace) cap 200 mg, 200 mg, Oral, BID    levothyroxine (Synthroid) tab 100 mcg, 100 mcg, Oral, Before breakfast    mexiletine (Mexitil) cap 150 mg, 150 mg, Oral, BID    nystatin (Mycostatin) 611203 UNIT/ML oral suspension 500,000 Units, 5 mL, Oral, QID    rivaroxaban (Xarelto) tab 2.5 mg, 2.5 mg, Oral, BID with meals    rosuvastatin (Crestor) tab 10 mg, 10 mg, Oral, QPM    lactated ringers infusion, , Intravenous, Continuous    acetaminophen (Tylenol Extra Strength) tab 500 mg, 500 mg, Oral, Q4H PRN    ampicillin (Omnipen) 2 g in sodium chloride 0.9% 100 mL IVPB-MBP, 2 g, Intravenous, Q8H    ondansetron (Zofran) 4 MG/2ML injection 4 mg, 4 mg, Intravenous, Q6H PRN    Review of Systems:  A comprehensive review of systems was negative if not otherwise mention in above HPI.    BP 97/54 (BP Location: Left arm)   Pulse 85   Temp 97.7 °F (36.5 °C) (Oral)   Resp 18   Wt 178 lb (80.7 kg)   SpO2 100%   BMI 25.54 kg/m²   Temp  (24hrs), Av.5 °F (36.4 °C), Min:97.3 °F (36.3 °C), Max:97.7 °F (36.5 °C)       Intake/Output Summary (Last 24 hours) at 2024 1214  Last data filed at 2024 1100  Gross per 24 hour   Intake 6109 ml   Output 400 ml   Net 5709 ml     Wt Readings from Last 3 Encounters:   24 178 lb (80.7 kg)   24 208 lb (94.3 kg)   24 200 lb (90.7 kg)       Physical Exam:   General: Alert and oriented x 3. No apparent distress. No respiratory or constitutional distress.  HEENT: Normocephalic, anicteric sclera, neck supple.  Neck: No JVD, carotids 2+, no bruits.  Cardiac: Regular rate and rhythm. S1, S2 normal. No murmur, pericardial rub, S3.  Lungs: Clear without wheezes, rales, rhonchi or dullness.  Normal excursions and effort.  Abdomen: Soft, non-tender.   Extremities: Without clubbing, cyanosis or edema.  Peripheral pulses are 2+.  Neurologic: Alert and oriented, normal affect.  Skin: Warm and dry.     Laboratory Data:  Lab Results   Component Value Date    WBC 17.9 2024    HGB 9.7 2024    HCT 30.0 2024    .0 2024    CREATSERUM 3.24 2024    BUN 32 2024     2024    K 4.6 2024     2024    CO2 19.0 2024     2024    CA 9.0 2024    MG 2.2 2024     Recent Labs   Lab 24  1154   TROPHS 7         Imaging:  EKG 2024: sinus with no major ST/T wave changes    Echo 24 Conclusions:   1. Left ventricle: The cavity size was moderately to severely increased.  Wall thickness was normal. Systolic function was reduced. The estimated ejection fraction was 20-25%, by visual assessment. No diagnostic evidence for regional wall motion abnormalities. Features are consistent with a pseudonormal left ventricular filling pattern, with concomitant abnormal relaxation and increased filling pressure - grade 2 diastolic dysfunction.   2. Right ventricle: Pacer C/W ICD noted in the right ventricle.   3. Left atrium:  The left atrial volume was moderately increased.   4. Right atrium: Pacer C/W ICD noted in right atrium.   5. Mitral valve: There was mild to moderate regurgitation.   6. Tricuspid valve: There was moderate regurgitation.     Impression:  Acute renal insufficiency  Nausea and vomiting in adult  Enterococcus bacteremia   Abx per ID  Dual chamber BS ICD 2017  Not pacer dependent.   Hx VT s/p ablation 11/2022  Intolerant to amiodarone  On sotalol, mexiletine  CAD s/p remote CABG, ICM, LVEF 25%  Echo 6/4/24-LVEF 20-25%, no rwmas.   Historically on entresto, torsemide, rashawn  AAA s/p emergent aortoiliac bypass and graft placement   Low dose xarelto  Mild-moderate MR, moderate TR    Recommendations:  -tele monitoring  -on ASA 81 mg daily and Xarelto 2.5 mg BID  -on mexiletine and Xarelto; holding sotalol in setting of JACEK  -continue statin  -holding diuretics, entresto and appreciate renal input  -Abx per ID    Thank you for allowing me to participate in the care of your patient.    Lenny Hale MD  6/22/2024  12:14 PM

## 2024-06-22 NOTE — CONSULTS
[facility]  Report of Consultation    Sonia Martell Patient Status:  Inpatient    1950 MRN [id number]   Location [facility] 5NW-A Attending Saumya rBavo, DO   Hosp Day # 0 PCP Jana Buck MD     Reason for Consultation:  JACEK/CKD    History of Present Illness:  Sonia Martell is a a(n) 73 year old female with hx of IBS, CM, recent enterococcus baceremia - was being treated w/ gentamicin/pcn presents to hospital w/ weakness/nausea and poor PO itnake.  Denies any f/c  No abd pain  Denies urinary problems except she has not urinated as much as usual   Episode of diarrhea    Cr up to 3.7;   Noted to be hypotensive in ER          History:  Past Medical History:    AAA (abdominal aortic aneurysm) (HCC)    Anemia, unspecified    Arrhythmia    a fib, VT    Atherosclerosis of coronary artery    Calculus of kidney    Cancer (HCC)    skin    Chronic atrial fibrillation (HCC)    Coronary atherosclerosis    Disorder of thyroid    History of blood transfusion    IBS (irritable bowel syndrome)    Ischemic cardiomyopathy    Kidney stone    Peripheral vascular disease (HCC)    Skin cancer    Small bowel obstruction (HCC)    Tobacco abuse    Visual impairment    reading glasses     Past Surgical History:   Procedure Laterality Date    Adenoidectomy      Angiogram      Cabg  2017    3 vessel    Cardiac defibrillator placement      [employer]    Cardiac pacemaker placement  10/31/2017    [employer]    Colectomy      due to bowel obstruction    Cystoscopy,insert ureteral stent      Hip replacement surgery Right 05/10/2019    Ndsc ablation & rcnstj atria lmtd w/o bypass      Other surgical history      facial skin cancer removal    Other surgical history      left atrial appendage amputation    Other surgical history      maze procedure    Symp aaa urgent repair  2017    Tonsillectomy       Family History   Problem Relation Age of Onset    Heart Disorder Father     Cancer Mother          colon cancer    Diabetes Neg     Hypertension Neg       reports that she quit smoking about 7 years ago. Her smoking use included cigarettes. She started smoking about 54 years ago. She has a 23.5 pack-year smoking history. She has never used smokeless tobacco. She reports current alcohol use of about 3.0 - 4.0 standard drinks of alcohol per week. She reports that she does not use drugs.    Allergies:  Allergies   Allergen Reactions    Amiodarone NAUSEA AND VOMITING     On high dose oral load    Midodrine OTHER (SEE COMMENTS)    Protamine UNKNOWN    Seafood NAUSEA AND VOMITING    Sulfa Antibiotics NAUSEA ONLY       Current Medications:    Current Facility-Administered Medications:     aspirin DR tab 81 mg, 81 mg, Oral, Daily    [START ON 6/22/2024] cholecalciferol (Vitamin D3) tab 2,000 Units, 2,000 Units, Oral, Daily    docusate sodium (Colace) cap 200 mg, 200 mg, Oral, BID    [START ON 6/22/2024] levothyroxine (Synthroid) tab 100 mcg, 100 mcg, Oral, Before breakfast    mexiletine (Mexitil) cap 150 mg, 150 mg, Oral, BID    nystatin (Mycostatin) [id number] UNIT/ML oral suspension 500,000 Units, 5 mL, Oral, QID    rivaroxaban (Xarelto) tab 2.5 mg, 2.5 mg, Oral, BID with meals    rosuvastatin (Crestor) tab 10 mg, 10 mg, Oral, QPM    lactated ringers infusion, , Intravenous, Continuous    acetaminophen (Tylenol Extra Strength) tab 500 mg, 500 mg, Oral, Q4H PRN    ampicillin (Omnipen) 2 g in sodium chloride 0.9% 100 mL IVPB-MBP, 2 g, Intravenous, Q8H    ondansetron (Zofran) 4 MG/2ML injection 4 mg, 4 mg, Intravenous, Q6H PRN  Home Medications:  No current outpatient medications on file.       Review of Systems:  See HPI; A total of 12 systems reviewed and otherwise unremarkable.    Physical Exam:  Vital signs: Blood pressure 95/39, pulse 91, temperature 97.3 °F (36.3 °C), temperature source Oral, resp. rate 16, weight 178 lb (80.7 kg), SpO2 97%, not currently breastfeeding.  General: No acute distress. Alert and oriented x  3.  HEENT: Moist mucous membranes. EOM-I. PERRL  Neck: No lymphadenopathy.  No JVD. No carotid bruits.  Respiratory: Clear to auscultation bilaterally.  No wheezes. No rhonchi.  Cardiovascular: S1, S2.  Regular rate and rhythm.  No murmurs. Equal pulses   Abdomen: Soft, nontender, nondistended.  Positive bowel sounds. No rebound tenderness   Neurologic: No focal neurological deficits.   Musculoskeletal: Full range of motion of all extremities.  No swelling noted.   Integument: No lesions. No erythema.  Psychiatric: Appropriate mood and affect.    Laboratory:  Lab Results   Component Value Date    WBC 24.6 06/21/2024    HGB 12.2 06/21/2024    HCT 36.7 06/21/2024    .0 06/21/2024    CREATSERUM 3.71 06/21/2024    BUN 37 06/21/2024     06/21/2024    K 4.7 06/21/2024     06/21/2024    CO2 18.0 06/21/2024     06/21/2024    CA 10.5 06/21/2024    ALB 2.4 06/21/2024    ALKPHO 115 06/21/2024    BILT 0.4 06/21/2024    TP 8.2 06/21/2024    AST 11 06/21/2024    ALT 12 06/21/2024     Lab Results   Component Value Date    COLORUR Light-Yellow 06/21/2024    CLARITY Turbid 06/21/2024    SPECGRAVITY 1.024 06/21/2024    GLUUR Normal 06/21/2024    BILUR Negative 06/21/2024    KETUR Negative 06/21/2024    BLOODURINE Negative 06/21/2024    PHURINE 6.5 06/21/2024    PROUR 70 06/21/2024    UROBILINOGEN Normal 06/21/2024    NITRITE Negative 06/21/2024    LEUUR 75 06/21/2024       BUN (mg/dL)   Date Value   06/21/2024 37 (H)   06/17/2024 19   06/10/2024 7 (L)   04/23/2014 14     Blood Urea Nitrogen (mg/dL)   Date Value   07/16/2018 12   04/27/2018 17     CREATININE (mg/dL)   Date Value   04/23/2014 0.59     Creatinine (mg/dL)   Date Value   06/21/2024 3.71 (H)   06/17/2024 1.45 (H)   06/10/2024 0.75   07/16/2018 0.90   04/27/2018 1.09 (H)         Imaging:  Reviewed     Impression:  JACEK- suspect related to combination of volume depletionn/hypotension/ongoing gentamicin use  -agree w/ fluids; hold diuretics  - check  urine chem  - appreciate ID eval- gentamicin stopped- check level  - check US  Hx of CM- hold diuretics/entresto  Currently compensated  Hypotension/Sepsis  - fluids  - abx per ID; concern for PNA/UTI  Afib       Thank you for allowing me to participate in this patient's care.  Please feel free to call me with any questions or concerns.    Rahat Munoz MD  6/21/2024  9:36 PM

## 2024-06-22 NOTE — IMAGING NOTE
[facility] Echo 2022  1. The study quality is average.   2. The left ventricle is mildly enlarged. Left ventricular systolic function is moderate-severely reduced with estimated LVEF 25-30%.  Left ventricular diastolic function is indeterminate.   3. The right ventricle is normal in size. Right ventricular systolic function is borderline normal. A linear echo density is noted in the right ventricle, suggestive of a ICD lead.   4. No significant valvular regurgitation or stenosis.  5. The pulmonary artery systolic pressure is 24 mmHg.   6. A trivial anterior and posterior pericardial effusion is noted.

## 2024-06-22 NOTE — PROGRESS NOTES
[facility]                INFECTIOUS DISEASE PROGRESS NOTE    Sonia Martell Patient Status:  Inpatient    1950 MRN [id number]   [facility] 5NW-A Attending Hilario Cortes MD   Hosp Day # 1 PCP Jana Buck MD     Antibiotics: ampicillin #2    Subjective:  Afebrile. Scr down trended to 3.2. Patient reports that nausea is better so far today, has not tried to eat yet. Denies back pain or abdominal pain. No swelling in BLE. Tolerating ampicillin without apparent ADRs. Blood cultures are in process and c diff is negative.     Objective:  Temp:  [97.3 °F (36.3 °C)-97.7 °F (36.5 °C)] 97.7 °F (36.5 °C)  Pulse:  [70-91] 85  Resp:  [11-20] 18  BP: ()/(37-81) 97/54  SpO2:  [95 %-100 %] 100 %    General: A&Ox3, NAD  Vital signs:Temp:  [97.3 °F (36.3 °C)-97.7 °F (36.5 °C)] 97.7 °F (36.5 °C)  Pulse:  [70-91] 85  Resp:  [11-20] 18  BP: ()/(37-81) 97/54  SpO2:  [95 %-100 %] 100 %  HEENT: Moist mucous membranes. Extraocular muscles are intact.  Neck: supple no masses  Respiratory: Non labored, symmetric excursion, normal respirations  Cardiovascular: no irregularities in rhythm  Abdomen: Soft, nontender, nondistended.   Musculoskeletal: joints: no swelling   Integument: No lesions. No erythema. No open wounds.    Labs:       Recent Labs   Lab 24  0530   RBC 3.14*   HGB 9.7*   HCT 30.0*   MCV 95.5   MCH 30.9   MCHC 32.3   RDW 14.6   NEPRELIM 8.08*   WBC 17.9*   .0         Recent Labs   Lab 24  1415 24  1154 24  0530   * 136* 101*   BUN 19 37* 32*   CREATSERUM 1.45* 3.71* 3.24*   CA 8.8 10.5* 9.0   ALB  --  2.4*  --    * 136 139   K 4.7 4.7 4.6    107 112   CO2 21.0 18.0* 19.0*   ALKPHO  --  115  --    AST  --  11*  --    ALT  --  12*  --    BILT  --  0.4  --    TP  --  8.2  --        Vancomycin Trough (ug/mL)   Date Value   2017 10.6     Microbiology    No results found for this visit on 24.      Radiology    Reviewed in  epic     Problem list reviewed:  Patient Active Problem List   Diagnosis    Troponin level elevated    V-tach (HCC)    Paroxysmal atrial fibrillation (HCC)    Coronary artery disease involving native heart without angina pectoris    History of endovascular stent graft for abdominal aortic aneurysm (AAA)    Ischemic cardiomyopathy    S/P CABG x 3    S/P ablation of atrial fibrillation    Dyslipidemia    Bilateral iliac artery aneurysm (HCC)    Iliac artery aneurysm, bilateral (HCC)    S/P AAA repair    HFrEF (heart failure with reduced ejection fraction) ([facility])    Superficial femoral artery occlusion ([facility])    Chronic anticoagulation    Nephrolithiasis    Peripheral vascular disease ([facility])    Other specified hypothyroidism    Primary osteoarthritis of right hip  Global 08/08/2019    Orthopedic aftercare    Status post right hip replacement    Vascular insufficiency    Allodynia    Neuropathy    ICD (implantable cardioverter-defibrillator) discharge    Ventricular tachycardia ([facility])    Troponin I above reference range    Closed nondisplaced fracture of acromial end of left clavicle, initial encounter    Fall    Bilateral carotid artery stenosis    Pure hypercholesterolemia    Aneurysm of iliac artery (HCC)    CAD (coronary artery disease), native coronary artery    PAD (peripheral artery disease) ([facility])    Cardiomyopathy, ischemic    V tach ([facility])    Hypotension    Chronic systolic heart failure ([facility])    VTE (venous thromboembolism)    Defibrillator discharge    COVID-19    Leukocytosis    Hyperglycemia    Sepsis due to other etiology ([facility])    Febrile illness    Hypokalemia    Renal insufficiency    Dehydration    Nausea and vomiting in adult    JACEK (acute kidney injury) ([facility])    ATN (acute tubular necrosis) ([facility])           ASSESSMENT/PLAN:  1. JACEK/gentamicin nephrotoxicity  -developed vomiting 3 days PTA, poor po intake, and worsening creat over the last 4 days  IVF   Nephrology on consult  No further gent planned  Scr now  down trending  Today nausea is improved so far    - follow up blood cultures - currently in process   - monitor symptoms, po intake, temperatures      2. Enterococcus faecalis bacteremia with ICD  Has been on IV PCN, gent stopped  Ampicillin while hospitalized, renal adjusted  Will need to determine new outpatient IV abx regimen prior to discharge and have this coordinated pending inpatient workup (blood cultures)     3. Ischemic cardiomyopathy  Will need IVF, and hold dieuretics, cardiology and nephrology following      [facility]  [phone]

## 2024-06-22 NOTE — PLAN OF CARE
Problem: Patient/Family Goals  Goal: Patient/Family Short Term Goal  Description: Patient's Short Term Goal: 06/21 nocs: sleep    Interventions:   - cluster care  - See additional Care Plan goals for specific interventions  Outcome: Progressing     Problem: RESPIRATORY - ADULT  Goal: Achieves optimal ventilation and oxygenation  Description: INTERVENTIONS:  - Assess for changes in respiratory status  - Assess for changes in mentation and behavior  - Position to facilitate oxygenation and minimize respiratory effort  - Oxygen supplementation based on oxygen saturation or ABGs  - Provide Smoking Cessation handout, if applicable  - Encourage broncho-pulmonary hygiene including cough, deep breathe, Incentive Spirometry  - Assess the need for suctioning and perform as needed  - Assess and instruct to report SOB or any respiratory difficulty  - Respiratory Therapy support as indicated  - Manage/alleviate anxiety  - Monitor for signs/symptoms of CO2 retention  Outcome: Progressing     Problem: GASTROINTESTINAL - ADULT  Goal: Minimal or absence of nausea and vomiting  Description: INTERVENTIONS:  - Maintain adequate hydration with IV or PO as ordered and tolerated  - Nasogastric tube to low intermittent suction as ordered  - Evaluate effectiveness of ordered antiemetic medications  - Provide nonpharmacologic comfort measures as appropriate  - Advance diet as tolerated, if ordered  - Obtain nutritional consult as needed  - Evaluate fluid balance  Outcome: Progressing     Problem: GASTROINTESTINAL - ADULT  Goal: Minimal or absence of nausea and vomiting  Description: INTERVENTIONS:  - Maintain adequate hydration with IV or PO as ordered and tolerated  - Nasogastric tube to low intermittent suction as ordered  - Evaluate effectiveness of ordered antiemetic medications  - Provide nonpharmacologic comfort measures as appropriate  - Advance diet as tolerated, if ordered  - Obtain nutritional consult as needed  - Evaluate fluid  balance  Outcome: Progressing     Problem: METABOLIC/FLUID AND ELECTROLYTES - ADULT  Goal: Electrolytes maintained within normal limits  Description: INTERVENTIONS:  - Monitor labs and rhythm and assess patient for signs and symptoms of electrolyte imbalances  - Administer electrolyte replacement as ordered  - Monitor response to electrolyte replacements, including rhythm and repeat lab results as appropriate  - Fluid restriction as ordered  - Instruct patient on fluid and nutrition restrictions as appropriate  Outcome: Progressing  Goal: Hemodynamic stability and optimal renal function maintained  Description: INTERVENTIONS:  - Monitor labs and assess for signs and symptoms of volume excess or deficit  - Monitor intake, output and patient weight  - Monitor urine specific gravity, serum osmolarity and serum sodium as indicated or ordered  - Monitor response to interventions for patient's volume status, including labs, urine output, blood pressure (other measures as available)  - Encourage oral intake as appropriate  - Instruct patient on fluid and nutrition restrictions as appropriate  Outcome: Progressing       Patient received tonight alert and oriented x4, vss, denies pain.  She denies nausea so far this shift.  She is on room air, tele sr.  IVF infusing to right picc as ordered without difficulty.  IV ampicillin.  Discussed poc, she verbalized understanding.  Safety and comfort measures provided, will monitor.

## 2024-06-22 NOTE — HISTORICAL OFFICE NOTE
[facility]  [address] [place] [address], 4th [place] [address]  [phone]      Sonia Martell  Progress Note  Demographics:  Name: Sonia Martell YOB: 1950  Age: 73, Female Medical Record No: [id number]  Visited Date/Time: 05/20/2024 10:20 AM    Chief Complaints  3 month follow up   History of Present Illness  Sonia Martell is a 73 year old woman w PMH a complex history of abdominal aortic aneurysm status post emergent aortobiiliac bypass with background graft  placement in May, subsequently underwent bypass surgery with a MAZE procedure and left atrial  appendage amputation.    LVEF is 25-30%.    Recently hospitalized for sustained VT requiring ICD therapy and subsequently presenting with VT below rate cut off of device, was externally shocked by ER staff. VT morphology consistent with inferior wall scar. Underwent VT ablation.     She did not tolerate amiodarone, discharged on sotalol after VT ablation. She had one recurrent episode treated with ATP in January, no recurrent episodes.   Cardiac risk factors Hypertension, Dyslipidemia and Former smoker  Past Medical History  1.Cardiomyopathy, ischemic  2.Abdominal aortic aneurysm (AAA) without rupture  3.CAD (coronary artery disease), native coronary artery  4.Atrial fibrillation, currently in sinus rhythm  5.Hx of CABG  6.Pure hypercholesterolemia  7.ICD (implantable cardioverter-defibrillator) in place  8.Ventricular tachycardia  9.Aneurysm of iliac artery  10.Superficial femoral artery occlusion  11.Carotid artery stenosis, bilateral  12.PAD (peripheral artery disease)  13.History of repair of aneurysm of abdominal aorta  Past Surgical History  1.Hx of CABG  Family History  1. Father - Family history of early CAD - FHx (Reason for death)  Social History  Smoking status Former smoker  Tobacco usage - No (Ex-smoker (finding))  Review of systems  Cardiovascular No history of Chest pain, LANDRUM, Palpitations, Syncope, PND,  Orthopnea, Edema and Claudication  Physical Examination  Vitals Right Arm Sitting  / 60 mmHg, Pulse rate 85 bpm, Regular, Height in 5' 10\", BMI: 30.8, Weight in 215 lbs (or) 97.52 kgs and BSA : 2.22 cc/m²  General Appearance No Acute Distress  Head/Eyes/Ears/Nose/Mouth/Throat Conjunctiva pink, Sclera Clear and Mucous membranes Moist  Neck Normal carotid pulsations, No carotid bruits and No JVD  Respiratory Unlabored, Lungs clear with normal breath sounds and Equal bilaterally  Cardiovascular Intact distal pulses and Regular rhythm. Normal rate present. Normal and normal S1 and S2    Allergies  1.Midodrine(Reaction:, Severity:Mild)  2.penicillin - Ingredient(Reaction:rash, Severity:Severe)  3.Protamine(Reaction:, Severity:Mild)  4.Sulfa (Sulfonamide Antibiotics) - Allergen(Reaction:rash, Severity:Severe)  Medications (Info obtained by: Verbal)  1.allopurinoL 100 mg tablet, Take 1 tablet orally once a day.  2.aspirin 81 MG tablet, 1 TABLET DAILY  3.Entresto 97 mg-103 mg tablet, Take 1 tablet orally 2 times a day.  4.levothyroxine 100 mcg tablet, Take 1 tablet orally once a day.  5.mexiletine 150 mg capsule, Take 1 capsule orally 2 times a day.  6.multivitamin tablet, Take 1 tablet orally once a day.  7.Rosuvastatin Calcium Oral Tablet 10 MG, TAKE 1 TABLET BY MOUTH EVERY DAY IN THE EVENING  8.Sotalol HCl Oral Tablet 80 MG, TAKE 1 TABLET BY MOUTH 2 TIMES A DAY  9.spironolactone 25 mg tablet, Take one-half tablet orally once a day.  10.torsemide 20 mg tablet, Take 1 tablet orally once a day.  11.Vitamin C 1,000 mg tablet, Take 1 tablet orally once a day.  12.Vitamin D3 50 mcg (2,000 unit) capsule, Take 1 capsule orally once a day.  13.Xarelto Oral Tablet 2.5 MG, TAKE 1 TABLET BY MOUTH TWO TIMES A DAY  Impression  1.Cardiomyopathy, ischemic  2.Abdominal aortic aneurysm (AAA) without rupture  3.CAD (coronary artery disease), native coronary artery  4.Atrial fibrillation, currently in sinus rhythm  5.Hx of  CABG  6.Pure hypercholesterolemia  7.PAD (peripheral artery disease)  Assessment & Plan  72 y/o female with history of ischemic CM, dual chamber ICD, presented with sustained VT requiring ICD therapy, also presented with VT in 160s below rate cut off of ICD. Was externally cardioverted. She did not tolerate high dose amiodarone.     Pt underwent ablation of VT originating from inferior wall scar via transseptal approach due to PVD, AAA repair. Had one recurrent episode of VT treated with ATP, mexilitine was added, no recurrent events.     Prior history of ruptured AAA, PV bypass, CABG, LVEF 25-30%.     - One recurrent episode of VT, treated with ATP, added mexilitine, doing well.    -Continue antiarrhythmic therapy, maintaining SR, ECG is stable. Some fatigue with sotalol but she is tolerating treatment. Felt nauseated on high dose amiodarone.   - Follow up in 6 months.  Future appointments  1.Follow up visit - Layo Mix MD (6 Months)  Nurses documentation  Refill: none   Upcoming surgeries: none   Use of assistive devices(s): none   Triage & medication list reviewed by:   JAMIE  Patient instructions   Follow up in 6 months.  Diagnostics Details  Carotid Ultrasound 10/16/2023  1.The study quality is good.    2.1-39% stenosis in the proximal right internal carotid artery based on Bluth Criteria.    3.1-39% stenosis in the proximal left internal carotid artery based on Bluth Criteria.    4.Antegrade right vertebral artery flow.    5.Antegrade left vertebral artery flow.    Ankle Brachial Index 04/14/2023  1.The study quality is average.    2.The resting right ankle brachial index is 0.69 with a monophasic waveform.    3.The resting left ankle brachial index is 0.77 with a monophasic waveform.    4.Bilateral toe brachial indices suggest moderate/severe small vessel disease.    Lower Extremity Arterial Ultrasound 04/14/2023  1.The study quality is average.    2.Occluded bilateral superfiical femoral arteries with  reconstitution in bilateral popliteal arteries    3.3 vessel runoff to both feet with no stenosis in the runoff vessels    Abdominal Aorta 04/14/2023  1.The study quality is below average. Technically challenging due to overlying bowel gas and scar tissue.    2.Patent aorta-bi-iliac Dacron graft with significant limb tortuousity. No evidence of significant stenosis at visualized levels within the graft body, limbs, and distal anastomotic sites.    3.Right distal common iliac artery appears dilated 2.1 x 2.1 cm, Left distal commn iliac artery appears slightly dilated 1.5 x 1.5 cm.    4.Bilateral internal iliac arteries not visualized.    Trans Thoracic Echocardiogram 03/03/2022  1.The study quality is average.    2.The left ventricle is mildly enlarged. Left ventricular systolic function is moderate-severely reduced with estimated LVEF 25-30%. Left ventricular diastolic function is indeterminate.    3.The right ventricle is normal in size. Right ventricular systolic function is borderline normal. A linear echo density is noted in the right ventricle, suggestive of a ICD lead.    4.No significant valvular regurgitation or stenosis.    5.The pulmonary artery systolic pressure is 24 mmHg.    6.A trivial anterior and posterior pericardial effusion is noted.    Care Providers: Layo Mix MD and Noelle Du  Electronically Authenticated by  Layo Mix MD  05/25/2024 08:26:03 PM  Disclaimer: Components of this note were documented using voice recognition system and are subject to errors not corrected at proofreading. Contact the author of this note for any clarifications.    ______________________________________________________     [facility]  [address] [place] [address], [address] [place] [address]  [phone]      Sonia Martell  Progress Note  Demographics:  Name: Sonia Martell YOB: 1950  Age: 73, Female Medical Record No: [id number]  Visited Date/Time: 10/30/2023 10:30  AM    Chief Complaints  6 month follow up- inc crestor to 10mg daily   carotid 10/16    History of Present Illness  No recent medical illness or hospitalization.    No chest pain, dyspnea, orthpnea, PND or increased lower extremity pain/swelling  No palpitations, presyncope or syncope.  No fever, nausea/vomiting, diarrhea or urinary complaints.    Still walks frequently about 3K steps a day with no issues.  She was fall cleaning recently with no issues.  Cardiac risk factors Hypertension, Dyslipidemia and Former smoker  Past Medical History  1.Cardiomyopathy, ischemic  2.Abdominal aortic aneurysm (AAA) without rupture  3.CAD (coronary artery disease), native coronary artery  4.Atrial fibrillation, currently in sinus rhythm  5.Hx of CABG  6.Pure hypercholesterolemia  7.ICD (implantable cardioverter-defibrillator) in place  8.Ventricular tachycardia  9.Aneurysm of iliac artery  10.Superficial femoral artery occlusion  11.Carotid artery stenosis, bilateral  12.PAD (peripheral artery disease)  13.History of repair of aneurysm of abdominal aorta  Past Surgical History  1.Hx of CABG  Family History  1. Father - Family history of early CAD - FHx (Reason for death)  Social History  Smoking status Former smoker  Tobacco usage - No (Ex-smoker (finding))  Alcohol usage - Yes (little bit )  Review of systems  Cardiovascular No history of Chest pain, LANDRUM, Palpitations, Syncope, PND, Orthopnea, Edema and Claudication  Respiratory No history of SOB  Physical Examination  Vitals Right Arm Sitting  / 62 mmHg, Pulse rate 73 bpm, Height in 5' 10\", BMI: 30.7, Weight in 214 lbs (or) 97.07 kgs and BSA : 2.22 cc/m²  General Appearance No Acute Distress and Appropriate  Head/Eyes/Ears/Nose/Mouth/Throat Conjunctiva pink, Sclera Clear and Mucous membranes Moist  Neck Normal carotid pulsations, No carotid bruits and No JVD  Respiratory Unlabored, Lungs clear with normal breath sounds and Equal bilaterally  Cardiovascular Intact distal  pulses and Regular rhythm. Normal rate present. Normal and normal S1 and S2    Gastrointestinal Abdomen soft and Non-tender  Musculoskeletal Normal spine  Gait Normal gait  Lower Extremities Pulses 2+ and equal bilaterally and No edema  Skin Warm and dry and Intact  Neurologic / Psychiatric Alert and Oriented  Speech Normal speech  EKG/Other abnormalities  US Carotid Oct 2023 >> 1-39% b/l ICA stenoses    [facility] Echocardiogram March 2022    1. The study quality is average.   2. The left ventricle is mildly enlarged. Left ventricular systolic function is moderate-severely reduced with estimated LVEF 25-30%.  Left ventricular diastolic function is indeterminate.   3. The right ventricle is normal in size. Right ventricular systolic function is borderline normal. A linear echo density is noted in the right ventricle, suggestive of a ICD lead.   4. No significant valvular regurgitation or stenosis.  5. The pulmonary artery systolic pressure is 24 mmHg.   6. A trivial anterior and posterior pericardial effusion is noted.     Lexiscan nuclear stress test: 2/28/20 IMPRESSION:  1. Abnormal rest/stress gated SPECT myocardial perfusion scan. There is an extensive severe fixed inferior wall perfusion defect consistent with prior transmural infarction in the anatomic distribution of a dominant right coronary artery. There is also a small, fixed anteroseptal defect.  2. Left ventricular enlargement with a severe reduction in left ventricular systolic function. Normal electrocardiographic response to regadenoson infusion.      Carotid artery duplex: March 2021 Impression:   1.  16 to 49% bilateral internal carotid artery stenoses.  2.  Patent vertebral arteries at the ankles bilaterally.  3.  Compared to previous study from September 20, 2019, no significant change.     [facility] Aorto-iliac arterial duplex: April 2023   1. The study quality is below average.   Technically challenging due to overlying bowel gas and scar tissue.     2. Patent aorta-bi-iliac Dacron graft with significant limb tortuousity.  No evidence of significant stenosis at visualized levels within the graft body, limbs, and distal anastomotic sites.    3. Right distal common iliac artery appears dilated 2.1 x 2.1 cm, Left distal commn iliac artery appears slightly dilated 1.5 x 1.5 cm.    4. Bilateral internal iliac arteries not visualized.       [facility] Lower extremity arterial duplex: 2023    1. The study quality is average.   2. Occluded bilateral superfiical femoral arteries with reconstitution in bilateral popliteal arteries  3. 3 vessel runoff to both feet with no stenosis in the runoff vessels   MANUEL   1. The study quality is average.   2. The resting right ankle brachial index is 0.69 with a monophasic waveform.   3. The resting left ankle brachial index is 0.77 with a monophasic waveform.   4. Bilateral toe brachial indices suggest moderate/severe small vessel disease.     Procedures:  PV angiogram: 10/15/19 SUMMARY:  In summary, the patient is noted to have a patent aortoiliac bypass graft.  The iliac segments are very tortuous, especially on the left side.  Bilaterally occluded superficial femoral arteries are noted.  The profunda collaterals appear to reconstitute the popliteal vessel.  Two-vessel runoff was done as noted to the foot bilaterally.    [facility] labs 23 09:37  Glucose: 103 (H) Sodium: 141 Potassium: 3.7  BUN: 17 CREATININE: 0.88  AST (SGOT): 21 ALT (SGPT): 23  Cholesterol, Total: 164 Triglycerides: 165 (H) HDL Cholesterol: 48 LDL Cholesterol Ca    Results for SARAH HUSTON (MRN [id number]) 2022 09:41  Glucose: 106 (H) Sodium: 137 Potassium: 4.0  BUN: 19 (H) CREATININE: 1.09 (H)  AST (SGOT): 18 ALT (SGPT): 22  Cholesterol, Total: 149 Triglycerides: 139 HDL Cholesterol: 47 LDL Cholesterol Ca    Pertinent labs reviewed from [facility] 21 lipid profile: , , HDL 40, LDL 74  Allergies  1.Midodrine(Reaction:,  Severity:Mild)  2.penicillin - Ingredient(Reaction:rash, Severity:Severe)  3.Protamine(Reaction:, Severity:Mild)  4.Sulfa (Sulfonamide Antibiotics) - Allergen(Reaction:rash, Severity:Severe)  Medications (Info obtained by: Verbal)  1.allopurinoL 100 mg tablet, Take 1 tablet orally once a day.  2.aspirin 81 MG tablet, 1 TABLET DAILY  3.Entresto 97 mg-103 mg tablet, Take 1 tablet orally 2 times a day.  4.levothyroxine 100 mcg tablet, Take 1 tablet orally once a day.  5.Rosuvastatin Calcium Oral Tablet 10 MG, TAKE 1 TABLET BY MOUTH EVERY DAY IN THE EVENING  6.sotaloL 80 mg tablet, Take 1 tablet orally 2 times a day.  7.Spironolactone Oral Tablet 25 MG, TAKE 1/2 TABLET BY MOUTH ONE TIME A DAY  8.torsemide 20 mg tablet, Take 1 tablet orally once a day.  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     She is on microdose Xarelto 2.5 mg twice daily due to her peripheral arterial disease and bypass surgery previously recommended by Dr. Isaac.     She continues to have more strength since changing to Entresto and increasing the dose.    She follows with Dr. Mix for paroxysmal atrial fibrillation & ICD and has history of MAZE procedure and JOHNATHAN amputation.    Suggest f/u US carotid in late 2025.   Suggest f/u echocardiogram in early 2024.  Suggest f/u US abdomen and US b/l LE arterial + MANUEL in early 2024.    Will aim for LDL less than 70;  increased rosuvastatin dose and has yet to have follow-up labs.    Plan:  -continue rosuvastatin to 10 mg nightly that was recently increased and have fasting lipid profile and AST/ALT done within the next week  -echocardiogram, US abdominal aorta-iliac arteries & US bilateral lower extremity arterial + MANUEL, fasting CMP and lipid profile just prior to follow-up with Dr. Hale in 6 months  -continue aspirin 81 mg daily, Entresto, spironolactone and anti-hypertensive medications  -continue torsemide 20 mg daily  -Notify office of weight gain of 3 lbs overnight or 5 lbs in 1 week, lower extremity swelling, and/or shortness of  breath.  -follow-up with Dr. Mix and pacer clinic as scheduled   Medications Ordered  1.torsemide 20 mg tablet, Take 1 tablet orally once a day.  2.spironolactone 25 mg tablet, Take one-half tablet orally once a day.  Labs and Diagnostics ordered  1.CMP (6 Months)  2.Lipid panel, Fasting (6 Months)  3.Echocardiography - Complete (6 Months)  4.Abdominal Aorta Ultrasound (6 Months)  5.Arterial Lower Ultrasound (bilateral) (6 Months)  6.MANUEL (6 Months)  Future appointments  1.Referral Visit - Jorgito Mustafa ([email or web address]) : (Today)  2.Follow up visit - Lenny Hale (6 Months)  Miscellaneous  1.Weight monitoring (regime/therapy)  2.Reviewed carotid ultrasound, ankle brachial index, lower extremity arterial ultrasound, abdominal aorta, trans thoracic echocardiogram with the patient.  Nurses documentation  Refills: none  Upcoming surgeries: none  Use of assistive device: none  (, MA)     Patient instructions  Plan:  -continue rosuvastatin to 10 mg nightly that was recently increased and have fasting lipid profile and AST/ALT done within the next week  -echocardiogram, US abdominal aorta-iliac arteries & US bilateral lower extremity arterial + MANUEL, fasting CMP and lipid profile just prior to follow-up with Dr. Hale in 6 months  -continue aspirin 81 mg daily, Entresto, spironolactone and  anti-hypertensive medications  -continue torsemide 20 mg daily  -Notify office of weight gain of 3 lbs overnight or 5 lbs in 1 week, lower extremity swelling, and/or shortness of  breath.  -follow-up with Dr. Mix and pacer clinic as scheduled     Echocardiogram:   Your provider has ordered an echocardiogram. This is an ultrasound of your heart to further assess its function and valves. You may require an IV.  You will be required to remove your shirt AND BRA, no all in-in-ones, dresses or coveralls. This test takes approximately 45 to 60 minutes.     AAA (abdominal aortic arterial ultrasound):   Your provider has ordered an ultrasound of the abdominal aorta. This is the main blood vessel leading away from the heart. Please have a low fiber diet the day before and nothing to eat or drink after midnight prior to procedure. You are allowed to take your medications with a sip of water. Diabetics may have a few saltine crackers in the morning if needed. Diabetics are ALLOWED /NOT ALLOWED to take insulin the morning of the exam.  You will be required to expose your abdominal area.  This test takes approximately 30 to 60 minutes.     Arterial ultrasound:   Your provider has ordered an arterial ultrasound. This test will check the circulation in the arteries. No special preparations are necessary. You will be required to remove your shoes, socks, and pants. If the ultrasound is of the arm, you will be required to remove your shirt. Please no coveralls.  You are allowed to wear underwear; the test will stop in your groin area.  This test takes approximately 45 to 60 minutes.     MANUEL:    Your provider has ordered an ankle brachial index.  This test is used to check circulation and blood pressure to your lower extremities.  You will not need to hold any medications for this appointment. You will be required to remove your shoes and socks. Please no coveralls. You will also be required to roll up your sleeves. This test  takes approximately 20 to 30 minutes.     Labs Fasting:   Your provider has ordered bloodwork on 6 month.  You will need to be fasting, nothing to eat or drink after midnight prior to the blood work. You are allowed to take your medications with a sip of water. If you are on Insulin please TAKE/HOLD your Insulin.      Follow up appointment scheduled:   Your provider has requested for you to follow up in 6 month.  An appointment will be made for you as you leave your visit.  It is always important to bring all your medications including over the counter medications, vitamins and supplements to your doctor visits. This will assist in providing the best care for your condition. Please bring your insurance cards to your appointment.     Diagnostics Details  Carotid Ultrasound 10/16/2023  1.The study quality is good.    2.1-39% stenosis in the proximal right internal carotid artery based on Bluth Criteria.    3.1-39% stenosis in the proximal left internal carotid artery based on Bluth Criteria.    4.Antegrade right vertebral artery flow.    5.Antegrade left vertebral artery flow.    Ankle Brachial Index 04/14/2023  1.The study quality is average.    2.The resting right ankle brachial index is 0.69 with a monophasic waveform.    3.The resting left ankle brachial index is 0.77 with a monophasic waveform.    4.Bilateral toe brachial indices suggest moderate/severe small vessel disease.    Lower Extremity Arterial Ultrasound 04/14/2023  1.The study quality is average.    2.Occluded bilateral superfiical femoral arteries with reconstitution in bilateral popliteal arteries    3.3 vessel runoff to both feet with no stenosis in the runoff vessels    Abdominal Aorta 04/14/2023  1.The study quality is below average. Technically challenging due to overlying bowel gas and scar tissue.    2.Patent aorta-bi-iliac Dacron graft with significant limb tortuousity. No evidence of significant stenosis at visualized levels within the graft  body, limbs, and distal anastomotic sites.    3.Right distal common iliac artery appears dilated 2.1 x 2.1 cm, Left distal commn iliac artery appears slightly dilated 1.5 x 1.5 cm.    4.Bilateral internal iliac arteries not visualized.    Trans Thoracic Echocardiogram 03/03/2022  1.The study quality is average.    2.The left ventricle is mildly enlarged. Left ventricular systolic function is moderate-severely reduced with estimated LVEF 25-30%. Left ventricular diastolic function is indeterminate.    3.The right ventricle is normal in size. Right ventricular systolic function is borderline normal. A linear echo density is noted in the right ventricle, suggestive of a ICD lead.    4.No significant valvular regurgitation or stenosis.    5.The pulmonary artery systolic pressure is 24 mmHg.    6.A trivial anterior and posterior pericardial effusion is noted.    CPOE Orders carried out by: Lenny Hale MD and Tara Haro  Care Providers: Lenny Hale MD and Tara Haro  Electronically Authenticated by  Lenny Hale MD  10/30/2023 04:36:56 PM  Disclaimer: Components of this note were documented using voice recognition system and are subject to errors not corrected at proofreading. Contact the author of this note for any clarifications.

## 2024-06-23 PROBLEM — E87.20 METABOLIC ACIDOSIS: Status: ACTIVE | Noted: 2024-06-23

## 2024-06-23 LAB
ANION GAP SERPL CALC-SCNC: 7 MMOL/L (ref 0–18)
BUN BLD-MCNC: 27 MG/DL (ref 9–23)
CALCIUM BLD-MCNC: 9.5 MG/DL (ref 8.5–10.1)
CHLORIDE SERPL-SCNC: 110 MMOL/L (ref 98–112)
CO2 SERPL-SCNC: 20 MMOL/L (ref 21–32)
CREAT BLD-MCNC: 2.98 MG/DL
EGFRCR SERPLBLD CKD-EPI 2021: 16 ML/MIN/1.73M2 (ref 60–?)
GLUCOSE BLD-MCNC: 88 MG/DL (ref 70–99)
OSMOLALITY SERPL CALC.SUM OF ELEC: 289 MOSM/KG (ref 275–295)
POTASSIUM SERPL-SCNC: 4 MMOL/L (ref 3.5–5.1)
SODIUM SERPL-SCNC: 137 MMOL/L (ref 136–145)

## 2024-06-23 PROCEDURE — 99233 SBSQ HOSP IP/OBS HIGH 50: CPT | Performed by: INTERNAL MEDICINE

## 2024-06-23 PROCEDURE — 99232 SBSQ HOSP IP/OBS MODERATE 35: CPT | Performed by: HOSPITALIST

## 2024-06-23 RX ORDER — ALLOPURINOL 100 MG/1
100 TABLET ORAL DAILY
Status: DISCONTINUED | OUTPATIENT
Start: 2024-06-23 | End: 2024-06-24

## 2024-06-23 NOTE — OCCUPATIONAL THERAPY NOTE
OT orders received, pt screened. Pt reports she has been ambulating in room ad jayla and toileting supervision. She has no concerns RE ADL performance or functional transfers at this time. Pt aware if concerns arise during admission she can request new OT orders.

## 2024-06-23 NOTE — PROGRESS NOTES
[facility]  Nephrology Progress Note    Sonia Martell Attending:  Hilario Cortes MD       Assessment and Plan:    1) JACEK- ATN due to volume depletion + aminoglycoside nephrotoxicity in setting of severe HFrEF. UA relatively bland. Improving -> HL IV; expect full recovery     2) Enterococcus bacteremia (with ICD)- on PCN / gent (dc'ed); ampicillin in hosp per ID    3) Ischemic CM EF 20-25% with mod MR- normally on entresto / BB (sotalol) / torsemide (all held with JACEK)    4) New dx CLL    5) AF on mexilitine + xarelto; hold sotalol with JACEK    6) Pulm- CT noted; asymptomatic      Subjective:  Awake alert feeling better overall still weak     Physical Exam:   /58 (BP Location: Left arm)   Pulse 83   Temp 97.9 °F (36.6 °C) (Axillary)   Resp 18   Wt 178 lb (80.7 kg)   SpO2 96%   BMI 25.54 kg/m²   Temp (24hrs), Av.8 °F (36.6 °C), Min:97.7 °F (36.5 °C), Max:97.9 °F (36.6 °C)       Intake/Output Summary (Last 24 hours) at 2024 0832  Last data filed at 2024 0441  Gross per 24 hour   Intake 100 ml   Output 1300 ml   Net -1200 ml     Wt Readings from Last 3 Encounters:   24 178 lb (80.7 kg)   24 208 lb (94.3 kg)   24 200 lb (90.7 kg)     General: awake alert  HEENT: No scleral icterus, MMM  Neck: Supple, no KISHA or thyromegaly  Cardiac: Regular rate and rhythm, S1, S2 normal, no murmur or tub  Lungs: Decreased BS at bases bilaterally   Abdomen: Soft, non-tender. + bowel sounds, no palpable organomegaly  Extremities: Without clubbing, cyanosis; no edema  Neurologic: Cranial nerves grossly intact, moving all extremities  Skin: Warm and dry, no rashes       Labs:   Lab Results   Component Value Date    CREATSERUM 2.98 2024    BUN 27 2024     2024    K 4.0 2024     2024    CO2 20.0 2024    GLU 88 2024    CA 9.5 2024       Imaging:  All imaging studies reviewed.    Meds:   Current Facility-Administered Medications    Medication Dose Route Frequency    sodium chloride (Saline Mist) 0.65 % nasal solution 1 spray  1 spray Each Nare Q3H PRN    loperamide (Imodium) cap 2 mg  2 mg Oral QID PRN    metoclopramide (Reglan) 5 mg/mL injection 5 mg  5 mg Intravenous Q6H PRN    aspirin DR tab 81 mg  81 mg Oral Daily    cholecalciferol (Vitamin D3) tab 2,000 Units  2,000 Units Oral Daily    docusate sodium (Colace) cap 200 mg  200 mg Oral BID    levothyroxine (Synthroid) tab 100 mcg  100 mcg Oral Before breakfast    mexiletine (Mexitil) cap 150 mg  150 mg Oral BID    nystatin (Mycostatin) 078939 UNIT/ML oral suspension 500,000 Units  5 mL Oral QID    rivaroxaban (Xarelto) tab 2.5 mg  2.5 mg Oral BID with meals    rosuvastatin (Crestor) tab 10 mg  10 mg Oral QPM    lactated ringers infusion   Intravenous Continuous    acetaminophen (Tylenol Extra Strength) tab 500 mg  500 mg Oral Q4H PRN    ampicillin (Omnipen) 2 g in sodium chloride 0.9% 100 mL IVPB-MBP  2 g Intravenous Q8H    ondansetron (Zofran) 4 MG/2ML injection 4 mg  4 mg Intravenous Q6H PRN         Questions/concerns were discussed with patient and/or family by bedside.          Isrrael Ly MD  6/23/2024  832 AM

## 2024-06-23 NOTE — PLAN OF CARE
Assumed care at 1930.   A&Ox4, RA, VSS.   SR on tele.  Scheduled medications given per MAR.   IV ampicillin q8h administered and tolerated well by patient.   LR infusing at 50 ml /hr via SUZI PICC.   Medicated for reports of nausea, and loose stools, see MAR.   Safety maintained  Staff rounding in place for patient's needs.   Updated on plan of care.     Problem: Patient/Family Goals  Goal: Patient/Family Long Term Goal  Description: Patient's Long Term Goal: go home    Interventions:  - follow poc  - See additional Care Plan goals for specific interventions  6/22/2024 2307 by Esther Berrios RN  Outcome: Progressing  6/22/2024 2307 by Esther Berrios RN  Outcome: Progressing  Goal: Patient/Family Short Term Goal  Description: Patient's Short Term Goal: 06/21 nocs: sleep    Interventions:   - cluster care  - See additional Care Plan goals for specific interventions  6/22/2024 2307 by Esther Berrios RN  Outcome: Progressing  6/22/2024 2307 by Esther Berrios RN  Outcome: Progressing     Problem: RESPIRATORY - ADULT  Goal: Achieves optimal ventilation and oxygenation  Description: INTERVENTIONS:  - Assess for changes in respiratory status  - Assess for changes in mentation and behavior  - Position to facilitate oxygenation and minimize respiratory effort  - Oxygen supplementation based on oxygen saturation or ABGs  - Provide Smoking Cessation handout, if applicable  - Encourage broncho-pulmonary hygiene including cough, deep breathe, Incentive Spirometry  - Assess the need for suctioning and perform as needed  - Assess and instruct to report SOB or any respiratory difficulty  - Respiratory Therapy support as indicated  - Manage/alleviate anxiety  - Monitor for signs/symptoms of CO2 retention  6/22/2024 2307 by Esther Berrios RN  Outcome: Progressing  6/22/2024 2307 by Esther Berrios RN  Outcome: Progressing     Problem: GASTROINTESTINAL - ADULT  Goal: Minimal or absence of nausea and vomiting  Description: INTERVENTIONS:  -  Maintain adequate hydration with IV or PO as ordered and tolerated  - Nasogastric tube to low intermittent suction as ordered  - Evaluate effectiveness of ordered antiemetic medications  - Provide nonpharmacologic comfort measures as appropriate  - Advance diet as tolerated, if ordered  - Obtain nutritional consult as needed  - Evaluate fluid balance  6/22/2024 2307 by Esther Berrios RN  Outcome: Progressing  6/22/2024 2307 by Esther Berrios RN  Outcome: Progressing     Problem: METABOLIC/FLUID AND ELECTROLYTES - ADULT  Goal: Electrolytes maintained within normal limits  Description: INTERVENTIONS:  - Monitor labs and rhythm and assess patient for signs and symptoms of electrolyte imbalances  - Administer electrolyte replacement as ordered  - Monitor response to electrolyte replacements, including rhythm and repeat lab results as appropriate  - Fluid restriction as ordered  - Instruct patient on fluid and nutrition restrictions as appropriate  6/22/2024 2307 by Esther Berrios RN  Outcome: Progressing  6/22/2024 2307 by Esther Berrios RN  Outcome: Progressing  Goal: Hemodynamic stability and optimal renal function maintained  Description: INTERVENTIONS:  - Monitor labs and assess for signs and symptoms of volume excess or deficit  - Monitor intake, output and patient weight  - Monitor urine specific gravity, serum osmolarity and serum sodium as indicated or ordered  - Monitor response to interventions for patient's volume status, including labs, urine output, blood pressure (other measures as available)  - Encourage oral intake as appropriate  - Instruct patient on fluid and nutrition restrictions as appropriate  6/22/2024 2307 by Esther Berrios RN  Outcome: Progressing  6/22/2024 2307 by Esther Berrios RN  Outcome: Progressing

## 2024-06-23 NOTE — PHYSICAL THERAPY NOTE
PHYSICAL THERAPY EVALUATION - INPATIENT     Room Number: 509/509-A  Evaluation Date: 6/23/2024  Type of Evaluation: Initial  Physician Order: PT Eval and Treat    Presenting Problem: admit with weakness and emesis  Co-Morbidities : afib, IBS, ischemic cardiomyopathy, enterococcus bacteremia, hospitalized 6/3-6/7 for sepsis  Reason for Therapy: Mobility Dysfunction and Discharge Planning    PHYSICAL THERAPY ASSESSMENT   Patient is a 74 year old female admitted 6/21/2024 for weakness, emesis.   Patient is currently functioning at baseline with bed mobility, transfers, gait, and maintaining seated position. Prior to admission, patient's baseline is IND.     Patient does not require IP PT services at this time, given currently functioning at an IND level. No further IP PT needs identified. PT to sign off at this time. Please re consult if functional change occurs.    PLAN  Patient has been evaluated and presents with no skilled Physical Therapy needs at this time.  Patient discharged from Physical Therapy services.  Please re-order if a new functional limitation presents during this admission.    GOALS  Patient was able to achieve the following goals ...    Patient was able to transfer At previous, functional level  Safely and independently   Patient able to ambulate on level surfaces At previous, functional level  Safely and independently         HOME SITUATION  Type of Home: House   Home Layout: Two level  Stairs to Enter : 2     Stairs to Bedroom: 12       Lives With: Spouse;Son  Drives: No  Patient Owned Equipment: Rolling walker;Cane       Prior Level of [place]: IND for all ADLs/IADLs. Has RW and canes available as needed, however does not require devices at all times. No falls in the past year.     SUBJECTIVE  \"I will take a walk later with my \"      OBJECTIVE  Precautions: Bed/chair alarm  Fall Risk: Standard fall risk    WEIGHT BEARING RESTRICTION  Weight Bearing Restriction: None                 PAIN ASSESSMENT  Ratin          COGNITION  Overall Cognitive Status:  WFL - within functional limits    RANGE OF MOTION AND STRENGTH ASSESSMENT  Upper extremity ROM and strength are within functional limits     Lower extremity ROM is within functional limits     Lower extremity strength is within functional limits       BALANCE  Static Sitting: Normal  Dynamic Sitting: Normal  Static Standing: Normal  Dynamic Standing: Normal    NEUROLOGICAL FINDINGS  Neurological Findings: None                   AM-PAC '6-Clicks' INPATIENT SHORT FORM - BASIC MOBILITY  How much difficulty does the patient currently have...  Patient Difficulty: Turning over in bed (including adjusting bedclothes, sheets and blankets)?: None   Patient Difficulty: Sitting down on and standing up from a chair with arms (e.g., wheelchair, bedside commode, etc.): None   Patient Difficulty: Moving from lying on back to sitting on the side of the bed?: None   How much help from another person does the patient currently need...   Help from Another: Moving to and from a bed to a chair (including a wheelchair)?: None   Help from Another: Need to walk in hospital room?: None   Help from Another: Climbing 3-5 steps with a railing?: None       AM-PAC Score:  Raw Score: 24   Approx Degree of Impairment: 0%   Standardized Score (AM-PAC Scale): 61.14   CMS Modifier (G-Code): CH    FUNCTIONAL ABILITY STATUS  Gait Assessment   Functional Mobility/Gait Assessment  Gait Assistance: Modified independent  Distance (ft): 20  Assistive Device: None  Pattern: Within Functional Limits    Skilled Therapy Provided     Bed Mobility:  Supine to sit: IND   Sit to supine: IND     Transfer Mobility:  Sit to stand: modIND   Stand to sit: modIND  Gait = modIND, no device    Therapist's comments:pt educated on role of IP PT services, purpose of PT evaluation, PT POC, PT goals, device recommendations, dc recommendations, and importance of maintaining mobility while hospitalized.      Exercise/Education Provided:  Bed mobility  Body mechanics  Energy conservation  Functional activity tolerated  Transfer training    Patient End of Session: In bed;Needs met;Call light within reach;RN aware of session/findings;Family present    Patient Evaluation Complexity Level:  History Low - no personal factors and/or co-morbidities   Examination of body systems Low - addressing 1-2 elements   Clinical Presentation Low - Stable   Clinical Decision Making Low Complexity       PT Session Time: 15 minutes

## 2024-06-23 NOTE — PROGRESS NOTES
[facility]  [facility]  Cardiology Progress Note    Sonia Martell Patient Status:  Inpatient    1950 MRN [id number]   Location [facility] 5NW-A Attending Hilario Cortes MD   Hosp Day # 2 PCP Jana Buck MD       Subjective: No further nausea, appetite improved.  No chest pain, dyspnea or lower extremity pain/swelling.    Objective:   Temp: 97.9 °F (36.6 °C)  Pulse: 83  Resp: 18  BP: 107/58    Intake/Output:     Intake/Output Summary (Last 24 hours) at 2024 1120  Last data filed at 2024 0441  Gross per 24 hour   Intake 100 ml   Output 900 ml   Net -800 ml       Last 3 Weights   24 1706 178 lb (80.7 kg)   24 1133 178 lb 9.2 oz (81 kg)   24 1557 208 lb (94.3 kg)   24 1443 200 lb (90.7 kg)   24 0759 200 lb (90.7 kg)       Tele: NSR with no tele alarms in bedside chart    Physical Exam:     General: Alert and oriented x 3. No apparent distress. No respiratory or constitutional distress.  HEENT: Normocephalic, anicteric sclera, neck supple.  Neck: No JVD, carotids 2+, no bruits.  Cardiac: Regular rate and rhythm. S1, S2 normal. No murmur, pericardial rub, S3.  Lungs: Clear without wheezes, rales, rhonchi or dullness.  Normal excursions and effort.  Abdomen: Soft, non-tender.   Extremities: Without clubbing, cyanosis or edema.  Peripheral pulses are 2+.  Neurologic: Alert and oriented, normal affect.  Skin: Warm and dry.     Laboratory/Data:    Labs:         Recent Labs   Lab 24  1415 24  1155 24  0530   WBC 18.6* 24.6* 17.9*   HGB 11.1* 12.2 9.7*   MCV 95.5 92.9 95.5   .0 413.0 324.0       Recent Labs   Lab 24  1415 24  1154 24  0530 24  0630   * 136 139 137   K 4.7 4.7 4.6 4.0    107 112 110   CO2 21.0 18.0* 19.0* 20.0*   BUN 19 37* 32* 27*   CREATSERUM 1.45* 3.71* 3.24* 2.98*   CA 8.8 10.5* 9.0 9.5   MG  --   --  2.2  --    * 136* 101* 88       Recent Labs    Lab 06/21/24  1154   ALT 12*   AST 11*   ALB 2.4*       Recent Labs   Lab 06/21/24  1154   TROPHS 7       Allergies:   Allergies   Allergen Reactions    Amiodarone NAUSEA AND VOMITING     On high dose oral load    Midodrine OTHER (SEE COMMENTS)    Protamine UNKNOWN    Seafood NAUSEA AND VOMITING    Sulfa Antibiotics NAUSEA ONLY       Medications:  Current Facility-Administered Medications   Medication Dose Route Frequency    allopurinol (Zyloprim) tab 100 mg  100 mg Oral Daily    sodium chloride (Saline Mist) 0.65 % nasal solution 1 spray  1 spray Each Nare Q3H PRN    loperamide (Imodium) cap 2 mg  2 mg Oral QID PRN    metoclopramide (Reglan) 5 mg/mL injection 5 mg  5 mg Intravenous Q6H PRN    aspirin DR tab 81 mg  81 mg Oral Daily    cholecalciferol (Vitamin D3) tab 2,000 Units  2,000 Units Oral Daily    docusate sodium (Colace) cap 200 mg  200 mg Oral BID    levothyroxine (Synthroid) tab 100 mcg  100 mcg Oral Before breakfast    mexiletine (Mexitil) cap 150 mg  150 mg Oral BID    nystatin (Mycostatin) 891637 UNIT/ML oral suspension 500,000 Units  5 mL Oral QID    rivaroxaban (Xarelto) tab 2.5 mg  2.5 mg Oral BID with meals    rosuvastatin (Crestor) tab 10 mg  10 mg Oral QPM    acetaminophen (Tylenol Extra Strength) tab 500 mg  500 mg Oral Q4H PRN    ampicillin (Omnipen) 2 g in sodium chloride 0.9% 100 mL IVPB-MBP  2 g Intravenous Q8H    ondansetron (Zofran) 4 MG/2ML injection 4 mg  4 mg Intravenous Q6H PRN       Imaging:  EKG 6/22/2024: sinus with no major ST/T wave changes     Echo 6/4/24 Conclusions:   1. Left ventricle: The cavity size was moderately to severely increased.  Wall thickness was normal. Systolic function was reduced. The estimated ejection fraction was 20-25%, by visual assessment. No diagnostic evidence for regional wall motion abnormalities. Features are consistent with a pseudonormal left ventricular filling pattern, with concomitant abnormal relaxation and increased filling pressure - grade  2 diastolic dysfunction.   2. Right ventricle: Pacer C/W ICD noted in the right ventricle.   3. Left atrium: The left atrial volume was moderately increased.   4. Right atrium: Pacer C/W ICD noted in right atrium.   5. Mitral valve: There was mild to moderate regurgitation.   6. Tricuspid valve: There was moderate regurgitation.      Impression:  Acute renal insufficiency  Nausea and vomiting in adult  Enterococcus bacteremia   Abx per ID  Dual chamber BS ICD 2017  Not pacer dependent.   Hx VT s/p ablation 11/2022  Intolerant to amiodarone  On sotalol, mexiletine  CAD s/p remote CABG, ICM, LVEF 25%  Echo 6/4/24-LVEF 20-25%, no rwmas.   Historically on entresto, torsemide, rashawn  AAA s/p emergent aortoiliac bypass and graft placement   Low dose xarelto  Mild-moderate MR, moderate TR     Recommendations:  -tele monitoring  -on ASA 81 mg daily and Xarelto 2.5 mg BID  -on mexiletine and Xarelto; holding sotalol in setting of JACEK  -continue statin  -holding diuretics, entresto and appreciate renal input  -Abx per ID    D/w patient and  at bedside.    Lenny Hale MD  6/23/2024  11:20 AM

## 2024-06-23 NOTE — PROGRESS NOTES
[facility]   part of [facility]     Hospitalist Progress Note     Sonia Martell Patient Status:  Inpatient    1950 MRN [id number]   Location [facility] 5NW-A Attending Hilario Cortes MD   Hosp Day # 2 PCP Jana Buck MD     Chief Complaint:   Chief Complaint   Patient presents with    Fatigue    Nausea       Subjective:     Feeling better today    Objective:    Review of Systems:   5 point ROS completed and was negative, except for pertinent positive and negatives stated in subjective.    Vital signs:  Temp:  [97.7 °F (36.5 °C)-97.9 °F (36.6 °C)] 97.9 °F (36.6 °C)  Pulse:  [66-83] 83  Resp:  [16-18] 18  BP: (102-118)/(52-89) 107/58  SpO2:  [83 %-97 %] 96 %    Physical Exam:    General: No acute distress.   Respiratory: Clear to auscultation bilaterally. No wheezes. No rhonchi.  Cardiovascular: S1, S2. Regular rate and rhythm. No murmurs.  Abdomen: Soft, nontender, nondistended.    Extremities: No edema.    Diagnostic Data:    Labs:  Recent Labs   Lab 24  1415 24  1155 24  0530   WBC 18.6* 24.6* 17.9*   HGB 11.1* 12.2 9.7*   MCV 95.5 92.9 95.5   .0 413.0 324.0       Recent Labs   Lab 24  1154 24  0530 24  0630   * 101* 88   BUN 37* 32* 27*   CREATSERUM 3.71* 3.24* 2.98*   CA 10.5* 9.0 9.5   ALB 2.4*  --   --     139 137   K 4.7 4.6 4.0    112 110   CO2 18.0* 19.0* 20.0*   ALKPHO 115  --   --    AST 11*  --   --    ALT 12*  --   --    BILT 0.4  --   --    TP 8.2  --   --        Estimated Creatinine Clearance: 17.6 mL/min (A) (based on SCr of 2.98 mg/dL (H)).    No results for input(s): \"PTP\", \"INR\" in the last 168 hours.         COVID-19 Lab Results    COVID-19  Lab Results   Component Value Date    COVID19 Detected (A) 2022    COVID19 Not Detected 10/27/2020       Pro-Calcitonin  Recent Labs   Lab 24  1154   PCT 0.12       Cardiac  No results for input(s): \"TROP\", \"PBNP\" in the last 168 hours.    Creatinine  Kinase  No results for input(s): \"CK\" in the last 168 hours.    Inflammatory Markers  Recent Labs   Lab 06/17/24  1415   CRP 3.63*       Recent Labs   Lab 06/21/24  1154   TROPHS 7       Imaging: Imaging data reviewed in Epic.    Medications:    aspirin  81 mg Oral Daily    cholecalciferol  2,000 Units Oral Daily    docusate sodium  200 mg Oral BID    levothyroxine  100 mcg Oral Before breakfast    mexiletine  150 mg Oral BID    nystatin  5 mL Oral QID    rivaroxaban  2.5 mg Oral BID with meals    rosuvastatin  10 mg Oral QPM    ampicillin  2 g Intravenous Q8H       Assessment & Plan:      #Weakness 2/2 hypotension  -likely due to bp meds in context of reduced GFR; hold bp meds    #right lower lobe pleural based 3.6 cm mass like density and lingular density; will need OP follow up for these.  Discussed with patient that she will need to pursue repeat imaging with her PCP in a few weeks; she agrees to do so and understands.     #JACEK 2/2 dehydration and amioglycoside toxicity; improving; volume management per renal/cards  -hold home entresto, torsemide    #enterococcus bacteremia (with ICD); ampicillin per ID    #HFrEF; holding GDMT given hypotension and JACEK; cards consulting for GDMT management as Cr improves     #NAGMA-mild; monitor; stable    #CLL  -newly diagnosed as out-patient on flow cytometry, pt waiting to see hematologist until after her infection clears  -baseline WBC 18-19.5     #A-fib  -cont home mexiletine, Xarelto  -holding sotalol pending renal improvement     #HLD  -cont home rosuvastatin     Resume allopurinol today    Plan of care discussed with patient     Hilario King MD    Supplementary Documentation:     Quality:  DVT Prophylaxis: xarelto  CODE status: see chart  Lam: no  Central line: no      Estimated date of discharge: tbd   At this point Ms. Martell is expected to be discharge to: tbd

## 2024-06-24 ENCOUNTER — TELEPHONE (OUTPATIENT)
Dept: FAMILY MEDICINE CLINIC | Facility: CLINIC | Age: 74
End: 2024-06-24

## 2024-06-24 VITALS
BODY MASS INDEX: 26 KG/M2 | RESPIRATION RATE: 16 BRPM | SYSTOLIC BLOOD PRESSURE: 94 MMHG | OXYGEN SATURATION: 98 % | WEIGHT: 178 LBS | TEMPERATURE: 98 F | HEART RATE: 82 BPM | DIASTOLIC BLOOD PRESSURE: 51 MMHG

## 2024-06-24 PROBLEM — A41.81 SEPSIS DUE TO ENTEROCOCCUS (HCC): Status: ACTIVE | Noted: 2024-06-24

## 2024-06-24 LAB
ANION GAP SERPL CALC-SCNC: 6 MMOL/L (ref 0–18)
BUN BLD-MCNC: 26 MG/DL (ref 9–23)
CALCIUM BLD-MCNC: 9.2 MG/DL (ref 8.5–10.1)
CHLORIDE SERPL-SCNC: 111 MMOL/L (ref 98–112)
CO2 SERPL-SCNC: 21 MMOL/L (ref 21–32)
CREAT BLD-MCNC: 2.78 MG/DL
EGFRCR SERPLBLD CKD-EPI 2021: 17 ML/MIN/1.73M2 (ref 60–?)
GLUCOSE BLD-MCNC: 104 MG/DL (ref 70–99)
OSMOLALITY SERPL CALC.SUM OF ELEC: 291 MOSM/KG (ref 275–295)
POTASSIUM SERPL-SCNC: 4.4 MMOL/L (ref 3.5–5.1)
SODIUM SERPL-SCNC: 138 MMOL/L (ref 136–145)

## 2024-06-24 PROCEDURE — 1111F DSCHRG MED/CURRENT MED MERGE: CPT | Performed by: HOSPITALIST

## 2024-06-24 PROCEDURE — 99233 SBSQ HOSP IP/OBS HIGH 50: CPT | Performed by: INTERNAL MEDICINE

## 2024-06-24 PROCEDURE — 1159F MED LIST DOCD IN RCRD: CPT | Performed by: HOSPITALIST

## 2024-06-24 PROCEDURE — 1170F FXNL STATUS ASSESSED: CPT | Performed by: HOSPITALIST

## 2024-06-24 PROCEDURE — 3074F SYST BP LT 130 MM HG: CPT | Performed by: HOSPITALIST

## 2024-06-24 PROCEDURE — 3078F DIAST BP <80 MM HG: CPT | Performed by: HOSPITALIST

## 2024-06-24 PROCEDURE — 99232 SBSQ HOSP IP/OBS MODERATE 35: CPT | Performed by: HOSPITALIST

## 2024-06-24 RX ORDER — ONDANSETRON 4 MG/1
4 TABLET, ORALLY DISINTEGRATING ORAL EVERY 6 HOURS PRN
Qty: 20 TABLET | Refills: 0 | Status: SHIPPED | OUTPATIENT
Start: 2024-06-24 | End: 2024-11-06

## 2024-06-24 NOTE — PLAN OF CARE
Problem: Patient/Family Goals  Goal: Patient/Family Long Term Goal  Description: Patient's Long Term Goal: go home    Interventions:  - follow poc  - See additional Care Plan goals for specific interventions  6/24/2024 1623 by Zeenat Matamoros RN  Outcome: Completed  6/24/2024 1021 by Zeenat Matamoros RN  Outcome: Progressing  Goal: Patient/Family Short Term Goal  Description: Patient's Short Term Goal: 06/21 nocs: sleep  6/23 NOC: manage nausea. Get sleep  Interventions:   - cluster care  - See additional Care Plan goals for specific interventions  6/24/2024 1623 by Zeenat Matamoros RN  Outcome: Completed  6/24/2024 1021 by Zeenat Matamoros RN  Outcome: Progressing     Problem: RESPIRATORY - ADULT  Goal: Achieves optimal ventilation and oxygenation  Description: INTERVENTIONS:  - Assess for changes in respiratory status  - Assess for changes in mentation and behavior  - Position to facilitate oxygenation and minimize respiratory effort  - Oxygen supplementation based on oxygen saturation or ABGs  - Provide Smoking Cessation handout, if applicable  - Encourage broncho-pulmonary hygiene including cough, deep breathe, Incentive Spirometry  - Assess the need for suctioning and perform as needed  - Assess and instruct to report SOB or any respiratory difficulty  - Respiratory Therapy support as indicated  - Manage/alleviate anxiety  - Monitor for signs/symptoms of CO2 retention  6/24/2024 1623 by Zeenat Matamoros RN  Outcome: Completed  6/24/2024 1021 by Zeenat Matamoros RN  Outcome: Progressing     Problem: GASTROINTESTINAL - ADULT  Goal: Minimal or absence of nausea and vomiting  Description: INTERVENTIONS:  - Maintain adequate hydration with IV or PO as ordered and tolerated  - Nasogastric tube to low intermittent suction as ordered  - Evaluate effectiveness of ordered antiemetic medications  - Provide nonpharmacologic comfort measures as appropriate  - Advance diet as tolerated, if ordered  - Obtain nutritional consult as needed  -  Evaluate fluid balance  6/24/2024 1623 by Zeenat Matamoros RN  Outcome: Completed  6/24/2024 1021 by Zeenat Matamoros RN  Outcome: Progressing     Problem: METABOLIC/FLUID AND ELECTROLYTES - ADULT  Goal: Electrolytes maintained within normal limits  Description: INTERVENTIONS:  - Monitor labs and rhythm and assess patient for signs and symptoms of electrolyte imbalances  - Administer electrolyte replacement as ordered  - Monitor response to electrolyte replacements, including rhythm and repeat lab results as appropriate  - Fluid restriction as ordered  - Instruct patient on fluid and nutrition restrictions as appropriate  6/24/2024 1623 by Zeenat Matamoros RN  Outcome: Completed  6/24/2024 1021 by Zeenat Matamoros RN  Outcome: Progressing  Goal: Hemodynamic stability and optimal renal function maintained  Description: INTERVENTIONS:  - Monitor labs and assess for signs and symptoms of volume excess or deficit  - Monitor intake, output and patient weight  - Monitor urine specific gravity, serum osmolarity and serum sodium as indicated or ordered  - Monitor response to interventions for patient's volume status, including labs, urine output, blood pressure (other measures as available)  - Encourage oral intake as appropriate  - Instruct patient on fluid and nutrition restrictions as appropriate  6/24/2024 1623 by Zeenat Matamoros RN  Outcome: Completed  6/24/2024 1021 by Zeenat Matamoros RN  Outcome: Progressing

## 2024-06-24 NOTE — HOME CARE LIAISON
Patient is current with [facility].  Please enter SYLVIE order upon discharge.  RN.

## 2024-06-24 NOTE — PLAN OF CARE
Received pt A&Ox4.  on RA. NSR on tele. Receiving xarelto. Dislikes the hospital food. C/o nausea - antiemetic provided. Voids via BRP. Up self. Denies Pain. R arm precautions for PICC. Receiving IV abx. Plan of care continues, no further needs at this time.     Problem: Patient/Family Goals  Goal: Patient/Family Long Term Goal  Description: Patient's Long Term Goal: go home    Interventions:  - follow poc  - See additional Care Plan goals for specific interventions  Outcome: Progressing  Goal: Patient/Family Short Term Goal  Description: Patient's Short Term Goal: 06/21 nocs: sleep  6/23 NOC: manage nausea. Get sleep  Interventions:   - cluster care  - See additional Care Plan goals for specific interventions  Outcome: Progressing     Problem: RESPIRATORY - ADULT  Goal: Achieves optimal ventilation and oxygenation  Description: INTERVENTIONS:  - Assess for changes in respiratory status  - Assess for changes in mentation and behavior  - Position to facilitate oxygenation and minimize respiratory effort  - Oxygen supplementation based on oxygen saturation or ABGs  - Provide Smoking Cessation handout, if applicable  - Encourage broncho-pulmonary hygiene including cough, deep breathe, Incentive Spirometry  - Assess the need for suctioning and perform as needed  - Assess and instruct to report SOB or any respiratory difficulty  - Respiratory Therapy support as indicated  - Manage/alleviate anxiety  - Monitor for signs/symptoms of CO2 retention  Outcome: Progressing     Problem: GASTROINTESTINAL - ADULT  Goal: Minimal or absence of nausea and vomiting  Description: INTERVENTIONS:  - Maintain adequate hydration with IV or PO as ordered and tolerated  - Nasogastric tube to low intermittent suction as ordered  - Evaluate effectiveness of ordered antiemetic medications  - Provide nonpharmacologic comfort measures as appropriate  - Advance diet as tolerated, if ordered  - Obtain nutritional consult as needed  - Evaluate  fluid balance  Outcome: Progressing     Problem: METABOLIC/FLUID AND ELECTROLYTES - ADULT  Goal: Electrolytes maintained within normal limits  Description: INTERVENTIONS:  - Monitor labs and rhythm and assess patient for signs and symptoms of electrolyte imbalances  - Administer electrolyte replacement as ordered  - Monitor response to electrolyte replacements, including rhythm and repeat lab results as appropriate  - Fluid restriction as ordered  - Instruct patient on fluid and nutrition restrictions as appropriate  Outcome: Progressing  Goal: Hemodynamic stability and optimal renal function maintained  Description: INTERVENTIONS:  - Monitor labs and assess for signs and symptoms of volume excess or deficit  - Monitor intake, output and patient weight  - Monitor urine specific gravity, serum osmolarity and serum sodium as indicated or ordered  - Monitor response to interventions for patient's volume status, including labs, urine output, blood pressure (other measures as available)  - Encourage oral intake as appropriate  - Instruct patient on fluid and nutrition restrictions as appropriate  Outcome: Progressing

## 2024-06-24 NOTE — TELEPHONE ENCOUNTER
Home health certification for initial services was received reviewed and  signed and given to Caitlyn MULLER to fax back.

## 2024-06-24 NOTE — PROGRESS NOTES
Progress Note  Sonia Martell Patient Status:  Inpatient    1950 MRN [id number]   Location [facility] 5NW-A Attending Hilario Cortes MD   Hosp Day # 3 PCP Jana Buck MD     Subjective:  No acute events overnight.  Resting in bed this morning, denies any cardiac symptoms currently.  Sustaining SR on the tele monitor.    Objective:  /65 (BP Location: Left arm)   Pulse 80   Temp 98.2 °F (36.8 °C) (Oral)   Resp 16   Wt 178 lb (80.7 kg)   SpO2 100%   BMI 25.54 kg/m²     Telemetry: SR, HR 80s      Intake/Output:    Intake/Output Summary (Last 24 hours) at 2024 0957  Last data filed at 2024 0618  Gross per 24 hour   Intake 200 ml   Output 800 ml   Net -600 ml       Last 3 Weights   24 1706 178 lb (80.7 kg)   24 1133 178 lb 9.2 oz (81 kg)   24 1557 208 lb (94.3 kg)   24 1443 200 lb (90.7 kg)   24 0759 200 lb (90.7 kg)       Labs:  Recent Labs   Lab 24  0530 24  0630 24  0615   * 88 104*   BUN 32* 27* 26*   CREATSERUM 3.24* 2.98* 2.78*   EGFRCR 14* 16* 17*   CA 9.0 9.5 9.2    137 138   K 4.6 4.0 4.4    110 111   CO2 19.0* 20.0* 21.0     Recent Labs   Lab 24  1415 24  1155 24  0530   RBC 3.57* 3.95 3.14*   HGB 11.1* 12.2 9.7*   HCT 34.1* 36.7 30.0*   MCV 95.5 92.9 95.5   MCH 31.1 30.9 30.9   MCHC 32.6 33.2 32.3   RDW 14.0 14.2 14.6   NEPRELIM 7.36 10.57* 8.08*   WBC 18.6* 24.6* 17.9*   .0 413.0 324.0         Recent Labs   Lab 24  1154   TROPHS 7       Review of Systems   Constitutional: Negative.   Cardiovascular: Negative.    Respiratory: Negative.         Physical Exam:    Gen: alert, oriented x 3, NAD  Heent: pupils equal, reactive. Mucous membranes moist.   Neck: no jvd  Cardiac: regular rate and rhythm, normal S1,S2, no murmur, gallop or rub   Lungs: CTA  Abd: soft, NT/ND +bs  Ext: no edema  Skin: Warm, dry  Neuro: No focal deficits        Medications:     allopurinol  100 mg  Oral Daily    aspirin  81 mg Oral Daily    cholecalciferol  2,000 Units Oral Daily    docusate sodium  200 mg Oral BID    levothyroxine  100 mcg Oral Before breakfast    mexiletine  150 mg Oral BID    nystatin  5 mL Oral QID    rivaroxaban  2.5 mg Oral BID with meals    rosuvastatin  10 mg Oral QPM    ampicillin  2 g Intravenous Q8H         Assessment:  Acute renal insufficiency likely d/t volu,e depletion and abx (gentamicin) nephrotoxicity    Nephrology following   Nausea and emesis, now resolved-likely med related   Enterococcus bacteremia   On IV abx-per ID  Hx of dual chamber BS ICD 2017   Not pacer dependant   No plans for device removal at this time, continue abx treatment as per ID recs.  Hx of VT s/p ablation 11/2022  Intolerant to amiodarone in the past  On sotalol, mexiletine pta, now sotalol held with jacek   CAD with hx of CABG, ICM, LVEF 25%  Echo 6/4/24 LVEF 20-25%, no rwma  Historically on entresto, spior, torsemide-now held with JACEK  Mod MR and mod TR  AAA s/p emergent aortoiliac bypass and graft placement with Dr Meza   On asa, low dose xarelto, statin    New diagnosis of CLL-follows op with hem/onc     Plan:  Continue abx treatment as per ID recs.  Continue asa, low dose xarelto, statin.  Holding sotalol with JACEK.  Continue Mexiletine.  Holding GDMT (entresto, rashawn, torsemide) with JACEK-nephrology following, appreciate the recs.      Plan of care discussed with patient, RN.    CARLTON Deng  6/24/2024  9:57 AM  [phone]      Patient seen and examined independently.  Note reviewed and labs reviewed. Agree with above assessment and plan. MDM per me. Will plan resumption of sotalol as renal function improves. Abx per ID recs.         Ion Talley DO  Cardiologist  [facility] [place]  6/24/2024 12:00 PM      Note to the patient: The 21st Century Cures Act makes medical notes like these available to patients in the interest of transparency. However, be advised that this is a  medical document. It is intended as peer to peer communication. It is written in medical language and may contain abbreviations or verbiage that are unfamiliar. It may appear blunt or direct. Medical documents are intended to carry relevant information, facts as evident, and clinical opinion of the practitioner.     Disclaimer: Components of this note were documented using voice recognition system and are subject to errors not corrected at proofreading. Contact the author of this note for any clarifications.

## 2024-06-24 NOTE — PROGRESS NOTES
[facility]                INFECTIOUS DISEASE PROGRESS NOTE    Sonia Martell Patient Status:  Inpatient    1950 MRN [id number]   Location [facility] 5NW-A Attending Hilario Cortes MD   Hosp Day # 2 PCP Jana Buck MD     Antibiotics: ampicillin #2    Subjective:  She feels better today nausea is better.  No fevers.  Objective:  Temp:  [97.9 °F (36.6 °C)-98.5 °F (36.9 °C)] 98.5 °F (36.9 °C)  Pulse:  [72-88] 86  Resp:  [16-18] 18  BP: ()/(47-89) 97/48  SpO2:  [83 %-99 %] 99 %    General: A&Ox3, NAD  Vital signs:Temp:  [97.9 °F (36.6 °C)-98.5 °F (36.9 °C)] 98.5 °F (36.9 °C)  Pulse:  [72-88] 86  Resp:  [16-18] 18  BP: ()/(47-89) 97/48  SpO2:  [83 %-99 %] 99 %  HEENT: Moist mucous membranes. Extraocular muscles are intact.  Neck: supple no masses  Respiratory: Non labored, symmetric excursion, normal respirations  Cardiovascular: no irregularities in rhythm  Abdomen: Soft, nontender, nondistended.   Musculoskeletal: joints: no swelling   Integument: No lesions. No erythema. No open wounds.    Labs:       Recent Labs   Lab 24  0530   RBC 3.14*   HGB 9.7*   HCT 30.0*   MCV 95.5   MCH 30.9   MCHC 32.3   RDW 14.6   NEPRELIM 8.08*   WBC 17.9*   .0         Recent Labs   Lab 24  1154 24  0530 24  0630   * 101* 88   BUN 37* 32* 27*   CREATSERUM 3.71* 3.24* 2.98*   CA 10.5* 9.0 9.5   ALB 2.4*  --   --     139 137   K 4.7 4.6 4.0    112 110   CO2 18.0* 19.0* 20.0*   ALKPHO 115  --   --    AST 11*  --   --    ALT 12*  --   --    BILT 0.4  --   --    TP 8.2  --   --        Vancomycin Trough (ug/mL)   Date Value   2017 10.6     Microbiology    Hospital Encounter on 24   1. Blood Culture     Status: None (Preliminary result)    Collection Time: 24 12:48 PM    Specimen: Blood,peripheral   Result Value Ref Range    Blood Culture Result No Growth 2 Days N/A         Radiology    Reviewed in epic     Problem list  reviewed:  Patient Active Problem List   Diagnosis    Troponin level elevated    V-tach (HCC)    Paroxysmal atrial fibrillation (HCC)    Coronary artery disease involving native heart without angina pectoris    History of endovascular stent graft for abdominal aortic aneurysm (AAA)    Ischemic cardiomyopathy    S/P CABG x 3    S/P ablation of atrial fibrillation    Dyslipidemia    Bilateral iliac artery aneurysm (HCC)    Iliac artery aneurysm, bilateral (HCC)    S/P AAA repair    HFrEF (heart failure with reduced ejection fraction) ([facility])    Superficial femoral artery occlusion ([facility])    Chronic anticoagulation    Nephrolithiasis    Peripheral vascular disease ([facility])    Other specified hypothyroidism    Primary osteoarthritis of right hip  Global 08/08/2019    Orthopedic aftercare    Status post right hip replacement    Vascular insufficiency    Allodynia    Neuropathy    ICD (implantable cardioverter-defibrillator) discharge    Ventricular tachycardia ([facility])    Troponin I above reference range    Closed nondisplaced fracture of acromial end of left clavicle, initial encounter    Fall    Bilateral carotid artery stenosis    Pure hypercholesterolemia    Aneurysm of iliac artery (HCC)    CAD (coronary artery disease), native coronary artery    PAD (peripheral artery disease) (HCC)    Cardiomyopathy, ischemic    V tach ([facility])    Hypotension    Chronic systolic heart failure (HCC)    VTE (venous thromboembolism)    Defibrillator discharge    COVID-19    Leukocytosis    Hyperglycemia    Sepsis due to other etiology ([facility])    Febrile illness    Hypokalemia    Renal insufficiency    Dehydration    Nausea and vomiting in adult    JACEK (acute kidney injury) ([facility])    ATN (acute tubular necrosis) ([facility])    Metabolic acidosis           ASSESSMENT/PLAN:  1. JACEK/gentamicin nephrotoxicity  -developed vomiting 3 days PTA, poor po intake, and worsening creat over the last 4 days  IVF   Nephrology on consult  No further gent planned  Scr now  down trending  Today nausea is improved so far    - follow up blood cultures - currently in process   - monitor symptoms, po intake, temperatures      2. Enterococcus faecalis bacteremia with ICD  Has been on IV PCN, gent stopped  Ampicillin while hospitalized, renal adjusted.  Seems to be tolerating this better.  Possibly some nausea with penicillin.  Will CF CADD pump is feasible with amoxicillin.  Plus or minus ceftriaxone.    Will need to determine new outpatient IV abx regimen prior to discharge and have this coordinated pending inpatient workup (blood cultures)     3. Ischemic cardiomyopathy  Will need IVF, and hold dieuretics, cardiology and nephrology following        [facility]  [phone]

## 2024-06-24 NOTE — PLAN OF CARE
Patient AAOx4. Room air. Tele NSR. Xarelto/aspirin. Voids. Zofran/reglan for nausea. Denies pain. Up self. Low sodium/cardiac diet. Right arm PICC. IV Ampicillin. Patient rounded on routinely. Patient updated on plan of care.    Messaged cards to confirm if cleared for discharge, stated yes from their perspective, also messaged to clarify cards discharge medications. Spoke to MD Dang- stated ok for discharge today if ok with all others. Hospitalist and cards paged regarding BP 94/51- both ok.     ID paged to confirm if patient is ok to miss night time dose of antibiotic- stated ok. Home health messaged regarding med dosages, ok to miss PM dose, and confirmed they take care of lab draws at home.    Problem: Patient/Family Goals  Goal: Patient/Family Long Term Goal  Description: Patient's Long Term Goal: go home    Interventions:  - follow poc  - See additional Care Plan goals for specific interventions  Outcome: Progressing  Goal: Patient/Family Short Term Goal  Description: Patient's Short Term Goal: 06/21 nocs: sleep  6/23 NOC: manage nausea. Get sleep  Interventions:   - cluster care  - See additional Care Plan goals for specific interventions  Outcome: Progressing     Problem: RESPIRATORY - ADULT  Goal: Achieves optimal ventilation and oxygenation  Description: INTERVENTIONS:  - Assess for changes in respiratory status  - Assess for changes in mentation and behavior  - Position to facilitate oxygenation and minimize respiratory effort  - Oxygen supplementation based on oxygen saturation or ABGs  - Provide Smoking Cessation handout, if applicable  - Encourage broncho-pulmonary hygiene including cough, deep breathe, Incentive Spirometry  - Assess the need for suctioning and perform as needed  - Assess and instruct to report SOB or any respiratory difficulty  - Respiratory Therapy support as indicated  - Manage/alleviate anxiety  - Monitor for signs/symptoms of CO2 retention  Outcome: Progressing     Problem:  GASTROINTESTINAL - ADULT  Goal: Minimal or absence of nausea and vomiting  Description: INTERVENTIONS:  - Maintain adequate hydration with IV or PO as ordered and tolerated  - Nasogastric tube to low intermittent suction as ordered  - Evaluate effectiveness of ordered antiemetic medications  - Provide nonpharmacologic comfort measures as appropriate  - Advance diet as tolerated, if ordered  - Obtain nutritional consult as needed  - Evaluate fluid balance  Outcome: Progressing     Problem: METABOLIC/FLUID AND ELECTROLYTES - ADULT  Goal: Electrolytes maintained within normal limits  Description: INTERVENTIONS:  - Monitor labs and rhythm and assess patient for signs and symptoms of electrolyte imbalances  - Administer electrolyte replacement as ordered  - Monitor response to electrolyte replacements, including rhythm and repeat lab results as appropriate  - Fluid restriction as ordered  - Instruct patient on fluid and nutrition restrictions as appropriate  Outcome: Progressing  Goal: Hemodynamic stability and optimal renal function maintained  Description: INTERVENTIONS:  - Monitor labs and assess for signs and symptoms of volume excess or deficit  - Monitor intake, output and patient weight  - Monitor urine specific gravity, serum osmolarity and serum sodium as indicated or ordered  - Monitor response to interventions for patient's volume status, including labs, urine output, blood pressure (other measures as available)  - Encourage oral intake as appropriate  - Instruct patient on fluid and nutrition restrictions as appropriate  Outcome: Progressing

## 2024-06-24 NOTE — PROGRESS NOTES
[facility]   part of [facility]     Hospitalist Progress Note     Sonia Martell Patient Status:  Inpatient    1950 MRN [id number]   [facility] 5NW-A Attending Hilario Cortes MD   Hosp Day # 3 PCP Jana Buck MD     Chief Complaint:   Chief Complaint   Patient presents with    Fatigue    Nausea       Subjective:     Feeling better overall.    Objective:    Review of Systems:   5 point ROS completed and was negative, except for pertinent positive and negatives stated in subjective.    Vital signs:  Temp:  [98.1 °F (36.7 °C)-98.6 °F (37 °C)] 98.2 °F (36.8 °C)  Pulse:  [76-88] 80  Resp:  [16-18] 16  BP: ()/(47-65) 104/65  SpO2:  [97 %-100 %] 100 %    Physical Exam:    General: No acute distress.   Respiratory: Clear to auscultation bilaterally. No wheezes. No rhonchi.  Cardiovascular: S1, S2. Regular rate and rhythm. No murmurs.  Abdomen: Soft, nontender, nondistended.    Extremities: No edema.    Diagnostic Data:    Labs:  Recent Labs   Lab 24  1415 24  1155 24  0530   WBC 18.6* 24.6* 17.9*   HGB 11.1* 12.2 9.7*   MCV 95.5 92.9 95.5   .0 413.0 324.0       Recent Labs   Lab 24  1154 24  0530 24  0630 24  0615   * 101* 88 104*   BUN 37* 32* 27* 26*   CREATSERUM 3.71* 3.24* 2.98* 2.78*   CA 10.5* 9.0 9.5 9.2   ALB 2.4*  --   --   --     139 137 138   K 4.7 4.6 4.0 4.4    112 110 111   CO2 18.0* 19.0* 20.0* 21.0   ALKPHO 115  --   --   --    AST 11*  --   --   --    ALT 12*  --   --   --    BILT 0.4  --   --   --    TP 8.2  --   --   --        Estimated Creatinine Clearance: 18.9 mL/min (A) (based on SCr of 2.78 mg/dL (H)).    No results for input(s): \"PTP\", \"INR\" in the last 168 hours.         COVID-19 Lab Results    COVID-19  Lab Results   Component Value Date    COVID19 Detected (A) 2022    COVID19 Not Detected 10/27/2020       Pro-Calcitonin  Recent Labs   Lab 24  1154   PCT 0.12        Cardiac  No results for input(s): \"TROP\", \"PBNP\" in the last 168 hours.    Creatinine Kinase  No results for input(s): \"CK\" in the last 168 hours.    Inflammatory Markers  Recent Labs   Lab 06/17/24  1415   CRP 3.63*       Recent Labs   Lab 06/21/24  1154   TROPHS 7       Imaging: Imaging data reviewed in Epic.    Medications:    allopurinol  100 mg Oral Daily    aspirin  81 mg Oral Daily    cholecalciferol  2,000 Units Oral Daily    docusate sodium  200 mg Oral BID    levothyroxine  100 mcg Oral Before breakfast    mexiletine  150 mg Oral BID    nystatin  5 mL Oral QID    rivaroxaban  2.5 mg Oral BID with meals    rosuvastatin  10 mg Oral QPM    ampicillin  2 g Intravenous Q8H       Assessment & Plan:      #Weakness 2/2 hypotension  -likely due to bp meds in context of reduced GFR; cardiac meds per cards    #right lower lobe pleural based 3.6 cm mass like density and lingular density; will need OP follow up for these.  Discussed with patient that she will need to pursue repeat imaging with her PCP in a few weeks; she agrees to do so and understands.     #JACEK 2/2 dehydration and amioglycoside toxicity; improving; volume management per renal/cards  -hold home entresto, torsemide; per cards    #enterococcus bacteremia (with ICD); ampicillin per ID    #HFrEF; holding GDMT given hypotension and JACEK; cards consulting for GDMT management as Cr improves     #NAGMA-resolved    #CLL  -newly diagnosed as out-patient on flow cytometry, pt waiting to see hematologist until after her infection clears  -baseline WBC 18-19.5     #A-fib  -cont home mexiletine, Xarelto  -holding sotalol pending renal improvement     #HLD  -cont home rosuvastatin     Resume allopurinol today    Plan of care discussed with patient     Hilario King MD    Supplementary Documentation:     Quality:  DVT Prophylaxis: xarelto  CODE status: see chart  Lam: no  Central line: no      Estimated date of discharge: tbd   At this point Ms. Martell is  expected to be discharge to: tbd

## 2024-06-24 NOTE — PLAN OF CARE
NURSING DISCHARGE NOTE    Discharged Home via Wheelchair.  Accompanied by Support staff  Belongings Taken by patient/family.    Patient discharged in stable condition. PIV removed, R PICC left in place. Dressing changed today before discharge. Clearance received from all specialists. IV abx arranged through . Tele removed. Bedside belongings taken. AVS reviewed including zofran prescription, which medications to stop, and follow up appointments.

## 2024-06-24 NOTE — PAYOR COMM NOTE
--------------  ADMISSION REVIEW   6/21-6/23  [place] [employer] [place] [facility] [place]:  [id number]  Authorization Number: [id number]    Admit date: 6/21/24  Admit time:  5:04 PM       REVIEW DOCUMENTATION:    ED Provider Notes signed by Jose Coker MD at 6/21/2024  2:15 PM      Patient Seen in: [facility] Emergency Department    History     Chief Complaint   Patient presents with    Fatigue    Nausea     Stated Complaint: mult complaints, fatigue    Subjective:   HPI    Patient with a history of AAA, A-fib, anemia, coronary artery disease among others who presented earlier in the month with generalized weakness with nausea and vomiting.  Patient was having recurrent urinary tract infections and had several rounds of antibiotic.  Blood cultures grew Enterococcus with repeat cultures clearing.  Patient was eventually discharged with PICC line and IV antibiotic ampicillin and gentamicin as an outpatient.  Plan was to repeat blood cultures in 10 to 14 days after completion of antibiotic.  If recurrence \"then consider ICD extraction \"  Family reports patient not doing well.  Patient was nauseous and having dry heaves and sleeping most of the day.  Has been very poor p.o. intake.  There has been loose stools only today.  No black or bloody stool.  Patient has developed sore mouth/thrush.  Patient denies any abdominal pain.  No significant cough.  No chest pain or pressure.  No shortness of breath.  No calf pain or swelling.  No edema  Patient denying any urinary symptoms    Objective:   Past Medical History:    AAA (abdominal aortic aneurysm) (HCC)    Anemia, unspecified    Arrhythmia    a fib, VT    Atherosclerosis of coronary artery    Calculus of kidney    Cancer (HCC)    skin    Chronic atrial fibrillation (HCC)    Coronary atherosclerosis    Disorder of thyroid    History of blood transfusion    IBS (irritable bowel syndrome)    Ischemic cardiomyopathy    Kidney stone    Peripheral vascular disease (HCC)    Skin  cancer    Small bowel obstruction (HCC)    Tobacco abuse    Visual impairment    reading glasses       Positive for stated complaint: mult complaints, fatigue  Other systems are as noted in HPI.  Constitutional and vital signs reviewed.      All other systems reviewed and negative except as noted above.    Physical Exam     ED Triage Vitals [06/21/24 1133]   /65   Pulse 78   Resp 16   Temp 97.6 °F (36.4 °C)   Temp src Oral   SpO2 97 %   O2 Device None (Room air)       Current Vitals:   Vital Signs  BP: 107/65  Pulse: 70  Resp: 19  Temp: 97.6 °F (36.4 °C)  Temp src: Oral  MAP (mmHg): 79    Oxygen Therapy  SpO2: 100 %  O2 Device: None (Room air)      Physical Exam  General: The patient is awake, alert, conversant.   Eyes: sclera white, conjunctiva pink and moist.  Lids and lashes are normal.  Oromucosa and lips are dry.  Tongue is red  Neck: With no JVD  Lungs: Clear to auscultation bilaterally.  No rhonchi or rales.  Heart: Normal S1 and S2, without murmur.  Distal pulses are strong and symmetric.  Abdomen: Soft, nondistended.  Completely nontender even deep palpation  Extremities: Unremarkable.  Calves nonswollen, symmetric, nontender.  No pedal edema.  Skin: Somewhat pale  Neurologic:  Mental status as above.  Patient moves all extremities but seems mildly generally weak      ED Course     Labs Reviewed   COMP METABOLIC PANEL (14) - Abnormal; Notable for the following components:       Result Value    Glucose 136 (*)     CO2 18.0 (*)     BUN 37 (*)     Creatinine 3.71 (*)     Calcium, Total 10.5 (*)     eGFR-Cr 12 (*)     AST 11 (*)     ALT 12 (*)     Albumin 2.4 (*)     Globulin  5.8 (*)     A/G Ratio 0.4 (*)     All other components within normal limits   CBC W/ DIFFERENTIAL - Abnormal; Notable for the following components:    WBC 24.6 (*)     Neutrophil Absolute Prelim 10.57 (*)     Neutrophil Absolute 10.57 (*)     Lymphocyte Absolute 12.20 (*)     Monocyte Absolute 1.33 (*)     All other components  within normal limits   LACTIC ACID, PLASMA - Normal   PROCALCITONIN - Normal    Narrative:     Resulted by: batch: [facility],    TROPONIN I HIGH SENSITIVITY - Normal   CBC WITH DIFFERENTIAL WITH PLATELET           SCAN SLIDE   URINALYSIS, ROUTINE   BLOOD CULTURE   BLOOD CULTURE     An EKG was performed. I agree with computerized EKG interval interpretations. EKG shows sinus rhythm with nonspecific T wave change. There are no acute ST changes to suggest acute ischemia or infarct  Ventricular rate 85 bpm. CT interval 178 ms. QRS duration 88 ms.    MDM      Patient presents with nausea and vomiting with very poor p.o. intake.  She is hypotensive and appears dehydrated.  Patient treated with IV fluid by bolus  Differential diagnosis includes recurrent sepsis.  Urinary tract infection include the differential.  Abdominal examination is benign without evidence for bowel obstruction, diverticulitis, or appendicitis.      Admission disposition: 6/21/2024  2:09 PM         CBC shows elevated white count of nearly 25 with normal hemoglobin and platelets.  There is a strong predominance of neutrophils on differential  Metabolic panel shows normal electrolytes.  Bicarb is 18.  Creatinine is trended up to 3.7  Troponin negative  urinalysis pending  Lactic acid negative  Procalcitonin negative    Chest x-ray  I personally reviewed the actual radiographs themselves and my individual interpretation possible right-sided infiltrate  radiologist's formal interpretation which I have reviewed  CONCLUSION:  Nodular airspace disease in the right midlung new since study of 6/6/2024 likely representing focal pneumonia.         Follow-up blood pressure after IV fluid bolus was improved to 94/55.  Patient notes that her usual blood pressure varies between about 90 and 100 systolic.    I discussed case with Dr. Victoria.  She will admit    Patient did not receive 30ml/kg of fluids despite having: hypotension. The reason for doing so is: patient's blood  pressure responded to a lesser volume. 500mL of fluids were given instead (1000ml is the amount of fluids given).       Reassessment Note    /65   Pulse 70   Temp 97.6 °F (36.4 °C) (Oral)   Resp 19   Wt 81 kg   SpO2 100%   BMI 25.62 kg/m²          Cardiac:  Regularity: Regular  Rate: Normal  Heart Sounds: S1,S2    Lungs:   Right: Clear  Left: Clear    Peripheral Pulses:  Radial: Right 1+ or Left 1+      Capillary Refill:  <3 Secs    Skin:  Temp/Moisture: Warm and Dry  Color: Normal    Disposition and Plan     Clinical Impression:  1. Renal insufficiency    2. Dehydration    3. Nausea and vomiting in adult         Disposition:  Admit  6/21/2024  2:09 pm       A total of 35 minutes of critical care time (exclusive of billable procedures) was administered to manage the patient's significant hypotension.   This involved direct patient intervention, complex decision making, and/or extensive discussions with the patient, family, and clinical staff.       Hospital Problems       Present on Admission  Date Reviewed: 6/12/2024            ICD-10-CM Noted POA    * (Principal) Renal insufficiency N28.9 6/21/2024 Unknown                   6/21 H&P      Chief Complaint: weakness, n/v        Subjective:  History of Present Illness:      Sonia Martell is a 73 year old female with PMHx A-fib/ IBS/ ischemic cardiomyopathy/ enterococcus bacteremia who presented to the hospital for weakness and emesis. She was hospitalized from 6/3-7 for sepsis with enterococcus faecalis bacteremia for which he was sent home with gentamicin and penicillin G. She was hypotensive that admission and her torsemide was decreased and spironolactone was stopped. Since going home she has been weak and had decreased appetite. She is having issues with nausea and emesis with one episode of diarrhea today. She then develop thrush and was started on nystatin. She called Dr. Kitchen today as she has been losing weight from not being able to eat and was  informed to go to the ED. She denied any fever, chest pain, palpitations, dizziness, dyspnea, cough.         Assessment & Plan:  #Weakness 2/2 hypotension  -MAP as low as 57  -hx recent enterococcus bacteremia  on gentamicin and PCN G  -chest xray reviewed and showing nodular opacity of R mid lung new since 6/6 and CT on 6/3  -met 1 SIRS criteria: WBC 24.6  -suspect hypotension 2/2 hypovolemia from emesis, some component may be 2/2 PNA  -obtain CT chest to further define opacity given nodularity and normal procalcitonin  -given current antibiotic use, cover for resistant organisms  -recheck procalcitonin in AM  -ID c/s given hx eneterococcs bacteremia in setting of new opacity  -LR @100 mls/h with goal MAP >65, IVF bolus prn  -blood cx pending  -PT/OT  #Leukocytosis  #Nodular opacity of lung     #JACEK  -Cr 3.71 with BUN 37: baseline 0.75 with recent value of 1.45  -likely multifactorial from gentamicin use, volume depletion, possible ATN  -IVF as above  -obtain UA  -cont to monitor BMP  -strict I/O, avoid nephrotoxins  -if no improvement obtain renal US and urine studies  -hold home entresto, torsemide     #NAGMA  -CO2 of 18 with AG of 11  -suspect 2/2 renal dysfunction  -cont to monitor BMP  -consider ABG if worsening     #Hypoalbuminemia  -albumin 2.4  -dietary c/s     #CLL  -newly diagnosed as out-patient on flow cytometry, pt waiting to see hematologist until after her infection clears  -baseline WBC 18-19.5     #A-fib  -cont home mexiletine, Xarelto  -holding sotalol pending renal improvement     #HLD  -cont home rosuvastatin     #Ischemic cardiomyopathy            6/21 ID    Reason for Consultation:  Enterococcus bacteremia with ICD  Gentamicin nephrotoxicity     History of Present Illness:  Sonia Martell is a a(n) 73 year old female previously admitted 6/3 with fever for 3 days and found to have positive blood cultures for enterococcus faecalis.  She has an underlying ischemic cardiomyopathy with ICD in  place.  ECHO showed no vegetations.   EP was consulted to consider lead extraction, and was decided to discharge to remove only if fails to clear or has recurrent bacteremia.  She was discharged on PCN pump with gentamicin for synergy.  She reports that she had episodes of vomiting starting a few days ago, and was sent to ED.   Labs show JACEK.  Creat on  6/7 was 0.74, on 6/17 1.45, and on 6/21 3.71.         ASSESSMENT/PLAN:  1. JACEK/gentamicin nephrotoxicity  -developed vomiting 3 days ago, poor po intake, and worsening creat over the last 4 days     IVF   Nephrology on consult  No further gent planned     2. Enterococcus faecalis bacteremia with ICD  Has been on IV PCN, gent stopped  Ampicillin while hospitalized, renal adjsuted     3. Ischemic cardiomyopathy  Will need IVF, and hold dieuretics, cardiology and nephrology following          6/21 Nephrology      Reason for Consultation:  JACEK/CKD     History of Present Illness:  Sonia Martell is a a(n) 73 year old female with hx of IBS, CM, recent enterococcus baceremia - was being treated w/ gentamicin/pcn presents to hospital w/ weakness/nausea and poor PO itnake.  Denies any f/c  No abd pain  Denies urinary problems except she has not urinated as much as usual   Episode of diarrhea     Cr up to 3.7;   Noted to be hypotensive in ER      Impression:  JACEK- suspect related to combination of volume depletionn/hypotension/ongoing gentamicin use  -agree w/ fluids; hold diuretics  - check urine chem  - appreciate ID eval- gentamicin stopped- check level  - check US  Hx of CM- hold diuretics/entresto  Currently compensated  Hypotension/Sepsis  - fluids  - abx per ID; concern for PNA/UTI  Afib         6/22 Nephrology      Assessment and Plan:     1) JACEK- ATN due to volume depletion + aminoglycoside nephrotoxicity in setting of severe HFrEF. UA relatively bland. PLAN- adjust IVF; holding HF meds     2) Enterococcus bacteremia (with ICD)- on PCN / gent (dc'ed); ampicillin in  hosp per ID     3) Ischemic CM EF 20-25% with mod MR- normally on entresto / BB (sotalol) / torsemide (all held with JACEK)    4) New dx CLL     5) AF on mexilitine + xarelto; hold sotalol with JACEK     6) Pulm- CT noted; asymptomatic     still weak     Lungs: Decreased BS at bases bilaterally             6/22 IM     Patient presents with    Fatigue    Nausea     Assessment & Plan:  #Weakness 2/2 hypotension  -likely due to bp meds in context of reduced GFR; hold bp meds     #right lower lobe pleural based 3.6 cm mass like density and lingular density; will need OP follow up for these.  Clinically doubt pneumonia but defer to ID for any possible abx changes     #JACEK 2/2 dehydration and amioglycoside toxicity; iv fluids; hold diuretics; renal consulting  -hold home entresto, torsemide     #enterococcus bacteremia (with ICD); ampicillin per ID     #HFrEF; holding GDMT given hypotension and JACEK; cards to consult for GDMT management as Cr improves     #NAGMA-mild; monitor     #CLL  -newly diagnosed as out-patient on flow cytometry, pt waiting to see hematologist until after her infection clears  -baseline WBC 18-19.5     #A-fib  -cont home mexiletine, Xarelto  -holding sotalol pending renal improvement     #HLD  -cont home rosuvastatin     Hold allopurinol today          6/22 ID      Antibiotics: ampicillin #2     Subjective:  Afebrile. Scr down trended to 3.2. Patient reports that nausea is better so far today, has not tried to eat yet. Denies back pain or abdominal pain. No swelling in BLE. Tolerating ampicillin without apparent ADRs. Blood cultures are in process and c diff is negative.        ASSESSMENT/PLAN:  1. JACEK/gentamicin nephrotoxicity  -developed vomiting 3 days PTA, poor po intake, and worsening creat over the last 4 days  IVF   Nephrology on consult  No further gent planned  Scr now down trending  Today nausea is improved so far     - follow up blood cultures - currently in process   - monitor symptoms, po  intake, temperatures      2. Enterococcus faecalis bacteremia with ICD  Has been on IV PCN, gent stopped  Ampicillin while hospitalized, renal adjusted  Will need to determine new outpatient IV abx regimen prior to discharge and have this coordinated pending inpatient workup (blood cultures)     3. Ischemic cardiomyopathy  Will need IVF, and hold dieuretics, cardiology and nephrology following          6/22 Cardiology    Impression:  Acute renal insufficiency  Nausea and vomiting in adult  Enterococcus bacteremia   Abx per ID  Dual chamber BS ICD 2017  Not pacer dependent.   Hx VT s/p ablation 11/2022  Intolerant to amiodarone  On sotalol, mexiletine  CAD s/p remote CABG, ICM, LVEF 25%  Echo 6/4/24-LVEF 20-25%, no rwmas.   Historically on entresto, torsemide, rashawn  AAA s/p emergent aortoiliac bypass and graft placement   Low dose xarelto  Mild-moderate MR, moderate TR     Recommendations:  -tele monitoring  -on ASA 81 mg daily and Xarelto 2.5 mg BID  -on mexiletine and Xarelto; holding sotalol in setting of JACEK  -continue statin  -holding diuretics, entresto and appreciate renal input  -Abx per ID          6/23 Nephrology       Assessment and Plan:     1) JACEK- ATN due to volume depletion + aminoglycoside nephrotoxicity in setting of severe HFrEF. UA relatively bland. Improving -> HL IV; expect full recovery      2) Enterococcus bacteremia (with ICD)- on PCN / gent (dc'ed); ampicillin in hosp per ID     3) Ischemic CM EF 20-25% with mod MR- normally on entresto / BB (sotalol) / torsemide (all held with JACEK)    4) New dx CLL     5) AF on mexilitine + xarelto; hold sotalol with JACEK     6) Pulm- CT noted; asymptomatic            6/23 IM        Chief Complaint   Patient presents with    Fatigue    Nausea     Assessment & Plan:  #Weakness 2/2 hypotension  -likely due to bp meds in context of reduced GFR; hold bp meds     #right lower lobe pleural based 3.6 cm mass like density and lingular density; will need OP follow up  for these.  Discussed with patient that she will need to pursue repeat imaging with her PCP in a few weeks; she agrees to do so and understands.     #JACEK 2/2 dehydration and amioglycoside toxicity; improving; volume management per renal/cards  -hold home entresto, torsemide     #enterococcus bacteremia (with ICD); ampicillin per ID     #HFrEF; holding GDMT given hypotension and JACEK; cards consulting for GDMT management as Cr improves     #NAGMA-mild; monitor; stable     #CLL  -newly diagnosed as out-patient on flow cytometry, pt waiting to see hematologist until after her infection clears  -baseline WBC 18-19.5     #A-fib  -cont home mexiletine, Xarelto  -holding sotalol pending renal improvement     #HLD  -cont home rosuvastatin     Resume allopurinol today            6/23 Cardiology    Impression:  Acute renal insufficiency  Nausea and vomiting in adult  Enterococcus bacteremia   Abx per ID  Dual chamber BS ICD 2017  Not pacer dependent.   Hx VT s/p ablation 11/2022  Intolerant to amiodarone  On sotalol, mexiletine  CAD s/p remote CABG, ICM, LVEF 25%  Echo 6/4/24-LVEF 20-25%, no rwmas.   Historically on entresto, torsemide, rashawn  AAA s/p emergent aortoiliac bypass and graft placement   Low dose xarelto  Mild-moderate MR, moderate TR     Recommendations:  -tele monitoring  -on ASA 81 mg daily and Xarelto 2.5 mg BID  -on mexiletine and Xarelto; holding sotalol in setting of JACEK  -continue statin  -holding diuretics, entresto and appreciate renal input  -Abx per ID          6/23 ID    Antibiotics: ampicillin #2       ASSESSMENT/PLAN:  1. JACEK/gentamicin nephrotoxicity  -developed vomiting 3 days PTA, poor po intake, and worsening creat over the last 4 days  IVF   Nephrology on consult  No further gent planned  Scr now down trending  Today nausea is improved so far     - follow up blood cultures - currently in process   - monitor symptoms, po intake, temperatures      2. Enterococcus faecalis bacteremia with ICD  Has  been on IV PCN, gent stopped  Ampicillin while hospitalized, renal adjusted.  Seems to be tolerating this better.  Possibly some nausea with penicillin.  Will CF CADD pump is feasible with amoxicillin.  Plus or minus ceftriaxone.     Will need to determine new outpatient IV abx regimen prior to discharge and have this coordinated pending inpatient workup (blood cultures)     3. Ischemic cardiomyopathy  Will need IVF, and hold dieuretics, cardiology and nephrology following                     Latest Reference Range & Units 06/21/24 11:54 06/21/24 11:55 06/21/24 12:48 06/22/24 05:30 06/23/24 06:30 06/24/24 06:15   Glucose 70 - 99 mg/dL 136 (H)   101 (H) 88 104 (H)   Sodium 136 - 145 mmol/L 136   139 137 138   Potassium 3.5 - 5.1 mmol/L 4.7   4.6 4.0 4.4   Chloride 98 - 112 mmol/L 107   112 110 111   Carbon Dioxide, Total 21.0 - 32.0 mmol/L 18.0 (L)   19.0 (L) 20.0 (L) 21.0   BUN 9 - 23 mg/dL 37 (H)   32 (H) 27 (H) 26 (H)   CREATININE 0.55 - 1.02 mg/dL 3.71 (H)   3.24 (H) 2.98 (H) 2.78 (H)   CALCIUM 8.5 - 10.1 mg/dL 10.5 (H)   9.0 9.5 9.2   EGFR >=60 mL/min/1.73m2 12 (L)   14 (L) 16 (L) 17 (L)   ANION GAP 0 - 18 mmol/L 11   8 7 6   CALCULATED OSMOLALITY 275 - 295 mOsm/kg 293   295 289 291   ALKALINE PHOSPHATASE 55 - 142 U/L 115        AST (SGOT) 15 - 37 U/L 11 (L)        ALT (SGPT) 13 - 56 U/L 12 (L)        Total Bilirubin 0.1 - 2.0 mg/dL 0.4        Globulin 2.8 - 4.4 g/dL 5.8 (H)        Magnesium, Serum 1.6 - 2.6 mg/dL    2.2     LACTIC ACID 0.4 - 2.0 mmol/L   1.2      Troponin I (High Sensitivity) <=54 ng/L 7        A/G Ratio 1.0 - 2.0  0.4 (L)        PROTEIN, TOTAL 6.4 - 8.2 g/dL 8.2        Albumin 3.4 - 5.0 g/dL 2.4 (L)        PROCALCITONIN <0.16 ng/mL 0.12        WBC 4.0 - 11.0 x10(3) uL  24.6 (H)  17.9 (H)     Hemoglobin 12.0 - 16.0 g/dL  12.2  9.7 (L)     Hematocrit 35.0 - 48.0 %  36.7  30.0 (L)     Platelet Count 150.0 - 450.0 10(3)uL  413.0  324.0     RBC 3.80 - 5.30 x10(6)uL  3.95  3.14 (L)     (H): Data is  abnormally high  (L): Data is abnormally low    Medications 06/18/24 06/19/24 06/20/24 06/21/24 06/22/24 06/23/24 06/24/24       ampicillin (Omnipen) 2 g in sodium chloride 0.9% 100 mL IVPB-MBP  Dose: 2 g  Freq: Every 8 hours Route: IV  Last Dose: 2 g (06/24/24 0618)  Start: 06/21/24 2200   Admin Instructions:   *incompatible with dextrose*   Order specific questions:          2111 LM-New Bag      0534 LM-New Bag     1417 ML-New Bag     2108 JANICE-New Bag      0622 JANICE-New Bag     1300 ML-New Bag     2108 ZS-New Bag      0618 ZS-New Bag     1400     2200                     mexiletine (Mexitil) cap 150 mg  Dose: 150 mg  Freq: 2 times daily Route: OR  Start: 06/21/24 2100 2001 LM-Given      0841 ML-Given     2101 JANICE-Given      0745 ML-Given     2106 ZS-Given      1025 AH-Given     2100        nystatin (Mycostatin) 079003 UNIT/ML oral suspension 500,000 Units  Dose: 5 mL  Freq: 4 times daily Route: OR  Start: 06/21/24 1715       1819 CRISTINA-Given     2002 LM-Given      0843 ML-Given     1249 ML-Given     (1700 ML)-Not Given [C]     2103 JANICE-Given      0738 ML-Given     1300 ML-Given     1640 ML-Given     2106 ZS-Given      1024 AH-Given     1300     1700     2100        ondansetron (Zofran) 4 MG/2ML injection 4 mg  Dose: 4 mg  Freq: Once Route: IV  Start: 06/21/24 1413 End: 06/21/24 1415       1415 AH-Given                            sodium chloride 0.9 % IV bolus 1,000 mL  Dose: 1,000 mL  Freq: Once Route: IV  Last Dose: Stopped (06/21/24 1645)  Start: 06/21/24 1503 End: 06/21/24 1645       1500 AH-New Bag     1645 AH-Stopped           sodium chloride 0.9 % IV bolus 1,000 mL  Dose: 1,000 mL  Freq: Once Route: IV  Last Dose: Stopped (06/21/24 1350)  Start: 06/21/24 1233 End: 06/21/24 1350       1230 AH-New Bag     1350 AH-Stopped           sodium chloride 0.9 % IV bolus 500 mL  Dose: 500 mL  Freq: Once Route: IV  Last Dose: 500 mL (06/22/24 0023)  Start: 06/22/24 0030 End: 06/22/24 0123 0023 LM-New Bag               lactated ringers infusion  Rate: 50 mL/hr  Freq: Continuous Route: IV  Last Dose: Stopped (06/23/24 0841)  Start: 06/21/24 1715 End: 06/23/24 0834       1820 CRISTINA-New Bag      0534 LM-New Bag     0841 ML-Rate/Dose Change     2124 JANICE-New Bag      0834-D/C'd  0841 ML-Stopped            loperamide (Imodium) cap 2 mg  Dose: 2 mg  Freq: 4 times daily PRN Route: OR  PRN Reason: Diarrhea  Start: 06/22/24 1105   Admin Instructions:   Maximum daily dose is 16 mg/day        1125 ML-Given     2124 JANICE-Given      0738 ML-Given         metoclopramide (Reglan) 5 mg/mL injection 5 mg  Dose: 5 mg  Freq: Every 6 hours PRN Route: IV  PRN Reasons: Nausea,vomiting  Start: 06/22/24 1240        1249 ML-Given     2124 JANICE-Given      1640 ML-Given      0955 AH-Given        ondansetron (Zofran) 4 MG/2ML injection 4 mg  Dose: 4 mg  Freq: Every 6 hours PRN Route: IV  PRN Reason: Nausea  Start: 06/21/24 1800        1125 ML-Given     1745 ML-Given      0737 ML-Given     2111 ZS-Given      0425 ZS-Given            06/24/24 0430 98.2 °F (36.8 °C) 80 16 104/65 100 % -- None (Room air) -- ZS   06/23/24 2330 98.6 °F (37 °C) 76 18 106/56 97 % -- None (Room air) -- AT   06/23/24 2116 -- 84 -- 100/49 -- -- -- -- ZS   06/23/24 1930 98.5 °F (36.9 °C) 86 18 97/48 99 % -- None (Room air) -- AT   06/23/24 1706 98.2 °F (36.8 °C) -- 18 -- -- -- -- -- WB   06/23/24 1154 -- 88 -- 96/47 99 % -- None (Room air) -- ML   06/23/24 1151 98.1 °F (36.7 °C) -- 18 89/49 Abnormal  -- -- -- -- WB   06/23/24 0752 97.9 °F (36.6 °C) 83 18 107/58 96 % -- None (Room air) --    06/23/24 0441 -- 74 -- 102/52 97 % -- -- --    06/23/24 0440 -- 74 -- 102/52 97 % -- -- --    06/23/24 0430 97.9 °F (36.6 °C) -- 16 -- -- -- None (Room air) --    06/22/24 2352 97.9 °F (36.6 °C) 72 18 107/89 96 % -- None (Room air) --    06/22/24 2257 -- -- -- -- 83 % Abnormal  -- -- --    06/22/24 2013 97.7 °F (36.5 °C) 66 16 118/69 97 % -- -- --    06/22/24 1646 97.9 °F (36.6 °C) -- 18  109/54 -- -- -- --    06/22/24 1234 97.9 °F (36.6 °C) -- 18 -- -- -- -- --    06/22/24 0856 97.7 °F (36.5 °C) -- 18 97/54 -- -- -- --    06/22/24 0325 97.6 °F (36.4 °C) 85 19 108/56 100 % -- None (Room air) -- BC   06/22/24 0107 -- 81 -- 92/48 97 % -- -- -- BC   06/21/24 2341 97.6 °F (36.4 °C) 81 17 85/52 Abnormal  97 % -- None (Room air) -- BC   06/21/24 1959 97.3 °F (36.3 °C) 91 16 95/39 97 % -- None (Room air) --    06/21/24 1706 97.4 °F (36.3 °C) 88 15 -- 98 % -- None (Room air) --    06/21/24 1706 -- -- -- -- -- 178 lb -- -- JCA   06/21/24 1645 -- 88 14 93/57 98 % -- None (Room air) --    06/21/24 1600 -- 77 16 102/60 96 % -- None (Room air) --    06/21/24 1545 -- 75 15 87/54 Abnormal  98 % -- None (Room air) --    06/21/24 1530 -- 75 14 91/52 99 % -- None (Room air) --    06/21/24 1500 -- 74 13 86/44 Abnormal  96 % -- None (Room air) --    06/21/24 1430 -- 78 16 103/64 99 % -- None (Room air) --    06/21/24 1415 -- 85 20 112/64 100 % -- None (Room air) --    06/21/24 1400 -- 70 19 107/65 100 % -- None (Room air) --    06/21/24 1345 -- 81 15 91/71 100 % -- None (Room air) --    06/21/24 1330 -- 74 16 94/55 99 % -- None (Room air) --    06/21/24 1300 -- 82 11 103/37 98 % -- None (Room air) --    06/21/24 1245 -- 85 14 112/81 99 % -- None (Room air) --    06/21/24 1230 -- 83 17 82/49 Abnormal  95 % -- None (Room air) --    06/21/24 1228 -- 83 17 81/52 Abnormal  97 % -- None (Room air) --    06/21/24 1215 -- 85 15 80/58 Abnormal  98 % -- None (Room air) --    06/21/24 1133 97.6 °F (36.4 °C) 78 16 102/65 97 % 178 lb 9.2 oz None (Room air) -- SK

## 2024-06-24 NOTE — DISCHARGE INSTRUCTIONS
Sometimes managing your health at home requires assistance.  The [facility] team has recognized your preference to use [facility].  They can be reached by phone at [phone].  The fax number for your reference is [phone].. A representative from the home health agency will contact you or your family to schedule your first visit.      [facility] - IV antibiotics and supplies   [phone]

## 2024-06-24 NOTE — PROGRESS NOTES
[facility]  Nephrology Progress Note    Sonia Martell Attending:  Hilario Cortes MD       Subjective:  No acute events      Current Facility-Administered Medications:     allopurinol (Zyloprim) tab 100 mg, 100 mg, Oral, Daily    sodium chloride (Saline Mist) 0.65 % nasal solution 1 spray, 1 spray, Each Nare, Q3H PRN    loperamide (Imodium) cap 2 mg, 2 mg, Oral, QID PRN    metoclopramide (Reglan) 5 mg/mL injection 5 mg, 5 mg, Intravenous, Q6H PRN    aspirin DR tab 81 mg, 81 mg, Oral, Daily    cholecalciferol (Vitamin D3) tab 2,000 Units, 2,000 Units, Oral, Daily    docusate sodium (Colace) cap 200 mg, 200 mg, Oral, BID    levothyroxine (Synthroid) tab 100 mcg, 100 mcg, Oral, Before breakfast    mexiletine (Mexitil) cap 150 mg, 150 mg, Oral, BID    nystatin (Mycostatin) 788101 UNIT/ML oral suspension 500,000 Units, 5 mL, Oral, QID    rivaroxaban (Xarelto) tab 2.5 mg, 2.5 mg, Oral, BID with meals    rosuvastatin (Crestor) tab 10 mg, 10 mg, Oral, QPM    acetaminophen (Tylenol Extra Strength) tab 500 mg, 500 mg, Oral, Q4H PRN    ampicillin (Omnipen) 2 g in sodium chloride 0.9% 100 mL IVPB-MBP, 2 g, Intravenous, Q8H    ondansetron (Zofran) 4 MG/2ML injection 4 mg, 4 mg, Intravenous, Q6H PRN       Physical Exam:   /65 (BP Location: Left arm)   Pulse 80   Temp 98.2 °F (36.8 °C) (Oral)   Resp 16   Wt 178 lb (80.7 kg)   SpO2 100%   BMI 25.54 kg/m²   Temp (24hrs), Av.3 °F (36.8 °C), Min:98.1 °F (36.7 °C), Max:98.6 °F (37 °C)       Intake/Output Summary (Last 24 hours) at 2024 09  Last data filed at 2024 0618  Gross per 24 hour   Intake 200 ml   Output 800 ml   Net -600 ml     Wt Readings from Last 3 Encounters:   24 178 lb (80.7 kg)   24 208 lb (94.3 kg)   24 200 lb (90.7 kg)     General: awake alert  HEENT: No scleral icterus, MMM  Neck: Supple, no KISHA or thyromegaly  Cardiac: Regular rate and rhythm, S1, S2 normal, no murmur or tub  Lungs: Decreased BS at bases  bilaterally   Abdomen: Soft, non-tender. + bowel sounds, no palpable organomegaly  Extremities: Without clubbing, cyanosis; no edema  Neurologic: Cranial nerves grossly intact, moving all extremities  Skin: Warm and dry, no rashes     Recent Labs     06/21/24  1155 06/22/24  0530   WBC 24.6* 17.9*   HGB 12.2 9.7*   MCV 92.9 95.5   .0 324.0       Recent Labs     06/21/24  1154 06/22/24  0530 06/23/24  0630 06/24/24  0615    139 137 138   K 4.7 4.6 4.0 4.4    112 110 111   CO2 18.0* 19.0* 20.0* 21.0   BUN 37* 32* 27* 26*   CREATSERUM 3.71* 3.24* 2.98* 2.78*   CA 10.5* 9.0 9.5 9.2   MG  --  2.2  --   --        Recent Labs     06/21/24  1154   ALT 12*   AST 11*   ALB 2.4*       Assessment and Plan:    1. JACEK- ATN due to volume depletion + aminoglycoside nephrotoxicity in setting of severe HFrEF. UA relatively bland.  - Improving; monitor labs     2. Enterococcus bacteremia (with ICD)- on PCN / gent (dc'ed); ampicillin in hosp per ID    3. Ischemic CM EF 20-25% with mod MR- normally on entresto / BB (sotalol) / torsemide (all held with JACEK)    4. New dx CLL    5. AF on mexilitine + xarelto; hold sotalol with JACEK    6. Pulm- CT noted; asymptomatic    She wants to go home today; OK from renal standpoint w/ close f/u - labs in next 48 hrs     Rahat Munoz  6/24/2024

## 2024-06-24 NOTE — CDS QUERY
DOCUMENTATION CLARIFICATION QUERY   Dear Dr. Cortes,    Please clarify the diagnosis of Sepsis documented by the Cardiology consultant:     [   ]  Enterococcus bacteremia only, with Sepsis ruled out  [   ]  Enterococcus bacteremia with Sepsis is a clinically valid diagnosis treated this admission (please include supporting clinical indicators for sepsis) _________________________  [ x  ] Other: (please specify)_enterococcus bacteremia; was present on admit but was not septic during this hospitalization________________________   ________________________________________________________________________________     CLINICAL INDICATORS:   6/21/24:   Temp 97.6  Pulse 78, 70  RR 16, 19  B/P 102/65, 80/58, 82/49,  107/65     WBC 24.6* , 17.9*  MAP 66, 62, 79  Lactic Acid 1.2  Platelets 413  Total Bilirubin 0.4   Creatinine 3.71*, 3.24*, 2.98*, 2.78*   -6/21 H&P: #Weakness 2/2 hypotension  -MAP as low as 57  -hx recent enterococcus bacteremia on gentamicin and PCN G  -chest xray reviewed and showing nodular opacity of R mid lung new since 6/6 and  CT on 6/3  -met 1 SIRS criteria: WBC 24.6  -suspect hypotension 2/2 hypovolemia from emesis, some component may be 2/2   -6/21 Cardiology consult: Hypotension/Sepsis   -6/23 ID: Enterococcus faecalis bacteremia    RISK FACTORS: -hx recent enterococcus bacteremia on gentamicin and PCN G- CLL - #JACEK 2/2 dehydration and amioglycoside toxicity    TREATMENT : ID consult - IV antibiotics - Serial labs     Use of terms such as suspected, possible, or probable (associated with a specific diagnosis that is being evaluated, monitored, or treated as if it exists) are acceptable and can be coded in the inpatient setting, when documented at the time of discharge.   Elif Brothers RN, BSN, CWOCN, [facility]   [phone]                                                               THIS FORM IS A PERMANENT PART OF THE MEDICAL RECORD

## 2024-06-24 NOTE — DISCHARGE SUMMARY
[facility]     Sonia Martell Patient Status:  Inpatient    1950 MRN [id number]   Location [facility] 5NW-A Attending Hilario Cortes MD   Hosp Day # 3 PCP Jana Buck MD     Date of Admission: 2024  Date of Discharge: 2024  Discharge Disposition: Home Health Care Services  Chief complaint:   Chief Complaint   Patient presents with    Fatigue    Nausea         Discharge Diagnoses and Brief Synopsis:  #Weakness 2/2 hypotension  -likely due to bp meds in context of reduced GFR     #right lower lobe pleural based 3.6 cm mass like density and lingular density; will need OP follow up for these.  Discussed with patient that she will need to pursue repeat imaging with her PCP in a few weeks; she agrees to do so and understands.     #JACEK 2/2 dehydration and amioglycoside toxicity; improved with fluids and holding bp meds; volume  -during stay, we held home entresto, torsemide; see below for discharge meds.     #enterococcus bacteremia (with ICD); switched abx to ampicillin per ID during hospital stay. See below for discharge abx.     #HFrEF; cards consulted for GDMT management; see below for discharge meds     #NAGMA     #CLL  -newly diagnosed as out-patient on flow cytometry, pt waiting to see hematologist until after her infection clears  -baseline WBC 18-19.5     #A-fib  -cont home mexiletine, Xarelto  -held sotalol during stay, given JACEK      #HLD          Lab/Test results pending at Discharge:   cultures        Admission History of Present Illness (author of HPI not necessarily myself; see H&P author): Sonia Martell is a 73 year old female with PMHx A-fib/ IBS/ ischemic cardiomyopathy/ enterococcus bacteremia who presented to the hospital for weakness and emesis. She was hospitalized from 6/3- for sepsis with enterococcus faecalis bacteremia for which he was sent home with gentamicin and penicillin G. She was hypotensive that admission and her torsemide was  decreased and spironolactone was stopped. Since going home she has been weak and had decreased appetite. She is having issues with nausea and emesis with one episode of diarrhea today. She then develop thrush and was started on nystatin. She called Dr. Kitchen today as she has been losing weight from not being able to eat and was informed to go to the ED. She denied any fever, chest pain, palpitations, dizziness, dyspnea, cough.       Lace+ Score: 76  59-90 High Risk  29-58 Medium Risk  0-28   Low Risk       TCM Follow-Up Recommendation:  LACE > 58: High Risk of readmission after discharge from the hospital.      Discharge Medication List:     Discharge Medications        START taking these medications        Instructions Prescription details   ondansetron 4 MG Tbdp  Commonly known as: Zofran-ODT      Take 1 tablet (4 mg total) by mouth every 6 (six) hours as needed for Nausea.   Quantity: 20 tablet  Refills: 0     sodium chloride 0.9% SOLN 100 mL with ampicillin 2 g SOLR 2 g  Start taking on: June 25, 2024      Inject 2 g into the vein every 8 (eight) hours for 21 days. BMP on 6/25 and weekly while on ivabx  CBC, CRP weekly   Stop taking on: July 16, 2024  Quantity: 21 each  Refills: 2            CHANGE how you take these medications        Instructions Prescription details   nystatin 690000 UNIT/ML Susp  Commonly known as: Mycostatin  What changed: Another medication with the same name was removed. Continue taking this medication, and follow the directions you see here.      Take 5 mL (500,000 Units total) by mouth 4 (four) times daily.   Quantity: 250 mL  Refills: 0            CONTINUE taking these medications        Instructions Prescription details   allopurinol 100 MG Tabs  Commonly known as: Zyloprim      Take 1 tablet (100 mg total) by mouth daily.   Quantity: 90 tablet  Refills: 1     aspirin 81 MG Tbec      Take 1 tablet (81 mg total) by mouth daily.   Refills: 0     docusate sodium 100 MG Caps  Commonly  known as: Colace      Take 2 capsules (200 mg total) by mouth 2 (two) times daily.   Refills: 0     KRILL OIL OR      Take 1 capsule by mouth 2 (two) times daily.   Refills: 0     levothyroxine 100 MCG Tabs  Commonly known as: Synthroid      Take 1 tablet (100 mcg total) by mouth before breakfast.   Quantity: 90 tablet  Refills: 1     mexiletine 150 MG Caps  Commonly known as: Mexitil      Take 1 capsule (150 mg total) by mouth in the morning and 1 capsule (150 mg total) before bedtime.   Refills: 0     MULTI-VITAMIN DAILY OR      Take 1 tablet by mouth daily.   Refills: 0     PROBIOTIC OR      Take 1 tablet by mouth daily.   Refills: 0     rosuvastatin 10 MG Tabs  Commonly known as: Crestor      Take 1 tablet (10 mg total) by mouth every evening.   Refills: 0     torsemide 20 MG Tabs  Commonly known as: Demadex      Take 0.5 tablets (10 mg total) by mouth daily.   Refills: 0     vitamin C 1000 MG Tabs      Take 1 tablet (1,000 mg total) by mouth daily.   Refills: 0     Vitamin D 50 MCG (2000 UT) Tabs      Take 2,000 Units by mouth daily. qd   Refills: 0     Xarelto 2.5 MG Tabs  Generic drug: rivaroxaban      Take 1 tablet (2.5 mg total) by mouth 2 (two) times daily with meals.   Refills: 0     Zinc 50 MG Caps      Take 50 mg by mouth daily.   Refills: 0            STOP taking these medications      sacubitril-valsartan  MG Tabs  Commonly known as: Entresto        sodium chloride 0.9% SOLN 100 mL with gentamicin 40 MG/ML SOLN 160 mg        sodium chloride 0.9% SOLN 144 mL with penicillin G potassium [id number] units SOLR 48 Million Units        sotalol 80 MG Tabs  Commonly known as: Betapace                  Where to Get Your Medications        These medications were sent to [id number] - [place], [place] - [address] [phone], [phone]  [address] [facility] [address]      Phone: [phone]   ondansetron 4 MG Tbdp       Please  your prescriptions at the location directed by  your doctor or nurse    Bring a paper prescription for each of these medications  sodium chloride 0.9% SOLN 100 mL with ampicillin 2 g SOLR 2 g         [place]    Follow-up appointment:   Jana Buck MD  [address] [place] [address] [address]  [facility] [address]  [phone]    Follow up in 1 week(s)      Rahat Munoz MD  [address] [facility] [address]  [facility] [address]  [phone]    Follow up in 1 week(s)      Labs (blood work)    Follow up in 2 day(s)      David Dang MD  [address] [facility] [address]  [facility] [address]  [phone]    Schedule an appointment as soon as possible for a visit on 7/17/2024      Appointments for Next 30 Days 6/24/2024 - 7/24/2024        Date Arrival Time Visit Type Length Department Provider     7/22/2024  2:30 PM  CONSULT-HEM/ONC  [2430] 60 min [facility] in [place] Nallely Sheikh MD    Patient Instructions:         Location Instructions:     **IF YOU NEED LABWORK OR AN INFUSION ALONG WITH YOUR APPOINTMENT, YOU MUST CALL TO SCHEDULE.**  Your appointment is on the [facility] campus in the [facility] [place]. The address is [address] [place] [address]. Please register at the [facility]  on the second floor.  Masks are optional for all patients and visitors, unless otherwise indicated.                      Vital signs:  Temp:  [98 °F (36.7 °C)-98.6 °F (37 °C)] 98 °F (36.7 °C)  Pulse:  [76-84] 82  Resp:  [16-18] 16  BP: ()/(49-65) 94/51  SpO2:  [97 %-100 %] 98 %    Physical Exam:    General: No acute distress.   Respiratory: Clear to auscultation bilaterally. No wheezes. No rhonchi.  Cardiovascular: S1, S2. Regular rate and rhythm. No murmurs.  Abdomen: Soft, nontender, nondistended.    Extremities: No edema.  -----------------------------------------------------------------------------------------------  PATIENT DISCHARGE INSTRUCTIONS: See electronic chart    Hilario King MD    Time spent:   33 minutes

## 2024-06-24 NOTE — PROGRESS NOTES
INFECTIOUS DISEASE  PROGRESS NOTE            [facility]    Sonia Martell Patient Status:  Inpatient    1950 MRN [id number]   [facility] 5NW-A Attending Hilario Cortes MD   Hosp Day # 3 PCP Jana Buck MD     Antibiotics: #21  ampicillin    Subjective:  : nausea, no vomiting  No diarrhea, or abdominal pain    Objective:  Temp:  [98.1 °F (36.7 °C)-98.6 °F (37 °C)] 98.2 °F (36.8 °C)  Pulse:  [76-88] 80  Resp:  [16-18] 16  BP: ()/(47-65) 104/65  SpO2:  [97 %-100 %] 100 %    General: awake alert  Vital signs:Temp:  [98.1 °F (36.7 °C)-98.6 °F (37 °C)] 98.2 °F (36.8 °C)  Pulse:  [76-88] 80  Resp:  [16-18] 16  BP: ()/(47-65) 104/65  SpO2:  [97 %-100 %] 100 %  HEENT: Moist mucous membranes. Extraocular muscles are intact.  Neck: supple no masses  Respiratory: Non labored, symmetric excursion, normal respirations  Abdomen: Soft, nontender, nondistended.   Musculoskeletal: joints: no swelling   Integument: No lesions. No erythema. No open wounds.  Labs:     COVID-19 Lab Results    COVID-19  Lab Results   Component Value Date    COVID19 Detected (A) 2022    COVID19 Not Detected 10/27/2020       Pro-Calcitonin  Recent Labs   Lab 24  1154   PCT 0.12       Cardiac  No results for input(s): \"TROP\", \"PBNP\" in the last 168 hours.    Creatinine Kinase  No results for input(s): \"CK\" in the last 168 hours.    Inflammatory Markers  Recent Labs   Lab 24  1415   CRP 3.63*       Recent Labs   Lab 24  0530   RBC 3.14*   HGB 9.7*   HCT 30.0*   MCV 95.5   MCH 30.9   MCHC 32.3   RDW 14.6   NEPRELIM 8.08*   WBC 17.9*   .0         Recent Labs   Lab 24  1154 24  0530 24  0630 24  0615   * 101* 88 104*   BUN 37* 32* 27* 26*   CREATSERUM 3.71* 3.24* 2.98* 2.78*   CA 10.5* 9.0 9.5 9.2   ALB 2.4*  --   --   --     139 137 138   K 4.7 4.6 4.0 4.4    112 110 111   CO2 18.0* 19.0* 20.0* 21.0   ALKPHO 115  --   --   --    AST 11*   --   --   --    ALT 12*  --   --   --    BILT 0.4  --   --   --    TP 8.2  --   --   --        Vancomycin Trough (ug/mL)   Date Value   05/17/2017 10.6     Microbiology    Hospital Encounter on 06/21/24   1. Blood Culture     Status: None (Preliminary result)    Collection Time: 06/21/24 12:48 PM    Specimen: Blood,peripheral   Result Value Ref Range    Blood Culture Result No Growth 2 Days N/A           Problem list reviewed:  Patient Active Problem List   Diagnosis    Troponin level elevated    V-tach ([facility])    Paroxysmal atrial fibrillation ([facility] located [facility])    Coronary artery disease involving native heart without angina pectoris    History of endovascular stent graft for abdominal aortic aneurysm (AAA)    Ischemic cardiomyopathy    S/P CABG x 3    S/P ablation of atrial fibrillation    Dyslipidemia    Bilateral iliac artery aneurysm ([facility])    Iliac artery aneurysm, bilateral ([facility])    S/P AAA repair    HFrEF (heart failure with reduced ejection fraction) ([facility])    Superficial femoral artery occlusion ([facility])    Chronic anticoagulation    Nephrolithiasis    Peripheral vascular disease ([facility])    Other specified hypothyroidism    Primary osteoarthritis of right hip  Global 08/08/2019    Orthopedic aftercare    Status post right hip replacement    Vascular insufficiency    Allodynia    Neuropathy    ICD (implantable cardioverter-defibrillator) discharge    Ventricular tachycardia ([facility])    Troponin I above reference range    Closed nondisplaced fracture of acromial end of left clavicle, initial encounter    Fall    Bilateral carotid artery stenosis    Pure hypercholesterolemia    Aneurysm of iliac artery ([facility])    CAD (coronary artery disease), native coronary artery    PAD (peripheral artery disease) ([facility])    Cardiomyopathy, ischemic    V tach ([facility])    Hypotension    Chronic systolic heart failure ([facility])    VTE (venous thromboembolism)    Defibrillator discharge    COVID-19    Leukocytosis    Hyperglycemia    Sepsis due to other  etiology (HCC)    Febrile illness    Hypokalemia    Renal insufficiency    Dehydration    Nausea and vomiting in adult    JACEK (acute kidney injury) (HCC)    ATN (acute tubular necrosis) (HCC)    Metabolic acidosis             ASSESSMENT/PLAN:  1.  JACEK/gentamicin nephrotoxicity  -developed vomiting 3 days ago, poor po intake, and worsening creat over the last 4 days  -creat improving        2. Enterococcus faecalis bacteremia with ICD  Has been on IV PCN, gent stopped  PCN replaced with ampicillin     3. Ischemic cardiomyopathy  Will need IVF, and hold dieuretics, cardiology and nephrology following    DC planning      David Dang MD, MD [facility]  [phone]

## 2024-06-24 NOTE — CM/SW NOTE
Patient is current with [facility] for IV abx and [facility] for RN. Referrals sent in aidin.     Sent updated IV abx order and ID note to [facility].     Informed [facility] of anticipating new teach and train.    SW/CM to remain available for support and/or discharge planning.    Addendum 3:45pm: Spoke with Aspen from [facility] who confirmed new IV abx costs $438.04/week. She will have medication delivered tonight.    Spoke with Zoe from [facility] who is scheduling RN visit tomorrow morning.    RN confirmed with ID, ok to skip dose tonight.    Met with patient and  to provide update. They were agreeable with plan and cost.         PANKAJ Posey  Discharge Planner  [phone]

## 2024-06-24 NOTE — CM/SW NOTE
Department  notified of request for Infusion , aidin referrals started. Assigned CM/SW to follow up with pt/family on further discharge planning.     Dariana Kelly, DSC

## 2024-06-25 ENCOUNTER — LAB REQUISITION (OUTPATIENT)
Dept: LAB | Facility: HOSPITAL | Age: 74
End: 2024-06-25

## 2024-06-25 ENCOUNTER — TELEPHONE (OUTPATIENT)
Dept: FAMILY MEDICINE CLINIC | Facility: CLINIC | Age: 74
End: 2024-06-25

## 2024-06-25 ENCOUNTER — PATIENT OUTREACH (OUTPATIENT)
Dept: CASE MANAGEMENT | Age: 74
End: 2024-06-25

## 2024-06-25 DIAGNOSIS — N17.9 ACUTE KIDNEY FAILURE, UNSPECIFIED (HCC): ICD-10-CM

## 2024-06-25 DIAGNOSIS — B37.0 ORAL THRUSH: Primary | ICD-10-CM

## 2024-06-25 DIAGNOSIS — B95.2 ENTEROCOCCUS AS THE CAUSE OF DISEASES CLASSIFIED ELSEWHERE: ICD-10-CM

## 2024-06-25 DIAGNOSIS — N28.9 RENAL INSUFFICIENCY: Primary | ICD-10-CM

## 2024-06-25 LAB
ANION GAP SERPL CALC-SCNC: 5 MMOL/L (ref 0–18)
BASOPHILS # BLD: 0.18 X10(3) UL (ref 0–0.2)
BASOPHILS NFR BLD: 1 %
BUN BLD-MCNC: 25 MG/DL (ref 9–23)
CALCIUM BLD-MCNC: 9.6 MG/DL (ref 8.5–10.1)
CHLORIDE SERPL-SCNC: 110 MMOL/L (ref 98–112)
CO2 SERPL-SCNC: 22 MMOL/L (ref 21–32)
CREAT BLD-MCNC: 2.75 MG/DL
CRP SERPL-MCNC: 1.37 MG/DL (ref ?–0.3)
EGFRCR SERPLBLD CKD-EPI 2021: 17 ML/MIN/1.73M2 (ref 60–?)
EOSINOPHIL # BLD: 0.53 X10(3) UL (ref 0–0.7)
EOSINOPHIL NFR BLD: 3 %
ERYTHROCYTE [DISTWIDTH] IN BLOOD BY AUTOMATED COUNT: 14.8 %
GLUCOSE BLD-MCNC: 98 MG/DL (ref 70–99)
HCT VFR BLD AUTO: 29.8 %
HGB BLD-MCNC: 9.6 G/DL
LYMPHOCYTES NFR BLD: 22 %
LYMPHOCYTES NFR BLD: 3.85 X10(3) UL (ref 1–4)
MCH RBC QN AUTO: 30.8 PG (ref 26–34)
MCHC RBC AUTO-ENTMCNC: 32.2 G/DL (ref 31–37)
MCV RBC AUTO: 95.5 FL
MONOCYTES # BLD: 0.7 X10(3) UL (ref 0.1–1)
MONOCYTES NFR BLD: 4 %
MYELOCYTES # BLD: 0.18 X10(3) UL
MYELOCYTES NFR BLD: 1 %
NEUTROPHILS # BLD AUTO: 6.49 X10 (3) UL (ref 1.5–7.7)
NEUTROPHILS NFR BLD: 69 %
NEUTS HYPERSEG # BLD: 12.08 X10(3) UL (ref 1.5–7.7)
OSMOLALITY SERPL CALC.SUM OF ELEC: 288 MOSM/KG (ref 275–295)
PLATELET # BLD AUTO: 303 10(3)UL (ref 150–450)
PLATELET MORPHOLOGY: NORMAL
POTASSIUM SERPL-SCNC: 4.5 MMOL/L (ref 3.5–5.1)
RBC # BLD AUTO: 3.12 X10(6)UL
SODIUM SERPL-SCNC: 137 MMOL/L (ref 136–145)
TOTAL CELLS COUNTED BLD: 100
WBC # BLD AUTO: 17.5 X10(3) UL (ref 4–11)

## 2024-06-25 PROCEDURE — 85025 COMPLETE CBC W/AUTO DIFF WBC: CPT | Performed by: INTERNAL MEDICINE

## 2024-06-25 PROCEDURE — 85027 COMPLETE CBC AUTOMATED: CPT | Performed by: INTERNAL MEDICINE

## 2024-06-25 PROCEDURE — 86140 C-REACTIVE PROTEIN: CPT | Performed by: INTERNAL MEDICINE

## 2024-06-25 PROCEDURE — 80048 BASIC METABOLIC PNL TOTAL CA: CPT | Performed by: INTERNAL MEDICINE

## 2024-06-25 PROCEDURE — 85007 BL SMEAR W/DIFF WBC COUNT: CPT | Performed by: INTERNAL MEDICINE

## 2024-06-25 PROCEDURE — 1111F DSCHRG MED/CURRENT MED MERGE: CPT

## 2024-06-25 NOTE — TELEPHONE ENCOUNTER
Noted.  I am glad that she is feeling better as long as she is following up with specialist it would be okay with me.  Thank you

## 2024-06-25 NOTE — PAYOR COMM NOTE
--------------  DISCHARGE REVIEW    [place] [facility] [place]:  [id number]  Authorization Number: [id number]    Admit date: 24  Admit time:   5:04 PM  Discharge Date: 2024  5:55 PM     Admitting Physician: Roxanna Lopez MD  Attending Physician:  No att. providers found  Primary Care Physician: Jana Buck MD          Discharge Summary Notes        Discharge Summary signed by Hilario Cortes MD at 2024  7:30 PM       Author: Hilario Cortes MD Specialty: HOSPITALIST, Internal Medicine Author Type: Physician    Filed: 2024  7:30 PM Date of Service: 2024 11:48 AM Status: Signed    : Hilario Cortes MD (Physician)           [facility]  DISCHARGE SUMMARY     Sonia Martell Patient Status:  Inpatient    1950 MRN [id number]   Location [facility] 5NW-A Attending Hilario Cortes MD   Hosp Day # 3 PCP Jana Buck MD     Date of Admission: 2024  Date of Discharge: 2024  Discharge Disposition: Home Health Care Services  Chief complaint:   Chief Complaint   Patient presents with    Fatigue    Nausea         Discharge Diagnoses and Brief Synopsis:  #Weakness 2/2 hypotension  -likely due to bp meds in context of reduced GFR     #right lower lobe pleural based 3.6 cm mass like density and lingular density; will need OP follow up for these.  Discussed with patient that she will need to pursue repeat imaging with her PCP in a few weeks; she agrees to do so and understands.     #JACEK 2/2 dehydration and amioglycoside toxicity; improved with fluids and holding bp meds; volume  -during stay, we held home entresto, torsemide; see below for discharge meds.     #enterococcus bacteremia (with ICD); switched abx to ampicillin per ID during hospital stay. See below for discharge abx.     #HFrEF; cards consulted for GDMT management; see below for discharge meds     #NAGMA     #CLL  -newly diagnosed as out-patient on flow cytometry, pt waiting to see  hematologist until after her infection clears  -baseline WBC 18-19.5     #A-fib  -cont home mexiletine, Xarelto  -held sotalol during stay, given JACEK      #HLD          Lab/Test results pending at Discharge:   cultures        Admission History of Present Illness (author of HPI not necessarily myself; see H&P author): Sonia Martell is a 73 year old female with PMHx A-fib/ IBS/ ischemic cardiomyopathy/ enterococcus bacteremia who presented to the hospital for weakness and emesis. She was hospitalized from 6/3-7 for sepsis with enterococcus faecalis bacteremia for which he was sent home with gentamicin and penicillin G. She was hypotensive that admission and her torsemide was decreased and spironolactone was stopped. Since going home she has been weak and had decreased appetite. She is having issues with nausea and emesis with one episode of diarrhea today. She then develop thrush and was started on nystatin. She called Dr. Kitchen today as she has been losing weight from not being able to eat and was informed to go to the ED. She denied any fever, chest pain, palpitations, dizziness, dyspnea, cough.       Lace+ Score: 76  59-90 High Risk  29-58 Medium Risk  0-28   Low Risk       TCM Follow-Up Recommendation:  LACE > 58: High Risk of readmission after discharge from the hospital.      Discharge Medication List:     Discharge Medications        START taking these medications        Instructions Prescription details   ondansetron 4 MG Tbdp  Commonly known as: Zofran-ODT      Take 1 tablet (4 mg total) by mouth every 6 (six) hours as needed for Nausea.   Quantity: 20 tablet  Refills: 0     sodium chloride 0.9% SOLN 100 mL with ampicillin 2 g SOLR 2 g  Start taking on: June 25, 2024      Inject 2 g into the vein every 8 (eight) hours for 21 days. BMP on 6/25 and weekly while on ivabx  CBC, CRP weekly   Stop taking on: July 16, 2024  Quantity: 21 each  Refills: 2            CHANGE how you take these medications         Instructions Prescription details   nystatin 669605 UNIT/ML Susp  Commonly known as: Mycostatin  What changed: Another medication with the same name was removed. Continue taking this medication, and follow the directions you see here.      Take 5 mL (500,000 Units total) by mouth 4 (four) times daily.   Quantity: 250 mL  Refills: 0            CONTINUE taking these medications        Instructions Prescription details   allopurinol 100 MG Tabs  Commonly known as: Zyloprim      Take 1 tablet (100 mg total) by mouth daily.   Quantity: 90 tablet  Refills: 1     aspirin 81 MG Tbec      Take 1 tablet (81 mg total) by mouth daily.   Refills: 0     docusate sodium 100 MG Caps  Commonly known as: Colace      Take 2 capsules (200 mg total) by mouth 2 (two) times daily.   Refills: 0     KRILL OIL OR      Take 1 capsule by mouth 2 (two) times daily.   Refills: 0     levothyroxine 100 MCG Tabs  Commonly known as: Synthroid      Take 1 tablet (100 mcg total) by mouth before breakfast.   Quantity: 90 tablet  Refills: 1     mexiletine 150 MG Caps  Commonly known as: Mexitil      Take 1 capsule (150 mg total) by mouth in the morning and 1 capsule (150 mg total) before bedtime.   Refills: 0     MULTI-VITAMIN DAILY OR      Take 1 tablet by mouth daily.   Refills: 0     PROBIOTIC OR      Take 1 tablet by mouth daily.   Refills: 0     rosuvastatin 10 MG Tabs  Commonly known as: Crestor      Take 1 tablet (10 mg total) by mouth every evening.   Refills: 0     torsemide 20 MG Tabs  Commonly known as: Demadex      Take 0.5 tablets (10 mg total) by mouth daily.   Refills: 0     vitamin C 1000 MG Tabs      Take 1 tablet (1,000 mg total) by mouth daily.   Refills: 0     Vitamin D 50 MCG (2000 UT) Tabs      Take 2,000 Units by mouth daily. qd   Refills: 0     Xarelto 2.5 MG Tabs  Generic drug: rivaroxaban      Take 1 tablet (2.5 mg total) by mouth 2 (two) times daily with meals.   Refills: 0     Zinc 50 MG Caps      Take 50 mg by mouth daily.    Refills: 0            STOP taking these medications      sacubitril-valsartan  MG Tabs  Commonly known as: Entresto        sodium chloride 0.9% SOLN 100 mL with gentamicin 40 MG/ML SOLN 160 mg        sodium chloride 0.9% SOLN 144 mL with penicillin G potassium [id number] units SOLR 48 Million Units        sotalol 80 MG Tabs  Commonly known as: Betapace                  Where to Get Your Medications        These medications were sent to [id number] - [place], [place] - [address] [facility] [phone], [phone]  [address] [address]      Phone: [phone]   ondansetron 4 MG Tbdp       Please  your prescriptions at the location directed by your doctor or nurse    Bring a paper prescription for each of these medications  sodium chloride 0.9% SOLN 100 mL with ampicillin 2 g SOLR 2 g         [place]    Follow-up appointment:   Jana Buck MD  [address] [place] [address]  [facility] [address]  [phone]    Follow up in 1 week(s)      Rahat Munoz MD  [address] [place] [address]  [facility] [address]  [phone]    Follow up in 1 week(s)      Labs (blood work)    Follow up in 2 day(s)      David Dang MD  [address] [facility] [address]  [facility] [address]  [phone]    Schedule an appointment as soon as possible for a visit on 7/17/2024      Appointments for Next 30 Days 6/24/2024 - 7/24/2024        Date Arrival Time Visit Type Length Department Provider     7/22/2024  2:30 PM  CONSULT-HEM/ONC  [2377] 60 min [facility] in [place] Nallely Sheikh MD    Patient Instructions:         Location Instructions:     **IF YOU NEED LABWORK OR AN INFUSION ALONG WITH YOUR APPOINTMENT, YOU MUST CALL TO SCHEDULE.**  Your appointment is on the [facility] in the [facility]. The address is [address] [place] [address]. Please register at the [facility]  on the second floor.  Masks are optional for all patients and visitors,  unless otherwise indicated.                      Vital signs:  Temp:  [98 °F (36.7 °C)-98.6 °F (37 °C)] 98 °F (36.7 °C)  Pulse:  [76-84] 82  Resp:  [16-18] 16  BP: ()/(49-65) 94/51  SpO2:  [97 %-100 %] 98 %    Physical Exam:    General: No acute distress.   Respiratory: Clear to auscultation bilaterally. No wheezes. No rhonchi.  Cardiovascular: S1, S2. Regular rate and rhythm. No murmurs.  Abdomen: Soft, nontender, nondistended.    Extremities: No edema.  -----------------------------------------------------------------------------------------------  PATIENT DISCHARGE INSTRUCTIONS: See electronic chart    Hilario King MD    Time spent:   33 minutes      Electronically signed by Hilario King MD on 6/24/2024  7:30 PM

## 2024-06-25 NOTE — PROGRESS NOTES
Initial Post Discharge Follow Up   Discharge Date: 6/24/24  Contact Date: 6/25/2024    Consent Verification:  Assessment Completed With: Spouse: Abel Permission received per patient?  written  HIPAA Verified?  Yes    Discharge Dx:   Renal insufficiency     General:   How have you been since your discharge from the hospital? She's doing pretty well, she's napping right now. No more nausea so that's good.    Do you have any pain since discharge?  No    When you were leaving the hospital were your discharge instructions reviewed with you? Yes  How well were your discharge instructions explained to you?   On a scale of 1-5   1- Very Poor and 5- Very well   Very Well  Do you have any questions about your discharge instructions?  No  Before leaving the hospital was your diagnoses explained to you? Yes  Do you have any questions about your diagnoses? No  Are you able to perform normal daily activities of living as you have prior to your hospital stay (dressing, bathing, ambulating to the bathroom, etc)? yes  (NCM) Was patient given a different diet per AVS? no    Medications:   Current Outpatient Medications   Medication Sig Dispense Refill    sodium chloride 0.9% SOLN 100 mL with ampicillin 2 g SOLR 2 g Inject 2 g into the vein every 8 (eight) hours for 21 days. BMP on 6/25 and weekly while on ivabx  CBC, CRP weekly 21 each 2    ondansetron 4 MG Oral Tablet Dispersible Take 1 tablet (4 mg total) by mouth every 6 (six) hours as needed for Nausea. 20 tablet 0    nystatin 794323 UNIT/ML Mouth/Throat Suspension Take 5 mL (500,000 Units total) by mouth 4 (four) times daily. 250 mL 0    torsemide 20 MG Oral Tab Take 0.5 tablets (10 mg total) by mouth daily.      rivaroxaban (XARELTO) 2.5 MG Oral Tab Take 1 tablet (2.5 mg total) by mouth 2 (two) times daily with meals.      mexiletine 150 MG Oral Cap Take 1 capsule (150 mg total) by mouth in the morning and 1 capsule (150 mg total) before bedtime.      allopurinol 100 MG Oral  Tab Take 1 tablet (100 mg total) by mouth daily. 90 tablet 1    levothyroxine 100 MCG Oral Tab Take 1 tablet (100 mcg total) by mouth before breakfast. 90 tablet 1    rosuvastatin 10 MG Oral Tab Take 1 tablet (10 mg total) by mouth every evening.      docusate sodium 100 MG Oral Cap Take 2 capsules (200 mg total) by mouth 2 (two) times daily.      Zinc 50 MG Oral Cap Take 50 mg by mouth daily.      Cholecalciferol (VITAMIN D) 50 MCG (2000 UT) Oral Tab Take 2,000 Units by mouth daily. qd      Ascorbic Acid (VITAMIN C) 1000 MG Oral Tab Take 1 tablet (1,000 mg total) by mouth daily.      aspirin 81 MG Oral Tab EC Take 1 tablet (81 mg total) by mouth daily.      KRILL OIL OR Take 1 capsule by mouth 2 (two) times daily.      Multiple Vitamin (MULTI-VITAMIN DAILY OR) Take 1 tablet by mouth daily.      Probiotic Product (PROBIOTIC OR) Take 1 tablet by mouth daily.       Were there any changes to your current medication(s) noted on the AVS? Yes  If so, were these medication changes discussed with you prior to leaving the hospital? Yes  If a new medication was prescribed:    Was the new medication's purpose & side effects reviewed? Yes  Do you have any questions about your new medication? No  Did you  your discharge medications when you left the hospital? Yes  Let's go over your medications together to make sure we are not missing anything. Medications Reviewed  Are there any reasons that keep you from taking your medication as prescribed? No  Are you having any concerns with constipation? No      Discharge medications reviewed/discussed/and reconciled against outpatient medications with patient.  Any changes or updates to medications sent to PCP.  Patient Acknowledged     Referrals/orders at D/C:  Referrals/orders placed at D/C? yes  What services:   Home health   (If HH was ordered) Has HH been set up?  No, [facility] is coming today per .     DME ordered at D/C? No      Discharge orders, AVS reviewed and discussed  with patient. Any changes or updates to orders sent to PCP.  Patient Acknowledged      SDOH:   Transportation Needs: No Transportation Needs (6/21/2024)    Transportation Needs     Lack of Transportation: No     Car Seat: Not on file     Financial Resource Strain: Low Risk  (6/11/2024)    Financial Resource Strain     Difficulty of Paying Living Expenses: Not hard at all     Med Affordability: No       Follow up appointments:      Your appointments       Date & Time Appointment Department (Center)    Jul 22, 2024 2:30 PM CDT Consultation with Nallely Sheikh MD [facility]              [facility]  [address]  [facility] [address]  [phone]            TCC  Was TCC ordered: No    PCP (If no TCC appointment)  Does patient already have a PCP appointment scheduled? No  NCM Attempted to schedule PCP office HFU appointment with patient   If no appointment scheduled: Explain- declined, NCM sent TE to PCP office.     Specialist    Does the patient have any other follow up appointment(s) needing to be scheduled? Yes  If yes: NCM reviewed upcoming specialist appointment with patient: Yes  Does the patient need assistance scheduling appointment(s): No  states that he will be scheduling with ID and nephrology. He also states that patient will be completing blood work per AVS instructions. He also states that pt is seeing cardiology this Friday 6/28/24 for a follow up.     Is there any reason as to why you cannot make your appointment(s)?  No     Needs post D/C:   Now that you are home, are there any needs or concerns you need addressed before your next visit with your PCP?  (DME, meds, questions, etc.): No    Interventions by NCM:   NCM reviewed discharge instructions and when to seek medical attention with the patient's  Abel. He states that the  patient is doing well since coming home. She is currently napping. He states that she is getting her IV antibiotics. He states that thankfully she no longer has any nausea and is able to keep food down. He states that they do have a bp machine but have not been checking her bp's. He states that he will start. He denied that she has had any fever, n/v/c/d, sob, pain, lightheadedness, HA or any new or worsening symptoms. Med review completed. He denied having any questions or concerns at this time.       CCM referral placed:    No    BOOK BY DATE: 7/8/24

## 2024-06-25 NOTE — TELEPHONE ENCOUNTER
GEORGETTE, Spoke to patient's  Abel for HFU today.  He states that the patient is doing well and will be following up with the specialists and did not feel an appt with PCP at this time was needed.  HFU appointment recommended by 7/1/24 and no later than 7/8/24 as patient is a High risk for readmission.

## 2024-06-26 NOTE — TELEPHONE ENCOUNTER
LOV:6/12/2024   for: Hospital follow up.   Patient advised to RTC on: 6 weeks   Medication Quantity Refills Start End   nystatin 318007 UNIT/ML Mouth/Throat Suspension 250 mL 0 6/11/2024 --   Sig:   Take 5 mL (500,000 Units total) by mouth 4 (four) times daily.

## 2024-07-01 ENCOUNTER — LAB ENCOUNTER (OUTPATIENT)
Dept: LAB | Age: 74
End: 2024-07-01
Attending: INTERNAL MEDICINE
Payer: MEDICARE

## 2024-07-05 DIAGNOSIS — M1A.0790 CHRONIC GOUT OF FOOT, UNSPECIFIED CAUSE, UNSPECIFIED LATERALITY: ICD-10-CM

## 2024-07-08 ENCOUNTER — LAB REQUISITION (OUTPATIENT)
Dept: LAB | Facility: HOSPITAL | Age: 74
End: 2024-07-08
Payer: MEDICARE

## 2024-07-08 DIAGNOSIS — Z79.02 LONG TERM (CURRENT) USE OF ANTITHROMBOTICS/ANTIPLATELETS: ICD-10-CM

## 2024-07-08 LAB
ANION GAP SERPL CALC-SCNC: 12 MMOL/L (ref 0–18)
BASOPHILS # BLD AUTO: 0.06 X10(3) UL (ref 0–0.2)
BASOPHILS NFR BLD AUTO: 0.5 %
BUN BLD-MCNC: 18 MG/DL (ref 9–23)
CALCIUM BLD-MCNC: 9 MG/DL (ref 8.5–10.1)
CHLORIDE SERPL-SCNC: 105 MMOL/L (ref 98–112)
CO2 SERPL-SCNC: 23 MMOL/L (ref 21–32)
CREAT BLD-MCNC: 1.85 MG/DL
CRP SERPL-MCNC: <0.29 MG/DL (ref ?–0.3)
EGFRCR SERPLBLD CKD-EPI 2021: 28 ML/MIN/1.73M2 (ref 60–?)
EOSINOPHIL # BLD AUTO: 0.32 X10(3) UL (ref 0–0.7)
EOSINOPHIL NFR BLD AUTO: 2.5 %
ERYTHROCYTE [DISTWIDTH] IN BLOOD BY AUTOMATED COUNT: 14.6 %
GLUCOSE BLD-MCNC: 107 MG/DL (ref 70–99)
HCT VFR BLD AUTO: 29.9 %
HGB BLD-MCNC: 9.6 G/DL
IMM GRANULOCYTES # BLD AUTO: 0.1 X10(3) UL (ref 0–1)
IMM GRANULOCYTES NFR BLD: 0.8 %
LYMPHOCYTES # BLD AUTO: 6.7 X10(3) UL (ref 1–4)
LYMPHOCYTES NFR BLD AUTO: 51.7 %
MCH RBC QN AUTO: 30.1 PG (ref 26–34)
MCHC RBC AUTO-ENTMCNC: 32.1 G/DL (ref 31–37)
MCV RBC AUTO: 93.7 FL
MONOCYTES # BLD AUTO: 0.92 X10(3) UL (ref 0.1–1)
MONOCYTES NFR BLD AUTO: 7.1 %
NEUTROPHILS # BLD AUTO: 4.87 X10 (3) UL (ref 1.5–7.7)
NEUTROPHILS # BLD AUTO: 4.87 X10(3) UL (ref 1.5–7.7)
NEUTROPHILS NFR BLD AUTO: 37.4 %
OSMOLALITY SERPL CALC.SUM OF ELEC: 292 MOSM/KG (ref 275–295)
PLATELET # BLD AUTO: 196 10(3)UL (ref 150–450)
POTASSIUM SERPL-SCNC: 3.4 MMOL/L (ref 3.5–5.1)
RBC # BLD AUTO: 3.19 X10(6)UL
SODIUM SERPL-SCNC: 140 MMOL/L (ref 136–145)
WBC # BLD AUTO: 13 X10(3) UL (ref 4–11)

## 2024-07-08 PROCEDURE — 86140 C-REACTIVE PROTEIN: CPT | Performed by: INTERNAL MEDICINE

## 2024-07-08 PROCEDURE — 80048 BASIC METABOLIC PNL TOTAL CA: CPT | Performed by: INTERNAL MEDICINE

## 2024-07-08 PROCEDURE — 85025 COMPLETE CBC W/AUTO DIFF WBC: CPT | Performed by: INTERNAL MEDICINE

## 2024-07-08 RX ORDER — ALLOPURINOL 100 MG/1
100 TABLET ORAL DAILY
Qty: 90 TABLET | Refills: 0 | Status: SHIPPED | OUTPATIENT
Start: 2024-07-08

## 2024-07-09 ENCOUNTER — PATIENT OUTREACH (OUTPATIENT)
Dept: CASE MANAGEMENT | Age: 74
End: 2024-07-09

## 2024-07-10 ENCOUNTER — PATIENT OUTREACH (OUTPATIENT)
Dept: INFECTIOUS DISEASE | Facility: CLINIC | Age: 74
End: 2024-07-10

## 2024-07-10 DIAGNOSIS — R78.81 BACTEREMIA DUE TO ENTEROCOCCUS: Primary | ICD-10-CM

## 2024-07-10 DIAGNOSIS — B95.2 BACTEREMIA DUE TO ENTEROCOCCUS: Primary | ICD-10-CM

## 2024-07-10 NOTE — PROGRESS NOTES
Called patient for Chronic Care Management      Left message to call back   Call #1      [facility]  Care Management Department  [phone] work

## 2024-07-10 NOTE — PROGRESS NOTES
Renal function improved.  Ongoing nausea with iv infusions and occasional vomiting, taking zofran.  Completing IV ampicillin on 7/16, then DC picc.  Repeat blood cultures 2 weeks after ivabx completed.  May require ICD extraction if unable to clear infection.  Consider PO amoxicillin suppression after surveillance blood cultures 2 weeks off antibiotics.

## 2024-07-15 ENCOUNTER — LAB REQUISITION (OUTPATIENT)
Dept: LAB | Facility: HOSPITAL | Age: 74
End: 2024-07-15
Payer: MEDICARE

## 2024-07-15 DIAGNOSIS — Z79.2 LONG TERM (CURRENT) USE OF ANTIBIOTICS: ICD-10-CM

## 2024-07-15 LAB
ALBUMIN SERPL-MCNC: 2.4 G/DL (ref 3.4–5)
ALBUMIN/GLOB SERPL: 0.6 {RATIO} (ref 1–2)
ALP LIVER SERPL-CCNC: 77 U/L
ALT SERPL-CCNC: 8 U/L
ANION GAP SERPL CALC-SCNC: 11 MMOL/L (ref 0–18)
AST SERPL-CCNC: 12 U/L (ref 15–37)
BASOPHILS # BLD AUTO: 0.04 X10(3) UL (ref 0–0.2)
BASOPHILS NFR BLD AUTO: 0.4 %
BILIRUB SERPL-MCNC: 0.3 MG/DL (ref 0.1–2)
BUN BLD-MCNC: 14 MG/DL (ref 9–23)
CALCIUM BLD-MCNC: 9.1 MG/DL (ref 8.5–10.1)
CHLORIDE SERPL-SCNC: 107 MMOL/L (ref 98–112)
CO2 SERPL-SCNC: 24 MMOL/L (ref 21–32)
CREAT BLD-MCNC: 1.31 MG/DL
CRP SERPL-MCNC: <0.29 MG/DL (ref ?–0.3)
EGFRCR SERPLBLD CKD-EPI 2021: 42 ML/MIN/1.73M2 (ref 60–?)
EOSINOPHIL # BLD AUTO: 0.25 X10(3) UL (ref 0–0.7)
EOSINOPHIL NFR BLD AUTO: 2.3 %
ERYTHROCYTE [DISTWIDTH] IN BLOOD BY AUTOMATED COUNT: 14.6 %
GLOBULIN PLAS-MCNC: 3.8 G/DL (ref 2.8–4.4)
GLUCOSE BLD-MCNC: 114 MG/DL (ref 70–99)
HCT VFR BLD AUTO: 28.8 %
HGB BLD-MCNC: 9.3 G/DL
IMM GRANULOCYTES # BLD AUTO: 0.04 X10(3) UL (ref 0–1)
IMM GRANULOCYTES NFR BLD: 0.4 %
LYMPHOCYTES # BLD AUTO: 5.44 X10(3) UL (ref 1–4)
LYMPHOCYTES NFR BLD AUTO: 49.2 %
MCH RBC QN AUTO: 30.2 PG (ref 26–34)
MCHC RBC AUTO-ENTMCNC: 32.3 G/DL (ref 31–37)
MCV RBC AUTO: 93.5 FL
MONOCYTES # BLD AUTO: 1.02 X10(3) UL (ref 0.1–1)
MONOCYTES NFR BLD AUTO: 9.2 %
NEUTROPHILS # BLD AUTO: 4.26 X10 (3) UL (ref 1.5–7.7)
NEUTROPHILS # BLD AUTO: 4.26 X10(3) UL (ref 1.5–7.7)
NEUTROPHILS NFR BLD AUTO: 38.5 %
OSMOLALITY SERPL CALC.SUM OF ELEC: 295 MOSM/KG (ref 275–295)
PLATELET # BLD AUTO: 186 10(3)UL (ref 150–450)
POTASSIUM SERPL-SCNC: 3.2 MMOL/L (ref 3.5–5.1)
PROT SERPL-MCNC: 6.2 G/DL (ref 6.4–8.2)
RBC # BLD AUTO: 3.08 X10(6)UL
SODIUM SERPL-SCNC: 142 MMOL/L (ref 136–145)
WBC # BLD AUTO: 11.1 X10(3) UL (ref 4–11)

## 2024-07-15 PROCEDURE — 86140 C-REACTIVE PROTEIN: CPT | Performed by: INTERNAL MEDICINE

## 2024-07-15 PROCEDURE — 85025 COMPLETE CBC W/AUTO DIFF WBC: CPT | Performed by: INTERNAL MEDICINE

## 2024-07-15 PROCEDURE — 80053 COMPREHEN METABOLIC PANEL: CPT | Performed by: INTERNAL MEDICINE

## 2024-07-16 ENCOUNTER — TELEPHONE (OUTPATIENT)
Dept: FAMILY MEDICINE CLINIC | Facility: CLINIC | Age: 74
End: 2024-07-16

## 2024-07-16 NOTE — TELEPHONE ENCOUNTER
MATEO Evans from [facility] called to report that the patient will be discharged on 7/22/24. Patient has met all goals.

## 2024-07-22 ENCOUNTER — OFFICE VISIT (OUTPATIENT)
Dept: HEMATOLOGY/ONCOLOGY | Facility: HOSPITAL | Age: 74
End: 2024-07-22
Attending: FAMILY MEDICINE
Payer: MEDICARE

## 2024-07-22 VITALS
DIASTOLIC BLOOD PRESSURE: 83 MMHG | RESPIRATION RATE: 18 BRPM | TEMPERATURE: 98 F | BODY MASS INDEX: 29.81 KG/M2 | HEIGHT: 68.7 IN | SYSTOLIC BLOOD PRESSURE: 133 MMHG | WEIGHT: 199 LBS | OXYGEN SATURATION: 99 % | HEART RATE: 117 BPM

## 2024-07-22 DIAGNOSIS — D64.9 NORMOCYTIC ANEMIA: ICD-10-CM

## 2024-07-22 DIAGNOSIS — D72.829 LEUKOCYTOSIS, UNSPECIFIED TYPE: Primary | ICD-10-CM

## 2024-07-22 DIAGNOSIS — I25.5 ISCHEMIC CARDIOMYOPATHY: ICD-10-CM

## 2024-07-22 DIAGNOSIS — R91.8 RIGHT LOWER LOBE LUNG MASS: ICD-10-CM

## 2024-07-22 DIAGNOSIS — D72.820 ELEVATED LYMPHOCYTES: ICD-10-CM

## 2024-07-22 DIAGNOSIS — I50.20 HFREF (HEART FAILURE WITH REDUCED EJECTION FRACTION) (HCC): ICD-10-CM

## 2024-07-22 DIAGNOSIS — C91.10 MONOCLONAL B-CELL LYMPHOCYTOSIS WITH CHRONIC LYMPHOCYTIC LEUKEMIA (CLL) IMMUNOPHENOTYPE (HCC): ICD-10-CM

## 2024-07-22 DIAGNOSIS — A41.81 SEPSIS DUE TO ENTEROCOCCUS (HCC): ICD-10-CM

## 2024-07-22 DIAGNOSIS — N17.9 AKI (ACUTE KIDNEY INJURY) (HCC): ICD-10-CM

## 2024-07-22 DIAGNOSIS — D72.820 MONOCLONAL B-CELL LYMPHOCYTOSIS WITH CHRONIC LYMPHOCYTIC LEUKEMIA (CLL) IMMUNOPHENOTYPE (HCC): ICD-10-CM

## 2024-07-22 PROCEDURE — 99205 OFFICE O/P NEW HI 60 MIN: CPT | Performed by: INTERNAL MEDICINE

## 2024-07-22 RX ORDER — ONDANSETRON 4 MG/1
4 TABLET, FILM COATED ORAL DAILY PRN
COMMUNITY
Start: 2024-07-10

## 2024-07-22 RX ORDER — SOTALOL HYDROCHLORIDE 80 MG/1
TABLET ORAL
COMMUNITY
Start: 2024-06-28

## 2024-07-22 NOTE — PROGRESS NOTES
Called patient for Chronic Care Management      Left message to call back   Call # 2    [facility]  Care Management Department  [phone] work

## 2024-07-22 NOTE — PROGRESS NOTES
Education Record    Learner:  Patient and Spouse    Disease / Diagnosis: Leukocytosis    Barriers / Limitations:  None   Comments:    Method:  Discussion   Comments:    General Topics:  Medication, Pain, Side effects and symptom management, and Plan of care reviewed   Comments:    Outcome:  Observed demonstration and Shows understanding   Comments:    Pt here for new consult for Leukocytosis and increased Lymphocytes. Pt recently admitted to hospital for sepsis - completed antibiotic treatment. C/o decrease appetite, fatigue, and n/v relieved with PRN Zofran. No c/o SOB or coughing.

## 2024-07-23 NOTE — CONSULTS
Cancer Center Report of Consultation    Patient Name: Sonia Martell   YOB: 1950   Medical Record Number: [id number]   CSN: [id number]   Consulting Physician: Nallely Sheikh MD  Referring Physician(s): Dr. Jana Dang    Date of Consultation: 7/22/2024     Reason for Consultation:  Sonia Martell was seen today in the Cancer Center for the diagnosis of   Encounter Diagnoses   Name Primary?    Leukocytosis Yes    Elevated lymphocytes     Right lower lobe lung mass        Chief Complaint:   Chief Complaint   Patient presents with    Consult       History of Present Illness:   75 y/o female with a significant cardiac history including ischemic cardiomyopathy with an EF of 25-30%, ventricular tachycardia with ICD in place and previous VT ablation, CABG with MAZE procedure, left atrial appendage amputation, as well as significant vascular disease s/p aortobi-iliac bypass with grafting and AAA. Back in April was noted to have an elevated WBC with lymphocytosis and was referred for heme evaluation. PCP sent flow cytometry which showed a monotypic B cell population CD19, CD20, CD5 and  positive.  Immunophenotypic findings were c/w CLL. However in the interim developed hematuria and was placed on antibiotics for presumed UTI. Symptoms appeared to have improved some and completed course of antibiotics. Started having LBP and lower abdominal pressure. Urine cultures sent came back positive for Klebsiella and was placed on another course of antibiotics. Symptoms persisted and antibiotics were changed however she progressively felt worse with generalized weakness and intermittent fevers. Eventually presented to the ED on 6/3/24 and was subsequently admitted. She was placed on empiric antibiotics until blood cultures showed Enterococcus faecalis. ID was consulted and antibiotics were switched to IV ampicillin + Gentamicin. Felt to be at high risk for ICD lead infection and endocarditis.  CT done as part of her w/u revealed a focal opacity in the MEENA measuring 2.3 x 1.8 cm with bilateral pleural effusions L>R.  Was recommended IV antibiotics for 2 weeks (PCN with gent) and f/u CT outpatient. She was discharged home 6/7/24.     Developed vomiting and presented to the ED 6/21/24 and was found to be in JACEK and hypotensive. Gent was stopped and was placed on ampicillin and IVF. Entresto and diuretics were stopped. She had f/u CT chest which showed new RLL pleural based mass with masslike consolidation and internal bronchograms and lingular based mass seen on previous CT smaller.  She improved and was recommended IV ampicillin until 7/16/24 with repeat blood cultures 2 weeks after completion of IV antibiotics. If unable to clear infection may require ICD extraction. Will also consider PO amoxicillin suppression pending surveillance blood cultures off antibiotics.     She says she feels much better though still not back to baseline. Appetite still decreased and fatigued. Has occasional N/V relieved by PRN zofran. No further fevers. Did lose weight during hospitalizations for sepsis. Denies SOB, chest pain, change in bowel or urinary habits, headaches, dizziness or visual symptoms. No drenching night sweats.         Past Medical History:  Past Medical History:    AAA (abdominal aortic aneurysm) (HCC)    Anemia, unspecified    Arrhythmia    a fib, VT    Atherosclerosis of coronary artery    Calculus of kidney    Cancer (HCC)    skin    Chronic atrial fibrillation (HCC)    Coronary atherosclerosis    Disorder of thyroid    History of blood transfusion    IBS (irritable bowel syndrome)    Ischemic cardiomyopathy    Kidney stone    Peripheral vascular disease (HCC)    Skin cancer    Small bowel obstruction (HCC)    Tobacco abuse    Visual impairment    reading glasses       Past Surgical History:  Past Surgical History:   Procedure Laterality Date    Adenoidectomy      Angiogram      Cabg  05/25/2017    3  vessel    Cardiac defibrillator placement      [employer]    Cardiac pacemaker placement  10/31/2017    [employer]    Colectomy      due to bowel obstruction    Cystoscopy,insert ureteral stent      Ep svt ablation  2022    Hip replacement surgery Right 05/10/2019    Ndsc ablation & rcnstj [facility] w/o bypass      Other surgical history      facial skin cancer removal    Other surgical history      left atrial appendage amputation    Other surgical history      maze procedure    Symp aaa urgent repair  2017    Tonsillectomy         Family Medical History:  Family History   Problem Relation Age of Onset    Heart Disorder Father     Cancer Mother         colon cancer    Diabetes Neg     Hypertension Neg        Gyne History:  OB History   No obstetric history on file.       Psychosocial History:  Social History     Socioeconomic History    Marital status:      Spouse name: Not on file    Number of children: Not on file    Years of education: Not on file    Highest education level: Not on file   Occupational History    Not on file   Tobacco Use    Smoking status: Former     Current packs/day: 0.00     Average packs/day: 0.5 packs/day for 47.0 years (23.5 ttl pk-yrs)     Types: Cigarettes     Start date: 1970     Quit date: 2017     Years since quittin.2    Smokeless tobacco: Never   Vaping Use    Vaping status: Never Used   Substance and Sexual Activity    Alcohol use: Yes     Alcohol/week: 3.0 - 4.0 standard drinks of alcohol     Types: 3 - 4 Glasses of wine per week     Comment: socially    Drug use: Never    Sexual activity: Not on file   Other Topics Concern     Service Not Asked    Blood Transfusions Not Asked    Caffeine Concern Yes     Comment: 2 cups qday coffee    Occupational Exposure Not Asked    Hobby Hazards Not Asked    Sleep Concern Not Asked    Stress Concern Not Asked    Weight Concern Not Asked    Special Diet Not Asked    Back Care Not Asked     Exercise Yes     Comment: WALKING SOME 1900 steps/day    Bike Helmet Not Asked    Seat Belt Yes    Self-Exams Not Asked   Social History Narrative    , has adult children.  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      Social Determinants of Health     Financial Resource Strain: Low Risk  (6/11/2024)    Financial Resource Strain     Difficulty of Paying Living Expenses: Not hard at all     Med Affordability: No   Food Insecurity: No Food Insecurity (6/21/2024)    Food Insecurity     Food Insecurity: Never true   Transportation Needs: No Transportation Needs (6/21/2024)    Transportation Needs     Lack of Transportation: No     Car Seat: Not on file   Physical Activity: Sufficiently Active (3/26/2021)    Received from [employer], [facility]    Exercise Vital Sign     Days of Exercise per Week: 7 days     Minutes of Exercise per Session: 30 min   Stress: Not on file   Social Connections: Not on file   Housing Stability: Low Risk  (6/21/2024)    Housing Stability     Housing Instability: No     Housing Instability Emergency: Not on file     Crib or Bassinette: Not on file       Allergies:   Allergies   Allergen Reactions    Gentamicin NAUSEA AND VOMITING     During recent hospitalization    Amiodarone NAUSEA AND VOMITING     On high dose oral load    Fish-Derived Products NAUSEA AND VOMITING    Midodrine OTHER (SEE COMMENTS)    Protamine UNKNOWN    Seafood NAUSEA AND VOMITING    Sulfa Antibiotics NAUSEA ONLY       Current Medications:    Current Outpatient Medications:     ondansetron (ZOFRAN) 4 mg tablet, Take 1 tablet (4 mg total) by mouth daily as needed., Disp: , Rfl:     nystatin 474644 UNIT/ML Mouth/Throat Suspension, TAKE 5mLs BY MOUTH FOUR TIMES DIALY, Disp: , Rfl:     nystatin [id number] UNIT/ML Mouth/Throat Suspension, TAKE 5ML BY MOUTH FOUR TIMES DAILY, Disp: 473 mL, Rfl: 1    ondansetron 4 MG Oral Tablet Dispersible, Take 1 tablet (4 mg total) by mouth  every 6 (six) hours as needed for Nausea., Disp: 20 tablet, Rfl: 0    torsemide 20 MG Oral Tab, Take 0.5 tablets (10 mg total) by mouth daily., Disp: , Rfl:     rivaroxaban (XARELTO) 2.5 MG Oral Tab, Take 1 tablet (2.5 mg total) by mouth 2 (two) times daily with meals., Disp: , Rfl:     mexiletine 150 MG Oral Cap, Take 1 capsule (150 mg total) by mouth in the morning and 1 capsule (150 mg total) before bedtime., Disp: , Rfl:     levothyroxine 100 MCG Oral Tab, Take 1 tablet (100 mcg total) by mouth before breakfast., Disp: 90 tablet, Rfl: 1    rosuvastatin 10 MG Oral Tab, Take 1 tablet (10 mg total) by mouth every evening., Disp: , Rfl:     docusate sodium 100 MG Oral Cap, Take 2 capsules (200 mg total) by mouth 2 (two) times daily., Disp: , Rfl:     Zinc 50 MG Oral Cap, Take 50 mg by mouth daily., Disp: , Rfl:     Cholecalciferol (VITAMIN D) 50 MCG (2000 UT) Oral Tab, Take 2,000 Units by mouth daily. qd, Disp: , Rfl:     Ascorbic Acid (VITAMIN C) 1000 MG Oral Tab, Take 1 tablet (1,000 mg total) by mouth daily., Disp: , Rfl:     aspirin 81 MG Oral Tab EC, Take 1 tablet (81 mg total) by mouth daily., Disp: , Rfl:     KRILL OIL OR, Take 1 capsule by mouth 2 (two) times daily., Disp: , Rfl:     Multiple Vitamin (MULTI-VITAMIN DAILY OR), Take 1 tablet by mouth daily., Disp: , Rfl:     Probiotic Product (PROBIOTIC OR), Take 1 tablet by mouth daily., Disp: , Rfl:     sotalol 80 MG Oral Tab, sotaloL 80 mg tablet, [RxNorm: 5465938] (Patient not taking: Reported on 7/22/2024), Disp: , Rfl:     allopurinol 100 MG Oral Tab, TAKE 1 TABLET BY MOUTH EVERY DAY (Patient not taking: Reported on 7/22/2024), Disp: 90 tablet, Rfl: 0    Review of Systems:  A 14-point ROS was done with pertinent positives and negative per the HPI    Vital Signs:  /83 (BP Location: Left arm, Patient Position: Sitting, Cuff Size: large)   Pulse 117   Temp 97.5 °F (36.4 °C) (Oral)   Resp 18   Ht 1.745 m (5' 8.7\")   Wt 90.3 kg (199 lb)   SpO2 99%    BMI 29.64 kg/m²     Physical Examination:  General: Patient is alert and oriented x 3, not in acute distress.  HEENT: EOMs intact. PERRL. Oropharynx is clear.   Neck: No JVD. No palpable lymphadenopathy. Neck is supple.  Chest: Clear to auscultation.  Heart: Regular rate and rhythm.   Abdomen: Soft, non tender with good bowel sounds.  Extremities: Pedal pulses are present. No edema.  Neurological: Grossly intact.   Lymphatics: There is no palpable lymphadenopathy throughout in the cervical, supraclavicular, axillary, or inguinal regions.  Psych/Depression: Mood and affect are appropriate.    Laboratory:  Lab Component   Component Value Date/Time    RED BLOOD COUNT 4.82 04/23/2014 1035    RBC 3.08 (L) 07/15/2024 1430    RBC 3.19 (L) 07/08/2024 1430    RBC 3.18 (L) 07/01/2024 1445    RBC 3.77 (L) 07/16/2018 1058    HGB 9.3 (L) 07/15/2024 1430    HGB 9.6 (L) 07/08/2024 1430    HGB 10.0 (L) 07/01/2024 1445    HGB 14.5 04/23/2014 1035    Hemoglobin 11.0 (L) 07/16/2018 1058    HCT 28.8 (L) 07/15/2024 1430    HCT 29.9 (L) 07/08/2024 1430    HCT 30.3 (L) 07/01/2024 1445    HCT 44.3 04/23/2014 1035    Hematocrit 35.1 07/16/2018 1058    MCV 93.5 07/15/2024 1430    MCV 93.7 07/08/2024 1430    MCV 95.3 07/01/2024 1445    MCV 93.1 07/16/2018 1058    MEAN CELL VOLUME 91.9 04/23/2014 1035    MCH 30.2 07/15/2024 1430    MCH 30.1 07/08/2024 1430    MCH 31.4 07/01/2024 1445    MCH 29.2 07/16/2018 1058    Mean Corpuscular Hemoglobin 30.1 04/23/2014 1035    MCHC 32.3 07/15/2024 1430    MCHC 32.1 07/08/2024 1430    MCHC 33.0 07/01/2024 1445    MCHC 31.3 07/16/2018 1058    Mean Corpuscular HGB Conc 32.7 04/23/2014 1035    RDW 14.6 07/15/2024 1430    RDW 14.6 07/08/2024 1430    RDW 15.0 07/01/2024 1445    RDW 14.0 07/16/2018 1058    RED CELL DISTRIBUTION WIDTH 14.5 04/23/2014 1035    PRELIMINARY NEUTROPHIL ABS 7.81 (H) 04/23/2014 1035    Neutrophil Absolute Prelim 4.26 07/15/2024 1430    Neutrophil Absolute Prelim 4.87 07/08/2024 1430     Neutrophil Absolute Prelim 5.96 07/01/2024 1445    WBC 11.1 (H) 07/15/2024 1430    WBC 13.0 (H) 07/08/2024 1430    WBC 17.7 (H) 07/01/2024 1445    WBC 10.79 07/16/2018 1058    WBC 12.6 04/23/2014 1035    Platelet Count 286 07/16/2018 1058    .0 07/15/2024 1430    .0 07/08/2024 1430    .0 07/01/2024 1445    .0 04/23/2014 1035       Lab Component   Component Value Date/Time    GLUCOSE 97 04/23/2014 1035    Glucose 114 (H) 07/15/2024 1430    Glucose 107 (H) 07/08/2024 1430    Glucose 85 07/01/2024 1445    Glucose 121 (H) 07/16/2018 1058    BUN 14 07/15/2024 1430    BUN 18 07/08/2024 1430    BUN 19 07/01/2024 1445    BUN 14 04/23/2014 1035    Blood Urea Nitrogen 12 07/16/2018 1058    Blood Urea Nitrogen 17 04/27/2018 1306    CREATININE 0.59 04/23/2014 1035    Creatinine 1.31 (H) 07/15/2024 1430    Creatinine 1.85 (H) 07/08/2024 1430    Creatinine 2.47 (H) 07/01/2024 1445    Creatinine 0.90 07/16/2018 1058    Creatinine 1.09 (H) 04/27/2018 1306    GFR  112 04/23/2014 1035    GFR, -American 58 (L) 07/26/2022 0941    GFR, -American 80 09/17/2021 1001    GFR, -American 92 08/05/2021 0937    GFR NON- 97 04/23/2014 1035    GFR, Non- 50 (L) 07/26/2022 0941    GFR, Non- 70 09/17/2021 1001    GFR, Non- 80 08/05/2021 0937    CALCIUM 10.9 (H) 04/23/2014 1035    Calcium, Total 9.1 07/15/2024 1430    Calcium, Total 9.0 07/08/2024 1430    Calcium, Total 9.5 07/01/2024 1445    Albumin 2.4 (L) 07/15/2024 1430    Albumin 2.4 (L) 06/21/2024 1154    Albumin 2.8 (L) 06/03/2024 0902    Albumin 3.2 (L) 07/16/2018 1058    ALBUMIN 3.9 04/23/2014 1035    Sodium 142 07/15/2024 1430    Sodium 140 07/08/2024 1430    Sodium 136 07/01/2024 1445    Sodium 140 07/16/2018 1058    SODIUM 139 04/23/2014 1035    Potassium 3.2 (L) 07/15/2024 1430    Potassium 3.4 (L) 07/08/2024 1430    Potassium 3.8 07/01/2024 1445     Potassium 4.2 07/16/2018 1058    POTASSIUM 4.6 04/23/2014 1035    Chloride 107 07/15/2024 1430    Chloride 105 07/08/2024 1430    Chloride 105 07/01/2024 1445    Chloride 103 07/16/2018 1058    CHLORIDE 102 04/23/2014 1035    CO2 24.0 07/15/2024 1430    CO2 23.0 07/08/2024 1430    CO2 21.0 07/01/2024 1445    CO2 30.0 04/23/2014 1035    Carbon Dioxide 26.4 07/16/2018 1058    Alkaline Phosphatase 77 07/15/2024 1430    Alkaline Phosphatase 115 06/21/2024 1154    Alkaline Phosphatase 114 06/03/2024 0902    Alkaline Phosphatase 80 07/16/2018 1058    ALKALINE PHOSPHATASE 85 04/23/2014 1035    AST 12 (L) 07/15/2024 1430    AST 11 (L) 06/21/2024 1154    AST 32 06/03/2024 0902    AST 17 07/16/2018 1058    AST 19 04/23/2014 1035    ALT 8 (L) 07/15/2024 1430    ALT 12 (L) 06/21/2024 1154    ALT 28 06/03/2024 0902    ALT 17 07/16/2018 1058    ALT 25 04/23/2014 1035    BILIRUBIN, TOTAL 0.4 04/23/2014 1035    Bilirubin, Total 0.3 07/15/2024 1430    Bilirubin, Total 0.4 06/21/2024 1154    Bilirubin, Total 0.5 06/03/2024 0902    Bilirubin, Total 0.21 07/16/2018 1058    Total Protein 6.2 (L) 07/15/2024 1430    Total Protein 8.2 06/21/2024 1154    Total Protein 7.7 06/03/2024 0902    Total Protein 7.4 07/16/2018 1058    TOTAL PROTEIN 7.9 04/23/2014 1035       Component 4/30/24 1053   Flow Cytometry Specimen Peripheral blood   INDICATION FOR FLOW Flow cytometry performed at [facility] is interpreted at [facility] by: Cathryn A.Goldberg, MD.  Indications for flow: lymphocytosis  Based on 7AAD and side scatter characteristics of lymphocytes, the red blood cells lysis sample was found to contain approximately:  15%CD3+T cells  73%CD19+B cells  8%NK cells (CD5-/CD16+CD56+CD57+)         FLOW CYTOMETRY RESULTS   INTERPRETATION:    T cells show no significant antigen deletion with the markers assayed.  There is a monotypic B cell population based on lambda surface immmunoglobulin light chain restriction.  These cells are CD19,  CD20, CD5 and  positive.  They are negative for all other markers tested.    The immunophenotypic findings together with the CBC data are consistent with chronic lymphocytic leukemia.      This test was developed and its performance characteristics determined by Department of Pathology and Laboratory Medicine at [facility]. It has not been cleared or approved by the FDA. The laboratory is regulated under CLIA as qualified to perform high-complexity testing.  This test is used for clinical purposes.  It should not be regarded as investigational or for research.      Latest Reference Range & Units 06/25/24 13:30 07/01/24 14:45 07/08/24 14:30 07/15/24 14:30   C-REACTIVE PROTEIN <0.30 mg/dL 1.37 (H) 0.32 (H) <0.29 <0.29   (H): Data is abnormally high    Radiology:  PROCEDURE:  CT CHEST (CPT=71250)     COMPARISON:  [facility] , CT, CT CHEST (CPT=71250), 6/03/2024, 9:14 PM.     INDICATIONS:  mult complaints, fatigue, consolidation on xray     TECHNIQUE:  Unenhanced multislice CT scanning is performed through the chest.  Dose reduction techniques were used. Dose information is transmitted to the ACR (American College of Radiology) NRDR (National Radiology Data Registry) which includes the Dose   Index Registry.     PATIENT STATED HISTORY: (As transcribed by Technologist)  Patient reports severe fatigue for two weeks. Patient concerned about complications from medication for recent nausea/vomiting         FINDINGS:    LUNGS:  Right lower lobe pleural based 3.7 x 3.0 x 3.6 cm, AP x T x cc dimension respectively, image 54 of series 302 masslike consolidation with internal bronchograms and slight surrounding interstitial prominence.  Lingular pleural based 1.3 x 1.8  versus 2.3 x 1.8 cm pleural based nodule is more consolidative than on prior study.    VASCULATURE:  Pulmonary vessels are unremarkable within the limits of a noncontrast CT.    HUMBERTO:  No mass or adenopathy.    MEDIASTINUM:  No mass or adenopathy.   Mediastinal clips.  CARDIAC:  No enlargement or pericardial thickening.  Marked coronary artery calcifications and or stents status post CABG.  PLEURA:  No mass or effusion.    THORACIC AORTA:  Atherosclerosis.  CHEST WALL:  Scattered axillary nodes.  Left-sided pacemaker device.  Sternotomy wires.  LIMITED ABDOMEN:  Limited images of the upper abdomen demonstrate diffuse left adrenal gland thickening.    BONES:  Stable including multilevel diffuse vertebral body height loss with anterior compression deformity involving the T4 vertebral body, correlate clinically.  Degenerative change.                  Impression  CONCLUSION:    1. When compared to most recent noncontrast CT of the chest performed 6/3/2024, there is resolution of pleural fluid and bibasilar atelectasis/consolidation, noted greater on the left.  Interval decrease in size of lingular pleural based opacity which is   more consolidative, an underlying lesion cannot be excluded.  Correlate clinically.  2. Development of right lower lobe pleural based 3.6 cm mass like density.  Findings concerning for an inflammatory/infectious process.  Correlate clinically.     LOCATION:  [facility]        Dictated by (CST): Rachana Silva MD on 6/21/2024 at 6:30 PM      Finalized by (CST): Rachana Silva MD on 6/21/2024 at 6:41 PM      PROCEDURE:  CT CHEST (CPT=71250)     COMPARISON:  [facility] , CT, CT CHEST (CPT=71250), 5/16/2017, 5:07 PM.     INDICATIONS:  febrile illness, ? PNA     TECHNIQUE:  Unenhanced multislice CT scanning is performed through the chest.  Dose reduction techniques were used. Dose information is transmitted to the ACR (American College of Radiology) NRDR (National Radiology Data Registry) which includes the Dose   Index Registry.     PATIENT STATED HISTORY: (As transcribed by Technologist)  febrile illness, sob.         FINDINGS:    LUNGS:  Focal opacity in the left upper lobe is noted measuring up to 2.3 x 1.8 cm (image 88) small left greater than right  pleural effusions are noted.  Bibasilar atelectatic changes noted.  Superimposed infiltrate in the left lower lobe cannot be  completely excluded.  VASCULATURE:  Pulmonary vessels are unremarkable within the limits of a noncontrast CT.    HUMBERTO:  No mass or adenopathy.    MEDIASTINUM:  No mass or adenopathy.    CARDIAC:  Severe coronary artery atherosclerosis is noted.  The patient is status post median sternotomy and CABG.  Left pacer is noted.  PLEURA:  No mass or effusion.    THORACIC AORTA:  Unremarkable as seen on non-contrast imaging.  CHEST WALL:  No mass or axillary adenopathy.  Status post median sternotomy.  LIMITED ABDOMEN:  Limited images of the upper abdomen demonstrates cholelithiasis  BONES:  No bony lesion or fracture.                     Impression   CONCLUSION:    1. Focal opacity in the left upper lobe may represent infiltrate.  Follow-up after treatment to document resolution is recommended.  2. Small bilateral pleural effusions with bibasilar atelectatic change.  Superimposed infiltrate in the left lower lobe cannot be excluded.     LOCATION:  [facility]        Dictated by (CST): Mayank Lockwood MD on 6/03/2024 at 9:26 PM      Finalized by (CST): Mayank Lockwood MD on 6/03/2024 at 9:38 PM          Impression & Plan:   1. Elevated WBC  - first noted to have persistent lymphocytosis in 2022.   - At that time was hospitalized for cardiac issues.   -  increasing lymphocytosis this year and flow was sent with monotypic  B cell population CD19, CD20, CD5 and  positive noted.  Hgb was normal prior to hospitalization for sepsis, platelets normal. Immunophenotypic findings were c/w CLL.   - WBC/lymphocytosis and neutrophilia appeared to have peaked during hospital stay but have since improved with treatment for sepsis.   - most recent WBC 11.1 with lymphocytes elevated but lower, some monocytosis and no further neutrophilia  - reviewed results with her and her   - as blood counts continue to improve  the further out from her last hospitalization and with completion of treatment for infection, plan for now would be to have a repeat CBC and IgG levels when she has repeat cultures drawn. Pending results will decide next steps.   - as improved, does not appear to have B symptoms, painful bulky lymphadenopathy/HSM or increasing lymphocytosis may just require expectant management. Platelets have been normal and was not anemic until she had sepsis. If Hgb improves to more than 10 will likely just monitor  - unlikely recent bout of infection related to lymphocytosis but will check IgG level and see if would benefit from IVIG. Otherwise does not have indications for treatment.     2. Enterococcus faecalis bacteremia at high risk for ICD lead infection and endocarditis  - followed by ID    - was recommended IV ampicillin until 7/16/24 with repeat blood cultures 2 weeks after completion of IV antibiotics. If unable to clear infection may require ICD extraction. Will also consider PO amoxicillin suppression pending surveillance blood cultures off antibiotics    3. Cardiac issues  - appears compensated and continue current medications and close f/u with cardiology     4. CT lung findings (imaging done during hospital stay in June)  - MEENA initial finding improved on f/u imaging but did show a new RLL pleural mass-like density felt to likely be inflammatory/infectious.   - recommend f/u CT to ensure stability/resolution which I ordered.     5. JACEK   - felt to be from gent toxicity on top of n/v and poor PO intake  - improving  - renal function being monitored closely. Entresto and diuretics were held and cards plan to slowly reintroduce with continued improvement of renal function.     Per their preference will communicate through MyChart with results and recommendations and arrange for f/u then.     I spent 60 minutes face to face with the patient.  More than 50% of that time was spent counseling the patient and/or on  coordination of care.      Nallely Sheikh MD  [facility]

## 2024-07-29 ENCOUNTER — LAB ENCOUNTER (OUTPATIENT)
Dept: LAB | Facility: HOSPITAL | Age: 74
End: 2024-07-29
Attending: INTERNAL MEDICINE
Payer: MEDICARE

## 2024-07-29 ENCOUNTER — TELEPHONE (OUTPATIENT)
Dept: HEMATOLOGY/ONCOLOGY | Facility: HOSPITAL | Age: 74
End: 2024-07-29

## 2024-07-29 ENCOUNTER — MED REC SCAN ONLY (OUTPATIENT)
Dept: FAMILY MEDICINE CLINIC | Facility: CLINIC | Age: 74
End: 2024-07-29

## 2024-07-29 DIAGNOSIS — D72.820 ELEVATED LYMPHOCYTES: ICD-10-CM

## 2024-07-29 DIAGNOSIS — D72.820 MONOCLONAL B-CELL LYMPHOCYTOSIS WITH CHRONIC LYMPHOCYTIC LEUKEMIA (CLL) IMMUNOPHENOTYPE (HCC): Primary | ICD-10-CM

## 2024-07-29 DIAGNOSIS — R78.81 BACTEREMIA DUE TO ENTEROCOCCUS: ICD-10-CM

## 2024-07-29 DIAGNOSIS — D72.820 MONOCLONAL B-CELL LYMPHOCYTOSIS WITH CHRONIC LYMPHOCYTIC LEUKEMIA (CLL) IMMUNOPHENOTYPE (HCC): ICD-10-CM

## 2024-07-29 DIAGNOSIS — C91.10 MONOCLONAL B-CELL LYMPHOCYTOSIS WITH CHRONIC LYMPHOCYTIC LEUKEMIA (CLL) IMMUNOPHENOTYPE (HCC): Primary | ICD-10-CM

## 2024-07-29 DIAGNOSIS — C91.10 MONOCLONAL B-CELL LYMPHOCYTOSIS WITH CHRONIC LYMPHOCYTIC LEUKEMIA (CLL) IMMUNOPHENOTYPE (HCC): ICD-10-CM

## 2024-07-29 DIAGNOSIS — B95.2 BACTEREMIA DUE TO ENTEROCOCCUS: ICD-10-CM

## 2024-07-29 DIAGNOSIS — D72.829 LEUKOCYTOSIS, UNSPECIFIED TYPE: ICD-10-CM

## 2024-07-29 LAB
BASOPHILS # BLD AUTO: 0.04 X10(3) UL (ref 0–0.2)
BASOPHILS NFR BLD AUTO: 0.4 %
EOSINOPHIL # BLD AUTO: 0.23 X10(3) UL (ref 0–0.7)
EOSINOPHIL NFR BLD AUTO: 2 %
ERYTHROCYTE [DISTWIDTH] IN BLOOD BY AUTOMATED COUNT: 14.7 %
HCT VFR BLD AUTO: 31.9 %
HGB BLD-MCNC: 10.5 G/DL
IMM GRANULOCYTES # BLD AUTO: 0.04 X10(3) UL (ref 0–1)
IMM GRANULOCYTES NFR BLD: 0.4 %
IMMUNOGLOBULIN PNL SER-MCNC: 1675 MG/DL (ref 650–1600)
LYMPHOCYTES # BLD AUTO: 5.22 X10(3) UL (ref 1–4)
LYMPHOCYTES NFR BLD AUTO: 45.7 %
MCH RBC QN AUTO: 30.7 PG (ref 26–34)
MCHC RBC AUTO-ENTMCNC: 32.9 G/DL (ref 31–37)
MCV RBC AUTO: 93.3 FL
MONOCYTES # BLD AUTO: 0.97 X10(3) UL (ref 0.1–1)
MONOCYTES NFR BLD AUTO: 8.5 %
NEUTROPHILS # BLD AUTO: 4.91 X10 (3) UL (ref 1.5–7.7)
NEUTROPHILS # BLD AUTO: 4.91 X10(3) UL (ref 1.5–7.7)
NEUTROPHILS NFR BLD AUTO: 43 %
PLATELET # BLD AUTO: 217 10(3)UL (ref 150–450)
RBC # BLD AUTO: 3.42 X10(6)UL
WBC # BLD AUTO: 11.4 X10(3) UL (ref 4–11)

## 2024-07-29 PROCEDURE — 85025 COMPLETE CBC W/AUTO DIFF WBC: CPT

## 2024-07-29 PROCEDURE — 82784 ASSAY IGA/IGD/IGG/IGM EACH: CPT

## 2024-07-29 PROCEDURE — 87040 BLOOD CULTURE FOR BACTERIA: CPT

## 2024-07-29 PROCEDURE — 36415 COLL VENOUS BLD VENIPUNCTURE: CPT

## 2024-07-29 NOTE — TELEPHONE ENCOUNTER
Called Sonia to go over repeat labs. Went to [employer]. Sent a message through [employer] as well

## 2024-07-29 NOTE — PROGRESS NOTES
Called patient for Chronic Care Management - enrollment    Left message to call back   Call #3  Letter sent through [facility]  Care Management Department  [phone] work

## 2024-08-05 NOTE — PROGRESS NOTES
Called patient for Chronic Care Management      Left message to call back   Call #4  Letter sent through [employer] 7/29/24     [facility]  Care Management Department  [phone] work

## 2024-08-07 ENCOUNTER — PATIENT OUTREACH (OUTPATIENT)
Dept: INFECTIOUS DISEASE | Facility: CLINIC | Age: 74
End: 2024-08-07

## 2024-08-07 DIAGNOSIS — B95.2 BACTEREMIA DUE TO ENTEROCOCCUS: Primary | ICD-10-CM

## 2024-08-07 DIAGNOSIS — R78.81 BACTEREMIA DUE TO ENTEROCOCCUS: Primary | ICD-10-CM

## 2024-08-09 NOTE — PROGRESS NOTES
Called patient for Chronic Care Management      Left message to call back   Call #5  Letter sent through [employer] 7/29/24    [facility]  Care Management Department  [phone] work

## 2024-08-14 NOTE — PROGRESS NOTES
Called patient for Chronic Care Management      Left message to call back   Call #6   Letter sent through [employer] on 7/29/24 has not been read     Josefa Bustos  [facility]  Care Management Department  [phone] work

## 2024-08-23 ENCOUNTER — HOSPITAL ENCOUNTER (OUTPATIENT)
Dept: LAB | Facility: HOSPITAL | Age: 74
Discharge: HOME | End: 2024-08-23
Attending: PHYSICIAN ASSISTANT
Payer: MEDICARE

## 2024-08-23 DIAGNOSIS — R78.81 BACTEREMIA DUE TO ENTEROCOCCUS: ICD-10-CM

## 2024-08-23 DIAGNOSIS — B95.2 BACTEREMIA DUE TO ENTEROCOCCUS: ICD-10-CM

## 2024-08-23 LAB — MAGNESIUM SERPL-MCNC: 2 MG/DL (ref 1.6–2.6)

## 2024-08-23 PROCEDURE — 87040 BLOOD CULTURE FOR BACTERIA: CPT

## 2024-08-23 PROCEDURE — 36415 COLL VENOUS BLD VENIPUNCTURE: CPT

## 2024-08-23 PROCEDURE — 83735 ASSAY OF MAGNESIUM: CPT

## 2024-08-26 ENCOUNTER — MED REC SCAN ONLY (OUTPATIENT)
Dept: FAMILY MEDICINE CLINIC | Facility: CLINIC | Age: 74
End: 2024-08-26

## 2024-08-28 NOTE — PROGRESS NOTES
Called patient for Chronic Care Management      Left message to call back     Call #7  Letter sent through [employer] 7/29/24 has not been read  Letter sent through mail on 8/28/24    Josefa [facility]  Care Management Department  [phone] work

## 2024-09-01 DIAGNOSIS — E03.9 HYPOTHYROIDISM, UNSPECIFIED TYPE: ICD-10-CM

## 2024-09-01 RX ORDER — LEVOTHYROXINE SODIUM 100 UG/1
100 TABLET ORAL
Qty: 90 TABLET | Refills: 0 | Status: SHIPPED | OUTPATIENT
Start: 2024-09-01 | End: 2024-11-06

## 2024-09-03 ENCOUNTER — MED REC SCAN ONLY (OUTPATIENT)
Dept: FAMILY MEDICINE CLINIC | Facility: CLINIC | Age: 74
End: 2024-09-03

## 2024-09-04 ENCOUNTER — LAB ENCOUNTER (OUTPATIENT)
Dept: LAB | Age: 74
End: 2024-09-04
Attending: PHYSICIAN ASSISTANT
Payer: MEDICARE

## 2024-09-04 DIAGNOSIS — I50.22 CHRONIC SYSTOLIC CHF (CONGESTIVE HEART FAILURE) (HCC): ICD-10-CM

## 2024-09-04 DIAGNOSIS — I25.5 CARDIOMYOPATHY, ISCHEMIC: Primary | ICD-10-CM

## 2024-09-04 LAB
ANION GAP SERPL CALC-SCNC: 17 MMOL/L (ref 0–18)
BUN BLD-MCNC: 10 MG/DL (ref 9–23)
CALCIUM BLD-MCNC: 9.7 MG/DL (ref 8.7–10.4)
CHLORIDE SERPL-SCNC: 110 MMOL/L (ref 98–112)
CO2 SERPL-SCNC: 15 MMOL/L (ref 21–32)
CREAT BLD-MCNC: 0.94 MG/DL
EGFRCR SERPLBLD CKD-EPI 2021: 63 ML/MIN/1.73M2 (ref 60–?)
FASTING STATUS PATIENT QL REPORTED: YES
GLUCOSE BLD-MCNC: 80 MG/DL (ref 70–99)
OSMOLALITY SERPL CALC.SUM OF ELEC: 292 MOSM/KG (ref 275–295)
POTASSIUM SERPL-SCNC: 3.8 MMOL/L (ref 3.5–5.1)
SODIUM SERPL-SCNC: 142 MMOL/L (ref 136–145)

## 2024-09-04 PROCEDURE — 80048 BASIC METABOLIC PNL TOTAL CA: CPT

## 2024-09-04 PROCEDURE — 36415 COLL VENOUS BLD VENIPUNCTURE: CPT

## 2024-09-23 NOTE — PROGRESS NOTES
Multiple attempts to reach pt for monthly Chronic Care Management outreach with no success. Letter was sent to patient with no success. Removing patient from Chronic Care Management.  If patient would like to re-enroll they may call the main population health line back at [phone] to be connected with a Health .     Just an FYI. Thank you

## 2024-09-28 DIAGNOSIS — M1A.0790 CHRONIC GOUT OF FOOT, UNSPECIFIED CAUSE, UNSPECIFIED LATERALITY: ICD-10-CM

## 2024-09-30 ENCOUNTER — PATIENT MESSAGE (OUTPATIENT)
Dept: FAMILY MEDICINE CLINIC | Facility: CLINIC | Age: 74
End: 2024-09-30

## 2024-09-30 RX ORDER — ALLOPURINOL 100 MG/1
100 TABLET ORAL DAILY
Qty: 30 TABLET | Refills: 0 | Status: SHIPPED | OUTPATIENT
Start: 2024-09-30

## 2024-09-30 NOTE — TELEPHONE ENCOUNTER
LOV:  12/1/2023 for: Medication Follow-Up   Patient advised to RTC on:  May 2024.      Medication Quantity Refills Start End   allopurinol 100 MG Oral Tab 90 tablet 0 7/8/2024 --   Sig:   TAKE 1 TABLET BY MOUTH EVERY DAY

## 2024-10-01 NOTE — TELEPHONE ENCOUNTER
Reach out to Patient Left voicemail, patient is due for MA Super, to give us a call back to schedule.

## 2024-10-03 ENCOUNTER — HOME HEALTH CHARGES (OUTPATIENT)
Dept: FAMILY MEDICINE CLINIC | Facility: CLINIC | Age: 74
End: 2024-10-03

## 2024-10-03 DIAGNOSIS — I25.5 ISCHEMIC CARDIOMYOPATHY: Primary | ICD-10-CM

## 2024-10-05 ENCOUNTER — MED REC SCAN ONLY (OUTPATIENT)
Dept: FAMILY MEDICINE CLINIC | Facility: CLINIC | Age: 74
End: 2024-10-05

## 2024-10-14 ENCOUNTER — HOSPITAL ENCOUNTER (OUTPATIENT)
Dept: LAB | Facility: HOSPITAL | Age: 74
Discharge: HOME | End: 2024-10-14
Attending: INTERNAL MEDICINE
Payer: MEDICARE

## 2024-10-14 LAB
ANION GAP SERPL CALC-SCNC: 5 MMOL/L (ref 0–18)
BUN BLD-MCNC: 10 MG/DL (ref 9–23)
CALCIUM BLD-MCNC: 10 MG/DL (ref 8.7–10.4)
CHLORIDE SERPL-SCNC: 107 MMOL/L (ref 98–112)
CO2 SERPL-SCNC: 29 MMOL/L (ref 21–32)
CREAT BLD-MCNC: 0.94 MG/DL
EGFRCR SERPLBLD CKD-EPI 2021: 63 ML/MIN/1.73M2 (ref 60–?)
GLUCOSE BLD-MCNC: 101 MG/DL (ref 70–99)
OSMOLALITY SERPL CALC.SUM OF ELEC: 291 MOSM/KG (ref 275–295)
POTASSIUM SERPL-SCNC: 3.5 MMOL/L (ref 3.5–5.1)
SODIUM SERPL-SCNC: 141 MMOL/L (ref 136–145)

## 2024-10-14 PROCEDURE — 80048 BASIC METABOLIC PNL TOTAL CA: CPT | Performed by: INTERNAL MEDICINE

## 2024-10-14 PROCEDURE — 36415 COLL VENOUS BLD VENIPUNCTURE: CPT | Performed by: INTERNAL MEDICINE

## 2024-10-29 DIAGNOSIS — M1A.0790 CHRONIC GOUT OF FOOT, UNSPECIFIED CAUSE, UNSPECIFIED LATERALITY: ICD-10-CM

## 2024-10-29 RX ORDER — ALLOPURINOL 100 MG/1
100 TABLET ORAL DAILY
Qty: 30 TABLET | Refills: 0 | Status: SHIPPED | OUTPATIENT
Start: 2024-10-29

## 2024-10-29 NOTE — TELEPHONE ENCOUNTER
LOV:  06/12/2024 for: Hospital F/U   Patient advised to RTC on:   6 weeks (around 7/24/2024).   Nov:11/06/2024    Medication Quantity Refills Start End   ALLOPURINOL 100 MG Oral Tab 30 tablet 0 9/30/2024 --   Sig:   TAKE 1 TABLET BY MOUTH EVERY DAY

## 2024-11-06 ENCOUNTER — OFFICE VISIT (OUTPATIENT)
Dept: FAMILY MEDICINE CLINIC | Facility: CLINIC | Age: 74
End: 2024-11-06
Payer: MEDICARE

## 2024-11-06 VITALS
TEMPERATURE: 98 F | WEIGHT: 188 LBS | SYSTOLIC BLOOD PRESSURE: 110 MMHG | HEIGHT: 68.7 IN | DIASTOLIC BLOOD PRESSURE: 80 MMHG | HEART RATE: 90 BPM | RESPIRATION RATE: 16 BRPM | BODY MASS INDEX: 28.17 KG/M2

## 2024-11-06 DIAGNOSIS — I48.0 PAROXYSMAL ATRIAL FIBRILLATION (HCC): ICD-10-CM

## 2024-11-06 DIAGNOSIS — Z12.11 SCREENING FOR COLON CANCER: ICD-10-CM

## 2024-11-06 DIAGNOSIS — M1A.0790 CHRONIC GOUT OF FOOT, UNSPECIFIED CAUSE, UNSPECIFIED LATERALITY: ICD-10-CM

## 2024-11-06 DIAGNOSIS — I73.9 PERIPHERAL VASCULAR DISEASE (HCC): ICD-10-CM

## 2024-11-06 DIAGNOSIS — Z86.79 S/P AAA REPAIR: ICD-10-CM

## 2024-11-06 DIAGNOSIS — Z98.890 S/P AAA REPAIR: ICD-10-CM

## 2024-11-06 DIAGNOSIS — D72.829 LEUKOCYTOSIS, UNSPECIFIED TYPE: ICD-10-CM

## 2024-11-06 DIAGNOSIS — Z78.0 POSTMENOPAUSAL: ICD-10-CM

## 2024-11-06 DIAGNOSIS — E03.9 HYPOTHYROIDISM, UNSPECIFIED TYPE: ICD-10-CM

## 2024-11-06 DIAGNOSIS — D72.820 MONOCLONAL B-CELL LYMPHOCYTOSIS WITH CHRONIC LYMPHOCYTIC LEUKEMIA (CLL) IMMUNOPHENOTYPE (HCC): ICD-10-CM

## 2024-11-06 DIAGNOSIS — Z00.00 ENCOUNTER FOR ANNUAL HEALTH EXAMINATION: Primary | ICD-10-CM

## 2024-11-06 DIAGNOSIS — I25.10 CORONARY ARTERY DISEASE INVOLVING NATIVE CORONARY ARTERY OF NATIVE HEART WITHOUT ANGINA PECTORIS: ICD-10-CM

## 2024-11-06 DIAGNOSIS — I50.22 CHRONIC SYSTOLIC (CONGESTIVE) HEART FAILURE (HCC): ICD-10-CM

## 2024-11-06 DIAGNOSIS — Z95.1 S/P CABG X 3: ICD-10-CM

## 2024-11-06 DIAGNOSIS — I25.5 ISCHEMIC CARDIOMYOPATHY: ICD-10-CM

## 2024-11-06 DIAGNOSIS — Z95.810 AICD (AUTOMATIC CARDIOVERTER/DEFIBRILLATOR) PRESENT: ICD-10-CM

## 2024-11-06 DIAGNOSIS — C91.10 MONOCLONAL B-CELL LYMPHOCYTOSIS WITH CHRONIC LYMPHOCYTIC LEUKEMIA (CLL) IMMUNOPHENOTYPE (HCC): ICD-10-CM

## 2024-11-06 RX ORDER — LEVOTHYROXINE SODIUM 100 UG/1
100 TABLET ORAL
Qty: 90 TABLET | Refills: 1 | Status: SHIPPED | OUTPATIENT
Start: 2024-11-06

## 2024-11-06 RX ORDER — ALLOPURINOL 100 MG/1
100 TABLET ORAL DAILY
Qty: 90 TABLET | Refills: 1 | Status: SHIPPED | OUTPATIENT
Start: 2024-11-06

## 2024-11-06 NOTE — PATIENT INSTRUCTIONS
RSV shot.  Continue current meds.   Watch diet for fats and carbs.   Stay active.    Do labs prior visit in April 2025.   Refill policies:      Allow 3 business days for refills; controlled substances may take longer.  Contact your pharmacy at least 5-7 business days prior to running out of medication and have them send an electronic request or submit through the \"request refill\" option thru your [employer] account. No need to do both, as multiple requests will create an automated [employer] message to notify of a denial for one of the duplicated requests, causing you undue confusion.   Refills are NOT addressed on weekends; covering physicians do not authorize routine medications on weekends.  No narcotics or controlled substances are refilled after noon on Fridays or by on call physicians.  By law, narcotics cannot be faxed or phoned into your pharmacy.  If your prescription is due for a refill, you may be due for a follow up appointment. Please call our office at [phone] to make an appointment or schedule an appointment via [employer].  To best provide you care, patients receiving routine medications need to be seen at least twice a year. Patients receiving narcotic/controlled substance medications need to be seen at least once every 3 months.  In the event that your preferred pharmacy does not have the requested medication in stock (ie Backordered), it is your responsibility to find another pharmacy that has the requested medication available. We will gladly send a new prescription to that pharmacy at your request.  controlled substances may not be able to be filled out of state due to license restrictions.  If you have a planned trip, it's best to call your pharmacy at least 5-7 business days to prevent any delays in your medication refill.    Scheduling Tests:    If your physician has ordered radiology tests such as MRI or CT scans, please contact Central Scheduling at [phone] right away to schedule the test.   Once scheduled, the [facility] Centralized Referral Team will work with your insurance carrier to obtain pre-certification or prior authorization.  Depending on your insurance carrier, approval may take 3-10 days.  It is highly recommended patients assure they have received an authorization before having a test performed.  If test is done without insurance authorization, patient may be responsible for the entire amount billed.      Precertification and Prior Authorizations:  If your physician has recommended that you have a procedure or additional testing performed the [facility] Centralized Referral Team will contact your insurance carrier to obtain pre-certification or prior authorization.    You are strongly encouraged to contact your insurance carrier to verify that your procedure/test has been approved and is a COVERED benefit.  Although the [facility] Centralized Referral Team does its due diligence, the insurance carrier gives the disclaimer that \"Although the procedure is authorized, this does not guarantee payment.\"    Ultimately the patient is responsible for payment.   Thank you for your understanding in this matter.  Paperwork Completion:  If you require FMLA or disability paperwork for your recovery, please make sure to either drop it off or have it faxed to our office at [phone]. Be sure the form has your name and date of birth on it.  The form will be faxed to our Forms Department and they will complete it for you.  There is a 25$ fee for all forms that need to be filled out.  Please be aware there is a 10-14 day turnaround time.  You will need to sign a release of information (FREDY) form if your paperwork does not come with one.  You may call the Forms Department with any questions at [phone].  Their fax number is [phone].

## 2024-11-06 NOTE — PROGRESS NOTES
Subjective:   Sonia Martell is a 74 year old female who presents for a MA AHA (Medicare Advantage Annual Health Assessment) and Subsequent Annual Wellness visit (Pt already had Initial Annual Wellness) and scheduled follow up of multiple significant but stable problems.  History of sepsis this  year, hypothyroidism,  gout.    Patient has Enterococcus sepsis this year treated with IV antibiotics was seeing Dr. Dang.  Of antibiotics in September.  Energy is improving.  Doing much better.    Hypothyroidism diagnosed many years ago.  She is taking levothyroxine doing well with medication continue medication patient will return for blood work.     Gout patient is taking allopurinol doing well.  She thinks it is related to the medication she is taking.  Monitor uric acid levels     Patient  has coronary artery disease status post CABG which was diagnosed in 2017.  She had bypasses #3.  Subsequent cardiomyopathy with systolic heart failure she is seeing cardiologist Dr. Hale.  Bypass surgery with Dr. Monzon.  Patient has history of paroxysmal atrial fibrillation seeing Dr. Mix status post pacemaker/defibrillator placement had also ablation done doing well.  On low-dose of Xarelto.  Patient is going back on Entresto dose it is slowly tapered up.  Medication was decreased because of her sepsis.     Patient has history of ruptured aortic aneurysm she had a surgery done by Dr. Earnest Meza.  Doing well.  Monitored by them.     Carotid atherosclerosis monitored by cardiologist stable.     Patient has kidney stones 10 years ago right now doing well.    Leukocytosis/CLL patient is seeing Dr. Sheikh it is monitored.  Elevation in the white count was felt to be related to patient's sepsis.  She will have a blood work done this month by hematologist.       History/Other:   Fall Risk Assessment:   She has been screened for Falls and is low risk.      Cognitive Assessment:   She had a completely normal cognitive assessment - see  flowsheet entries     Functional Ability/Status:   Sonia Martell has a completely normal functional assessment. See flowsheet for details.        Depression Screening (PHQ):  PHQ-2 SCORE: 0  , done 11/6/2024     Screening reviewed    Advanced Directives:   She does have a Living Will but we do NOT have it on file in Epic.    She has a Power of  for Health Care on file in [facility].  Discussed Advance Care Planning with patient (and family/surrogate if present). Standard forms made available to patient in After Visit Summary.      Patient Active Problem List   Diagnosis    Troponin level elevated    V-tach ([facility])    Paroxysmal atrial fibrillation ([facility])    Coronary artery disease involving native heart without angina pectoris    History of endovascular stent graft for abdominal aortic aneurysm (AAA)    Ischemic cardiomyopathy    S/P CABG x 3    S/P ablation of atrial fibrillation    Dyslipidemia    Bilateral iliac artery aneurysm ([facility])    Iliac artery aneurysm, bilateral ([facility])    S/P AAA repair    HFrEF (heart failure with reduced ejection fraction) ([facility])    Superficial femoral artery occlusion ([facility])    Chronic anticoagulation    Nephrolithiasis    Peripheral vascular disease ([facility])    Other specified hypothyroidism    Primary osteoarthritis of right hip  [facility] 08/08/2019    Orthopedic aftercare    Status post right hip replacement    Vascular insufficiency    Allodynia    Neuropathy    ICD (implantable cardioverter-defibrillator) discharge    Ventricular tachycardia ([facility])    Troponin I above reference range    Closed nondisplaced fracture of acromial end of left clavicle, initial encounter    Fall    Bilateral carotid artery stenosis    Pure hypercholesterolemia    Aneurysm of iliac artery ([facility])    CAD (coronary artery disease), native coronary artery    PAD (peripheral artery disease) ([facility])    Cardiomyopathy, ischemic    V tach ([facility])    Hypotension    Chronic systolic heart failure ([facility])    VTE (venous  thromboembolism)    Defibrillator discharge    COVID-19    Leukocytosis    Hyperglycemia    Sepsis due to other etiology ([facility])    Febrile illness    Hypokalemia    Renal insufficiency    Dehydration    Nausea and vomiting in adult    AJCEK (acute kidney injury) ([facility])    ATN (acute tubular necrosis) ([facility])    Metabolic acidosis    Sepsis due to Enterococcus ([facility])     Allergies:  She is allergic to gentamicin, amiodarone, fish-derived products, midodrine, protamine, seafood, and sulfa antibiotics.    Current Medications:  Outpatient Medications Marked as Taking for the 11/6/24 encounter (Office Visit) with Jana Buck MD   Medication Sig    levothyroxine 100 MCG Oral Tab Take 1 tablet (100 mcg total) by mouth before breakfast. Schedule office visit.    allopurinol 100 MG Oral Tab Take 1 tablet (100 mg total) by mouth daily.    torsemide 20 MG Oral Tab Take 0.5 tablets (10 mg total) by mouth daily.    rivaroxaban (XARELTO) 2.5 MG Oral Tab Take 1 tablet (2.5 mg total) by mouth 2 (two) times daily with meals.    mexiletine 150 MG Oral Cap Take 1 capsule (150 mg total) by mouth in the morning and 1 capsule (150 mg total) before bedtime.    rosuvastatin 10 MG Oral Tab Take 1 tablet (10 mg total) by mouth every evening.    docusate sodium 100 MG Oral Cap Take 2 capsules (200 mg total) by mouth 2 (two) times daily.    Cholecalciferol (VITAMIN D) 50 MCG (2000 UT) Oral Tab Take 2,000 Units by mouth daily. qd    Ascorbic Acid (VITAMIN C) 1000 MG Oral Tab Take 1 tablet (1,000 mg total) by mouth daily.    aspirin 81 MG Oral Tab EC Take 1 tablet (81 mg total) by mouth daily.    KRILL OIL OR Take 1 capsule by mouth 2 (two) times daily.    Multiple Vitamin (MULTI-VITAMIN DAILY OR) Take 1 tablet by mouth daily.    Probiotic Product (PROBIOTIC OR) Take 1 tablet by mouth daily.       Medical History:  She  has a past medical history of AAA (abdominal aortic aneurysm) ([facility]), Anemia, unspecified (8/15/2017), Arrhythmia,  Atherosclerosis of coronary artery, Calculus of kidney, Cancer (HCC), Chronic atrial fibrillation (HCC), Coronary atherosclerosis, Disorder of thyroid, History of blood transfusion, IBS (irritable bowel syndrome), Ischemic cardiomyopathy, Kidney stone, Peripheral vascular disease (HCC), Skin cancer (), Small bowel obstruction (HCC) (2017), Tobacco abuse (2014), and Visual impairment.  Surgical History:  She  has a past surgical history that includes symp aaa urgent repair (); other surgical history (); other surgical history; other surgical history; cabg (2017); angiogram; ndsc ablation & rcnstj [employer] w/o bypass; Colectomy; Cardiac defibrillator placement; Cardiac pacemaker placement (10/31/2017); cystoscopy,insert ureteral stent; tonsillectomy; adenoidectomy; hip replacement surgery (Right, 05/10/2019); and ep svt ablation (2022).   Family History:  Her family history includes Cancer in her mother; Heart Disorder in her father.  Social History:  She  reports that she quit smoking about 7 years ago. Her smoking use included cigarettes. She started smoking about 54 years ago. She has a 23.5 pack-year smoking history. She has never used smokeless tobacco. She reports current alcohol use of about 3.0 - 4.0 standard drinks of alcohol per week. She reports that she does not use drugs.    Tobacco:  She smoked tobacco in the past but quit greater than 12 months ago.  Social History     Tobacco Use   Smoking Status Former    Current packs/day: 0.00    Average packs/day: 0.5 packs/day for 47.0 years (23.5 ttl pk-yrs)    Types: Cigarettes    Start date: 1970    Quit date: 2017    Years since quittin.5   Smokeless Tobacco Never          CAGE Alcohol Screen:   CAGE screening score of 0 on 2024, showing low risk of alcohol abuse.      Patient Care Team:  Jana Buck MD as PCP - General (Family Medicine)  Orlando Weldon DO as Consulting Physician (NEUROLOGY)  Alexia  MD Lenny as Consulting Physician (CARDIOLOGY)    Review of Systems  GENERAL: feels well otherwise  SKIN: denies any unusual skin lesions  EYES: denies blurred vision or double vision  HEENT: denies nasal congestion, sinus pain or ST  LUNGS: denies shortness of breath with exertion  CARDIOVASCULAR: denies chest pain on exertion  GI: denies abdominal pain, denies heartburn  : denies dysuria, vaginal discharge or itching, no complaint of urinary incontinence   MUSCULOSKELETAL: denies back pain  NEURO: denies headaches  PSYCHE: denies depression or anxiety  HEMATOLOGIC: denies hx of anemia  ENDOCRINE: denies thyroid history  ALL/ASTHMA: denies hx of allergy or asthma    Objective:   Physical Exam  General Appearance:  Alert, cooperative, no distress, appears stated age   Head:  Normocephalic, without obvious abnormality, atraumatic   Eyes:  PERRL, conjunctiva/corneas clear, EOM's intact both eyes   Ears:  Normal TM's and external ear canals, both ears   Nose: Nares normal, septum midline,mucosa normal, no drainage or sinus tenderness   Throat: Lips, mucosa, and tongue normal; teeth and gums normal   Neck: Supple, symmetrical, trachea midline, no adenopathy;  thyroid: not enlarged, symmetric, no tenderness/mass/nodules; no carotid bruit or JVD   Back:   Symmetric, no curvature, ROM normal, no CVA tenderness   Lungs:   Clear to auscultation bilaterally, respirations unlabored   Heart:  Regular rate and rhythm, S1 and S2 normal, no murmur, rub, or gallop   Abdomen:   Soft, non-tender, bowel sounds active all four quadrants,  no masses, no organomegaly   Pelvic: Deferred   Extremities: Extremities normal, atraumatic, no cyanosis or edema   Pulses: 2+ and symmetric   Skin: Skin color, texture, turgor normal, no rashes or lesions   Lymph nodes: Cervical, supraclavicular, and axillary nodes normal   Neurologic: Normal       /80 (BP Location: Right arm, Patient Position: Sitting, Cuff Size: adult)   Pulse 90   Temp 98  °F (36.7 °C) (Temporal)   Resp 16   Ht 5' 8.7\" (1.745 m)   Wt 188 lb (85.3 kg)   BMI 28.01 kg/m²  Estimated body mass index is 28.01 kg/m² as calculated from the following:    Height as of this encounter: 5' 8.7\" (1.745 m).    Weight as of this encounter: 188 lb (85.3 kg).    Medicare Hearing Assessment:   Hearing Screening    Time taken: 11/6/2024 10:32 AM  Screening Method: Finger Rub  Finger Rub Result: Pass         Visual Acuity:   Right Eye Visual Acuity: Uncorrected Right Eye Chart Acuity: 20/40   Left Eye Visual Acuity: Uncorrected Left Eye Chart Acuity: 20/40   Both Eyes Visual Acuity: Uncorrected Both Eyes Chart Acuity: 20/40   Able To Tolerate Visual Acuity: Yes        Results for orders placed or performed during the hospital encounter of 10/14/24   Basic Metabolic Panel (8)    Collection Time: 10/14/24 12:08 PM   Result Value Ref Range    Glucose 101 (H) 70 - 99 mg/dL    Sodium 141 136 - 145 mmol/L    Potassium 3.5 3.5 - 5.1 mmol/L    Chloride 107 98 - 112 mmol/L    CO2 29.0 21.0 - 32.0 mmol/L    Anion Gap 5 0 - 18 mmol/L    BUN 10 9 - 23 mg/dL    Creatinine 0.94 0.55 - 1.02 mg/dL    Calcium, Total 10.0 8.7 - 10.4 mg/dL    Calculated Osmolality 291 275 - 295 mOsm/kg    eGFR-Cr 63 >=60 mL/min/1.73m2    Patient Fasting for BMP? Patient not present       Assessment & Plan:   Sonia Martell is a 74 year old female who presents for a Medicare Assessment.     1. Encounter for annual health examination (Primary)  2. Hypothyroidism, unspecified type  -     TSH and Free T4; Future; Expected date: 03/24/2025  -     Levothyroxine Sodium; Take 1 tablet (100 mcg total) by mouth before breakfast. Schedule office visit.  Dispense: 90 tablet; Refill: 1  3. Coronary artery disease involving native coronary artery of native heart without angina pectoris  -     Comp Metabolic Panel (14); Future; Expected date: 03/24/2025  4. Leukocytosis, unspecified type  5. Postmenopausal  6. Screening for colon cancer  7. Chronic  gout of foot, unspecified cause, unspecified laterality  -     Allopurinol; Take 1 tablet (100 mg total) by mouth daily.  Dispense: 90 tablet; Refill: 1  -     Uric Acid; Future; Expected date: 03/24/2025  8. Paroxysmal atrial fibrillation (HCC)  9. Peripheral vascular disease (HCC)  10. Ischemic cardiomyopathy  11. S/P CABG x 3  12. AICD (automatic cardioverter/defibrillator) present  13. Chronic systolic (congestive) heart failure (HCC)  14. S/P AAA repair  15. 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  Continue current medication    Coronary artery disease seeing cardiologist management by them    Leukocytosis/CLL seeing Dr. Sheikh she will have a blood work done by hematologist    Postmenopausal monitor DEXA scan will plan to do it next year patient had a lot of things happening this year    Colon cancer screening patient wants to defer colonoscopy at this time monitor.    Gout on allopurinol doing well monitor uric acid level.    Paroxysmal atrial fibrillation patient is seeing cardiologist management by them stable asymptomatic    Peripheral vascular disease stable doing well.  Per cardiologist,    Ischemic cardiomyopathy ejection fraction is low tapering up medication per cardiologist    Status post CABG doing well stable    AICD stable per cardiologist    CHF stable medication adjusted by specialist.    Status post AAA repair doing well.  Stable.    RSV shot.  Continue current meds.   Watch diet for fats and carbs.   Stay active.    Do labs prior visit in April 2025.      The patient indicates understanding of these issues and agrees to the plan.  Imaging studies ordered.  Lab work ordered.  Reinforced healthy diet, lifestyle, and exercise.      Return in about 6 months (around 5/6/2025).     Jana Buck MD, 11/6/2024     Supplementary Documentation:   General  Health:  In the past six months, have you lost more than 10 pounds without trying?: 2 - No  Has your appetite been poor?: No  Type of Diet: Balanced  How does the patient maintain a good energy level?: Stretching  How would you describe your daily physical activity?: Light  How would you describe your current health state?: Good  How do you maintain positive mental well-being?: Social Interaction;Puzzles;Games;Visiting Friends;Visiting Family  On a scale of 0 to 10, with 0 being no pain and 10 being severe pain, what is your pain level?: 0 - (None)  In the past six months, have you experienced urine leakage?: 0-No  At any time do you feel concerned for the safety/well-being of yourself and/or your children, in your home or elsewhere?: No  Have you had any immunizations at another office such as Influenza, Hepatitis B, Tetanus, or Pneumococcal?: Yes    Health Maintenance   Topic Date Due    Colorectal Cancer Screening  Never done    Zoster Vaccines (1 of 2) Never done    Lung Cancer Screening  Never done    DEXA Scan  Never done    MA Annual Health Assessment  01/01/2024    Influenza Vaccine  Completed    Annual Depression Screening  Completed    Fall Risk Screening (Annual)  Completed    Pneumococcal Vaccine: 65+ Years  Completed    COVID-19 Vaccine  Completed

## 2024-11-15 ENCOUNTER — HOSPITAL ENCOUNTER (OUTPATIENT)
Dept: LAB | Facility: HOSPITAL | Age: 74
Discharge: HOME | End: 2024-11-15
Attending: NURSE PRACTITIONER
Payer: MEDICARE

## 2024-11-15 LAB
ANION GAP SERPL CALC-SCNC: 5 MMOL/L (ref 0–18)
BUN BLD-MCNC: 8 MG/DL (ref 9–23)
CALCIUM BLD-MCNC: 9.9 MG/DL (ref 8.7–10.4)
CHLORIDE SERPL-SCNC: 105 MMOL/L (ref 98–112)
CO2 SERPL-SCNC: 30 MMOL/L (ref 21–32)
CREAT BLD-MCNC: 0.93 MG/DL
EGFRCR SERPLBLD CKD-EPI 2021: 64 ML/MIN/1.73M2 (ref 60–?)
FASTING STATUS PATIENT QL REPORTED: NO
GLUCOSE BLD-MCNC: 92 MG/DL (ref 70–99)
OSMOLALITY SERPL CALC.SUM OF ELEC: 288 MOSM/KG (ref 275–295)
POTASSIUM SERPL-SCNC: 3.5 MMOL/L (ref 3.5–5.1)
SODIUM SERPL-SCNC: 140 MMOL/L (ref 136–145)

## 2024-11-15 PROCEDURE — 80048 BASIC METABOLIC PNL TOTAL CA: CPT | Performed by: NURSE PRACTITIONER

## 2024-11-15 PROCEDURE — 36415 COLL VENOUS BLD VENIPUNCTURE: CPT | Performed by: NURSE PRACTITIONER

## 2024-12-03 ENCOUNTER — MED REC SCAN ONLY (OUTPATIENT)
Dept: FAMILY MEDICINE CLINIC | Facility: CLINIC | Age: 74
End: 2024-12-03

## 2025-01-24 NOTE — PROGRESS NOTES
Notes read. Discussed with Dr. Derrek Hare in [address] yesterday.  Would have ID consult in light of bowel resection and recent aorto-biiliac dacron graft Pt is post op. Pt is alert and oriented on RA. Hammond in place. IVF are running as ordered. Pain is managed with  oxycodone. Pt had 1 episode of nausea and vomiting managed with IV Zofran and Reglan. Call light is within reach and safety measures are in place. Will continue to follow the current plan of care.    Problem: PAIN - ADULT  Goal: Verbalizes/displays adequate comfort level or patient's stated pain goal  Description: INTERVENTIONS:  - Encourage pt to monitor pain and request assistance  - Assess pain using appropriate pain scale  - Administer analgesics based on type and severity of pain and evaluate response  - Implement non-pharmacological measures as appropriate and evaluate response  - Consider cultural and social influences on pain and pain management  - Manage/alleviate anxiety  - Utilize distraction and/or relaxation techniques  - Monitor for opioid side effects  - Notify MD/LIP if interventions unsuccessful or patient reports new pain  - Anticipate increased pain with activity and pre-medicate as appropriate  Outcome: Progressing     Problem: GASTROINTESTINAL - ADULT  Goal: Minimal or absence of nausea and vomiting  Description: INTERVENTIONS:  - Maintain adequate hydration with IV or PO as ordered and tolerated  - Nasogastric tube to low intermittent suction as ordered  - Evaluate effectiveness of ordered antiemetic medications  - Provide nonpharmacologic comfort measures as appropriate  - Advance diet as tolerated, if ordered  - Obtain nutritional consult as needed  - Evaluate fluid balance  Outcome: Progressing  Goal: Maintains or returns to baseline bowel function  Description: INTERVENTIONS:  - Assess bowel function  - Maintain adequate hydration with IV or PO as ordered and tolerated  - Evaluate effectiveness of GI medications  - Encourage mobilization and activity  - Obtain nutritional consult as needed  - Establish a toileting routine/schedule  - Consider collaborating with pharmacy to review  patient's medication profile  Outcome: Progressing     Problem: GENITOURINARY - ADULT  Goal: Absence of urinary retention  Description: INTERVENTIONS:  - Assess patient’s ability to void and empty bladder  - Monitor intake/output and perform bladder scan as needed  - Follow urinary retention protocol/standard of care  - Consider collaborating with pharmacy to review patient's medication profile  - Implement strategies to promote bladder emptying  Outcome: Progressing     Problem: SKIN/TISSUE INTEGRITY - ADULT  Goal: Skin integrity remains intact  Description: INTERVENTIONS  - Assess and document risk factors for pressure ulcer development  - Assess and document skin integrity  - Monitor for areas of redness and/or skin breakdown  - Initiate interventions, skin care algorithm/standards of care as needed  Outcome: Progressing  Goal: Incision(s), wounds(s) or drain site(s) healing without S/S of infection  Description: INTERVENTIONS:  - Assess and document risk factors for pressure ulcer development  - Assess and document skin integrity  - Assess and document dressing/incision, wound bed, drain sites and surrounding tissue  - Implement wound care per orders  - Initiate isolation precautions as appropriate  - Initiate Pressure Ulcer prevention bundle as indicated  Outcome: Progressing

## 2025-03-13 ENCOUNTER — LAB ENCOUNTER (OUTPATIENT)
Dept: LAB | Facility: HOSPITAL | Age: 75
End: 2025-03-13
Attending: INTERNAL MEDICINE
Payer: MEDICARE

## 2025-03-13 DIAGNOSIS — I50.22 CHRONIC HFREF (HEART FAILURE WITH REDUCED EJECTION FRACTION) (HCC): Primary | ICD-10-CM

## 2025-03-13 DIAGNOSIS — I25.10 CORONARY ARTERY DISEASE INVOLVING NATIVE CORONARY ARTERY OF NATIVE HEART WITHOUT ANGINA PECTORIS: ICD-10-CM

## 2025-03-13 DIAGNOSIS — N28.9 RENAL INSUFFICIENCY: ICD-10-CM

## 2025-03-13 DIAGNOSIS — M1A.0790 CHRONIC GOUT OF FOOT, UNSPECIFIED CAUSE, UNSPECIFIED LATERALITY: ICD-10-CM

## 2025-03-13 DIAGNOSIS — E03.9 HYPOTHYROIDISM, UNSPECIFIED TYPE: ICD-10-CM

## 2025-03-13 LAB
ALBUMIN SERPL-MCNC: 4.6 G/DL (ref 3.2–4.8)
ALBUMIN/GLOB SERPL: 1.5 {RATIO} (ref 1–2)
ALP LIVER SERPL-CCNC: 71 U/L
ALT SERPL-CCNC: 12 U/L
ANION GAP SERPL CALC-SCNC: 9 MMOL/L (ref 0–18)
AST SERPL-CCNC: 21 U/L (ref ?–34)
BILIRUB SERPL-MCNC: 0.4 MG/DL (ref 0.2–1.1)
BUN BLD-MCNC: 17 MG/DL (ref 9–23)
CALCIUM BLD-MCNC: 10.4 MG/DL (ref 8.7–10.6)
CHLORIDE SERPL-SCNC: 105 MMOL/L (ref 98–112)
CO2 SERPL-SCNC: 26 MMOL/L (ref 21–32)
CREAT BLD-MCNC: 1.13 MG/DL
EGFRCR SERPLBLD CKD-EPI 2021: 51 ML/MIN/1.73M2 (ref 60–?)
FASTING STATUS PATIENT QL REPORTED: NO
GLOBULIN PLAS-MCNC: 3.1 G/DL (ref 2–3.5)
GLUCOSE BLD-MCNC: 85 MG/DL (ref 70–99)
NT-PROBNP SERPL-MCNC: 835 PG/ML (ref ?–450)
OSMOLALITY SERPL CALC.SUM OF ELEC: 291 MOSM/KG (ref 275–295)
POTASSIUM SERPL-SCNC: 4.6 MMOL/L (ref 3.5–5.1)
PROT SERPL-MCNC: 7.7 G/DL (ref 5.7–8.2)
SODIUM SERPL-SCNC: 140 MMOL/L (ref 136–145)
T4 FREE SERPL-MCNC: 1.4 NG/DL (ref 0.8–1.7)
TSI SER-ACNC: 2.73 UIU/ML (ref 0.55–4.78)
URATE SERPL-MCNC: 5.1 MG/DL

## 2025-03-13 PROCEDURE — 84550 ASSAY OF BLOOD/URIC ACID: CPT

## 2025-03-13 PROCEDURE — 83880 ASSAY OF NATRIURETIC PEPTIDE: CPT

## 2025-03-13 PROCEDURE — 84439 ASSAY OF FREE THYROXINE: CPT

## 2025-03-13 PROCEDURE — 80053 COMPREHEN METABOLIC PANEL: CPT

## 2025-03-13 PROCEDURE — 84443 ASSAY THYROID STIM HORMONE: CPT

## 2025-03-13 PROCEDURE — 36415 COLL VENOUS BLD VENIPUNCTURE: CPT

## 2025-03-25 ENCOUNTER — TELEPHONE (OUTPATIENT)
Dept: ADMINISTRATIVE | Age: 75
End: 2025-03-25

## 2025-03-25 DIAGNOSIS — Z09 FOLLOW-UP EXAM, 3-6 MONTHS SINCE PREVIOUS EXAM: Primary | ICD-10-CM

## 2025-03-25 NOTE — TELEPHONE ENCOUNTER
Patient request  Referral  to see Cardiology(Alexia)please review and sign plan and care if you agree Thank you.                              Natalie SEAN            [facility].

## 2025-04-01 ENCOUNTER — MED REC SCAN ONLY (OUTPATIENT)
Dept: FAMILY MEDICINE CLINIC | Facility: CLINIC | Age: 75
End: 2025-04-01

## 2025-06-02 DIAGNOSIS — M1A.0790 CHRONIC GOUT OF FOOT, UNSPECIFIED CAUSE, UNSPECIFIED LATERALITY: ICD-10-CM

## 2025-06-02 DIAGNOSIS — E03.9 HYPOTHYROIDISM, UNSPECIFIED TYPE: ICD-10-CM

## 2025-06-03 RX ORDER — ALLOPURINOL 100 MG/1
100 TABLET ORAL DAILY
Qty: 90 TABLET | Refills: 0 | Status: SHIPPED | OUTPATIENT
Start: 2025-06-03

## 2025-06-03 RX ORDER — LEVOTHYROXINE SODIUM 100 UG/1
100 TABLET ORAL
Qty: 30 TABLET | Refills: 0 | Status: SHIPPED | OUTPATIENT
Start: 2025-06-03

## 2025-06-03 NOTE — TELEPHONE ENCOUNTER
LOV: 11/06/2025  for: Ma- Super visit.   Patient advised to RTC on:  6 months (around 5/6/2025   Medication Quantity Refills Start End   levothyroxine 100 MCG Oral Tab 90 tablet 1 11/6/2024 --   Sig:   Take 1 tablet (100 mcg total) by mouth before breakfast. Schedule office visit.       Medication Quantity Refills Start End   allopurinol 100 MG Oral Tab 90 tablet 1 11/6/2024 --   Sig:   Take 1 tablet (100 mg total) by mouth daily.

## 2025-06-11 ENCOUNTER — MED REC SCAN ONLY (OUTPATIENT)
Dept: FAMILY MEDICINE CLINIC | Facility: CLINIC | Age: 75
End: 2025-06-11

## 2025-07-07 DIAGNOSIS — E03.9 HYPOTHYROIDISM, UNSPECIFIED TYPE: ICD-10-CM

## 2025-07-09 DIAGNOSIS — E03.9 HYPOTHYROIDISM, UNSPECIFIED TYPE: ICD-10-CM

## 2025-07-09 RX ORDER — TORSEMIDE 20 MG/1
10 TABLET ORAL DAILY
Refills: 0 | Status: CANCELLED | OUTPATIENT
Start: 2025-07-09

## 2025-07-09 RX ORDER — LEVOTHYROXINE SODIUM 100 UG/1
100 TABLET ORAL
Qty: 30 TABLET | Refills: 0 | Status: SHIPPED | OUTPATIENT
Start: 2025-07-09

## 2025-07-09 RX ORDER — LEVOTHYROXINE SODIUM 100 UG/1
100 TABLET ORAL
Qty: 30 TABLET | Refills: 0 | Status: CANCELLED | OUTPATIENT
Start: 2025-07-09

## 2025-07-09 NOTE — TELEPHONE ENCOUNTER
Requested Prescriptions     Pending Prescriptions Disp Refills    LEVOTHYROXINE 100 MCG Oral Tab [Pharmacy Med Name: Levothyroxine Sodium Oral Tablet 100 MCG] 30 tablet 0     Sig: TAKE 1 TABLET BY MOUTH EVERY DAY before breakfast.     Last refill 6/3/25 #30  LOV 11/6/24  No future appointments.  Patient is due for an office visit (cpx).  Routing to front office to assist with scheduling.

## 2025-07-09 NOTE — TELEPHONE ENCOUNTER
Patient is scheduled for 10/08/25.     Can the following medication below please be refilled.     Please advise

## 2025-07-10 NOTE — TELEPHONE ENCOUNTER
Left a message on voicemail to call back.  Need to verify Torsemide.  Who filled it for her? Never refilled for patient.

## 2025-07-27 ENCOUNTER — HOSPITAL ENCOUNTER (INPATIENT)
Facility: HOSPITAL | Age: 75
LOS: 3 days | Discharge: HOME | End: 2025-07-30
Attending: EMERGENCY MEDICINE | Admitting: HOSPITALIST

## 2025-07-27 ENCOUNTER — APPOINTMENT (OUTPATIENT)
Dept: CT IMAGING | Facility: HOSPITAL | Age: 75
End: 2025-07-27
Attending: EMERGENCY MEDICINE

## 2025-07-27 DIAGNOSIS — R31.0 GROSS HEMATURIA: Primary | ICD-10-CM

## 2025-07-27 DIAGNOSIS — D72.829 LEUKOCYTOSIS, UNSPECIFIED TYPE: ICD-10-CM

## 2025-07-27 DIAGNOSIS — N28.1 RENAL CYST: ICD-10-CM

## 2025-07-27 DIAGNOSIS — N13.30 HYDRONEPHROSIS, UNSPECIFIED HYDRONEPHROSIS TYPE: ICD-10-CM

## 2025-07-27 LAB
ALBUMIN SERPL-MCNC: 4.3 G/DL (ref 3.2–4.8)
ALBUMIN/GLOB SERPL: 1.4 (ref 1–2)
ALP LIVER SERPL-CCNC: 66 U/L (ref 55–142)
ALT SERPL-CCNC: 12 U/L (ref 10–49)
ANION GAP SERPL CALC-SCNC: 10 MMOL/L (ref 0–18)
ANTIBODY SCREEN: NEGATIVE
APTT PPP: 29.5 SECONDS (ref 23–36)
AST SERPL-CCNC: 23 U/L (ref ?–34)
BASOPHILS # BLD AUTO: 0.04 X10(3) UL (ref 0–0.2)
BASOPHILS NFR BLD AUTO: 0.2 %
BILIRUB SERPL-MCNC: 0.4 MG/DL (ref 0.2–1.1)
BILIRUB UR QL STRIP.AUTO: NEGATIVE
BUN BLD-MCNC: 12 MG/DL (ref 9–23)
CALCIUM BLD-MCNC: 9.9 MG/DL (ref 8.7–10.6)
CHLORIDE SERPL-SCNC: 104 MMOL/L (ref 98–112)
CO2 SERPL-SCNC: 25 MMOL/L (ref 21–32)
CREAT BLD-MCNC: 1.03 MG/DL (ref 0.55–1.02)
EGFRCR SERPLBLD CKD-EPI 2021: 56 ML/MIN/1.73M2 (ref 60–?)
EOSINOPHIL # BLD AUTO: 0.17 X10(3) UL (ref 0–0.7)
EOSINOPHIL NFR BLD AUTO: 0.9 %
ERYTHROCYTE [DISTWIDTH] IN BLOOD BY AUTOMATED COUNT: 14.7 %
GLOBULIN PLAS-MCNC: 3 G/DL (ref 2–3.5)
GLUCOSE BLD-MCNC: 109 MG/DL (ref 70–99)
GLUCOSE UR STRIP.AUTO-MCNC: NEGATIVE MG/DL
HCT VFR BLD AUTO: 38.8 % (ref 35–48)
HGB BLD-MCNC: 13 G/DL (ref 12–16)
IMM GRANULOCYTES # BLD AUTO: 0.04 X10(3) UL (ref 0–1)
IMM GRANULOCYTES NFR BLD: 0.2 %
INR BLD: 1.13 (ref 0.8–1.2)
LEUKOCYTE ESTERASE UR QL STRIP.AUTO: NEGATIVE
LYMPHOCYTES # BLD AUTO: 10.73 X10(3) UL (ref 1–4)
LYMPHOCYTES NFR BLD AUTO: 59.9 %
MCH RBC QN AUTO: 31.3 PG (ref 26–34)
MCHC RBC AUTO-ENTMCNC: 33.5 G/DL (ref 31–37)
MCV RBC AUTO: 93.5 FL (ref 80–100)
MONOCYTES # BLD AUTO: 0.92 X10(3) UL (ref 0.1–1)
MONOCYTES NFR BLD AUTO: 5.1 %
MORPHOLOGY: NORMAL
NEUTROPHILS # BLD AUTO: 6.02 X10 (3) UL (ref 1.5–7.7)
NEUTROPHILS # BLD AUTO: 6.02 X10(3) UL (ref 1.5–7.7)
NEUTROPHILS NFR BLD AUTO: 33.7 %
NITRITE UR QL STRIP.AUTO: NEGATIVE
OSMOLALITY SERPL CALC.SUM OF ELEC: 288 MOSM/KG (ref 275–295)
PH UR STRIP.AUTO: 5.5 (ref 5–8)
PLATELET # BLD AUTO: 182 10(3)UL (ref 150–450)
PLATELET MORPHOLOGY: NORMAL
POTASSIUM SERPL-SCNC: 5.2 MMOL/L (ref 3.5–5.1)
PROT SERPL-MCNC: 7.3 G/DL (ref 5.7–8.2)
PROTHROMBIN TIME: 14.6 SECONDS (ref 11.6–14.8)
RBC # BLD AUTO: 4.15 X10(6)UL (ref 3.8–5.3)
RBC #/AREA URNS AUTO: >10 /HPF
RBC #/AREA URNS AUTO: >10 /HPF
RH BLOOD TYPE: NEGATIVE
SODIUM SERPL-SCNC: 139 MMOL/L (ref 136–145)
SP GR UR STRIP.AUTO: 1.02 (ref 1–1.03)
UROBILINOGEN UR STRIP.AUTO-MCNC: 0.2 MG/DL
WBC # BLD AUTO: 17.9 X10(3) UL (ref 4–11)

## 2025-07-27 PROCEDURE — 74177 CT ABD & PELVIS W/CONTRAST: CPT | Performed by: EMERGENCY MEDICINE

## 2025-07-27 PROCEDURE — 99223 1ST HOSP IP/OBS HIGH 75: CPT | Performed by: HOSPITALIST

## 2025-07-27 RX ORDER — ALLOPURINOL 100 MG/1
100 TABLET ORAL DAILY
Status: DISCONTINUED | OUTPATIENT
Start: 2025-07-28 | End: 2025-07-30

## 2025-07-27 RX ORDER — LEVOTHYROXINE SODIUM 100 UG/1
100 TABLET ORAL
Status: DISCONTINUED | OUTPATIENT
Start: 2025-07-28 | End: 2025-07-30

## 2025-07-27 RX ORDER — MAGNESIUM HYDROXIDE 1200 MG/15ML
3000 LIQUID ORAL CONTINUOUS
Status: DISCONTINUED | OUTPATIENT
Start: 2025-07-27 | End: 2025-07-30

## 2025-07-27 RX ORDER — MEXILETINE HYDROCHLORIDE 150 MG/1
150 CAPSULE ORAL 2 TIMES DAILY
Status: DISCONTINUED | OUTPATIENT
Start: 2025-07-27 | End: 2025-07-30

## 2025-07-27 RX ORDER — ROSUVASTATIN CALCIUM 10 MG/1
10 TABLET, COATED ORAL EVERY EVENING
Status: DISCONTINUED | OUTPATIENT
Start: 2025-07-27 | End: 2025-07-30

## 2025-07-28 ENCOUNTER — ANESTHESIA EVENT (OUTPATIENT)
Dept: SURGERY | Facility: HOSPITAL | Age: 75
End: 2025-07-28
Payer: MEDICARE

## 2025-07-28 PROBLEM — I71.40 ABDOMINAL AORTIC ANEURYSM (AAA): Status: ACTIVE | Noted: 2025-07-28

## 2025-07-28 LAB
ANION GAP SERPL CALC-SCNC: 9 MMOL/L (ref 0–18)
BASOPHILS # BLD AUTO: 0.06 X10(3) UL (ref 0–0.2)
BASOPHILS NFR BLD AUTO: 0.4 %
BUN BLD-MCNC: 7 MG/DL (ref 9–23)
CALCIUM BLD-MCNC: 9.3 MG/DL (ref 8.7–10.6)
CHLORIDE SERPL-SCNC: 106 MMOL/L (ref 98–112)
CO2 SERPL-SCNC: 24 MMOL/L (ref 21–32)
CREAT BLD-MCNC: 0.91 MG/DL (ref 0.55–1.02)
EGFRCR SERPLBLD CKD-EPI 2021: 65 ML/MIN/1.73M2 (ref 60–?)
EOSINOPHIL # BLD AUTO: 0.15 X10(3) UL (ref 0–0.7)
EOSINOPHIL NFR BLD AUTO: 1 %
ERYTHROCYTE [DISTWIDTH] IN BLOOD BY AUTOMATED COUNT: 14.8 %
GLUCOSE BLD-MCNC: 107 MG/DL (ref 70–99)
HCT VFR BLD AUTO: 36.3 % (ref 35–48)
HGB BLD-MCNC: 12.1 G/DL (ref 12–16)
IMM GRANULOCYTES # BLD AUTO: 0.04 X10(3) UL (ref 0–1)
IMM GRANULOCYTES NFR BLD: 0.3 %
LYMPHOCYTES # BLD AUTO: 9.37 X10(3) UL (ref 1–4)
LYMPHOCYTES NFR BLD AUTO: 60.4 %
MCH RBC QN AUTO: 31.8 PG (ref 26–34)
MCHC RBC AUTO-ENTMCNC: 33.3 G/DL (ref 31–37)
MCV RBC AUTO: 95.3 FL (ref 80–100)
MONOCYTES # BLD AUTO: 0.9 X10(3) UL (ref 0.1–1)
MONOCYTES NFR BLD AUTO: 5.8 %
MORPHOLOGY: NORMAL
NEUTROPHILS # BLD AUTO: 4.99 X10 (3) UL (ref 1.5–7.7)
NEUTROPHILS # BLD AUTO: 4.99 X10(3) UL (ref 1.5–7.7)
NEUTROPHILS NFR BLD AUTO: 32.1 %
OSMOLALITY SERPL CALC.SUM OF ELEC: 286 MOSM/KG (ref 275–295)
PLATELET # BLD AUTO: 173 10(3)UL (ref 150–450)
PLATELET MORPHOLOGY: NORMAL
POTASSIUM SERPL-SCNC: 4.2 MMOL/L (ref 3.5–5.1)
RBC # BLD AUTO: 3.81 X10(6)UL (ref 3.8–5.3)
SODIUM SERPL-SCNC: 139 MMOL/L (ref 136–145)
WBC # BLD AUTO: 15.5 X10(3) UL (ref 4–11)

## 2025-07-28 PROCEDURE — 99222 1ST HOSP IP/OBS MODERATE 55: CPT | Performed by: PHYSICIAN ASSISTANT

## 2025-07-28 PROCEDURE — 99232 SBSQ HOSP IP/OBS MODERATE 35: CPT | Performed by: INTERNAL MEDICINE

## 2025-07-28 RX ORDER — SPIRONOLACTONE 25 MG/1
12.5 TABLET ORAL DAILY
Status: DISCONTINUED | OUTPATIENT
Start: 2025-07-28 | End: 2025-07-30

## 2025-07-28 RX ORDER — METOPROLOL SUCCINATE 25 MG/1
25 TABLET, EXTENDED RELEASE ORAL NIGHTLY
COMMUNITY
Start: 2025-07-15

## 2025-07-28 RX ORDER — SENNOSIDES 8.6 MG
17.2 TABLET ORAL NIGHTLY PRN
Status: DISCONTINUED | OUTPATIENT
Start: 2025-07-28 | End: 2025-07-30

## 2025-07-28 RX ORDER — TORSEMIDE 5 MG/1
10 TABLET ORAL DAILY
Status: DISCONTINUED | OUTPATIENT
Start: 2025-07-28 | End: 2025-07-30

## 2025-07-28 RX ORDER — METOCLOPRAMIDE HYDROCHLORIDE 5 MG/ML
5 INJECTION INTRAMUSCULAR; INTRAVENOUS EVERY 8 HOURS PRN
Status: DISCONTINUED | OUTPATIENT
Start: 2025-07-28 | End: 2025-07-30

## 2025-07-28 RX ORDER — BISACODYL 10 MG
10 SUPPOSITORY, RECTAL RECTAL
Status: DISCONTINUED | OUTPATIENT
Start: 2025-07-28 | End: 2025-07-30

## 2025-07-28 RX ORDER — BENZONATATE 100 MG/1
200 CAPSULE ORAL 3 TIMES DAILY PRN
Status: DISCONTINUED | OUTPATIENT
Start: 2025-07-28 | End: 2025-07-30

## 2025-07-28 RX ORDER — ACETAMINOPHEN 500 MG
500 TABLET ORAL EVERY 4 HOURS PRN
Status: DISCONTINUED | OUTPATIENT
Start: 2025-07-28 | End: 2025-07-30

## 2025-07-28 RX ORDER — SACUBITRIL AND VALSARTAN 97; 103 MG/1; MG/1
1 TABLET, FILM COATED ORAL 2 TIMES DAILY
COMMUNITY
Start: 2025-04-23

## 2025-07-28 RX ORDER — METOPROLOL SUCCINATE 25 MG/1
25 TABLET, EXTENDED RELEASE ORAL NIGHTLY
Status: DISCONTINUED | OUTPATIENT
Start: 2025-07-28 | End: 2025-07-30

## 2025-07-28 RX ORDER — ECHINACEA PURPUREA EXTRACT 125 MG
1 TABLET ORAL
Status: DISCONTINUED | OUTPATIENT
Start: 2025-07-28 | End: 2025-07-30

## 2025-07-28 RX ORDER — ONDANSETRON 2 MG/ML
4 INJECTION INTRAMUSCULAR; INTRAVENOUS EVERY 6 HOURS PRN
Status: DISCONTINUED | OUTPATIENT
Start: 2025-07-28 | End: 2025-07-30

## 2025-07-28 RX ORDER — SODIUM PHOSPHATE, DIBASIC AND SODIUM PHOSPHATE, MONOBASIC 7; 19 G/230ML; G/230ML
1 ENEMA RECTAL ONCE AS NEEDED
Status: DISCONTINUED | OUTPATIENT
Start: 2025-07-28 | End: 2025-07-30

## 2025-07-28 RX ORDER — SPIRONOLACTONE 25 MG/1
12.5 TABLET ORAL DAILY
COMMUNITY
Start: 2025-07-05

## 2025-07-28 RX ORDER — SACUBITRIL AND VALSARTAN 97; 103 MG/1; MG/1
1 TABLET, FILM COATED ORAL 2 TIMES DAILY
Status: DISCONTINUED | OUTPATIENT
Start: 2025-07-28 | End: 2025-07-30

## 2025-07-28 RX ORDER — POLYETHYLENE GLYCOL 3350 17 G/17G
17 POWDER, FOR SOLUTION ORAL DAILY PRN
Status: DISCONTINUED | OUTPATIENT
Start: 2025-07-28 | End: 2025-07-30

## 2025-07-28 RX ORDER — SOTALOL HYDROCHLORIDE 80 MG/1
80 TABLET ORAL 2 TIMES DAILY
COMMUNITY

## 2025-07-28 RX ORDER — SOTALOL HYDROCHLORIDE 80 MG/1
80 TABLET ORAL 2 TIMES DAILY
Status: DISCONTINUED | OUTPATIENT
Start: 2025-07-28 | End: 2025-07-30

## 2025-07-29 ENCOUNTER — APPOINTMENT (OUTPATIENT)
Dept: CV DIAGNOSTICS | Facility: HOSPITAL | Age: 75
End: 2025-07-29
Attending: INTERNAL MEDICINE

## 2025-07-29 ENCOUNTER — APPOINTMENT (OUTPATIENT)
Dept: GENERAL RADIOLOGY | Facility: HOSPITAL | Age: 75
End: 2025-07-29
Attending: UROLOGY

## 2025-07-29 ENCOUNTER — ANESTHESIA (OUTPATIENT)
Dept: SURGERY | Facility: HOSPITAL | Age: 75
End: 2025-07-29
Payer: MEDICARE

## 2025-07-29 ENCOUNTER — TELEPHONE (OUTPATIENT)
Dept: SURGERY | Facility: CLINIC | Age: 75
End: 2025-07-29

## 2025-07-29 ENCOUNTER — APPOINTMENT (OUTPATIENT)
Dept: GENERAL RADIOLOGY | Facility: HOSPITAL | Age: 75
End: 2025-07-29
Attending: INTERNAL MEDICINE

## 2025-07-29 DIAGNOSIS — R82.90 ABNORMAL URINE FINDINGS: Primary | ICD-10-CM

## 2025-07-29 DIAGNOSIS — N20.0 NEPHROLITHIASIS: ICD-10-CM

## 2025-07-29 PROBLEM — R31.9 URINARY TRACT INFECTION WITH HEMATURIA: Status: ACTIVE | Noted: 2025-07-29

## 2025-07-29 PROBLEM — N39.0 URINARY TRACT INFECTION WITH HEMATURIA: Status: ACTIVE | Noted: 2025-07-29

## 2025-07-29 LAB
ANION GAP SERPL CALC-SCNC: 10 MMOL/L (ref 0–18)
BUN BLD-MCNC: 10 MG/DL (ref 9–23)
CALCIUM BLD-MCNC: 9.7 MG/DL (ref 8.7–10.6)
CHLORIDE SERPL-SCNC: 105 MMOL/L (ref 98–112)
CO2 SERPL-SCNC: 26 MMOL/L (ref 21–32)
CREAT BLD-MCNC: 1 MG/DL (ref 0.55–1.02)
EGFRCR SERPLBLD CKD-EPI 2021: 58 ML/MIN/1.73M2 (ref 60–?)
GLUCOSE BLD-MCNC: 96 MG/DL (ref 70–99)
NON GYNE INTERPRETATION: NEGATIVE
OSMOLALITY SERPL CALC.SUM OF ELEC: 291 MOSM/KG (ref 275–295)
POTASSIUM SERPL-SCNC: 3.9 MMOL/L (ref 3.5–5.1)
SODIUM SERPL-SCNC: 141 MMOL/L (ref 136–145)

## 2025-07-29 PROCEDURE — 76376 3D RENDER W/INTRP POSTPROCES: CPT | Performed by: INTERNAL MEDICINE

## 2025-07-29 PROCEDURE — BT1D1ZZ FLUOROSCOPY OF RIGHT KIDNEY, URETER AND BLADDER USING LOW OSMOLAR CONTRAST: ICD-10-PCS | Performed by: UROLOGY

## 2025-07-29 PROCEDURE — 99232 SBSQ HOSP IP/OBS MODERATE 35: CPT | Performed by: PHYSICIAN ASSISTANT

## 2025-07-29 PROCEDURE — 74420 UROGRAPHY RTRGR +-KUB: CPT | Performed by: UROLOGY

## 2025-07-29 PROCEDURE — 0T768DZ DILATION OF RIGHT URETER WITH INTRALUMINAL DEVICE, VIA NATURAL OR ARTIFICIAL OPENING ENDOSCOPIC: ICD-10-PCS | Performed by: UROLOGY

## 2025-07-29 PROCEDURE — 93306 TTE W/DOPPLER COMPLETE: CPT | Performed by: INTERNAL MEDICINE

## 2025-07-29 PROCEDURE — 52332 CYSTOSCOPY AND TREATMENT: CPT | Performed by: UROLOGY

## 2025-07-29 PROCEDURE — 99232 SBSQ HOSP IP/OBS MODERATE 35: CPT | Performed by: INTERNAL MEDICINE

## 2025-07-29 DEVICE — IMPLANTABLE DEVICE: Type: IMPLANTABLE DEVICE | Site: URETER | Status: FUNCTIONAL

## 2025-07-29 RX ORDER — METOCLOPRAMIDE HYDROCHLORIDE 5 MG/ML
5 INJECTION INTRAMUSCULAR; INTRAVENOUS EVERY 8 HOURS PRN
Status: DISCONTINUED | OUTPATIENT
Start: 2025-07-29 | End: 2025-07-29 | Stop reason: HOSPADM

## 2025-07-29 RX ORDER — ACETAMINOPHEN 500 MG
1000 TABLET ORAL ONCE AS NEEDED
Status: DISCONTINUED | OUTPATIENT
Start: 2025-07-29 | End: 2025-07-29 | Stop reason: HOSPADM

## 2025-07-29 RX ORDER — NALOXONE HYDROCHLORIDE 0.4 MG/ML
0.08 INJECTION, SOLUTION INTRAMUSCULAR; INTRAVENOUS; SUBCUTANEOUS AS NEEDED
Status: DISCONTINUED | OUTPATIENT
Start: 2025-07-29 | End: 2025-07-29 | Stop reason: HOSPADM

## 2025-07-29 RX ORDER — LIDOCAINE HYDROCHLORIDE 10 MG/ML
INJECTION, SOLUTION EPIDURAL; INFILTRATION; INTRACAUDAL; PERINEURAL AS NEEDED
Status: DISCONTINUED | OUTPATIENT
Start: 2025-07-29 | End: 2025-07-29 | Stop reason: SURG

## 2025-07-29 RX ORDER — HYDROMORPHONE HYDROCHLORIDE 1 MG/ML
0.2 INJECTION, SOLUTION INTRAMUSCULAR; INTRAVENOUS; SUBCUTANEOUS EVERY 5 MIN PRN
Status: DISCONTINUED | OUTPATIENT
Start: 2025-07-29 | End: 2025-07-29 | Stop reason: HOSPADM

## 2025-07-29 RX ORDER — SODIUM CHLORIDE, SODIUM LACTATE, POTASSIUM CHLORIDE, CALCIUM CHLORIDE 600; 310; 30; 20 MG/100ML; MG/100ML; MG/100ML; MG/100ML
INJECTION, SOLUTION INTRAVENOUS CONTINUOUS PRN
Status: DISCONTINUED | OUTPATIENT
Start: 2025-07-29 | End: 2025-07-29 | Stop reason: SURG

## 2025-07-29 RX ORDER — LIDOCAINE HYDROCHLORIDE 20 MG/ML
JELLY TOPICAL AS NEEDED
Status: DISCONTINUED | OUTPATIENT
Start: 2025-07-29 | End: 2025-07-29 | Stop reason: HOSPADM

## 2025-07-29 RX ORDER — HYDROCODONE BITARTRATE AND ACETAMINOPHEN 5; 325 MG/1; MG/1
2 TABLET ORAL ONCE AS NEEDED
Status: DISCONTINUED | OUTPATIENT
Start: 2025-07-29 | End: 2025-07-29 | Stop reason: HOSPADM

## 2025-07-29 RX ORDER — SODIUM CHLORIDE, SODIUM LACTATE, POTASSIUM CHLORIDE, CALCIUM CHLORIDE 600; 310; 30; 20 MG/100ML; MG/100ML; MG/100ML; MG/100ML
INJECTION, SOLUTION INTRAVENOUS CONTINUOUS
Status: DISCONTINUED | OUTPATIENT
Start: 2025-07-29 | End: 2025-07-29 | Stop reason: HOSPADM

## 2025-07-29 RX ORDER — IOPAMIDOL 612 MG/ML
INJECTION, SOLUTION INTRAVASCULAR AS NEEDED
Status: DISCONTINUED | OUTPATIENT
Start: 2025-07-29 | End: 2025-07-29 | Stop reason: HOSPADM

## 2025-07-29 RX ORDER — HYDROMORPHONE HYDROCHLORIDE 1 MG/ML
0.4 INJECTION, SOLUTION INTRAMUSCULAR; INTRAVENOUS; SUBCUTANEOUS EVERY 5 MIN PRN
Status: DISCONTINUED | OUTPATIENT
Start: 2025-07-29 | End: 2025-07-29 | Stop reason: HOSPADM

## 2025-07-29 RX ORDER — PHENYLEPHRINE HCL 10 MG/ML
VIAL (ML) INJECTION AS NEEDED
Status: DISCONTINUED | OUTPATIENT
Start: 2025-07-29 | End: 2025-07-29 | Stop reason: SURG

## 2025-07-29 RX ORDER — ALBUTEROL SULFATE 0.83 MG/ML
2.5 SOLUTION RESPIRATORY (INHALATION) AS NEEDED
Status: DISCONTINUED | OUTPATIENT
Start: 2025-07-29 | End: 2025-07-29 | Stop reason: HOSPADM

## 2025-07-29 RX ORDER — ONDANSETRON 2 MG/ML
INJECTION INTRAMUSCULAR; INTRAVENOUS AS NEEDED
Status: DISCONTINUED | OUTPATIENT
Start: 2025-07-29 | End: 2025-07-29 | Stop reason: SURG

## 2025-07-29 RX ORDER — HYDROCODONE BITARTRATE AND ACETAMINOPHEN 5; 325 MG/1; MG/1
1 TABLET ORAL ONCE AS NEEDED
Status: DISCONTINUED | OUTPATIENT
Start: 2025-07-29 | End: 2025-07-29 | Stop reason: HOSPADM

## 2025-07-29 RX ORDER — LABETALOL HYDROCHLORIDE 5 MG/ML
5 INJECTION, SOLUTION INTRAVENOUS EVERY 5 MIN PRN
Status: DISCONTINUED | OUTPATIENT
Start: 2025-07-29 | End: 2025-07-29 | Stop reason: HOSPADM

## 2025-07-29 RX ORDER — ONDANSETRON 2 MG/ML
4 INJECTION INTRAMUSCULAR; INTRAVENOUS EVERY 6 HOURS PRN
Status: DISCONTINUED | OUTPATIENT
Start: 2025-07-29 | End: 2025-07-29 | Stop reason: HOSPADM

## 2025-07-29 RX ORDER — IBUPROFEN 600 MG/1
600 TABLET, FILM COATED ORAL ONCE AS NEEDED
Status: DISCONTINUED | OUTPATIENT
Start: 2025-07-29 | End: 2025-07-29 | Stop reason: HOSPADM

## 2025-07-29 RX ORDER — HYDROMORPHONE HYDROCHLORIDE 1 MG/ML
0.6 INJECTION, SOLUTION INTRAMUSCULAR; INTRAVENOUS; SUBCUTANEOUS EVERY 5 MIN PRN
Status: DISCONTINUED | OUTPATIENT
Start: 2025-07-29 | End: 2025-07-29 | Stop reason: HOSPADM

## 2025-07-29 RX ORDER — DEXAMETHASONE SODIUM PHOSPHATE 4 MG/ML
VIAL (ML) INJECTION AS NEEDED
Status: DISCONTINUED | OUTPATIENT
Start: 2025-07-29 | End: 2025-07-29 | Stop reason: SURG

## 2025-07-29 RX ADMIN — DEXAMETHASONE SODIUM PHOSPHATE 4 MG: 4 MG/ML VIAL (ML) INJECTION at 14:09:00

## 2025-07-29 RX ADMIN — SODIUM CHLORIDE, SODIUM LACTATE, POTASSIUM CHLORIDE, CALCIUM CHLORIDE: 600; 310; 30; 20 INJECTION, SOLUTION INTRAVENOUS at 13:55:00

## 2025-07-29 RX ADMIN — SODIUM CHLORIDE, SODIUM LACTATE, POTASSIUM CHLORIDE, CALCIUM CHLORIDE: 600; 310; 30; 20 INJECTION, SOLUTION INTRAVENOUS at 14:28:00

## 2025-07-29 RX ADMIN — ONDANSETRON 4 MG: 2 INJECTION INTRAMUSCULAR; INTRAVENOUS at 14:09:00

## 2025-07-29 RX ADMIN — DEXAMETHASONE SODIUM PHOSPHATE 4 MG: 4 MG/ML VIAL (ML) INJECTION at 14:10:00

## 2025-07-29 RX ADMIN — PHENYLEPHRINE HCL 200 MCG: 10 MG/ML VIAL (ML) INJECTION at 14:09:00

## 2025-07-29 RX ADMIN — PHENYLEPHRINE HCL 200 MCG: 10 MG/ML VIAL (ML) INJECTION at 14:17:00

## 2025-07-29 RX ADMIN — LIDOCAINE HYDROCHLORIDE 50 MG: 10 INJECTION, SOLUTION EPIDURAL; INFILTRATION; INTRACAUDAL; PERINEURAL at 14:02:00

## 2025-07-29 RX ADMIN — PHENYLEPHRINE HCL 100 MCG: 10 MG/ML VIAL (ML) INJECTION at 14:06:00

## 2025-07-30 ENCOUNTER — TELEPHONE (OUTPATIENT)
Dept: SURGERY | Facility: CLINIC | Age: 75
End: 2025-07-30

## 2025-07-30 VITALS
OXYGEN SATURATION: 100 % | DIASTOLIC BLOOD PRESSURE: 61 MMHG | HEART RATE: 60 BPM | HEIGHT: 70 IN | BODY MASS INDEX: 24.46 KG/M2 | SYSTOLIC BLOOD PRESSURE: 107 MMHG | RESPIRATION RATE: 17 BRPM | WEIGHT: 170.88 LBS | TEMPERATURE: 98 F

## 2025-07-30 PROBLEM — C91.10 CLL (CHRONIC LYMPHOCYTIC LEUKEMIA) (HCC): Status: ACTIVE | Noted: 2025-07-30

## 2025-07-30 PROBLEM — I50.22 CHRONIC HEART FAILURE WITH REDUCED EJECTION FRACTION (HFREF, <= 40%) (HCC): Status: ACTIVE | Noted: 2025-07-30

## 2025-07-30 LAB
ANION GAP SERPL CALC-SCNC: 9 MMOL/L (ref 0–18)
BASOPHILS # BLD AUTO: 0.08 X10(3) UL (ref 0–0.2)
BASOPHILS NFR BLD AUTO: 0.3 %
BUN BLD-MCNC: 10 MG/DL (ref 9–23)
CALCIUM BLD-MCNC: 10.5 MG/DL (ref 8.7–10.6)
CHLORIDE SERPL-SCNC: 103 MMOL/L (ref 98–112)
CO2 SERPL-SCNC: 23 MMOL/L (ref 21–32)
CREAT BLD-MCNC: 1.17 MG/DL (ref 0.55–1.02)
EGFRCR SERPLBLD CKD-EPI 2021: 48 ML/MIN/1.73M2 (ref 60–?)
EOSINOPHIL # BLD AUTO: 0.1 X10(3) UL (ref 0–0.7)
EOSINOPHIL NFR BLD AUTO: 0.4 %
ERYTHROCYTE [DISTWIDTH] IN BLOOD BY AUTOMATED COUNT: 14.4 %
GLUCOSE BLD-MCNC: 112 MG/DL (ref 70–99)
HCT VFR BLD AUTO: 41.7 % (ref 35–48)
HGB BLD-MCNC: 13.9 G/DL (ref 12–16)
IMM GRANULOCYTES # BLD AUTO: 0.07 X10(3) UL (ref 0–1)
IMM GRANULOCYTES NFR BLD: 0.3 %
LYMPHOCYTES # BLD AUTO: 14.53 X10(3) UL (ref 1–4)
LYMPHOCYTES NFR BLD AUTO: 58.6 %
MCH RBC QN AUTO: 31.5 PG (ref 26–34)
MCHC RBC AUTO-ENTMCNC: 33.3 G/DL (ref 31–37)
MCV RBC AUTO: 94.6 FL (ref 80–100)
MONOCYTES # BLD AUTO: 1.73 X10(3) UL (ref 0.1–1)
MONOCYTES NFR BLD AUTO: 7 %
NEUTROPHILS # BLD AUTO: 8.27 X10 (3) UL (ref 1.5–7.7)
NEUTROPHILS # BLD AUTO: 8.27 X10(3) UL (ref 1.5–7.7)
NEUTROPHILS NFR BLD AUTO: 33.4 %
OSMOLALITY SERPL CALC.SUM OF ELEC: 280 MOSM/KG (ref 275–295)
PLATELET # BLD AUTO: 173 10(3)UL (ref 150–450)
POTASSIUM SERPL-SCNC: 4.5 MMOL/L (ref 3.5–5.1)
RBC # BLD AUTO: 4.41 X10(6)UL (ref 3.8–5.3)
SODIUM SERPL-SCNC: 135 MMOL/L (ref 136–145)
WBC # BLD AUTO: 24.8 X10(3) UL (ref 4–11)

## 2025-07-30 PROCEDURE — 99231 SBSQ HOSP IP/OBS SF/LOW 25: CPT | Performed by: PHYSICIAN ASSISTANT

## 2025-07-30 PROCEDURE — 99239 HOSP IP/OBS DSCHRG MGMT >30: CPT | Performed by: INTERNAL MEDICINE

## 2025-07-30 RX ORDER — CEPHALEXIN 500 MG/1
500 CAPSULE ORAL 3 TIMES DAILY
Qty: 12 CAPSULE | Refills: 0 | Status: SHIPPED | OUTPATIENT
Start: 2025-07-30 | End: 2025-08-03

## 2025-07-30 RX ORDER — TORSEMIDE 20 MG/1
10 TABLET ORAL DAILY
Qty: 30 TABLET | Refills: 3 | Status: SHIPPED | OUTPATIENT
Start: 2025-07-30

## 2025-07-31 ENCOUNTER — PATIENT OUTREACH (OUTPATIENT)
Age: 75
End: 2025-07-31

## 2025-08-01 ENCOUNTER — TELEPHONE (OUTPATIENT)
Dept: SURGERY | Facility: CLINIC | Age: 75
End: 2025-08-01

## 2025-08-01 ENCOUNTER — TELEPHONE (OUTPATIENT)
Dept: FAMILY MEDICINE CLINIC | Facility: CLINIC | Age: 75
End: 2025-08-01

## 2025-08-01 ENCOUNTER — PATIENT OUTREACH (OUTPATIENT)
Dept: CASE MANAGEMENT | Age: 75
End: 2025-08-01

## 2025-08-04 ENCOUNTER — TELEPHONE (OUTPATIENT)
Dept: SURGERY | Facility: CLINIC | Age: 75
End: 2025-08-04

## 2025-08-12 ENCOUNTER — PATIENT OUTREACH (OUTPATIENT)
Age: 75
End: 2025-08-12

## 2025-08-19 ENCOUNTER — LAB ENCOUNTER (OUTPATIENT)
Dept: LAB | Age: 75
End: 2025-08-19
Attending: UROLOGY

## 2025-08-19 DIAGNOSIS — R82.90 ABNORMAL URINE FINDINGS: ICD-10-CM

## 2025-08-19 PROCEDURE — 87086 URINE CULTURE/COLONY COUNT: CPT

## 2025-08-21 ENCOUNTER — RESULTS FOLLOW-UP (OUTPATIENT)
Dept: SURGERY | Facility: CLINIC | Age: 75
End: 2025-08-21

## 2025-08-21 RX ORDER — CEPHALEXIN 500 MG/1
500 CAPSULE ORAL 3 TIMES DAILY
Qty: 15 CAPSULE | Refills: 0 | Status: SHIPPED | OUTPATIENT
Start: 2025-08-21 | End: 2025-08-26

## 2025-08-22 ENCOUNTER — PATIENT OUTREACH (OUTPATIENT)
Age: 75
End: 2025-08-22

## 2025-08-23 DIAGNOSIS — E03.9 HYPOTHYROIDISM, UNSPECIFIED TYPE: ICD-10-CM

## 2025-08-26 DIAGNOSIS — M1A.0790 CHRONIC GOUT OF FOOT, UNSPECIFIED CAUSE, UNSPECIFIED LATERALITY: ICD-10-CM

## 2025-08-26 RX ORDER — LEVOTHYROXINE SODIUM 100 UG/1
100 TABLET ORAL
Qty: 30 TABLET | Refills: 0 | Status: SHIPPED | OUTPATIENT
Start: 2025-08-26

## 2025-08-26 RX ORDER — ALLOPURINOL 100 MG/1
100 TABLET ORAL DAILY
Qty: 90 TABLET | Refills: 0 | Status: SHIPPED | OUTPATIENT
Start: 2025-08-26

## (undated) DEVICE — STANDARD HYPODERMIC NEEDLE,POLYPROPYLENE HUB: Brand: MONOJECT

## (undated) DEVICE — VIOLET BRAIDED (POLYGLACTIN 910), SYNTHETIC ABSORBABLE SUTURE: Brand: COATED VICRYL

## (undated) DEVICE — CELL SAVER RESERVOIR BRAT

## (undated) DEVICE — SUTURE PROLENE 6-0 C-1

## (undated) DEVICE — SUTURE PROLENE 5-0 C-1

## (undated) DEVICE — GLOVE SURG TRIUMPH SZ 7

## (undated) DEVICE — LIGASURE IMPACT OPEN DEVICE

## (undated) DEVICE — SOL  .9 1000ML BTL

## (undated) DEVICE — CELL SAVER BAG 600ML 4R2023

## (undated) DEVICE — SYRINGE MED 20ML STD CLR PLAS LL TIP N CTRL

## (undated) DEVICE — PIN STEINMAN SMOOTH 1/8 9

## (undated) DEVICE — CELL SAVER TUBING BRAT

## (undated) DEVICE — BASIC DOUBLE BASIN 1-LF: Brand: MEDLINE INDUSTRIES, INC.

## (undated) DEVICE — GAMMEX® PI HYBRID SIZE 8.5, STERILE POWDER-FREE SURGICAL GLOVE, POLYISOPRENE AND NEOPRENE BLEND: Brand: GAMMEX

## (undated) DEVICE — SUTURE VICRYL 3-0 PS-1

## (undated) DEVICE — 3M™ IOBAN™ 2 ANTIMICROBIAL INCISE DRAPE 6651EZ: Brand: IOBAN™ 2

## (undated) DEVICE — SUTURE PROLENE 5-0 RB-1

## (undated) DEVICE — SUTURE PROLENE 4-0 SH

## (undated) DEVICE — HEMOCLIP MED 24 CLIP/CARTRIDGE

## (undated) DEVICE — PROXIMATE SKIN STAPLERS (35 WIDE) CONTAINS 35 STAINLESS STEEL STAPLES (FIXED HEAD): Brand: PROXIMATE

## (undated) DEVICE — HAND PIECE LEFT ATRICURE

## (undated) DEVICE — DRESSING AQUACEL AG 3.5X12

## (undated) DEVICE — SYRINGE 10ML LL TIP

## (undated) DEVICE — ADAPTER BX SEAL Y BIOPSY PORT ADJ FOR HYSTEROSCOPE

## (undated) DEVICE — STERILE POLYISOPRENE POWDER-FREE SURGICAL GLOVES: Brand: PROTEXIS

## (undated) DEVICE — FIXED CORE WIRE GUIDE SAFE-T-J, CURVED: Brand: COOK

## (undated) DEVICE — ADHESIVE LIQ 2/3ML VI MASTISOL

## (undated) DEVICE — GLOVE SURG TRIUMPH SZ 8

## (undated) DEVICE — GEL AQUASONIC 100 20GR

## (undated) DEVICE — Device

## (undated) DEVICE — SUTURE ETHIBOND 1 OS-6

## (undated) DEVICE — Device: Brand: STABLECUT®

## (undated) DEVICE — TIBURON DRAPE TOWELS: Brand: CONVERTORS

## (undated) DEVICE — LAPAROTOMY CDS: Brand: MEDLINE INDUSTRIES, INC.

## (undated) DEVICE — UNDYED BRAIDED (POLYGLACTIN 910), SYNTHETIC ABSORBABLE SUTURE: Brand: COATED VICRYL

## (undated) DEVICE — ABSORBABLE HEMOSTAT (OXIDIZED REGENERATED CELLULOSE, U.S.P.): Brand: SURGICEL

## (undated) DEVICE — SUTURE VICRYL 0 CP-1

## (undated) DEVICE — GAUZE SPONGES,12 PLY: Brand: CURITY

## (undated) DEVICE — STOCKINETTE HYDROMED 8X6

## (undated) DEVICE — PACK PBDS CYSTOSCOPY

## (undated) DEVICE — SOL  .9 500ML

## (undated) DEVICE — TOTAL HIP CDS: Brand: MEDLINE INDUSTRIES, INC.

## (undated) DEVICE — SUTURE VICRYL 0

## (undated) DEVICE — SYRINGE MED 10ML LL TIP W/O SFTY DISP

## (undated) DEVICE — SOL  .9 3000ML

## (undated) DEVICE — SPONGE: LAP 18X18 W XR 200/CS: Brand: MEDICAL ACTION INDUSTRIES

## (undated) DEVICE — CATHETER URET 5FR L70CM FLX OPN TIP NONPORTED

## (undated) DEVICE — GLOVE SURG TRIUMPH SZ 71/2

## (undated) DEVICE — SUTURE POLYDEK 2-0

## (undated) DEVICE — OPEN HEART: Brand: MEDLINE INDUSTRIES, INC.

## (undated) DEVICE — TRAY FOLEY 16F IC URINMETER

## (undated) DEVICE — KENDALL SCD EXPRESS SLEEVES, KNEE LENGTH, MEDIUM: Brand: KENDALL SCD

## (undated) DEVICE — CHLORAPREP 26ML APPLICATOR

## (undated) DEVICE — SUTURE PROLENE 3-0 SH

## (undated) DEVICE — POOLE SUCTION HANDLE: Brand: CARDINAL HEALTH

## (undated) DEVICE — SUTURE ETHIBOND 5 CCS

## (undated) DEVICE — TOWEL: OR BLU 80/CS: Brand: MEDICAL ACTION INDUSTRIES

## (undated) DEVICE — TRANSPOSAL ULTRAFLEX DUO/QUAD ULTRA CART MANIFOLD

## (undated) DEVICE — SUTURE VICRYL 2-0 FSL

## (undated) DEVICE — CV PACK-LF: Brand: MEDLINE INDUSTRIES, INC.

## (undated) DEVICE — 3M™ STERI-DRAPE™ U-DRAPE 1015: Brand: STERI-DRAPE™

## (undated) DEVICE — SUTURE VICRYL 2-0 CT-1

## (undated) DEVICE — SUTURE PDS II 1 TP-1

## (undated) DEVICE — SLEEVE COMPR MD KNEE LEN SGL USE KENDALL SCD

## (undated) DEVICE — SUTURE PROLENE 7-0 CC

## (undated) DEVICE — CHLORAPREP ORANGE TINT 10.5ML

## (undated) DEVICE — DECANTER BAG 9": Brand: MEDLINE INDUSTRIES, INC.

## (undated) DEVICE — GOWN,SIRUS,FABRIC-REINFORCED,X-LARGE: Brand: MEDLINE

## (undated) DEVICE — SUTURE SILK 2-0

## (undated) DEVICE — SOLUTION IRRIG 3000ML 0.9% NACL FLX CONT

## (undated) DEVICE — PROXIMATE LINEAR STAPLER RELOADS: Brand: PROXIMATE

## (undated) DEVICE — HEMOCLIP HORIZON SM MULTI

## (undated) DEVICE — DRESSING AQUACEL AG 3.5 X 10

## (undated) DEVICE — HEMOCLIP HORIZON MED 002200

## (undated) DEVICE — MLPD DISPOSABLE PAD (6' ROLL) 3 ROLLS: Brand: SCHAERER MEDICAL USA

## (undated) DEVICE — SUTURE POLYDEK 4-0 TIES 6-932

## (undated) DEVICE — PROXIMATE RELOADABLE LINEAR CUTTER WITH SAFETY LOCK-OUT, 75MM: Brand: PROXIMATE

## (undated) DEVICE — LAPAROTOMY SPONGE - RF AND X-RAY DETECTABLE PRE-WASHED: Brand: SITUATE

## (undated) DEVICE — CELL SAVER 5/5+ BOWL KIT-225ML: Brand: HAEMONETICS CELL SAVER 5/5+ SYSTEMS

## (undated) DEVICE — DERMABOND LIQUID ADHESIVE

## (undated) DEVICE — BLADE STERNAL SAW BULK PACK

## (undated) DEVICE — TRAY ENDOVEIN KTV16 HARVESTING

## (undated) DEVICE — DRAPE SLUSH/WARMER W/DISC

## (undated) DEVICE — PROXIMATE RELOADABLE LINEAR STAPLER: Brand: PROXIMATE

## (undated) DEVICE — SUTURE VICRYL 2-0

## (undated) DEVICE — GLOVE SUR 7.5 SENSICARE PI PIP CRM PWD F

## (undated) DEVICE — MEDI-VAC SUCTION FINE CAPACITY: Brand: CARDINAL HEALTH

## (undated) DEVICE — MARKER SKIN 2 TIP

## (undated) DEVICE — SOL LACT RINGERS 1000ML

## (undated) DEVICE — PROXIMATE LINEAR CUTTER RELOAD, BLUE, 75MM: Brand: PROXIMATE

## (undated) DEVICE — 2C14 #2 PDO 45 X 45: Brand: 2C14 #2 PDO 45 X 45

## (undated) DEVICE — PDS II VLT 0 107CM AG ST3: Brand: ENDOLOOP

## (undated) NOTE — LETTER
03/10/22        Tram Bnuny  [address] [place] [address]      Dear Carmel Riley,    [facility] records indicate that you have outstanding lab work and or testing that was ordered for you and has not yet been completed:  Orders Placed This Encounter      CBC With Differential With Platelet      Comp Metabolic Panel (14)      Lipid Panel      TSH and Free T4      Uric Acid    To provide you with the best possible care, please complete these orders at your earliest convenience. If you have recently completed these orders please disregard this letter. If you have any questions please call the office at Dept: [phone].      Thank you,       Lily Holden MD

## (undated) NOTE — LETTER
[facility]  Monse Torres 60 [address] [facility], [address] [place] [address]    Consent for Operation    Date: __________________    Time: _______________    1.  I authorize the performance upon Yakovalejandro Lindo the following operation:    Procedure(s):  Exploratory laparoto procedure has been videotaped, the surgeon will obtain the original videotape. The hospital will not be responsible for storage or maintenance of this tape.     6. For the purpose of advancing medical education, I consent to the admittance of observers to t STATEMENTS REQUIRING INSERTION OR COMPLETION WERE FILLED IN.     Signature of Patient:   ___________________________    When the patient is a minor or mentally incompetent to give consent:  Signature of person authorized to consent for patient: ____________ drugs/illegal medications). Failure to inform my anesthesiologist about these medicines may increase my risk of anesthetic complications. · If I am allergic to anything or have had a reaction to anesthesia before.     3. I understand how the anesthesia med I have discussed the procedure and information above with the patient (or patient’s representative) and answered their questions. The patient or their representative has agreed to have anesthesia services.     _______________________________________________

## (undated) NOTE — LETTER
[facility]  Monse Torres 60 [address] [facility], [address] [place] [address]    Consent for Operation    Date: __________________    Time: _______________    1.  I authorize the performance upon Lucila Mallory the following operation:    Procedure(s):  coronary artery bypa procedure has been videotaped, the surgeon will obtain the original videotape. The hospital will not be responsible for storage or maintenance of this tape.     6. For the purpose of advancing medical education, I consent to the admittance of observers to t STATEMENTS REQUIRING INSERTION OR COMPLETION WERE FILLED IN.     Signature of Patient:   ___________________________    When the patient is a minor or mentally incompetent to give consent:  Signature of person authorized to consent for patient: ____________ drugs/illegal medications). Failure to inform my anesthesiologist about these medicines may increase my risk of anesthetic complications. · If I am allergic to anything or have had a reaction to anesthesia before.     3. I understand how the anesthesia med I have discussed the procedure and information above with the patient (or patient’s representative) and answered their questions. The patient or their representative has agreed to have anesthesia services.     _______________________________________________

## (undated) NOTE — Clinical Note
Hi Dr. Kalyan Sanon saw Mrs. Martell today. She expressed a lot of frustration about her illnesses and having to see so many physicians.  I did recommend hematology evaluation due to elevated light chain ratio, which may not be anything but in the setting of

## (undated) NOTE — Clinical Note
Hi, Dr. Phipps Pac! Thank you for referring Mrs. Letha Vallejo for rheumatologic evaluation. Please see the discussion portion of my note and let me know if you have any questions.  CAROLYN Ferguson [id number]

## (undated) NOTE — MR AVS SNAPSHOT
[facility], [address] [address] [id number]               Thank you for choosing us for your health care visit with Melony Wagner MD.  We are glad to serve you and happy to provide you with this shi Instructions and Information about Your Health     None      Allergies as of Jun 09, 2017     Pcn [Penicillins]     Protamine     Seafood Nausea and vomiting    Sulfa Antibiotics                 Today's Vital Signs     BP Pulse Temp Weight          90/50 m For medical emergencies, dial 911. Educational Information     TOP FALL PREVENTION TIPS    INSIDE YOUR HOME   KITCHEN:  ? Use non skid mats only. ? Clean up spills as soon as they happen. ? Keep objects that you use often within easy reach.   BA

## (undated) NOTE — LETTER
[address] [facility] FOR BLOOD OR BLOOD COMPONENTS      In the course of your treatment, it may become necessary to administer a transfusion of blood or blood components.  This form provides basic information concerning this proc explain the alternatives to you if it has not already been done. I,Sonia Martell, have read/had read to me the above. I understand the matters bearing on the decision whether or not to authorize a transfusion of blood or blood components.  I have no ques

## (undated) NOTE — Clinical Note
\" what surgery the patient had (knee, hip, spine): right SAIMA\" admit date: 5/10/19\" d/c date: 5/12/19\" last name of surgeon: Dr. Maier Sep

## (undated) NOTE — Clinical Note
FYI, TCM call made, see notes. NCM attempted to schedule a TCM HFU appointment, patient's  states they will call the office themselves to schedule. Message sent to MD's office.

## (undated) NOTE — Clinical Note
Transitional Care Management call completed. The patient is scheduled for a Transitional Care Management appointment tomorrow, 6/12/2024. Thank you.

## (undated) NOTE — LETTER
Deborah Patterson M.D., F.A.C.S. Spencer Casas M.D., F.A.C.S. Charlene Zuluaga M.D., Maria E Oneil. ALEX Vaca M.D., F.A.C.S. Alban Rosales. Karen Barraza M.D., F.A.C.S. Mike Mercer M.D. FABBY Bowers M.D., FUmesh chance to have all of your questions and concerns answered. If there are any issues which have not been adequately addressed, we ask you to bring them forward so that we can thoroughly address them.     A patient who is fully informed and understands their treatment, among other options and the risks and benefits of the different treatment options:    Yes _____ No _____    A [facility] surgeon as explained to me that if I should so desire, he/she is willing to explain my case and the surgical and non-surgical optio

## (undated) NOTE — Clinical Note
KWANI, TCM call made, see notes. TCM HFU scheduled for 6/9/17, since patient is at High risk for readmission it is recommended that she be seen by 6/7/17 (within 7 days of discharge). ANASTACIA sent a message to MD's office.

## (undated) NOTE — IP AVS SNAPSHOT
[facility] [place] [address] [place], [address] ~ [phone]                Discharge Summary   5/9/2017    [address] [place] [address]           Admission Information        Provider Department    5/9/2017 Anushka Green MD  7ne-A         Sariah Larry Commonly known as:  VIBRAMYCIN   Next dose due:  5/18 evening        Take 1 capsule (100 mg total) by mouth 2 (two) times daily.    Stop taking on:  5/25/2017    Janet Pen                              HYDROcodone-acetaminophen 5-325 MG Tabs   Last time Patient Instructions       Cover dressing with gauze. Change daily    Discharge References/Attachments     ABDOMEN: INCISION CARE (ENGLISH)      Follow-up Information     Follow up with Hermilo Encarnacion MD On 5/30/2017.     Specialties:  [facility] 411.0     (05/13/17)  16.9 (H) (05/13/17)  3.24 (L) (05/13/17)  9.5 (L) (05/13/17)  28.4 (L) (05/13/17)  87.7    (05/13/17)  294.0       Recent Hematology Lab Results (cont.)  (Last 3 results in the past 90 days)    Neutrophil % Lymphocyte % Monocyte % Eos We are concerned for your overall well being:    - If you are a smoker or have smoked in the last 12 months, we encourage you to explore options for quitting.     - If you have concerns related to behavioral health issues or thoughts of harming yourself, call your provider or healthcare team if you have any questions regarding your medications while at home.          Ant-Infective Medications     Doxycycline Hyclate 100 MG Oral Cap         Use: Treat infections or suspected infection   Most common side effe Use: Treat pain, fever, inflammation   Most common side effects: Stomach upset   What to report to your healthcare team: Stomach upset, unresolved pain           GI Medications     Probiotic Product (PROBIOTIC OR)       Use:  Nausea/vomiting, acid reflux,

## (undated) NOTE — LETTER
August 28, 2024      Sonia Martell  [address] [place] [address]      Dear Sonia:    We have attempted to contact you by phone with no success.   has asked me to contact you because she feels you would benefit from our Chronic Care Management program that [facility] is offering to its Medicare patients.  Dr. Arnold is interested in making sure her patients with chronic conditions are receiving the best possible care. If you choose to enroll in this Chronic Care Management program you will be working closely with me, your Care Manager.  I will provide you with the support and education needed to keep you healthy.   Together we will work on:  Coordination of care: helping to reduce duplicate orders/tests to save you time and money  Medications: review, educate, and discuss any concerns or issues you may have  Personalized education and support regarding your health.  These services, among others, will help you take control of your health and provide you with optimal quality care.   We ask that you take the time to review this information and consider all of the outstanding benefits available to you.   Your health is important to us! Please call me to discuss your final decision and any questions you may have.   We look forward to working with you,  Josefa Bustos  [facility]   Care Management Department    [email or web address]   [phone] work  [address],  [place] [address]

## (undated) NOTE — LETTER
Patient Name: Norm Hernández  YOB: 1950          MRN number:  [id number]  Date:  6/7/2019  Referring Physician:  Kody Amato

## (undated) NOTE — MR AVS SNAPSHOT
[facility]  [address] [facility]  [place] [id number] [phone]               Thank you for choosing us for your health care visit with YULIET Fairbanks. We are glad to serve you and happy to provide you with this summary of your visit.   Keegan Call [phone] for help. MyChart is NOT to be used for urgent needs. For medical emergencies, dial 911. Educational Information     Your blood pressure indicates you may be at-risk for Hypertension.    Please consider the following Lifestyle M active are less likely to develop some chronic diseases than adults who are inactive.      HOW TO GET STARTED: HOW TO STAY MOTIVATED:   Start activities slowly and build up over time Do what you like   Get your heart pumping – brisk walking, biking, swimmin

## (undated) NOTE — IP AVS SNAPSHOT
[facility] [place] [address] [place], [address] ~ [phone]                Discharge Summary   5/21/2017    [address] [place] [address]           Admission Information        Provider Department    5/21/2017 Edgard Kauffman MD  8ne-A Last time this was given:  400 mg on 5/31/2017  7:57 AM   Commonly known as:  PACERONE   Next dose due:  6/1 9AM        Take 1 tablet (200 mg total) by mouth daily.     Marlin Cooper                           aspirin 325 MG Tbec   Last time this was give [email or web address]  Your appointment with Cardiopulmonary Rehabilitation is on 6/27/2017 at 10am.   Prior to this appointment: See cardiologist and if stress test is ordered, please complete.  Check with your insurance plan for coverag Follow up with Nargis Veronica MD On 6/8/2017. Specialties:  SURGERY, CARDIOVASCULAR, Surgery, Thoracic    Why:  After Dr. Lesia Huizar appointment for staple removal    Contact information:    [address] [facility] 0.02 (05/28/17)  0.03    (05/27/17)  79.1 (05/27/17)  10.2 (05/27/17)  9.8 (05/27/17)  0.0 (05/27/17)  0.2  (05/27/17)  20.19 (H) (05/27/17)  2.59 (05/27/17)  2.51 (H) (05/27/17)  0.00 (05/27/17)  1.10      Metabolic Lab Results  (Last result in the past 9 You can access your MyChart to more actively manage your health care and view more details from this visit by going to [email or web address].   If you've recently had a stay at the Hospital you can access your discharge instructions in [address] by cj What to report to your healthcare team: Dizziness, decreased urine amount           Cholesterol Lowering Medications     atorvastatin 40 MG Oral Tab       Use: Lower cholesterol, protect your heart   Most common side effects: Dizziness, constipation, abnor Multiple Vitamin (MULTI-VITAMIN DAILY OR)

## (undated) NOTE — LETTER
[facility] Testing Department  Phone: [phone]  Right Fax: [phone]  EVALUATION REQUEST PREOP    Sent By:  Mel Fish Rn Date: 4/29/19    Patient Name: Aaliyah Santiago  Surgery Date: 5/10/2019    CSN: [id number]  Medical Record: [id number]

## (undated) NOTE — LETTER
[facility] [address], [address]  Consent for Procedure/Sedation    Date: 10/15/2019    Time: 1236      1.  I authorize the performance upon Meli Lee the following: Peripheral angiography, atherectomy, percutaneous translum you may develop a skin reaction.       Signature of Patient: _______________________________________________________    Signature of person authorized                                           Relationship to  to consent for patient: _______________________

## (undated) NOTE — LETTER
[facility] [address], [address]  Consent for Procedure/Sedation    Date:     Time:      1. I authorize the performance upon Inocencio Gonzalez the following:  Dual Chamber Internal Cardoverter Defibrillator Implant     2.  I Carmen Murray Signature of person authorized to consent for patient:        Relationship to patient:    ________________________________    ___________________    Witness: _________________________      Date: ___________________    Printed: 10/30/2017   9:17 AM  Patient

## (undated) NOTE — LETTER
[facility] [address], [address] [facility]    Consent for Anesthesia   1.    Karyn Varma agree to be cared for by a physician anesthesiologist alone and/or with a nurse anesthetist, who is specially trained to monitor me and give me allergic reactions to medications, injury to my airway, heart, lungs, vision, nerves, or muscles and in extremely rare instances death. 5. My doctor has explained to me other choices available to me for my care (alternatives).   6. Pregnant Patients (“epid Printed: 10/16/2019 at 9:53 AM    Medical Record #: [id number]                                            Page 1 of 1

## (undated) NOTE — LETTER
Keily Antonio [address] [address]  [place], [address] [facility]    Consent for Operation  Date: _____10/14/2019_____________                                Time: _______1941________    1.  I authorize the performance upon Meli Lee the following operati videotape. [facility] will not be responsible for storage or maintenance of this tape. 6. For the purpose of advancing medical education, I consent to the admittance of observers to the Operating Room.   7. I authorize the use of any specimen, organs, ti When the patient is a minor or mentally incompetent to give consent:  Signature of person authorized to consent for patient: ___________________________   Relationship to patient: ____________________________________________________    Signature of Witness these medicines may increase my risk of anesthetic complications. iv. If I am allergic to anything or have had a reaction to anesthesia before. 3. I understand how the anesthesia medicine will help me (benefits).   4. I understand that with any type of an patient’s representative) and answered their questions. The patient or their representative has agreed to have anesthesia services.     _____________________________________________________________________________  Witness        Date   Time  I have radha

## (undated) NOTE — LETTER
[facility] [address], [address] [place] [address]    Consent for Anesthesia   1.    Mike Lange agree to be cared for by an anesthesiologist, who is specially trained to monitor me and give me medicine to put me to sleep or keep me comforta vision, nerves, or muscles and in extremely rare instances death. 5. My doctor has explained to me other choices available to me for my care (alternatives).   6. Pregnant Patients (“epidural”):  I understand that the risks of having an epidural (medicine g

## (undated) NOTE — LETTER
Keily Antonio [address] [facility] [address], [address] [facility]     PICC LINE INSERTION CONSENT     I agree to have a Peripherally Inserted Central Catheter (PICC) placed in my arm.    1. The PICC insertion procedure, care, maintenance, risks, benefits, and c goals; and potential problems that might occur during recuperation.  They also discussed reasonable alternatives to the PICC, including risks, benefits, and side effects related to the alternatives and risks related to not receiving this procedure      ____